# Patient Record
Sex: FEMALE | Race: WHITE | NOT HISPANIC OR LATINO | Employment: OTHER | URBAN - METROPOLITAN AREA
[De-identification: names, ages, dates, MRNs, and addresses within clinical notes are randomized per-mention and may not be internally consistent; named-entity substitution may affect disease eponyms.]

---

## 2017-01-26 ENCOUNTER — HOSPITAL ENCOUNTER (OUTPATIENT)
Dept: INFUSION THERAPY | Facility: HOSPITAL | Age: 72
Discharge: HOME | End: 2017-01-26
Admitting: INTERNAL MEDICINE

## 2017-01-26 ENCOUNTER — DOCUMENTATION (OUTPATIENT)
Dept: GASTROENTEROLOGY | Facility: CLINIC | Age: 72
End: 2017-01-26

## 2017-01-26 VITALS
BODY MASS INDEX: 23.76 KG/M2 | TEMPERATURE: 96 F | RESPIRATION RATE: 20 BRPM | OXYGEN SATURATION: 98 % | WEIGHT: 145 LBS | SYSTOLIC BLOOD PRESSURE: 96 MMHG | DIASTOLIC BLOOD PRESSURE: 93 MMHG | HEART RATE: 74 BPM

## 2017-01-26 DIAGNOSIS — D83.9 COMMON VARIABLE IMMUNODEFICIENCY (HCC): ICD-10-CM

## 2017-01-26 DIAGNOSIS — D80.2 IGA DEFICIENCY (HCC): ICD-10-CM

## 2017-01-26 LAB — IGG1 SER-MCNC: 809 MG/DL (ref 700–1600)

## 2017-01-26 PROCEDURE — 36415 COLL VENOUS BLD VENIPUNCTURE: CPT

## 2017-01-26 PROCEDURE — 96366 THER/PROPH/DIAG IV INF ADDON: CPT

## 2017-01-26 PROCEDURE — 25010000002 IMMUNE GLOBULIN (HUMAN) PER 500 MG: Performed by: ALLERGY & IMMUNOLOGY

## 2017-01-26 PROCEDURE — 96365 THER/PROPH/DIAG IV INF INIT: CPT

## 2017-01-26 RX ORDER — DIPHENHYDRAMINE HYDROCHLORIDE 50 MG/ML
50 INJECTION INTRAMUSCULAR; INTRAVENOUS ONCE AS NEEDED
Status: CANCELLED | OUTPATIENT
Start: 2017-01-26

## 2017-01-26 RX ORDER — ACETAMINOPHEN 500 MG
500 TABLET ORAL ONCE AS NEEDED
Status: DISCONTINUED | OUTPATIENT
Start: 2017-01-26 | End: 2017-01-28 | Stop reason: HOSPADM

## 2017-01-26 RX ORDER — ACETAMINOPHEN 500 MG
500 TABLET ORAL ONCE
Status: CANCELLED
Start: 2017-01-26 | End: 2017-01-26

## 2017-01-26 RX ORDER — EPINEPHRINE 0.3 MG/.3ML
0.3 INJECTION SUBCUTANEOUS ONCE AS NEEDED
Status: DISCONTINUED | OUTPATIENT
Start: 2017-01-26 | End: 2017-01-28 | Stop reason: HOSPADM

## 2017-01-26 RX ORDER — DIPHENHYDRAMINE HCL 25 MG
25 CAPSULE ORAL ONCE
Status: DISCONTINUED | OUTPATIENT
Start: 2017-01-26 | End: 2017-01-28 | Stop reason: HOSPADM

## 2017-01-26 RX ORDER — DIPHENHYDRAMINE HYDROCHLORIDE 50 MG/ML
50 INJECTION INTRAMUSCULAR; INTRAVENOUS ONCE AS NEEDED
Status: DISCONTINUED | OUTPATIENT
Start: 2017-01-26 | End: 2017-01-28 | Stop reason: HOSPADM

## 2017-01-26 RX ORDER — DIPHENHYDRAMINE HCL 25 MG
25 CAPSULE ORAL ONCE
Status: CANCELLED | OUTPATIENT
Start: 2017-01-26

## 2017-01-26 RX ORDER — ACETAMINOPHEN 500 MG
500 TABLET ORAL ONCE AS NEEDED
Status: CANCELLED
Start: 2017-01-26

## 2017-01-26 RX ORDER — ACETAMINOPHEN 500 MG
500 TABLET ORAL ONCE
Status: DISCONTINUED | OUTPATIENT
Start: 2017-01-26 | End: 2017-01-28 | Stop reason: HOSPADM

## 2017-01-26 RX ORDER — EPINEPHRINE 0.3 MG/.3ML
0.3 INJECTION SUBCUTANEOUS ONCE AS NEEDED
Status: CANCELLED
Start: 2017-01-26

## 2017-01-26 RX ADMIN — IMMUNE GLOBULIN INTRAVENOUS (HUMAN) 10 G: KIT at 13:32

## 2017-01-26 RX ADMIN — IMMUNE GLOBULIN INTRAVENOUS (HUMAN) 10 G: KIT at 14:39

## 2017-01-26 NOTE — IP AVS SNAPSHOT
Elaina Tom   1/26/2017 12:00 PM   IVIG    Provider:  ROOM 8 [facility] ACU   Department:  [facility] CARE   Dept Phone:  [phone]                Your Full Care Plan           To Do List     2/23/2017 12:00 PM     Appointment with ROOM 9 [facility] ACU at [facility] CARE ([phone])   Reyna DELACRUZ [facility] [id number]            Your Updated Medication List      ASK your doctor about these medications     ALBUTEROL IN       b complex vitamins tablet       CALCIUM MAGNESIUM PO       CO Q 10 PO       CRANBERRY PO       dicyclomine 10 MG capsule   Commonly known as:  BENTYL   Take 1 capsule by mouth 4 (four) times a day before meals and nightly. TAKE 1 TO 2 CAPSULES EVERY 4 TO 6 HOURS AS NEEDED.       esomeprazole 20 MG capsule   Commonly known as:  NEXIUM   Take 1 capsule by mouth 2 (Two) Times a Day.       ESTRACE VA       FLAX SEEDS PO       fluticasone 50 MCG/BLIST diskus inhaler   Commonly known as:  FLOVENT DISKUS       FOLIC ACID PO       GAMMAGARD IJ       Ginkgo 60 MG tablet       Ginkgo Biloba extract       LECITHIN PO       MUCINEX PO       MULTIVITAMIN ADULT PO       * NON FORMULARY       * NON FORMULARY       * NON FORMULARY       * NON FORMULARY       * NON FORMULARY       OMEGA 3 PO       RESVERATROL PO       sucralfate 1 GM/10ML suspension   Commonly known as:  CARAFATE   Take 10 mL by mouth 4 (four) times a day.       Vinpocetine powder       VITAMIN A PO       Vitamin C 500 MG capsule       VITAMIN D-3 PO       VITAMIN E COMPLETE capsule       WELLNESS ESSENTIALS FOR JOINT PO       ZINC PO       * Notice:  This list has 5 medication(s) that are the same as other medications prescribed for you. Read the directions carefully, and ask your doctor or other care provider to review them with you.            MyChart Signup     [facility] allows you to send messages to your doctor, view your test results, renew your prescriptions,  schedule appointments, and more. To sign up, go to [email or web address] and click on the Sign Up Now link in the New User? box. Enter your [employer] Activation Code exactly as it appears below along with the last four digits of your Social Security Number and your Date of Birth () to complete the sign-up process. If you do not sign up before the expiration date, you must request a new code.    [employer] Activation Code: [email or web address]  Expires: 2017 12:20 PM    If you have questions, you can email [email or web address] or call [phone] to talk to our [employer] staff. Remember, [employer] is NOT to be used for urgent needs. For medical emergencies, dial 911.               Other Info from Your Visit           Allergies     Avelox [Moxifloxacin]     Erythromycin     Levaquin [Levofloxacin]     Other     BLOOD PRODUCTS WITH IGA IN THEM      Reason for Visit     Outpatient Infusion IVIG      Vital Signs     Blood Pressure Pulse Temperature Respirations Weight Oxygen Saturation    134/69 62 96 °F (35.6 °C) (Oral) 20 145 lb (65.8 kg) 98%    Body Mass Index Smoking Status                23.76 kg/m2 Never Smoker          Medications Administered     immune globulin (human) (GAMMAGARD S/D) injection 10 g                  immune globulin (human) (GAMMAGARD S/D) injection 10 g                  immune globulin (human) (GAMMAGARD S/D) injection 10 g                  immune globulin (human) (GAMMAGARD S/D) injection 10 g                  immune globulin (human) (GAMMAGARD S/D) injection 10 g                    Discharge Instructions     None         SYMPTOMS OF A STROKE    Call 911 or have someone take you to the Emergency Department if you have any of the following:    · Sudden numbness or weakness of your face, arm or leg especially on one side of the body  · Sudden confusion, diffiiculty speaking or trouble understanding   · Changes in your vision or loss of sight in one eye  · Sudden severe headache with no  known cause  · sudden dizziness, trouble walking, loss of balance or coordination    It is important to seek emergency care right away if you have further stroke symptoms. If you get emergency help quickly, the powerful clot-dissolving medicines can reduce the disabilities caused by a stroke.     For more information:    American Stroke Association  9-292-6-STROKE  www.strokeassociation.org           IF YOU SMOKE OR USE TOBACCO PLEASE READ THE FOLLOWING:    Why is smoking bad for me?  Smoking increases the risk of heart disease, lung disease, vascular disease, stroke, and cancer.     If you smoke, STOP!    If you would like more information on quitting smoking, please visit the [employer] website: [email or web address]   This link will provide additional resources including the QUIT line and the Beat the Pack support groups.     For more information:    American Cancer Society  [phone]    American Heart Association  1-353.854.2109

## 2017-02-23 ENCOUNTER — HOSPITAL ENCOUNTER (OUTPATIENT)
Dept: INFUSION THERAPY | Facility: HOSPITAL | Age: 72
Discharge: HOME | End: 2017-02-23
Admitting: INTERNAL MEDICINE

## 2017-02-23 VITALS
BODY MASS INDEX: 23.76 KG/M2 | DIASTOLIC BLOOD PRESSURE: 67 MMHG | OXYGEN SATURATION: 95 % | RESPIRATION RATE: 20 BRPM | SYSTOLIC BLOOD PRESSURE: 120 MMHG | HEART RATE: 77 BPM | TEMPERATURE: 98 F | WEIGHT: 145 LBS

## 2017-02-23 DIAGNOSIS — D83.9 COMMON VARIABLE IMMUNODEFICIENCY (HCC): ICD-10-CM

## 2017-02-23 DIAGNOSIS — D80.2 IGA DEFICIENCY (HCC): ICD-10-CM

## 2017-02-23 PROCEDURE — 96366 THER/PROPH/DIAG IV INF ADDON: CPT

## 2017-02-23 PROCEDURE — 25010000002 IMMUNE GLOBULIN (HUMAN) PER 500 MG: Performed by: ALLERGY & IMMUNOLOGY

## 2017-02-23 PROCEDURE — 96365 THER/PROPH/DIAG IV INF INIT: CPT

## 2017-02-23 RX ORDER — ACETAMINOPHEN 500 MG
500 TABLET ORAL ONCE AS NEEDED
Status: DISCONTINUED | OUTPATIENT
Start: 2017-02-23 | End: 2017-02-25 | Stop reason: HOSPADM

## 2017-02-23 RX ORDER — EPINEPHRINE 0.3 MG/.3ML
0.3 INJECTION SUBCUTANEOUS ONCE AS NEEDED
Status: CANCELLED
Start: 2017-02-23

## 2017-02-23 RX ORDER — ACETAMINOPHEN 500 MG
500 TABLET ORAL ONCE
Status: CANCELLED
Start: 2017-02-23 | End: 2017-02-23

## 2017-02-23 RX ORDER — ACETAMINOPHEN 500 MG
500 TABLET ORAL ONCE AS NEEDED
Status: CANCELLED
Start: 2017-02-23

## 2017-02-23 RX ORDER — DIPHENHYDRAMINE HYDROCHLORIDE 50 MG/ML
50 INJECTION INTRAMUSCULAR; INTRAVENOUS ONCE AS NEEDED
Status: CANCELLED | OUTPATIENT
Start: 2017-02-23

## 2017-02-23 RX ORDER — DIPHENHYDRAMINE HCL 25 MG
25 CAPSULE ORAL ONCE
Status: DISCONTINUED | OUTPATIENT
Start: 2017-02-23 | End: 2017-02-25 | Stop reason: HOSPADM

## 2017-02-23 RX ORDER — DIPHENHYDRAMINE HCL 25 MG
25 CAPSULE ORAL ONCE
Status: CANCELLED | OUTPATIENT
Start: 2017-02-23

## 2017-02-23 RX ORDER — DIPHENHYDRAMINE HYDROCHLORIDE 50 MG/ML
50 INJECTION INTRAMUSCULAR; INTRAVENOUS ONCE AS NEEDED
Status: DISCONTINUED | OUTPATIENT
Start: 2017-02-23 | End: 2017-02-25 | Stop reason: HOSPADM

## 2017-02-23 RX ORDER — EPINEPHRINE 0.3 MG/.3ML
0.3 INJECTION SUBCUTANEOUS ONCE AS NEEDED
Status: DISCONTINUED | OUTPATIENT
Start: 2017-02-23 | End: 2017-02-25 | Stop reason: HOSPADM

## 2017-02-23 RX ORDER — ACETAMINOPHEN 500 MG
500 TABLET ORAL ONCE
Status: DISCONTINUED | OUTPATIENT
Start: 2017-02-23 | End: 2017-02-25 | Stop reason: HOSPADM

## 2017-02-23 RX ADMIN — IMMUNE GLOBULIN INTRAVENOUS (HUMAN) 10 G: KIT at 15:02

## 2017-02-23 RX ADMIN — IMMUNE GLOBULIN INTRAVENOUS (HUMAN) 10 G: KIT at 13:55

## 2017-03-21 ENCOUNTER — APPOINTMENT (OUTPATIENT)
Dept: WOMENS IMAGING | Facility: HOSPITAL | Age: 72
End: 2017-03-21

## 2017-03-21 PROCEDURE — G0206 DX MAMMO INCL CAD UNI: HCPCS | Performed by: RADIOLOGY

## 2017-03-21 PROCEDURE — G0279 TOMOSYNTHESIS, MAMMO: HCPCS | Performed by: RADIOLOGY

## 2017-03-21 PROCEDURE — G0202 SCR MAMMO BI INCL CAD: HCPCS | Performed by: RADIOLOGY

## 2017-03-21 PROCEDURE — MDREVIEWSP: Performed by: RADIOLOGY

## 2017-03-21 PROCEDURE — 77063 BREAST TOMOSYNTHESIS BI: CPT | Performed by: RADIOLOGY

## 2017-03-23 ENCOUNTER — HOSPITAL ENCOUNTER (OUTPATIENT)
Dept: INFUSION THERAPY | Facility: HOSPITAL | Age: 72
Discharge: HOME | End: 2017-03-23
Admitting: ALLERGY & IMMUNOLOGY

## 2017-03-23 VITALS
RESPIRATION RATE: 20 BRPM | OXYGEN SATURATION: 97 % | DIASTOLIC BLOOD PRESSURE: 42 MMHG | SYSTOLIC BLOOD PRESSURE: 118 MMHG | BODY MASS INDEX: 23.76 KG/M2 | HEART RATE: 74 BPM | TEMPERATURE: 97.5 F | WEIGHT: 145 LBS

## 2017-03-23 DIAGNOSIS — D80.2 IGA DEFICIENCY (HCC): ICD-10-CM

## 2017-03-23 DIAGNOSIS — D83.9 COMMON VARIABLE IMMUNODEFICIENCY (HCC): ICD-10-CM

## 2017-03-23 PROCEDURE — 96366 THER/PROPH/DIAG IV INF ADDON: CPT

## 2017-03-23 PROCEDURE — 25010000002 IMMUNE GLOBULIN (HUMAN) PER 500 MG: Performed by: ALLERGY & IMMUNOLOGY

## 2017-03-23 PROCEDURE — 96365 THER/PROPH/DIAG IV INF INIT: CPT

## 2017-03-23 RX ORDER — DIPHENHYDRAMINE HYDROCHLORIDE 50 MG/ML
50 INJECTION INTRAMUSCULAR; INTRAVENOUS ONCE AS NEEDED
Status: CANCELLED | OUTPATIENT
Start: 2017-03-23

## 2017-03-23 RX ORDER — ACETAMINOPHEN 500 MG
500 TABLET ORAL ONCE AS NEEDED
Status: CANCELLED
Start: 2017-03-23

## 2017-03-23 RX ORDER — ACETAMINOPHEN 500 MG
500 TABLET ORAL ONCE
Status: CANCELLED
Start: 2017-03-23 | End: 2017-03-23

## 2017-03-23 RX ORDER — DIPHENHYDRAMINE HCL 25 MG
25 CAPSULE ORAL ONCE
Status: CANCELLED | OUTPATIENT
Start: 2017-03-23

## 2017-03-23 RX ORDER — ACETAMINOPHEN 500 MG
500 TABLET ORAL ONCE
Status: DISCONTINUED | OUTPATIENT
Start: 2017-03-23 | End: 2017-03-25 | Stop reason: HOSPADM

## 2017-03-23 RX ORDER — EPINEPHRINE 0.3 MG/.3ML
0.3 INJECTION SUBCUTANEOUS ONCE AS NEEDED
Status: DISCONTINUED | OUTPATIENT
Start: 2017-03-23 | End: 2017-03-25 | Stop reason: HOSPADM

## 2017-03-23 RX ORDER — EPINEPHRINE 0.3 MG/.3ML
0.3 INJECTION SUBCUTANEOUS ONCE AS NEEDED
Status: CANCELLED
Start: 2017-03-23

## 2017-03-23 RX ORDER — DIPHENHYDRAMINE HCL 25 MG
25 CAPSULE ORAL ONCE
Status: DISCONTINUED | OUTPATIENT
Start: 2017-03-23 | End: 2017-03-25 | Stop reason: HOSPADM

## 2017-03-23 RX ORDER — DIPHENHYDRAMINE HYDROCHLORIDE 50 MG/ML
50 INJECTION INTRAMUSCULAR; INTRAVENOUS ONCE AS NEEDED
Status: DISCONTINUED | OUTPATIENT
Start: 2017-03-23 | End: 2017-03-25 | Stop reason: HOSPADM

## 2017-03-23 RX ORDER — ACETAMINOPHEN 500 MG
500 TABLET ORAL ONCE AS NEEDED
Status: DISCONTINUED | OUTPATIENT
Start: 2017-03-23 | End: 2017-03-25 | Stop reason: HOSPADM

## 2017-03-23 RX ADMIN — IMMUNE GLOBULIN INTRAVENOUS (HUMAN) 10 G: KIT at 14:40

## 2017-03-23 RX ADMIN — IMMUNE GLOBULIN INTRAVENOUS (HUMAN) 10 G: KIT at 13:35

## 2017-03-24 ENCOUNTER — APPOINTMENT (OUTPATIENT)
Dept: WOMENS IMAGING | Facility: HOSPITAL | Age: 72
End: 2017-03-24

## 2017-03-24 PROCEDURE — 76641 ULTRASOUND BREAST COMPLETE: CPT | Performed by: RADIOLOGY

## 2017-03-28 ENCOUNTER — APPOINTMENT (OUTPATIENT)
Dept: WOMENS IMAGING | Facility: HOSPITAL | Age: 72
End: 2017-03-28

## 2017-03-28 PROCEDURE — 19083 BX BREAST 1ST LESION US IMAG: CPT | Performed by: RADIOLOGY

## 2017-04-13 ENCOUNTER — OFFICE VISIT (OUTPATIENT)
Dept: SURGERY | Facility: CLINIC | Age: 72
End: 2017-04-13

## 2017-04-13 VITALS — OXYGEN SATURATION: 96 % | HEART RATE: 93 BPM | WEIGHT: 147.2 LBS | HEIGHT: 65 IN | BODY MASS INDEX: 24.53 KG/M2

## 2017-04-13 DIAGNOSIS — C50.912 BREAST CANCER, STAGE 1, LEFT (HCC): Primary | ICD-10-CM

## 2017-04-13 PROCEDURE — 99203 OFFICE O/P NEW LOW 30 MIN: CPT | Performed by: SURGERY

## 2017-04-13 RX ORDER — FLUTICASONE PROPIONATE 50 MCG
2 SPRAY, SUSPENSION (ML) NASAL AS NEEDED
COMMUNITY

## 2017-04-13 RX ORDER — OCTISALATE, AVOBENZONE, HOMOSALATE, AND OCTOCRYLENE 29.4; 29.4; 49; 25.48 MG/ML; MG/ML; MG/ML; MG/ML
1 LOTION TOPICAL DAILY
COMMUNITY
End: 2018-07-11

## 2017-04-13 RX ORDER — ESTRADIOL 10 UG/1
1 INSERT VAGINAL 3 TIMES DAILY
COMMUNITY
End: 2017-05-15 | Stop reason: SDUPTHER

## 2017-04-13 RX ORDER — CEFAZOLIN SODIUM 2 G/100ML
2 INJECTION, SOLUTION INTRAVENOUS ONCE
Status: CANCELLED | OUTPATIENT
Start: 2017-04-13 | End: 2017-04-13

## 2017-04-14 ENCOUNTER — TRANSCRIBE ORDERS (OUTPATIENT)
Dept: ADMINISTRATIVE | Facility: HOSPITAL | Age: 72
End: 2017-04-14

## 2017-04-14 DIAGNOSIS — D05.92 UNSPECIFIED TYPE OF CARCINOMA IN SITU OF LEFT BREAST: Primary | ICD-10-CM

## 2017-04-14 NOTE — PROGRESS NOTES
CC:  Newly diagnosed breast cancer    HPI:  71-year-old lady presents with abnormal mammogram and ultrasound:    Mammogram 3/21/17 demonstrated irregular mass measuring 12 mm with associated architectural distortion in the posterior one third 6:30 o'clock region of the left breast located 6 cm from the nipple.    Ultrasound 3/24/17 solid mass in left breast at 6:00 located 5 cm from nipple is suspicious, measuring 7 mm.    No symptoms, no palpable mass on self-exam    ROS:  No chest pain or shortness of air.  Negative for unexpected weight loss.  All other systems reviewed and negative other than presenting complaints.    PSH:    EGD 2016  Colonoscopy 2014  Left breast biopsy 2007    PMH:    Gastroesophageal reflux disease  Common variable immune deficiency    MEDICATIONS: reviewed, in Epic    ALLERGIES: reviewed, in [facility]    FAMILY HISTORY:    Negative for breast cancer    SOCIAL HISTORY:   Denies tobacco use  Occasional alcohol use    PHYSICAL EXAM:   Constitutional: Well-developed well-nourished, no acute distress  Eyes: Conjunctiva normal, sclera nonicteric  ENMT: Hearing grossly normal, oral mucosa moist  Neck: Supple, no palpable mass, normal thyroid, trachea midline  Respiratory: Clear to auscultation, normal inspiratory effort  Cardiovascular: Regular rate, no murmur, no carotid bruit, no peripheral edema, no jugular venous distention  Gastrointestinal: Soft, nontender  Lymphatics (palpable nodes):  cervical-negative, axillary-negative  Breast:  In the lower mid to outer section of the left breast a proximally 5-6 cm from the nipple is a small perhaps 1 cm firm movable palpable nodule which likely represents a malignancy in question although there is significant ecchymosis from recent biopsy and this could simply be a hematoma as well.  No other palpable or visible abnormality is noted in the left or right breast, no skin retraction, no nipple discharge  Musculoskeletal: Symmetric strength, normal  gait  Psychiatric: Alert and oriented ×3, normal affect     PATHOLOGY:    Invasive ductal carcinoma low-grade, largest focus measuring 7.5 mm    Estrogen and progesterone receptor strongly positive, HER-2 trae negative    CLINICAL SUMMARY (A/P):    71-year-old lady with low-grade invasive duct carcinoma (7.5 mm on biopsy, 7 mm on ultrasound and 12 mm on mammogram), strongly estrogen and progesterone receptor positive, HER-2 negative.  She does have a history of atypical duct hyperplasia in the left breast in the upper central portion diagnosed in 2007.  I discussed the significance of the previous diagnosis of atypical duct hyperplasia with her and her family.  She is a very good candidate for left breast needle localization lumpectomy and sentinel lymph node biopsy and wishes to proceed accordingly.  She understands with atypical hyperplasia she is at increased risk of breast cancer in both breasts in all quadrants and that bilateral mastectomy would also be an option but she is not interested in pursuing this.  She has scheduled for surgery on May 19.    Steve Leon M.D.

## 2017-04-18 DIAGNOSIS — D83.9 COMMON VARIABLE IMMUNODEFICIENCY (HCC): Primary | ICD-10-CM

## 2017-04-20 ENCOUNTER — INFUSION (OUTPATIENT)
Dept: ONCOLOGY | Facility: HOSPITAL | Age: 72
End: 2017-04-20

## 2017-04-20 VITALS
TEMPERATURE: 97.9 F | OXYGEN SATURATION: 99 % | DIASTOLIC BLOOD PRESSURE: 81 MMHG | BODY MASS INDEX: 24.84 KG/M2 | WEIGHT: 147 LBS | HEART RATE: 70 BPM | SYSTOLIC BLOOD PRESSURE: 131 MMHG

## 2017-04-20 DIAGNOSIS — D80.2 IGA DEFICIENCY (HCC): ICD-10-CM

## 2017-04-20 DIAGNOSIS — D83.9 COMMON VARIABLE IMMUNODEFICIENCY (HCC): Primary | ICD-10-CM

## 2017-04-20 LAB
ALBUMIN SERPL-MCNC: 4.4 G/DL (ref 3.5–5.2)
ALBUMIN/GLOB SERPL: 2 G/DL
ALP SERPL-CCNC: 70 U/L (ref 39–117)
ALT SERPL W P-5'-P-CCNC: 18 U/L (ref 1–33)
ANION GAP SERPL CALCULATED.3IONS-SCNC: 10 MMOL/L
AST SERPL-CCNC: 21 U/L (ref 1–32)
BASOPHILS # BLD AUTO: 0.03 10*3/MM3 (ref 0–0.2)
BASOPHILS NFR BLD AUTO: 0.7 % (ref 0–1.5)
BILIRUB SERPL-MCNC: 0.8 MG/DL (ref 0.1–1.2)
BUN BLD-MCNC: 14 MG/DL (ref 8–23)
BUN/CREAT SERPL: 18.7 (ref 7–25)
CALCIUM SPEC-SCNC: 9.3 MG/DL (ref 8.6–10.5)
CHLORIDE SERPL-SCNC: 105 MMOL/L (ref 98–107)
CO2 SERPL-SCNC: 28 MMOL/L (ref 22–29)
CREAT BLD-MCNC: 0.75 MG/DL (ref 0.57–1)
DEPRECATED RDW RBC AUTO: 46.6 FL (ref 37–54)
EOSINOPHIL # BLD AUTO: 0.05 10*3/MM3 (ref 0–0.7)
EOSINOPHIL NFR BLD AUTO: 1.2 % (ref 0.3–6.2)
ERYTHROCYTE [DISTWIDTH] IN BLOOD BY AUTOMATED COUNT: 13.1 % (ref 11.7–13)
GFR SERPL CREATININE-BSD FRML MDRD: 77 ML/MIN/1.73
GLOBULIN UR ELPH-MCNC: 2.2 GM/DL
GLUCOSE BLD-MCNC: 94 MG/DL (ref 65–99)
HCT VFR BLD AUTO: 36.4 % (ref 35.6–45.5)
HGB BLD-MCNC: 12.2 G/DL (ref 11.9–15.5)
IGG1 SER-MCNC: 811 MG/DL (ref 700–1600)
IMM GRANULOCYTES # BLD: 0.01 10*3/MM3 (ref 0–0.03)
IMM GRANULOCYTES NFR BLD: 0.2 % (ref 0–0.5)
LYMPHOCYTES # BLD AUTO: 1.18 10*3/MM3 (ref 0.9–4.8)
LYMPHOCYTES NFR BLD AUTO: 27.7 % (ref 19.6–45.3)
MCH RBC QN AUTO: 32.4 PG (ref 26.9–32)
MCHC RBC AUTO-ENTMCNC: 33.5 G/DL (ref 32.4–36.3)
MCV RBC AUTO: 96.8 FL (ref 80.5–98.2)
MONOCYTES # BLD AUTO: 0.29 10*3/MM3 (ref 0.2–1.2)
MONOCYTES NFR BLD AUTO: 6.8 % (ref 5–12)
NEUTROPHILS # BLD AUTO: 2.7 10*3/MM3 (ref 1.9–8.1)
NEUTROPHILS NFR BLD AUTO: 63.4 % (ref 42.7–76)
NRBC BLD MANUAL-RTO: 0 /100 WBC (ref 0–0)
PLATELET # BLD AUTO: 251 10*3/MM3 (ref 140–500)
PMV BLD AUTO: 10 FL (ref 6–12)
POTASSIUM BLD-SCNC: 4.2 MMOL/L (ref 3.5–5.2)
PROT SERPL-MCNC: 6.6 G/DL (ref 6–8.5)
RBC # BLD AUTO: 3.76 10*6/MM3 (ref 3.9–5.2)
SODIUM BLD-SCNC: 143 MMOL/L (ref 136–145)
WBC NRBC COR # BLD: 4.26 10*3/MM3 (ref 4.5–10.7)

## 2017-04-20 PROCEDURE — 85025 COMPLETE CBC W/AUTO DIFF WBC: CPT | Performed by: NURSE PRACTITIONER

## 2017-04-20 PROCEDURE — 96365 THER/PROPH/DIAG IV INF INIT: CPT | Performed by: NURSE PRACTITIONER

## 2017-04-20 PROCEDURE — 80053 COMPREHEN METABOLIC PANEL: CPT | Performed by: NURSE PRACTITIONER

## 2017-04-20 PROCEDURE — 36415 COLL VENOUS BLD VENIPUNCTURE: CPT | Performed by: NURSE PRACTITIONER

## 2017-04-20 PROCEDURE — 96366 THER/PROPH/DIAG IV INF ADDON: CPT | Performed by: NURSE PRACTITIONER

## 2017-04-20 PROCEDURE — 25010000002 IMMUNE GLOBULIN (HUMAN) PER 500 MG: Performed by: ALLERGY & IMMUNOLOGY

## 2017-04-20 RX ORDER — ACETAMINOPHEN 500 MG
500 TABLET ORAL ONCE
Status: CANCELLED
Start: 2017-04-20 | End: 2017-04-20

## 2017-04-20 RX ORDER — EPINEPHRINE 0.3 MG/.3ML
0.3 INJECTION SUBCUTANEOUS ONCE AS NEEDED
Status: CANCELLED
Start: 2017-04-20

## 2017-04-20 RX ORDER — DIPHENHYDRAMINE HYDROCHLORIDE 50 MG/ML
50 INJECTION INTRAMUSCULAR; INTRAVENOUS ONCE AS NEEDED
Status: CANCELLED | OUTPATIENT
Start: 2017-04-20

## 2017-04-20 RX ORDER — ACETAMINOPHEN 500 MG
500 TABLET ORAL ONCE AS NEEDED
Status: CANCELLED
Start: 2017-04-20

## 2017-04-20 RX ORDER — DIPHENHYDRAMINE HCL 25 MG
25 CAPSULE ORAL ONCE
Status: CANCELLED | OUTPATIENT
Start: 2017-04-20

## 2017-04-20 RX ADMIN — IMMUNE GLOBULIN INTRAVENOUS (HUMAN) 10 G: KIT at 12:35

## 2017-04-20 RX ADMIN — IMMUNE GLOBULIN INTRAVENOUS (HUMAN) 10 G: KIT at 13:53

## 2017-05-15 ENCOUNTER — APPOINTMENT (OUTPATIENT)
Dept: PREADMISSION TESTING | Facility: HOSPITAL | Age: 72
End: 2017-05-15

## 2017-05-15 PROCEDURE — 93005 ELECTROCARDIOGRAM TRACING: CPT

## 2017-05-15 PROCEDURE — 93010 ELECTROCARDIOGRAM REPORT: CPT | Performed by: INTERNAL MEDICINE

## 2017-05-17 RX ORDER — ACETAMINOPHEN 325 MG/1
650 TABLET ORAL ONCE
Status: CANCELLED
Start: 2017-05-17 | End: 2017-05-17

## 2017-05-18 ENCOUNTER — INFUSION (OUTPATIENT)
Dept: ONCOLOGY | Facility: HOSPITAL | Age: 72
End: 2017-05-18

## 2017-05-18 VITALS
BODY MASS INDEX: 24.94 KG/M2 | WEIGHT: 147.6 LBS | HEART RATE: 80 BPM | OXYGEN SATURATION: 97 % | SYSTOLIC BLOOD PRESSURE: 126 MMHG | TEMPERATURE: 97.4 F | DIASTOLIC BLOOD PRESSURE: 77 MMHG

## 2017-05-18 DIAGNOSIS — D80.2 IGA DEFICIENCY (HCC): ICD-10-CM

## 2017-05-18 DIAGNOSIS — D83.9 COMMON VARIABLE IMMUNODEFICIENCY (HCC): Primary | ICD-10-CM

## 2017-05-18 PROCEDURE — 96366 THER/PROPH/DIAG IV INF ADDON: CPT | Performed by: NURSE PRACTITIONER

## 2017-05-18 PROCEDURE — 25010000002 IMMUNE GLOBULIN (HUMAN) PER 500 MG: Performed by: ALLERGY & IMMUNOLOGY

## 2017-05-18 PROCEDURE — 96365 THER/PROPH/DIAG IV INF INIT: CPT | Performed by: NURSE PRACTITIONER

## 2017-05-18 RX ORDER — DIPHENHYDRAMINE HCL 25 MG
25 CAPSULE ORAL ONCE
Status: CANCELLED | OUTPATIENT
Start: 2017-05-18

## 2017-05-18 RX ORDER — DIPHENHYDRAMINE HYDROCHLORIDE 50 MG/ML
50 INJECTION INTRAMUSCULAR; INTRAVENOUS ONCE AS NEEDED
Status: CANCELLED | OUTPATIENT
Start: 2017-05-18

## 2017-05-18 RX ORDER — EPINEPHRINE 0.3 MG/.3ML
0.3 INJECTION SUBCUTANEOUS ONCE AS NEEDED
Status: CANCELLED
Start: 2017-05-18

## 2017-05-18 RX ORDER — ACETAMINOPHEN 500 MG
500 TABLET ORAL ONCE AS NEEDED
Status: CANCELLED
Start: 2017-05-18

## 2017-05-18 RX ORDER — ACETAMINOPHEN 325 MG/1
650 TABLET ORAL ONCE
Status: CANCELLED
Start: 2017-05-18 | End: 2017-05-18

## 2017-05-18 RX ADMIN — IMMUNE GLOBULIN INTRAVENOUS (HUMAN) 10 G: KIT at 14:07

## 2017-05-18 RX ADMIN — IMMUNE GLOBULIN INTRAVENOUS (HUMAN) 10 G: KIT at 12:32

## 2017-05-19 ENCOUNTER — APPOINTMENT (OUTPATIENT)
Dept: GENERAL RADIOLOGY | Facility: HOSPITAL | Age: 72
End: 2017-05-19

## 2017-05-19 ENCOUNTER — HOSPITAL ENCOUNTER (OUTPATIENT)
Dept: MAMMOGRAPHY | Facility: HOSPITAL | Age: 72
Discharge: HOME | End: 2017-05-19
Attending: SURGERY

## 2017-05-19 ENCOUNTER — HOSPITAL ENCOUNTER (OUTPATIENT)
Facility: HOSPITAL | Age: 72
Setting detail: HOSPITAL OUTPATIENT SURGERY
Discharge: HOME | End: 2017-05-19
Attending: SURGERY | Admitting: SURGERY

## 2017-05-19 ENCOUNTER — APPOINTMENT (OUTPATIENT)
Dept: NUCLEAR MEDICINE | Facility: HOSPITAL | Age: 72
End: 2017-05-19

## 2017-05-19 ENCOUNTER — ANESTHESIA (OUTPATIENT)
Dept: PERIOP | Facility: HOSPITAL | Age: 72
End: 2017-05-19

## 2017-05-19 ENCOUNTER — ANESTHESIA EVENT (OUTPATIENT)
Dept: PERIOP | Facility: HOSPITAL | Age: 72
End: 2017-05-19

## 2017-05-19 VITALS
DIASTOLIC BLOOD PRESSURE: 85 MMHG | SYSTOLIC BLOOD PRESSURE: 147 MMHG | RESPIRATION RATE: 16 BRPM | OXYGEN SATURATION: 97 % | HEART RATE: 66 BPM | TEMPERATURE: 97.4 F

## 2017-05-19 DIAGNOSIS — C50.912 BREAST CANCER, STAGE 1, LEFT (HCC): ICD-10-CM

## 2017-05-19 DIAGNOSIS — D05.92 UNSPECIFIED TYPE OF CARCINOMA IN SITU OF LEFT BREAST: ICD-10-CM

## 2017-05-19 PROCEDURE — 38900 IO MAP OF SENT LYMPH NODE: CPT | Performed by: SURGERY

## 2017-05-19 PROCEDURE — 0 TECHNETIUM FILTERED SULFUR COLLOID: Performed by: SURGERY

## 2017-05-19 PROCEDURE — 25010000003 CEFAZOLIN IN DEXTROSE 2-4 GM/100ML-% SOLUTION: Performed by: SURGERY

## 2017-05-19 PROCEDURE — 88307 TISSUE EXAM BY PATHOLOGIST: CPT | Performed by: SURGERY

## 2017-05-19 PROCEDURE — 38525 BIOPSY/REMOVAL LYMPH NODES: CPT | Performed by: SURGERY

## 2017-05-19 PROCEDURE — A9541 TC99M SULFUR COLLOID: HCPCS | Performed by: SURGERY

## 2017-05-19 PROCEDURE — 25010000002 PROPOFOL 10 MG/ML EMULSION: Performed by: NURSE ANESTHETIST, CERTIFIED REGISTERED

## 2017-05-19 PROCEDURE — 19301 PARTIAL MASTECTOMY: CPT | Performed by: SURGERY

## 2017-05-19 PROCEDURE — 25010000002 ONDANSETRON PER 1 MG: Performed by: NURSE ANESTHETIST, CERTIFIED REGISTERED

## 2017-05-19 PROCEDURE — 19301 PARTIAL MASTECTOMY: CPT | Performed by: PHYSICIAN ASSISTANT

## 2017-05-19 PROCEDURE — 25010000002 MIDAZOLAM PER 1 MG: Performed by: ANESTHESIOLOGY

## 2017-05-19 PROCEDURE — 38792 RA TRACER ID OF SENTINL NODE: CPT

## 2017-05-19 PROCEDURE — 25010000002 FENTANYL CITRATE (PF) 100 MCG/2ML SOLUTION: Performed by: NURSE ANESTHETIST, CERTIFIED REGISTERED

## 2017-05-19 PROCEDURE — 25010000002 DEXAMETHASONE PER 1 MG: Performed by: NURSE ANESTHETIST, CERTIFIED REGISTERED

## 2017-05-19 PROCEDURE — 76098 X-RAY EXAM SURGICAL SPECIMEN: CPT

## 2017-05-19 RX ORDER — NALBUPHINE HCL 10 MG/ML
2 AMPUL (ML) INJECTION EVERY 4 HOURS PRN
Status: DISCONTINUED | OUTPATIENT
Start: 2017-05-19 | End: 2017-05-19 | Stop reason: HOSPADM

## 2017-05-19 RX ORDER — HYDROCODONE BITARTRATE AND ACETAMINOPHEN 5; 325 MG/1; MG/1
TABLET ORAL
Qty: 40 TABLET | Refills: 0 | Status: SHIPPED | OUTPATIENT
Start: 2017-05-19 | End: 2017-06-01

## 2017-05-19 RX ORDER — ACETAMINOPHEN 325 MG/1
650 TABLET ORAL ONCE AS NEEDED
Status: DISCONTINUED | OUTPATIENT
Start: 2017-05-19 | End: 2017-05-19 | Stop reason: HOSPADM

## 2017-05-19 RX ORDER — HYDRALAZINE HYDROCHLORIDE 20 MG/ML
5 INJECTION INTRAMUSCULAR; INTRAVENOUS
Status: DISCONTINUED | OUTPATIENT
Start: 2017-05-19 | End: 2017-05-19 | Stop reason: HOSPADM

## 2017-05-19 RX ORDER — NALOXONE HCL 0.4 MG/ML
0.4 VIAL (ML) INJECTION AS NEEDED
Status: DISCONTINUED | OUTPATIENT
Start: 2017-05-19 | End: 2017-05-19 | Stop reason: HOSPADM

## 2017-05-19 RX ORDER — FAMOTIDINE 10 MG/ML
20 INJECTION, SOLUTION INTRAVENOUS ONCE
Status: COMPLETED | OUTPATIENT
Start: 2017-05-19 | End: 2017-05-19

## 2017-05-19 RX ORDER — PROMETHAZINE HYDROCHLORIDE 25 MG/ML
6.25 INJECTION, SOLUTION INTRAMUSCULAR; INTRAVENOUS ONCE AS NEEDED
Status: DISCONTINUED | OUTPATIENT
Start: 2017-05-19 | End: 2017-05-19 | Stop reason: HOSPADM

## 2017-05-19 RX ORDER — DIAZEPAM 5 MG/1
TABLET ORAL
Status: COMPLETED
Start: 2017-05-19 | End: 2017-05-19

## 2017-05-19 RX ORDER — PROPOFOL 10 MG/ML
VIAL (ML) INTRAVENOUS AS NEEDED
Status: DISCONTINUED | OUTPATIENT
Start: 2017-05-19 | End: 2017-05-19 | Stop reason: SURG

## 2017-05-19 RX ORDER — DIAZEPAM 5 MG/ML
5 INJECTION, SOLUTION INTRAMUSCULAR; INTRAVENOUS ONCE
Status: DISCONTINUED | OUTPATIENT
Start: 2017-05-19 | End: 2017-05-19 | Stop reason: HOSPADM

## 2017-05-19 RX ORDER — BUPIVACAINE HYDROCHLORIDE AND EPINEPHRINE 5; 5 MG/ML; UG/ML
INJECTION, SOLUTION PERINEURAL AS NEEDED
Status: DISCONTINUED | OUTPATIENT
Start: 2017-05-19 | End: 2017-05-19 | Stop reason: HOSPADM

## 2017-05-19 RX ORDER — MIDAZOLAM HYDROCHLORIDE 1 MG/ML
1 INJECTION INTRAMUSCULAR; INTRAVENOUS
Status: DISCONTINUED | OUTPATIENT
Start: 2017-05-19 | End: 2017-05-19 | Stop reason: HOSPADM

## 2017-05-19 RX ORDER — PROMETHAZINE HYDROCHLORIDE 25 MG/1
25 TABLET ORAL ONCE AS NEEDED
Status: DISCONTINUED | OUTPATIENT
Start: 2017-05-19 | End: 2017-05-19 | Stop reason: HOSPADM

## 2017-05-19 RX ORDER — LIDOCAINE HYDROCHLORIDE 20 MG/ML
INJECTION, SOLUTION INFILTRATION; PERINEURAL AS NEEDED
Status: DISCONTINUED | OUTPATIENT
Start: 2017-05-19 | End: 2017-05-19 | Stop reason: SURG

## 2017-05-19 RX ORDER — DIAZEPAM 5 MG/1
5 TABLET ORAL ONCE
Status: COMPLETED | OUTPATIENT
Start: 2017-05-19 | End: 2017-05-19

## 2017-05-19 RX ORDER — SCOLOPAMINE TRANSDERMAL SYSTEM 1 MG/1
1 PATCH, EXTENDED RELEASE TRANSDERMAL ONCE
Status: DISCONTINUED | OUTPATIENT
Start: 2017-05-19 | End: 2017-05-19 | Stop reason: HOSPADM

## 2017-05-19 RX ORDER — ACETAMINOPHEN 650 MG/1
650 SUPPOSITORY RECTAL ONCE AS NEEDED
Status: DISCONTINUED | OUTPATIENT
Start: 2017-05-19 | End: 2017-05-19 | Stop reason: HOSPADM

## 2017-05-19 RX ORDER — LIDOCAINE HYDROCHLORIDE 10 MG/ML
3 INJECTION, SOLUTION INFILTRATION; PERINEURAL ONCE
Status: COMPLETED | OUTPATIENT
Start: 2017-05-19 | End: 2017-05-19

## 2017-05-19 RX ORDER — ISOSULFAN BLUE 50 MG/5ML
INJECTION, SOLUTION SUBCUTANEOUS AS NEEDED
Status: DISCONTINUED | OUTPATIENT
Start: 2017-05-19 | End: 2017-05-19 | Stop reason: HOSPADM

## 2017-05-19 RX ORDER — CEFAZOLIN SODIUM 2 G/100ML
2 INJECTION, SOLUTION INTRAVENOUS ONCE
Status: COMPLETED | OUTPATIENT
Start: 2017-05-19 | End: 2017-05-19

## 2017-05-19 RX ORDER — OXYCODONE HYDROCHLORIDE AND ACETAMINOPHEN 5; 325 MG/1; MG/1
1 TABLET ORAL ONCE AS NEEDED
Status: DISCONTINUED | OUTPATIENT
Start: 2017-05-19 | End: 2017-05-19 | Stop reason: HOSPADM

## 2017-05-19 RX ORDER — PROMETHAZINE HYDROCHLORIDE 25 MG/1
25 TABLET ORAL EVERY 6 HOURS PRN
Qty: 10 TABLET | Refills: 0 | Status: SHIPPED | OUTPATIENT
Start: 2017-05-19 | End: 2017-06-01

## 2017-05-19 RX ORDER — FENTANYL CITRATE 50 UG/ML
50 INJECTION, SOLUTION INTRAMUSCULAR; INTRAVENOUS
Status: DISCONTINUED | OUTPATIENT
Start: 2017-05-19 | End: 2017-05-19 | Stop reason: HOSPADM

## 2017-05-19 RX ORDER — DIPHENHYDRAMINE HYDROCHLORIDE 50 MG/ML
12.5 INJECTION INTRAMUSCULAR; INTRAVENOUS
Status: DISCONTINUED | OUTPATIENT
Start: 2017-05-19 | End: 2017-05-19 | Stop reason: HOSPADM

## 2017-05-19 RX ORDER — ONDANSETRON 2 MG/ML
INJECTION INTRAMUSCULAR; INTRAVENOUS AS NEEDED
Status: DISCONTINUED | OUTPATIENT
Start: 2017-05-19 | End: 2017-05-19 | Stop reason: SURG

## 2017-05-19 RX ORDER — HYDROCODONE BITARTRATE AND ACETAMINOPHEN 5; 325 MG/1; MG/1
1 TABLET ORAL EVERY 6 HOURS PRN
Status: DISCONTINUED | OUTPATIENT
Start: 2017-05-19 | End: 2017-05-19 | Stop reason: HOSPADM

## 2017-05-19 RX ORDER — FENTANYL CITRATE 50 UG/ML
INJECTION, SOLUTION INTRAMUSCULAR; INTRAVENOUS AS NEEDED
Status: DISCONTINUED | OUTPATIENT
Start: 2017-05-19 | End: 2017-05-19 | Stop reason: SURG

## 2017-05-19 RX ORDER — HYDROMORPHONE HYDROCHLORIDE 1 MG/ML
0.25 INJECTION, SOLUTION INTRAMUSCULAR; INTRAVENOUS; SUBCUTANEOUS
Status: DISCONTINUED | OUTPATIENT
Start: 2017-05-19 | End: 2017-05-19 | Stop reason: HOSPADM

## 2017-05-19 RX ORDER — LIDOCAINE AND PRILOCAINE 25; 25 MG/G; MG/G
CREAM TOPICAL ONCE
Status: DISCONTINUED | OUTPATIENT
Start: 2017-05-19 | End: 2017-05-19 | Stop reason: HOSPADM

## 2017-05-19 RX ORDER — DEXAMETHASONE SODIUM PHOSPHATE 10 MG/ML
INJECTION INTRAMUSCULAR; INTRAVENOUS AS NEEDED
Status: DISCONTINUED | OUTPATIENT
Start: 2017-05-19 | End: 2017-05-19 | Stop reason: SURG

## 2017-05-19 RX ORDER — ACETAMINOPHEN 325 MG/1
650 TABLET ORAL ONCE
Status: COMPLETED | OUTPATIENT
Start: 2017-05-19 | End: 2017-05-19

## 2017-05-19 RX ORDER — MIDAZOLAM HYDROCHLORIDE 1 MG/ML
2 INJECTION INTRAMUSCULAR; INTRAVENOUS
Status: DISCONTINUED | OUTPATIENT
Start: 2017-05-19 | End: 2017-05-19 | Stop reason: HOSPADM

## 2017-05-19 RX ORDER — SODIUM CHLORIDE, SODIUM LACTATE, POTASSIUM CHLORIDE, CALCIUM CHLORIDE 600; 310; 30; 20 MG/100ML; MG/100ML; MG/100ML; MG/100ML
9 INJECTION, SOLUTION INTRAVENOUS CONTINUOUS
Status: DISCONTINUED | OUTPATIENT
Start: 2017-05-19 | End: 2017-05-19 | Stop reason: HOSPADM

## 2017-05-19 RX ORDER — MAGNESIUM HYDROXIDE 1200 MG/15ML
LIQUID ORAL AS NEEDED
Status: DISCONTINUED | OUTPATIENT
Start: 2017-05-19 | End: 2017-05-19 | Stop reason: HOSPADM

## 2017-05-19 RX ORDER — NALBUPHINE HCL 10 MG/ML
10 AMPUL (ML) INJECTION EVERY 4 HOURS PRN
Status: DISCONTINUED | OUTPATIENT
Start: 2017-05-19 | End: 2017-05-19 | Stop reason: HOSPADM

## 2017-05-19 RX ORDER — PROMETHAZINE HYDROCHLORIDE 25 MG/1
25 SUPPOSITORY RECTAL ONCE AS NEEDED
Status: DISCONTINUED | OUTPATIENT
Start: 2017-05-19 | End: 2017-05-19 | Stop reason: HOSPADM

## 2017-05-19 RX ORDER — SODIUM CHLORIDE 0.9 % (FLUSH) 0.9 %
1-10 SYRINGE (ML) INJECTION AS NEEDED
Status: DISCONTINUED | OUTPATIENT
Start: 2017-05-19 | End: 2017-05-19 | Stop reason: HOSPADM

## 2017-05-19 RX ADMIN — PROPOFOL 150 MG: 10 INJECTION, EMULSION INTRAVENOUS at 09:23

## 2017-05-19 RX ADMIN — SODIUM CHLORIDE, POTASSIUM CHLORIDE, SODIUM LACTATE AND CALCIUM CHLORIDE 9 ML/HR: 600; 310; 30; 20 INJECTION, SOLUTION INTRAVENOUS at 08:54

## 2017-05-19 RX ADMIN — LIDOCAINE HYDROCHLORIDE 3 ML: 10 INJECTION, SOLUTION INFILTRATION; PERINEURAL at 07:50

## 2017-05-19 RX ADMIN — CEFAZOLIN SODIUM 2 G: 2 INJECTION, SOLUTION INTRAVENOUS at 09:26

## 2017-05-19 RX ADMIN — Medication 1 DOSE: at 08:35

## 2017-05-19 RX ADMIN — DEXAMETHASONE SODIUM PHOSPHATE 8 MG: 10 INJECTION INTRAMUSCULAR; INTRAVENOUS at 09:38

## 2017-05-19 RX ADMIN — DIAZEPAM 5 MG: 5 TABLET ORAL at 07:02

## 2017-05-19 RX ADMIN — FENTANYL CITRATE 50 MCG: 50 INJECTION INTRAMUSCULAR; INTRAVENOUS at 09:23

## 2017-05-19 RX ADMIN — HYDROCODONE BITARTRATE AND ACETAMINOPHEN 1 TABLET: 5; 325 TABLET ORAL at 11:04

## 2017-05-19 RX ADMIN — FENTANYL CITRATE 50 MCG: 50 INJECTION INTRAMUSCULAR; INTRAVENOUS at 09:39

## 2017-05-19 RX ADMIN — FAMOTIDINE 20 MG: 10 INJECTION, SOLUTION INTRAVENOUS at 08:54

## 2017-05-19 RX ADMIN — LIDOCAINE HYDROCHLORIDE 60 MG: 20 INJECTION, SOLUTION INFILTRATION; PERINEURAL at 09:23

## 2017-05-19 RX ADMIN — SCOPOLAMINE 1 PATCH: 1 PATCH, EXTENDED RELEASE TRANSDERMAL at 08:54

## 2017-05-19 RX ADMIN — ONDANSETRON 4 MG: 2 INJECTION INTRAMUSCULAR; INTRAVENOUS at 09:41

## 2017-05-19 RX ADMIN — EPHEDRINE SULFATE 10 MG: 50 INJECTION INTRAMUSCULAR; INTRAVENOUS; SUBCUTANEOUS at 09:52

## 2017-05-19 RX ADMIN — ACETAMINOPHEN 650 MG: 325 TABLET ORAL at 08:53

## 2017-05-19 RX ADMIN — MIDAZOLAM 1 MG: 1 INJECTION INTRAMUSCULAR; INTRAVENOUS at 08:54

## 2017-05-22 LAB
CYTO UR: NORMAL
LAB AP CASE REPORT: NORMAL
Lab: NORMAL
PATH REPORT.FINAL DX SPEC: NORMAL
PATH REPORT.GROSS SPEC: NORMAL

## 2017-05-24 ENCOUNTER — TELEPHONE (OUTPATIENT)
Dept: SURGERY | Facility: CLINIC | Age: 72
End: 2017-05-24

## 2017-06-01 ENCOUNTER — OFFICE VISIT (OUTPATIENT)
Dept: SURGERY | Facility: CLINIC | Age: 72
End: 2017-06-01

## 2017-06-01 DIAGNOSIS — C50.912 MALIGNANT NEOPLASM OF LEFT FEMALE BREAST, UNSPECIFIED SITE OF BREAST: ICD-10-CM

## 2017-06-01 DIAGNOSIS — Z09 FOLLOW UP: Primary | ICD-10-CM

## 2017-06-01 PROCEDURE — 99024 POSTOP FOLLOW-UP VISIT: CPT | Performed by: SURGERY

## 2017-06-08 DIAGNOSIS — R89.9 ABNORMAL LABORATORY TEST RESULT: Primary | ICD-10-CM

## 2017-06-09 ENCOUNTER — LAB (OUTPATIENT)
Dept: OTHER | Facility: HOSPITAL | Age: 72
End: 2017-06-09

## 2017-06-09 ENCOUNTER — CONSULT (OUTPATIENT)
Dept: ONCOLOGY | Facility: CLINIC | Age: 72
End: 2017-06-09

## 2017-06-09 VITALS
DIASTOLIC BLOOD PRESSURE: 79 MMHG | BODY MASS INDEX: 24.24 KG/M2 | HEIGHT: 65 IN | RESPIRATION RATE: 16 BRPM | HEART RATE: 85 BPM | OXYGEN SATURATION: 97 % | TEMPERATURE: 98.3 F | WEIGHT: 145.5 LBS | SYSTOLIC BLOOD PRESSURE: 157 MMHG

## 2017-06-09 DIAGNOSIS — D83.9 COMMON VARIABLE IMMUNODEFICIENCY (HCC): ICD-10-CM

## 2017-06-09 DIAGNOSIS — M81.0 OSTEOPOROSIS: ICD-10-CM

## 2017-06-09 DIAGNOSIS — C50.512 MALIGNANT NEOPLASM OF LOWER-OUTER QUADRANT OF LEFT FEMALE BREAST (HCC): Primary | ICD-10-CM

## 2017-06-09 DIAGNOSIS — R89.9 ABNORMAL LABORATORY TEST RESULT: ICD-10-CM

## 2017-06-09 DIAGNOSIS — D80.2 IGA DEFICIENCY (HCC): Primary | ICD-10-CM

## 2017-06-09 LAB
BASOPHILS # BLD AUTO: 0.1 10*3/MM3 (ref 0–0.2)
BASOPHILS NFR BLD AUTO: 1.8 % (ref 0–1.5)
DEPRECATED RDW RBC AUTO: 44.7 FL (ref 37–54)
EOSINOPHIL # BLD AUTO: 0.35 10*3/MM3 (ref 0–0.7)
EOSINOPHIL NFR BLD AUTO: 6.3 % (ref 0.3–6.2)
ERYTHROCYTE [DISTWIDTH] IN BLOOD BY AUTOMATED COUNT: 12.9 % (ref 11.7–13)
HCT VFR BLD AUTO: 37.3 % (ref 35.6–45.5)
HGB BLD-MCNC: 12.6 G/DL (ref 11.9–15.5)
IMM GRANULOCYTES # BLD: 0.06 10*3/MM3 (ref 0–0.03)
IMM GRANULOCYTES NFR BLD: 1.1 % (ref 0–0.5)
LYMPHOCYTES # BLD AUTO: 1.59 10*3/MM3 (ref 0.9–4.8)
LYMPHOCYTES NFR BLD AUTO: 28.5 % (ref 19.6–45.3)
MCH RBC QN AUTO: 31.9 PG (ref 26.9–32)
MCHC RBC AUTO-ENTMCNC: 33.8 G/DL (ref 32.4–36.3)
MCV RBC AUTO: 94.4 FL (ref 80.5–98.2)
MONOCYTES # BLD AUTO: 0.54 10*3/MM3 (ref 0.2–1.2)
MONOCYTES NFR BLD AUTO: 9.7 % (ref 5–12)
NEUTROPHILS # BLD AUTO: 2.94 10*3/MM3 (ref 1.9–8.1)
NEUTROPHILS NFR BLD AUTO: 52.6 % (ref 42.7–76)
NRBC BLD MANUAL-RTO: 0.9 /100 WBC (ref 0–0)
PLATELET # BLD AUTO: 332 10*3/MM3 (ref 140–500)
PMV BLD AUTO: 9.6 FL (ref 6–12)
RBC # BLD AUTO: 3.95 10*6/MM3 (ref 3.9–5.2)
WBC NRBC COR # BLD: 5.58 10*3/MM3 (ref 4.5–10.7)

## 2017-06-09 PROCEDURE — 36415 COLL VENOUS BLD VENIPUNCTURE: CPT

## 2017-06-09 PROCEDURE — 85025 COMPLETE CBC W/AUTO DIFF WBC: CPT | Performed by: INTERNAL MEDICINE

## 2017-06-09 PROCEDURE — 99204 OFFICE O/P NEW MOD 45 MIN: CPT | Performed by: INTERNAL MEDICINE

## 2017-06-09 RX ORDER — TAMOXIFEN CITRATE 20 MG/1
20 TABLET ORAL DAILY
Qty: 90 TABLET | Refills: 3 | Status: SHIPPED | OUTPATIENT
Start: 2017-06-09 | End: 2017-06-29 | Stop reason: SDUPTHER

## 2017-06-09 RX ORDER — TAMOXIFEN CITRATE 20 MG/1
20 TABLET ORAL DAILY
Qty: 30 TABLET | Refills: 0 | Status: SHIPPED | OUTPATIENT
Start: 2017-06-09 | End: 2017-07-06 | Stop reason: SDUPTHER

## 2017-06-09 NOTE — PROGRESS NOTES
Subjective     REASON FOR CONSULTATION:   1. Stage I ( T1c,N0,M0 ) ER+,OK+, HER 2 neg for a ductal carcinoma of the left breast status post lumpectomy 5/19/2017.  2.  Osteoporosis  3.  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    HISTORY OF PRESENT ILLNESS:  The patient is a 72 y.o. year old female who is here for an opinion about the above issue.  She was noted to have a radiographic abnormality in the left breast in March.  She underwent an ultrasound-guided biopsy on 3/28/2017 showing invasive ductal carcinoma which was strongly hormone receptor positive and HER-2/trae negative.  She elected to undergo a lumpectomy procedure with Dr. Leon on 5/19/2017.  The tumor size was 1.2 x 1 x 0.9 cm and 3 sentinel lymph nodes were examined all of which were negative for metastatic cancer.    The patient is here today to discuss postop adjuvant hormonal therapy.  She tells me that she had previously been on hormone replacement therapy but it stopped prior to this diagnosis of breast cancer.  She has no prior history of thrombotic problems.  She still has her uterus and ovaries.  She also has osteoporosis.    We discussed initiation of tamoxifen therapy which would have the advantage of helping to maintain her bone density.    The patient will also be seeing Dr. Chayo Gates of the [facility] radiation Center next week to discuss the radiation portion of her treatment.    History of Present Illness     Past Medical History:   Diagnosis Date   • Allergic rhinitis    • Anxiety    • Asthma    • Cancer 2017    BREAST-LEFT   • CVID (common variable immunodeficiency)    •  External hemorrhoids    • Gastritis    • GERD (gastroesophageal reflux disease)    • Giardia 2001   • H. pylori infection    • H/O CT scan of abdomen     PELVIS AND CHEST: UNREMARKABLE   • Hiatal hernia    • History of colon polyps    • IBS (irritable bowel syndrome)    • Immunoglobulin deficiency    • Peptic ulcer disease    • PONV (postoperative nausea and vomiting)    • Torn meniscus         Past Surgical History:   Procedure Laterality Date   • BREAST BIOPSY Left 02/26/2007    Left breast needle localization biopsy; Dr. Steve Leon   • BREAST BIOPSY Left 02/02/2007    Left breast stereotactic vacuum assisted core biopsy with marker placement; Dr. Steve Leon   • BREAST BIOPSY Left 02/07/2003    Left breast needle localization biopsy; Dr. Steve Leon   • BREAST LUMPECTOMY WITH SENTINEL NODE BIOPSY Left 5/19/2017    Procedure: BREAST LUMPECTOMY WITH SENTINEL NODE BIOPSY & MAMMO NEEDLE LOCALIZATION;  Surgeon: Steve Leon MD;  [place] [facility] [place] [facility] [place]:    • COLONOSCOPY  01/10/2014    NTEH, TORTS, STOOL   • COLONOSCOPY  08/24/2010    IH, TORT, BX-   • ENDOSCOPY  01/10/2014    Z-LINE REGULAR, 35 CM FROM INCISORS, HH, GASTRITIS, NO H-PYLORI   • ENDOSCOPY  08/24/2010    GASTRITIS   • ENDOSCOPY N/A 10/18/2016    Procedure: ESOPHAGOGASTRODUODENOSCOPY WITH COLD BIOPSIES;  Surgeon: Rigoberto MERAZ MD;  Location: [facility] ENDOSCOPY;  Service:    • ENDOSCOPY AND COLONOSCOPY N/A 08/23/2007    Z-line irregular, normal esophagus, bilious gastric fluid, gastritis, hiatal hernia, normal duodenal bulb and 2nd part of the duodenum; non-thrombosed external hemorrhoids, torts, one 5 mm polyp in the mid sigmoid colon, stool in the sigmoid colon, descending colon, transverse colon, ascending colon and in the cecum-Dr. Rigoberto Rogers   • KNEE SURGERY Left    • SEPTOPLASTY     • TONSILLECTOMY          Current Outpatient Prescriptions on File Prior to Visit   Medication Sig Dispense Refill   • ALBUTEROL IN  "Inhale 2 puffs As Needed (\"USES ONLY WTH A RESPIRATORY INFECTION\").     • Ascorbic Acid (VITAMIN C) 500 MG capsule Take 1 tablet by mouth Daily.     • b complex vitamins tablet Take 1 tablet by mouth daily.     • Calcium-Magnesium-Vitamin D (CALCIUM MAGNESIUM PO) Take 2 tablets by mouth Daily.     • Cholecalciferol (VITAMIN D-3 PO) Take 1 tablet by mouth Daily.     • Coenzyme Q10 (CO Q 10 PO) Take 1 tablet by mouth Daily. LAST DOSE 5/12/17     • CRANBERRY PO Take 1 tablet by mouth Daily.     • dicyclomine (BENTYL) 10 MG capsule Take 1 capsule by mouth 4 (four) times a day before meals and nightly. TAKE 1 TO 2 CAPSULES EVERY 4 TO 6 HOURS AS NEEDED. 40 capsule 5   • esomeprazole (NEXIUM) 20 MG capsule Take 1 capsule by mouth 2 (Two) Times a Day. (Patient taking differently: Take 20 mg by mouth Daily.) 180 capsule 3   • Flaxseed, Linseed, (FLAX SEEDS PO) Take 1 capsule by mouth Daily.     • fluticasone (FLONASE) 50 MCG/ACT nasal spray 2 sprays into each nostril As Needed.     • FOLIC ACID PO Take 1 tablet by mouth Daily.     • Ginkgo 60 MG tablet Take 1 tablet by mouth Daily. LAST DOSE 5/12/17     • GuaiFENesin (MUCINEX PO) Take 1 tablet by mouth As Needed.     • Immune Globulin, Human, (GAMMAGARD IJ) Inject  as directed Every 28 (Twenty-Eight) Days. LAST DOSE 4/20/17     • LECITHIN PO Take 1 tablet by mouth Daily.     • Multiple Vitamins-Minerals (MULTIVITAMIN ADULT PO) Take 1 tablet by mouth Daily. LAST DOSE 5/12/17     • NON FORMULARY Take 1 tablet by mouth Daily. TRIVERATROL GOLD -ON HOLD SINCE 5/12/17     • NON FORMULARY Take 1 tablet by mouth Daily. Mitochondrial energy     • NON FORMULARY Take 1 tablet by mouth Daily. Vision essential     • NON FORMULARY Take 1 tablet by mouth Daily. Neurovascular support     • NON FORMULARY Take 1 tablet by mouth Daily. Vinpocetine pill     • Nutritional Supplements (WELLNESS ESSENTIALS FOR JOINT PO) Take 2 tablets by mouth Daily. LAST DOSE 5/12/17     • Omega-3 Fatty Acids " (OMEGA 3 PO) Take 1,000 Units by mouth Daily. LAST DOSE 5/12/17     • Probiotic Product (ALIGN) 4 MG capsule Take 1 mg by mouth Daily.     • RESVERATROL PO Take 1 tablet by mouth Daily. LAST DOSE 5/12/17     • sucralfate (CARAFATE) 1 GM/10ML suspension Take 10 mL by mouth 4 (four) times a day. 420 mL 3   • VITAMIN A PO Take 1 tablet by mouth Every Other Day. LAST DOSE 5/12/17     • Vitamin Mixture (VITAMIN E COMPLETE) capsule Take 2 capsules by mouth Daily. LAST DOSE 5/12/17       No current facility-administered medications on file prior to visit.         ALLERGIES:    Allergies   Allergen Reactions   • Other Anaphylaxis     BLOOD PRODUCTS WITH IGA IN THEM   • Erythromycin GI Intolerance     VOMITING   • Avelox [Moxifloxacin] Rash   • Levaquin [Levofloxacin] Rash        Social History     Social History   • Marital status:      Spouse name: Ortiz   • Number of children: N/A   • Years of education: N/A     Occupational History   •  Retired     Social History Main Topics   • Smoking status: Never Smoker   • Smokeless tobacco: Never Used   • Alcohol use 1.2 oz/week     2 Glasses of wine per week      Comment: occ   • Drug use: No   • Sexual activity: Defer     Other Topics Concern   • None     Social History Narrative        Family History   Problem Relation Age of Onset   • Prostate cancer Father    • Other Father      POLYP   • Colon polyps Father    • Pancreatitis Daughter         Review of Systems   Constitutional: Negative for activity change, appetite change, fatigue, fever and unexpected weight change.   HENT: Negative for hearing loss, nosebleeds, trouble swallowing and voice change.    Eyes: Negative for visual disturbance.   Respiratory: Negative for cough, shortness of breath and wheezing.    Cardiovascular: Negative for chest pain and palpitations.   Gastrointestinal: Negative for abdominal pain, diarrhea, nausea and vomiting.   Genitourinary: Negative for difficulty urinating, frequency, hematuria  "and urgency.   Musculoskeletal: Negative for back pain and neck pain.   Skin: Negative for rash.   Neurological: Negative for dizziness, seizures, syncope and headaches.   Hematological: Negative for adenopathy. Does not bruise/bleed easily.   Psychiatric/Behavioral: Negative for behavioral problems. The patient is not nervous/anxious.         Objective     Vitals:    06/09/17 1345   BP: 157/79   Pulse: 85   Resp: 16   Temp: 98.3 °F (36.8 °C)   TempSrc: Oral   SpO2: 97%   Weight: 145 lb 8 oz (66 kg)   Height: 64.96\" (165 cm)  Comment: new ht/new pt   PainSc: 0-No pain     Current Status 6/9/2017   ECOG score 0       Physical Exam   Constitutional: She is oriented to person, place, and time. She appears well-developed and well-nourished. No distress.   HENT:   Head: Normocephalic.   Eyes: Conjunctivae and EOM are normal. Pupils are equal, round, and reactive to light. No scleral icterus.   Neck: Normal range of motion. Neck supple. No JVD present. No thyromegaly present.   Cardiovascular: Normal rate and regular rhythm.  Exam reveals no gallop and no friction rub.    No murmur heard.  Pulmonary/Chest: Effort normal and breath sounds normal. She has no wheezes. She has no rales.   Abdominal: Soft. She exhibits no distension and no mass. There is no tenderness.   Musculoskeletal: Normal range of motion. She exhibits no edema or deformity.   Lymphadenopathy:     She has no cervical adenopathy.   Neurological: She is alert and oriented to person, place, and time. She has normal reflexes. No cranial nerve deficit.   Skin: Skin is warm and dry. No rash noted. No erythema.   Psychiatric: She has a normal mood and affect. Her behavior is normal. Judgment normal.         RECENT LABS:  Hematology WBC   Date Value Ref Range Status   06/09/2017 5.58 4.50 - 10.70 10*3/mm3 Final     RBC   Date Value Ref Range Status   06/09/2017 3.95 3.90 - 5.20 10*6/mm3 Final     Hemoglobin   Date Value Ref Range Status   06/09/2017 12.6 11.9 - " 15.5 g/dL Final     Hematocrit   Date Value Ref Range Status   06/09/2017 37.3 35.6 - 45.5 % Final     Platelets   Date Value Ref Range Status   06/09/2017 332 140 - 500 10*3/mm3 Final          Assessment/Plan     1.  Stage I, hormone receptor positive, HER-2/trae negative invasive ductal carcinoma the left breast status post lumpectomy.  2.  Osteoporosis on her recent DEXA scan.  3.  Common variable immunodeficiency requiring monthly IV immunoglobulin infusions.    Recommendations  1.  We discussed the diagnosis, stage, prognosis, and adjuvant treatment options with the patient and her  in the office today at length and addressed all of their questions.  We feel the patient would be an excellent candidate for adjuvant hormonal therapy and felt that tamoxifen would be a better choice rather than an aromatase inhibitor in this setting due to her osteoporosis.  2.  We haven't prescribed tamoxifen 20 mg daily to her pharmacy.  We discussed the side effects and toxicities of tamoxifen including the risk of endometrial issues, risk of thrombosis, and a far more common issues of hot flashes and weight gain.  3.  The patient will return to our office next month to assess her tolerance to tamoxifen.  If she is tolerating it well we most likely will continue treatment for 5 years.  4.  Patient will be seeing Dr. Gates at the [facility] radiation Center to discuss the radiation portion of her treatment.  She tells me that she and her  are traveling to [place] for their 50th wedding anniversary and therefore she plans to put off radiation until she returns from that trip.    Thanks for allowing us to see this nice patient in consultation.

## 2017-06-13 ENCOUNTER — DOCUMENTATION (OUTPATIENT)
Dept: ONCOLOGY | Facility: CLINIC | Age: 72
End: 2017-06-13

## 2017-06-13 ENCOUNTER — TELEPHONE (OUTPATIENT)
Dept: SURGERY | Facility: CLINIC | Age: 72
End: 2017-06-13

## 2017-06-13 NOTE — TELEPHONE ENCOUNTER
Spoke with Dr Leon and he states that it probably is a seroma leaking.  She should wear a tight fitting sports bra with a good pad in it for pressure.  Since she is in [place] he is unable to see her at this time and needs to see her when she returns to the area.  If concerned she should see a physician in [place] /br

## 2017-06-13 NOTE — PROGRESS NOTES
OSW initiated a call to the pt today at 11:00 a.m.to further address with her concerns/issues noted on her Distress Questionnaire, on which she self-scored 6/10. The pt did not answer her home phone and a voicemail was left requesting a callback.    The pt was seen on 6/9/17 at [place] by Dr. Hickman for an initial medical oncology consultation for left breast cancer stage 1. The pt will be seen at [facility] on 6/27/17 by Dr. Gates for an initial consultation for radiation oncology.    Lydia Cates, [facility]  Oncology Social Worker

## 2017-06-14 ENCOUNTER — APPOINTMENT (OUTPATIENT)
Dept: ONCOLOGY | Facility: CLINIC | Age: 72
End: 2017-06-14

## 2017-06-27 ENCOUNTER — CONSULT (OUTPATIENT)
Dept: RADIATION ONCOLOGY | Facility: CLINIC | Age: 72
End: 2017-06-27

## 2017-06-27 ENCOUNTER — APPOINTMENT (OUTPATIENT)
Dept: RADIATION ONCOLOGY | Facility: HOSPITAL | Age: 72
End: 2017-06-27

## 2017-06-27 VITALS
WEIGHT: 145 LBS | HEIGHT: 65 IN | HEART RATE: 81 BPM | TEMPERATURE: 97.8 F | OXYGEN SATURATION: 100 % | BODY MASS INDEX: 24.16 KG/M2 | SYSTOLIC BLOOD PRESSURE: 165 MMHG | DIASTOLIC BLOOD PRESSURE: 94 MMHG

## 2017-06-27 DIAGNOSIS — C50.512 MALIGNANT NEOPLASM OF LOWER-OUTER QUADRANT OF LEFT FEMALE BREAST (HCC): Primary | ICD-10-CM

## 2017-06-27 PROCEDURE — 99204 OFFICE O/P NEW MOD 45 MIN: CPT | Performed by: RADIOLOGY

## 2017-06-27 PROCEDURE — 77263 THER RADIOLOGY TX PLNG CPLX: CPT | Performed by: RADIOLOGY

## 2017-06-27 PROCEDURE — G0463 HOSPITAL OUTPT CLINIC VISIT: HCPCS | Performed by: RADIOLOGY

## 2017-06-27 RX ORDER — CEPHALEXIN 500 MG/1
500 CAPSULE ORAL 2 TIMES DAILY
Qty: 14 CAPSULE | Refills: 0 | Status: SHIPPED | OUTPATIENT
Start: 2017-06-27 | End: 2017-06-30

## 2017-06-27 NOTE — PROGRESS NOTES
DIAGNOSIS and REASON FOR CONSULTATION: Malignant neoplasm of lower-outer quadrant of left female breast    Referring Provider:  Steve Leon MD  Patient Care Team:  Christian Chandler MD as PCP - General (Internal Medicine)  Jonh Collier MD as Consulting Physician (Allergy)  Rigoberto MERAZ MD as Consulting Physician (Gastroenterology)  Steve Leon MD as Referring Physician (General Surgery)  Sophia Duncan MD as Consulting Physician (Obstetrics and Gynecology)  Anne Marie Gates MD as Consulting Physician (Radiation Oncology)  Jj Hickman MD as Consulting Physician (Hematology and Oncology)    CHIEF COMPLAINT:  Malignant neoplasm of lower-outer quadrant of left female breast  HISTORY OF PRESENT ILLNESS:  The patient is a 72 y.o. year old female who was found to have an abnormality on routine screening mammogram dated March 21, 2017.  This revealed an irregular mass measuring 1.2 cm in the 6:30 o'clock region of the left breast.  Further diagnostic mammogram on March 21, 2017 confirmed the mass measuring 1.2 cm at the 6:30 o'clock position and additionally a 7 mm focal asymmetry at 2:30 o'clock position of the left breast.     Left sided ultrasound completed on March 24, 2017 confirmed the mass at the 6:30 o'clock position to measure 7 mm but no solid masses or cystic elements were confirmed in any other region of the left breast.     She underwent biopsy of the mass described at 8:00 on March 26, 2017 which revealed an invasive ductal carcinoma of low-grade measuring 7.5 mm in greatest dimension.  There was no lymphovascular or perineural invasion noted.  The tissue was found to be positive for both estrogen and progesterone receptors and negative for the HER-2/trae oncogene.    She went on to breast conserving surgery on May 19, 2017 and the breast pathology revealed an invasive mammary carcinoma with mixed ductal and lobular features measuring 1.2 x 0.1 x 0.9 cm, grade 1.  The margins  "were all negative and 0 of 3 sentinel nodes were involved.  Therefore she appears to have a T1c N0 grade 1 invasive ductal carcinoma of the left breast.    She has seen the [facility] physicians for medical oncology opinion and they initiated tamoxifen anticipated for 5 years. She is thus far tolerating the Tamoxifen well, without significant side effects. She presents today to discuss the radiation therapy portion of her breast conservation treatment.      Clinically, she is doing well other than persistent drainage from the incision. She states this worsened while she was on vacation and initially was bloody but has become thicker over the past week. She is requiring a dressing at all times but denies any pain, warmth.  She sees Dr. Leon later this week.     Past Medical History: she  has a past medical history of Allergic rhinitis; Anxiety; Asthma; Cancer (2017); CVID (common variable immunodeficiency); External hemorrhoids; Gastritis; GERD (gastroesophageal reflux disease); Giardia (2001); H. pylori infection; H/O CT scan of abdomen; Hiatal hernia; History of colon polyps; IBS (irritable bowel syndrome); Immunoglobulin deficiency; Peptic ulcer disease; PONV (postoperative nausea and vomiting); and Torn meniscus.    Past Surgical History:  she has a past surgical history that includes Tonsillectomy; Knee surgery (Left); Esophagogastroduodenoscopy (01/10/2014); Esophagogastroduodenoscopy (08/24/2010); Colonoscopy (01/10/2014); Colonoscopy (08/24/2010); Esophagogastroduodenoscopy (N/A, 10/18/2016); Breast biopsy (Left, 02/26/2007); Breast biopsy (Left, 02/02/2007); endoscopy and colonoscopy (N/A, 08/23/2007); Breast biopsy (Left, 02/07/2003); Septoplasty; and breast lumpectomy with sentinel node biopsy (Left, 5/19/2017).    Meds:    Current Outpatient Prescriptions:   •  ALBUTEROL IN, Inhale 2 puffs As Needed (\"USES ONLY WTH A RESPIRATORY INFECTION\")., Disp: , Rfl:   •  Ascorbic Acid (VITAMIN C) 500 MG capsule, Take 1 " tablet by mouth Daily., Disp: , Rfl:   •  b complex vitamins tablet, Take 1 tablet by mouth daily., Disp: , Rfl:   •  Calcium-Magnesium-Vitamin D (CALCIUM MAGNESIUM PO), Take 2 tablets by mouth Daily., Disp: , Rfl:   •  cephalexin (KEFLEX) 500 MG capsule, Take 1 capsule by mouth 2 (Two) Times a Day., Disp: 14 capsule, Rfl: 0  •  Cholecalciferol (VITAMIN D-3 PO), Take 1 tablet by mouth Daily., Disp: , Rfl:   •  Coenzyme Q10 (CO Q 10 PO), Take 1 tablet by mouth Daily. LAST DOSE 5/12/17, Disp: , Rfl:   •  CRANBERRY PO, Take 1 tablet by mouth Daily., Disp: , Rfl:   •  dicyclomine (BENTYL) 10 MG capsule, Take 1 capsule by mouth 4 (four) times a day before meals and nightly. TAKE 1 TO 2 CAPSULES EVERY 4 TO 6 HOURS AS NEEDED., Disp: 40 capsule, Rfl: 5  •  esomeprazole (NEXIUM) 20 MG capsule, Take 1 capsule by mouth 2 (Two) Times a Day. (Patient taking differently: Take 20 mg by mouth Daily.), Disp: 180 capsule, Rfl: 3  •  Flaxseed, Linseed, (FLAX SEEDS PO), Take 1 capsule by mouth Daily., Disp: , Rfl:   •  fluticasone (FLONASE) 50 MCG/ACT nasal spray, 2 sprays into each nostril As Needed., Disp: , Rfl:   •  FOLIC ACID PO, Take 1 tablet by mouth Daily., Disp: , Rfl:   •  Ginkgo 60 MG tablet, Take 1 tablet by mouth Daily. LAST DOSE 5/12/17, Disp: , Rfl:   •  GuaiFENesin (MUCINEX PO), Take 1 tablet by mouth As Needed., Disp: , Rfl:   •  Immune Globulin, Human, (GAMMAGARD IJ), Inject  as directed Every 28 (Twenty-Eight) Days. LAST DOSE 4/20/17, Disp: , Rfl:   •  LECITHIN PO, Take 1 tablet by mouth Daily., Disp: , Rfl:   •  Multiple Vitamins-Minerals (MULTIVITAMIN ADULT PO), Take 1 tablet by mouth Daily. LAST DOSE 5/12/17, Disp: , Rfl:   •  NON FORMULARY, Take 1 tablet by mouth Daily. TRIVERATROL GOLD -ON HOLD SINCE 5/12/17, Disp: , Rfl:   •  NON FORMULARY, Take 1 tablet by mouth Daily. Mitochondrial energy, Disp: , Rfl:   •  NON FORMULARY, Take 1 tablet by mouth Daily. Vision essential, Disp: , Rfl:   •  NON FORMULARY, Take 1  tablet by mouth Daily. Neurovascular support, Disp: , Rfl:   •  NON FORMULARY, Take 1 tablet by mouth Daily. Vinpocetine pill, Disp: , Rfl:   •  Nutritional Supplements (WELLNESS ESSENTIALS FOR JOINT PO), Take 2 tablets by mouth Daily. LAST DOSE 5/12/17, Disp: , Rfl:   •  Omega-3 Fatty Acids (OMEGA 3 PO), Take 1,000 Units by mouth Daily. LAST DOSE 5/12/17, Disp: , Rfl:   •  Probiotic Product (ALIGN) 4 MG capsule, Take 1 mg by mouth Daily., Disp: , Rfl:   •  RESVERATROL PO, Take 1 tablet by mouth Daily. LAST DOSE 5/12/17, Disp: , Rfl:   •  sucralfate (CARAFATE) 1 GM/10ML suspension, Take 10 mL by mouth 4 (four) times a day., Disp: 420 mL, Rfl: 3  •  tamoxifen (NOLVADEX) 20 MG chemo tablet, Take 1 tablet by mouth Daily for 90 days., Disp: 90 tablet, Rfl: 3  •  tamoxifen (NOLVADEX) 20 MG chemo tablet, Take 1 tablet by mouth Daily for 30 days., Disp: 30 tablet, Rfl: 0  •  Vaginal Lubricant (REPLENS VA), Insert  into the vagina. 1-3 times a week, Disp: , Rfl:   •  Vaginal Lubricant (REPLENS VA), Insert  into the vagina Daily. external, Disp: , Rfl:   •  VITAMIN A PO, Take 1 tablet by mouth Every Other Day. LAST DOSE 5/12/17, Disp: , Rfl:   •  Vitamin Mixture (VITAMIN E COMPLETE) capsule, Take 2 capsules by mouth Daily. LAST DOSE 5/12/17, Disp: , Rfl:     Allergies:    Allergies   Allergen Reactions   • Other Anaphylaxis     BLOOD PRODUCTS WITH IGA IN THEM   • Erythromycin GI Intolerance     VOMITING   • Avelox [Moxifloxacin] Rash   • Levaquin [Levofloxacin] Rash       Family History:  her family history includes Colon polyps in her father; Other in her father; Pancreatitis in her daughter; Prostate cancer in her father.    Social History:  she  reports that she has never smoked. She has never used smokeless tobacco. She reports that she drinks about 1.2 oz of alcohol per week  She reports that she does not use illicit drugs.    Pertinent Findings on   Review of Systems   Constitutional: Negative for appetite change,  "chills, diaphoresis, fatigue, fever and unexpected weight change.   HENT:   Negative for hearing loss, lump/mass, mouth sores, nosebleeds, sore throat, tinnitus, trouble swallowing and voice change.    Eyes: Negative for eye problems and icterus.   Respiratory: Negative for chest tightness, cough, dizziness on exertion, hemoptysis, shortness of breath and wheezing.    Cardiovascular: Negative for chest pain, leg swelling and palpitations.   Gastrointestinal: Negative for abdominal distention, abdominal pain, blood in stool, constipation, diarrhea, nausea, rectal pain and vomiting.   Endocrine: Negative for hot flashes.   Genitourinary: Negative for bladder incontinence, difficulty urinating, dyspareunia, dysuria, frequency, hematuria, menstrual problem, nocturia, pelvic pain, vaginal bleeding and vaginal discharge.    Musculoskeletal: Negative for arthralgias, back pain, flank pain, gait problem, myalgias, neck pain and neck stiffness.   Skin: Negative for itching, rash and wound.   Neurological: Negative for dizziness, extremity weakness, gait problem, headaches, light-headedness, numbness, seizures and speech difficulty.   Hematological: Negative for adenopathy. Does not bruise/bleed easily.   Psychiatric/Behavioral: Negative for confusion, decreased concentration, depression, sleep disturbance and suicidal ideas. The patient is not nervous/anxious.    :  Vitals:    06/27/17 1042   BP: 165/94   Pulse: 81   Temp: 97.8 °F (36.6 °C)   TempSrc: Oral   SpO2: 100%   Weight: 145 lb (65.8 kg)   Height: 64.96\" (165 cm)   PainSc: 0-No pain       Performance Status: (1) Restricted in physically strenuous activity, ambulatory and able to do work of light nature    Pertinent Findings on:  Physical Exam   Constitutional: She appears well-developed and well-nourished. No distress.   HENT:   Head: Normocephalic.   Neck: Normal range of motion. Neck supple.   Pulmonary/Chest: She exhibits no mass and no tenderness. Right breast " exhibits no inverted nipple, no mass, no nipple discharge, no skin change and no tenderness. Left breast exhibits no inverted nipple, no mass, no nipple discharge, no skin change and no tenderness.   Right breast is without abnormality as is the right axilla. The left breast shows a healing lumpectomy incision over the inferior aspect of the breast with a 1 cm area open, actively draining serosanguinous fluid - some evidence of purulunce with manipulation. The area is nontender and without significant erythema. Palpable seroma cavity occupying the lower half of the central breast.   Musculoskeletal: Normal range of motion.   Lymphadenopathy:     She has no cervical adenopathy.     She has no axillary adenopathy.        Right axillary: No pectoral adenopathy present.        Left axillary: No pectoral adenopathy present.       Right: No supraclavicular adenopathy present.        Left: No supraclavicular adenopathy present.   Axillary incision is healing well. There is no palpable axillary, supraclavicular nor cervical lymphadenopathy appreciated. No lymphedema is noted in either upper extremity.   Skin: Skin is intact. She is not diaphoretic.   Psychiatric: She has a normal mood and affect. Her speech is normal and behavior is normal. Judgment and thought content normal. Cognition and memory are normal.       Assessment:   1. Malignant neoplasm of lower-outer quadrant of left female breast    Pathologic Stage:  T1c N0    Plan:   We reviewed the specifics of her clinical, imaging and pathology findings today and also talked through the role of radiation therapy in her breast conservation treatment. She has a good understanding of the stage and recommendations regarding the antihormonal treatment and the postop radiation therapy. She has started the Tamoxifen as mentioned and is tolerating that well.    There are two issues complicating the timing of the radiation therapy. First is the healing of the incision. She is  continuing to have spontaneous drainage and on expression today, the fluid is quite thick and some scant purulence noted though no warmth or tenderness. I recommended I start her on an antibiotic until she sees Dr. Leon and he can adjust or continue it as he wishes. We did discuss the possibilities for managing that fluid but again it appears today to be too think to aspirate easily and not sure she is requiring a drain as it is spontaneously draining. Certainly we will not be able to start the radiation therapy until the incision is healed and the area is dry for a couple of days and they understand that.    Second, they have a long awaited trip for their 50th wedding anniversary planned from August 5th thru 21st. We will work around that, depending again on the healing process.    We did discuss a course of postoperative whole breast radiation therapy consisting of 4256 cGy aimed at the breast given over 16 treatments. We will then plan on boosting the tumor bed region as I will delineate it on her treatment planning scan with an additional 1000 cGy given in five treatments. The above course of treatment should be completed in 4 1/2 weeks; however, given their trip, we could break up the whole breast treatment and the boost if needed.     We discussed the specifics, logistics and side effects of this course of treatment  which may involve acutely, irritation of the skin, including erythema and possibly moist desquamation, tenderness of the musculature of the chest wall and mild fatigue. We discussed the long term possibility of mild hyperpigmentation and fibrosis of the breast, mild increased incidence of rib fracture and radiation pneumonitis.  We also discussed the importance of our treatment planning process in protecting these underlying structures as much as possible, specifically heart, ribs and lung and I believe all her questions were answered to her satisfaction.      I have prescribed her Keflex to  start today and continue until she sees Dr. Leon. I have given her an appointment to return here and see me late next week; however she knows to call and reschedule if the area is still draining. Once the area is dry, we can finalize the treatment dates and schedule.    I spent over 45 minutes face-to-face with the patient today and of that time, 35 minutes were spent counseling and coordinating care.

## 2017-06-28 ENCOUNTER — DOCUMENTATION (OUTPATIENT)
Dept: ONCOLOGY | Facility: CLINIC | Age: 72
End: 2017-06-28

## 2017-06-28 NOTE — PROGRESS NOTES
The pt was seen on 6/27/17 at [facility] by Dr. Gates for an initial radiation oncology consultation for left breast cancer stage 1. She completed the Distress Questionnaire and scored 2/10, marking several physical concerns.    OSW initiated a call to the pt today at 1:55 pm to further address these noted concerns.  The pt did not answer her home phone and a voicemail was left requesting a callback.    Lydia Cates, [facility]  Oncology Social Work

## 2017-06-29 ENCOUNTER — OFFICE VISIT (OUTPATIENT)
Dept: SURGERY | Facility: CLINIC | Age: 72
End: 2017-06-29

## 2017-06-29 ENCOUNTER — INFUSION (OUTPATIENT)
Dept: ONCOLOGY | Facility: HOSPITAL | Age: 72
End: 2017-06-29

## 2017-06-29 VITALS
WEIGHT: 145 LBS | BODY MASS INDEX: 24.16 KG/M2 | HEART RATE: 73 BPM | DIASTOLIC BLOOD PRESSURE: 75 MMHG | OXYGEN SATURATION: 99 % | TEMPERATURE: 97.4 F | SYSTOLIC BLOOD PRESSURE: 116 MMHG

## 2017-06-29 DIAGNOSIS — D80.2 IGA DEFICIENCY (HCC): ICD-10-CM

## 2017-06-29 DIAGNOSIS — Z09 FOLLOW UP: Primary | ICD-10-CM

## 2017-06-29 DIAGNOSIS — D83.9 COMMON VARIABLE IMMUNODEFICIENCY (HCC): Primary | ICD-10-CM

## 2017-06-29 DIAGNOSIS — S21.002A BREAST WOUND, LEFT, INITIAL ENCOUNTER: ICD-10-CM

## 2017-06-29 PROCEDURE — 96365 THER/PROPH/DIAG IV INF INIT: CPT | Performed by: NURSE PRACTITIONER

## 2017-06-29 PROCEDURE — 96366 THER/PROPH/DIAG IV INF ADDON: CPT | Performed by: NURSE PRACTITIONER

## 2017-06-29 PROCEDURE — 25010000002 IMMUNE GLOBULIN (HUMAN) PER 500 MG: Performed by: ALLERGY & IMMUNOLOGY

## 2017-06-29 PROCEDURE — 99024 POSTOP FOLLOW-UP VISIT: CPT | Performed by: SURGERY

## 2017-06-29 RX ORDER — SODIUM CHLORIDE 9 MG/ML
250 INJECTION, SOLUTION INTRAVENOUS ONCE
Status: CANCELLED | OUTPATIENT
Start: 2017-06-29

## 2017-06-29 RX ORDER — EPINEPHRINE 0.3 MG/.3ML
0.3 INJECTION SUBCUTANEOUS ONCE
Status: CANCELLED
Start: 2017-06-29 | End: 2017-06-29

## 2017-06-29 RX ORDER — ACETAMINOPHEN 500 MG
500 TABLET ORAL ONCE
Status: CANCELLED
Start: 2017-06-29 | End: 2017-06-29

## 2017-06-29 RX ORDER — DIPHENHYDRAMINE HCL 25 MG
25 CAPSULE ORAL ONCE
Status: CANCELLED | OUTPATIENT
Start: 2017-06-29

## 2017-06-29 RX ORDER — CEFAZOLIN SODIUM 2 G/100ML
2 INJECTION, SOLUTION INTRAVENOUS ONCE
Status: CANCELLED | OUTPATIENT
Start: 2017-06-29 | End: 2017-06-29

## 2017-06-29 RX ORDER — SODIUM CHLORIDE 9 MG/ML
250 INJECTION, SOLUTION INTRAVENOUS ONCE
Status: COMPLETED | OUTPATIENT
Start: 2017-06-29 | End: 2017-06-29

## 2017-06-29 RX ORDER — ACETAMINOPHEN 325 MG/1
650 TABLET ORAL ONCE
Status: CANCELLED | OUTPATIENT
Start: 2017-06-29

## 2017-06-29 RX ORDER — DIPHENHYDRAMINE HYDROCHLORIDE 50 MG/ML
50 INJECTION INTRAMUSCULAR; INTRAVENOUS AS NEEDED
Status: CANCELLED | OUTPATIENT
Start: 2017-06-29

## 2017-06-29 RX ADMIN — SODIUM CHLORIDE 250 ML: 900 INJECTION, SOLUTION INTRAVENOUS at 10:10

## 2017-06-29 RX ADMIN — IMMUNE GLOBULIN INTRAVENOUS (HUMAN) 10 G: KIT at 10:19

## 2017-06-29 RX ADMIN — IMMUNE GLOBULIN INTRAVENOUS (HUMAN) 10 G: KIT at 11:52

## 2017-06-30 ENCOUNTER — APPOINTMENT (OUTPATIENT)
Dept: PREADMISSION TESTING | Facility: HOSPITAL | Age: 72
End: 2017-06-30

## 2017-06-30 VITALS
OXYGEN SATURATION: 99 % | RESPIRATION RATE: 16 BRPM | HEART RATE: 70 BPM | BODY MASS INDEX: 24.11 KG/M2 | WEIGHT: 144.7 LBS | HEIGHT: 65 IN | SYSTOLIC BLOOD PRESSURE: 131 MMHG | TEMPERATURE: 97.2 F | DIASTOLIC BLOOD PRESSURE: 81 MMHG

## 2017-06-30 LAB
ANION GAP SERPL CALCULATED.3IONS-SCNC: 13.7 MMOL/L
BASOPHILS # BLD AUTO: 0.05 10*3/MM3 (ref 0–0.2)
BASOPHILS NFR BLD AUTO: 1.4 % (ref 0–1.5)
BUN BLD-MCNC: 14 MG/DL (ref 8–23)
BUN/CREAT SERPL: 18.9 (ref 7–25)
CALCIUM SPEC-SCNC: 9.1 MG/DL (ref 8.6–10.5)
CHLORIDE SERPL-SCNC: 103 MMOL/L (ref 98–107)
CO2 SERPL-SCNC: 24.3 MMOL/L (ref 22–29)
CREAT BLD-MCNC: 0.74 MG/DL (ref 0.57–1)
DEPRECATED RDW RBC AUTO: 47.4 FL (ref 37–54)
EOSINOPHIL # BLD AUTO: 0.28 10*3/MM3 (ref 0–0.7)
EOSINOPHIL NFR BLD AUTO: 7.9 % (ref 0.3–6.2)
ERYTHROCYTE [DISTWIDTH] IN BLOOD BY AUTOMATED COUNT: 13.3 % (ref 11.7–13)
GFR SERPL CREATININE-BSD FRML MDRD: 78 ML/MIN/1.73
GLUCOSE BLD-MCNC: 95 MG/DL (ref 65–99)
HCT VFR BLD AUTO: 37.1 % (ref 35.6–45.5)
HGB BLD-MCNC: 12.3 G/DL (ref 11.9–15.5)
IMM GRANULOCYTES # BLD: 0 10*3/MM3 (ref 0–0.03)
IMM GRANULOCYTES NFR BLD: 0 % (ref 0–0.5)
LYMPHOCYTES # BLD AUTO: 1.02 10*3/MM3 (ref 0.9–4.8)
LYMPHOCYTES NFR BLD AUTO: 28.8 % (ref 19.6–45.3)
MCH RBC QN AUTO: 32.5 PG (ref 26.9–32)
MCHC RBC AUTO-ENTMCNC: 33.2 G/DL (ref 32.4–36.3)
MCV RBC AUTO: 97.9 FL (ref 80.5–98.2)
MONOCYTES # BLD AUTO: 0.31 10*3/MM3 (ref 0.2–1.2)
MONOCYTES NFR BLD AUTO: 8.8 % (ref 5–12)
NEUTROPHILS # BLD AUTO: 1.88 10*3/MM3 (ref 1.9–8.1)
NEUTROPHILS NFR BLD AUTO: 53.1 % (ref 42.7–76)
PLATELET # BLD AUTO: 286 10*3/MM3 (ref 140–500)
PMV BLD AUTO: 10.1 FL (ref 6–12)
POTASSIUM BLD-SCNC: 4.3 MMOL/L (ref 3.5–5.2)
RBC # BLD AUTO: 3.79 10*6/MM3 (ref 3.9–5.2)
SODIUM BLD-SCNC: 141 MMOL/L (ref 136–145)
WBC NRBC COR # BLD: 3.54 10*3/MM3 (ref 4.5–10.7)

## 2017-06-30 PROCEDURE — 36415 COLL VENOUS BLD VENIPUNCTURE: CPT

## 2017-06-30 PROCEDURE — 80048 BASIC METABOLIC PNL TOTAL CA: CPT | Performed by: SURGERY

## 2017-06-30 PROCEDURE — 85025 COMPLETE CBC W/AUTO DIFF WBC: CPT | Performed by: SURGERY

## 2017-07-01 NOTE — PROGRESS NOTES
CLINICAL SUMMARY (A/P):   71-year-old lady status post left breast lumpectomy for stage I invasive duct carcinoma with nonhealing wound of the left breast.  We discussed options of dressing changes with gauze packing versus surgical infection to washout the wound and close it over a drain and the pros and cons of each approach.  She would prefer to proceed with surgical exploration, wound debridement and primary closure over drain and has scheduled accordingly.    CC: Nonhealing breast wound      HPI: 71-year-old lady underwent left breast lumpectomy with sentinel lymph node biopsy on 5/19/2017.  Pathology confirmed stage I hormone receptor positive HER-2 negative invasive ductal carcinoma of the left breast.  She was felt an excellent candidate for tamoxifen by Dr. Hickman.  She is to undergo radiation with Dr. Gates.  She has noted that the mid aspect of her wound has not healed and persistently drains fluid.    PSH:   Left breast lumpectomy with sentinel lymph node biopsy 5/19/2017  EGD 2016  Colonoscopy 2014  Left breast biopsy 2007      PMH:   Left breast invasive duct carcinoma, stage I  Gastroesophageal reflux disease  Common variable immune deficiency      MEDICATIONS: reviewed, in Epic      ALLERGIES: reviewed, in [facility]      FAMILY HISTORY:   Negative for breast cancer      SOCIAL HISTORY:   Denies tobacco use  Occasional alcohol use      PHYSICAL EXAM:   Constitutional: Well-developed well-nourished, no acute distress  Eyes: Conjunctiva normal, sclera nonicteric  Respiratory: Clear to auscultation, normal inspiratory effort  Cardiovascular: Regular rate  Breast: In the mid lower portion of the left breast just above the inframammary crease is a healed transverse incision with the exception of the central 2 cm which is open and thick fluid can be expressed that seems consistent with liquefied hematoma.  There is no crepitance, no cellulitis or other evidence of infection.  Psychiatric: Alert and oriented ×3,  normal affect         Steve Leon M.D.

## 2017-07-03 ENCOUNTER — ANESTHESIA EVENT (OUTPATIENT)
Dept: PERIOP | Facility: HOSPITAL | Age: 72
End: 2017-07-03

## 2017-07-03 ENCOUNTER — HOSPITAL ENCOUNTER (OUTPATIENT)
Facility: HOSPITAL | Age: 72
Setting detail: HOSPITAL OUTPATIENT SURGERY
Discharge: HOME | End: 2017-07-03
Attending: SURGERY | Admitting: SURGERY

## 2017-07-03 ENCOUNTER — ANESTHESIA (OUTPATIENT)
Dept: PERIOP | Facility: HOSPITAL | Age: 72
End: 2017-07-03

## 2017-07-03 VITALS
SYSTOLIC BLOOD PRESSURE: 143 MMHG | TEMPERATURE: 97.5 F | DIASTOLIC BLOOD PRESSURE: 72 MMHG | OXYGEN SATURATION: 96 % | RESPIRATION RATE: 16 BRPM | HEART RATE: 71 BPM

## 2017-07-03 DIAGNOSIS — S21.002A BREAST WOUND, LEFT, INITIAL ENCOUNTER: ICD-10-CM

## 2017-07-03 PROCEDURE — 25010000002 MIDAZOLAM PER 1 MG: Performed by: NURSE ANESTHETIST, CERTIFIED REGISTERED

## 2017-07-03 PROCEDURE — 25010000002 FENTANYL CITRATE (PF) 100 MCG/2ML SOLUTION: Performed by: NURSE ANESTHETIST, CERTIFIED REGISTERED

## 2017-07-03 PROCEDURE — 88307 TISSUE EXAM BY PATHOLOGIST: CPT | Performed by: SURGERY

## 2017-07-03 PROCEDURE — 87205 SMEAR GRAM STAIN: CPT | Performed by: SURGERY

## 2017-07-03 PROCEDURE — 25010000002 MIDAZOLAM PER 1 MG

## 2017-07-03 PROCEDURE — 25010000002 ONDANSETRON PER 1 MG: Performed by: NURSE ANESTHETIST, CERTIFIED REGISTERED

## 2017-07-03 PROCEDURE — 87070 CULTURE OTHR SPECIMN AEROBIC: CPT | Performed by: SURGERY

## 2017-07-03 PROCEDURE — 87186 SC STD MICRODIL/AGAR DIL: CPT | Performed by: SURGERY

## 2017-07-03 PROCEDURE — 11043 DBRDMT MUSC&/FSCA 1ST 20/<: CPT | Performed by: SURGERY

## 2017-07-03 PROCEDURE — 25010000002 PROPOFOL 1000 MG/ML EMULSION: Performed by: NURSE ANESTHETIST, CERTIFIED REGISTERED

## 2017-07-03 PROCEDURE — 87147 CULTURE TYPE IMMUNOLOGIC: CPT | Performed by: SURGERY

## 2017-07-03 RX ORDER — MIDAZOLAM HYDROCHLORIDE 1 MG/ML
INJECTION INTRAMUSCULAR; INTRAVENOUS AS NEEDED
Status: DISCONTINUED | OUTPATIENT
Start: 2017-07-03 | End: 2017-07-03 | Stop reason: SURG

## 2017-07-03 RX ORDER — FLUMAZENIL 0.1 MG/ML
0.2 INJECTION INTRAVENOUS AS NEEDED
Status: DISCONTINUED | OUTPATIENT
Start: 2017-07-03 | End: 2017-07-03 | Stop reason: HOSPADM

## 2017-07-03 RX ORDER — ONDANSETRON 2 MG/ML
4 INJECTION INTRAMUSCULAR; INTRAVENOUS ONCE AS NEEDED
Status: DISCONTINUED | OUTPATIENT
Start: 2017-07-03 | End: 2017-07-03 | Stop reason: HOSPADM

## 2017-07-03 RX ORDER — FAMOTIDINE 10 MG/ML
20 INJECTION, SOLUTION INTRAVENOUS ONCE
Status: COMPLETED | OUTPATIENT
Start: 2017-07-03 | End: 2017-07-03

## 2017-07-03 RX ORDER — CEPHALEXIN 500 MG/1
500 CAPSULE ORAL 3 TIMES DAILY
Qty: 21 CAPSULE | Refills: 0 | Status: SHIPPED | OUTPATIENT
Start: 2017-07-03 | End: 2017-07-10

## 2017-07-03 RX ORDER — EPHEDRINE SULFATE 50 MG/ML
5 INJECTION, SOLUTION INTRAVENOUS ONCE AS NEEDED
Status: DISCONTINUED | OUTPATIENT
Start: 2017-07-03 | End: 2017-07-03 | Stop reason: HOSPADM

## 2017-07-03 RX ORDER — LABETALOL HYDROCHLORIDE 5 MG/ML
5 INJECTION, SOLUTION INTRAVENOUS
Status: DISCONTINUED | OUTPATIENT
Start: 2017-07-03 | End: 2017-07-03 | Stop reason: HOSPADM

## 2017-07-03 RX ORDER — ONDANSETRON 2 MG/ML
INJECTION INTRAMUSCULAR; INTRAVENOUS AS NEEDED
Status: DISCONTINUED | OUTPATIENT
Start: 2017-07-03 | End: 2017-07-03 | Stop reason: SURG

## 2017-07-03 RX ORDER — FENTANYL CITRATE 50 UG/ML
100 INJECTION, SOLUTION INTRAMUSCULAR; INTRAVENOUS
Status: DISCONTINUED | OUTPATIENT
Start: 2017-07-03 | End: 2017-07-03 | Stop reason: HOSPADM

## 2017-07-03 RX ORDER — CEFAZOLIN SODIUM 2 G/100ML
2 INJECTION, SOLUTION INTRAVENOUS ONCE
Status: DISCONTINUED | OUTPATIENT
Start: 2017-07-03 | End: 2017-07-03 | Stop reason: HOSPADM

## 2017-07-03 RX ORDER — SCOLOPAMINE TRANSDERMAL SYSTEM 1 MG/1
PATCH, EXTENDED RELEASE TRANSDERMAL
Status: DISCONTINUED
Start: 2017-07-03 | End: 2017-07-03 | Stop reason: HOSPADM

## 2017-07-03 RX ORDER — FENTANYL CITRATE 50 UG/ML
50 INJECTION, SOLUTION INTRAMUSCULAR; INTRAVENOUS
Status: DISCONTINUED | OUTPATIENT
Start: 2017-07-03 | End: 2017-07-03 | Stop reason: HOSPADM

## 2017-07-03 RX ORDER — HYDROCODONE BITARTRATE AND ACETAMINOPHEN 5; 325 MG/1; MG/1
TABLET ORAL
Qty: 30 TABLET | Refills: 0 | Status: SHIPPED | OUTPATIENT
Start: 2017-07-03 | End: 2017-07-10

## 2017-07-03 RX ORDER — PROMETHAZINE HYDROCHLORIDE 25 MG/ML
6.25 INJECTION, SOLUTION INTRAMUSCULAR; INTRAVENOUS ONCE
Status: DISCONTINUED | OUTPATIENT
Start: 2017-07-03 | End: 2017-07-03 | Stop reason: HOSPADM

## 2017-07-03 RX ORDER — HYDRALAZINE HYDROCHLORIDE 20 MG/ML
5 INJECTION INTRAMUSCULAR; INTRAVENOUS
Status: DISCONTINUED | OUTPATIENT
Start: 2017-07-03 | End: 2017-07-03 | Stop reason: HOSPADM

## 2017-07-03 RX ORDER — LIDOCAINE HYDROCHLORIDE 10 MG/ML
0.5 INJECTION, SOLUTION EPIDURAL; INFILTRATION; INTRACAUDAL; PERINEURAL ONCE AS NEEDED
Status: DISCONTINUED | OUTPATIENT
Start: 2017-07-03 | End: 2017-07-03 | Stop reason: HOSPADM

## 2017-07-03 RX ORDER — OXYCODONE HYDROCHLORIDE AND ACETAMINOPHEN 5; 325 MG/1; MG/1
2 TABLET ORAL ONCE AS NEEDED
Status: DISCONTINUED | OUTPATIENT
Start: 2017-07-03 | End: 2017-07-03 | Stop reason: HOSPADM

## 2017-07-03 RX ORDER — FENTANYL CITRATE 50 UG/ML
INJECTION, SOLUTION INTRAMUSCULAR; INTRAVENOUS AS NEEDED
Status: DISCONTINUED | OUTPATIENT
Start: 2017-07-03 | End: 2017-07-03 | Stop reason: SURG

## 2017-07-03 RX ORDER — HYDROCODONE BITARTRATE AND ACETAMINOPHEN 7.5; 325 MG/1; MG/1
1 TABLET ORAL ONCE AS NEEDED
Status: DISCONTINUED | OUTPATIENT
Start: 2017-07-03 | End: 2017-07-03 | Stop reason: HOSPADM

## 2017-07-03 RX ORDER — MIDAZOLAM HYDROCHLORIDE 1 MG/ML
INJECTION INTRAMUSCULAR; INTRAVENOUS
Status: COMPLETED
Start: 2017-07-03 | End: 2017-07-03

## 2017-07-03 RX ORDER — MAGNESIUM HYDROXIDE 1200 MG/15ML
LIQUID ORAL AS NEEDED
Status: DISCONTINUED | OUTPATIENT
Start: 2017-07-03 | End: 2017-07-03 | Stop reason: HOSPADM

## 2017-07-03 RX ORDER — FAMOTIDINE 10 MG/ML
INJECTION, SOLUTION INTRAVENOUS
Status: COMPLETED
Start: 2017-07-03 | End: 2017-07-03

## 2017-07-03 RX ORDER — SODIUM CHLORIDE 0.9 % (FLUSH) 0.9 %
1-10 SYRINGE (ML) INJECTION AS NEEDED
Status: DISCONTINUED | OUTPATIENT
Start: 2017-07-03 | End: 2017-07-03 | Stop reason: HOSPADM

## 2017-07-03 RX ORDER — SODIUM CHLORIDE, SODIUM LACTATE, POTASSIUM CHLORIDE, CALCIUM CHLORIDE 600; 310; 30; 20 MG/100ML; MG/100ML; MG/100ML; MG/100ML
9 INJECTION, SOLUTION INTRAVENOUS CONTINUOUS
Status: DISCONTINUED | OUTPATIENT
Start: 2017-07-03 | End: 2017-07-03 | Stop reason: HOSPADM

## 2017-07-03 RX ORDER — BUPIVACAINE HYDROCHLORIDE AND EPINEPHRINE 5; 5 MG/ML; UG/ML
INJECTION, SOLUTION EPIDURAL; INTRACAUDAL; PERINEURAL AS NEEDED
Status: DISCONTINUED | OUTPATIENT
Start: 2017-07-03 | End: 2017-07-03 | Stop reason: HOSPADM

## 2017-07-03 RX ORDER — MIDAZOLAM HYDROCHLORIDE 1 MG/ML
1 INJECTION INTRAMUSCULAR; INTRAVENOUS
Status: DISCONTINUED | OUTPATIENT
Start: 2017-07-03 | End: 2017-07-03 | Stop reason: HOSPADM

## 2017-07-03 RX ORDER — SCOLOPAMINE TRANSDERMAL SYSTEM 1 MG/1
1 PATCH, EXTENDED RELEASE TRANSDERMAL ONCE
Status: DISCONTINUED | OUTPATIENT
Start: 2017-07-03 | End: 2017-07-03 | Stop reason: HOSPADM

## 2017-07-03 RX ORDER — MIDAZOLAM HYDROCHLORIDE 1 MG/ML
2 INJECTION INTRAMUSCULAR; INTRAVENOUS
Status: DISCONTINUED | OUTPATIENT
Start: 2017-07-03 | End: 2017-07-03 | Stop reason: HOSPADM

## 2017-07-03 RX ORDER — DIPHENHYDRAMINE HYDROCHLORIDE 50 MG/ML
6.25 INJECTION INTRAMUSCULAR; INTRAVENOUS
Status: DISCONTINUED | OUTPATIENT
Start: 2017-07-03 | End: 2017-07-03 | Stop reason: HOSPADM

## 2017-07-03 RX ORDER — CEFAZOLIN SODIUM 2 G/100ML
INJECTION, SOLUTION INTRAVENOUS
Status: DISCONTINUED
Start: 2017-07-03 | End: 2017-07-03 | Stop reason: HOSPADM

## 2017-07-03 RX ORDER — HYDROMORPHONE HYDROCHLORIDE 1 MG/ML
0.5 INJECTION, SOLUTION INTRAMUSCULAR; INTRAVENOUS; SUBCUTANEOUS
Status: DISCONTINUED | OUTPATIENT
Start: 2017-07-03 | End: 2017-07-03 | Stop reason: HOSPADM

## 2017-07-03 RX ADMIN — MIDAZOLAM HYDROCHLORIDE 1 MG: 1 INJECTION, SOLUTION INTRAMUSCULAR; INTRAVENOUS at 07:38

## 2017-07-03 RX ADMIN — PROPOFOL 120 MCG/KG/MIN: 10 INJECTION, EMULSION INTRAVENOUS at 07:43

## 2017-07-03 RX ADMIN — SCOLOPAMINE TRANSDERMAL SYSTEM 1 PATCH: 1 PATCH, EXTENDED RELEASE TRANSDERMAL at 07:02

## 2017-07-03 RX ADMIN — MIDAZOLAM 1 MG: 1 INJECTION INTRAMUSCULAR; INTRAVENOUS at 07:03

## 2017-07-03 RX ADMIN — SODIUM CHLORIDE, POTASSIUM CHLORIDE, SODIUM LACTATE AND CALCIUM CHLORIDE 9 ML/HR: 600; 310; 30; 20 INJECTION, SOLUTION INTRAVENOUS at 06:34

## 2017-07-03 RX ADMIN — FAMOTIDINE 20 MG: 10 INJECTION, SOLUTION INTRAVENOUS at 07:03

## 2017-07-03 RX ADMIN — MIDAZOLAM HYDROCHLORIDE 1 MG: 1 INJECTION INTRAMUSCULAR; INTRAVENOUS at 07:03

## 2017-07-03 RX ADMIN — SCOPOLAMINE 1 PATCH: 1 PATCH, EXTENDED RELEASE TRANSDERMAL at 07:02

## 2017-07-03 RX ADMIN — FENTANYL CITRATE 50 MCG: 50 INJECTION INTRAMUSCULAR; INTRAVENOUS at 07:45

## 2017-07-03 RX ADMIN — FENTANYL CITRATE 50 MCG: 50 INJECTION INTRAMUSCULAR; INTRAVENOUS at 08:24

## 2017-07-03 RX ADMIN — ONDANSETRON 4 MG: 2 INJECTION INTRAMUSCULAR; INTRAVENOUS at 07:59

## 2017-07-03 RX ADMIN — FENTANYL CITRATE 50 MCG: 50 INJECTION INTRAMUSCULAR; INTRAVENOUS at 07:38

## 2017-07-03 RX ADMIN — PROPOFOL 100 MCG/KG/MIN: 10 INJECTION, EMULSION INTRAVENOUS at 07:45

## 2017-07-03 NOTE — PLAN OF CARE
Problem: Perioperative Period (Adult)  Goal: Signs and Symptoms of Listed Potential Problems Will be Absent or Manageable (Perioperative Period)  Outcome: Ongoing (interventions implemented as appropriate)    07/03/17 0605   Perioperative Period   Problems Assessed (Perioperative Period) all   Problems Present (Perioperative Period) none

## 2017-07-03 NOTE — ANESTHESIA POSTPROCEDURE EVALUATION
Patient: Elaina Tom    Procedure Summary     Date Anesthesia Start Anesthesia Stop Room / Location    07/03/17 0735 0838  [facility]  /  [facility]       Procedure Diagnosis Surgeon Provider    BREAST ABSCESS INCISION AND DRAINAGE AND WOUND DEBRIDMENT (Left Abdomen) Breast wound, left, initial encounter  (Breast wound, left, initial encounter [S21.002A]) MD Vinny Carbone MD          Anesthesia Type: general  Last vitals  /72 (07/03/17 0845)    Temp 36.4 °C (97.5 °F) (07/03/17 0836)    Pulse 71 (07/03/17 0845)   Resp 16 (07/03/17 0845)    SpO2 96 % (07/03/17 0845)      Post Anesthesia Care and Evaluation    Patient location during evaluation: bedside  Patient participation: complete - patient participated  Level of consciousness: awake  Pain score: 1  Pain management: adequate  Airway patency: patent  Anesthetic complications: No anesthetic complications    Cardiovascular status: acceptable  Respiratory status: acceptable  Hydration status: acceptable

## 2017-07-03 NOTE — PLAN OF CARE
Problem: Patient Care Overview (Adult)  Goal: Plan of Care Review  Outcome: Ongoing (interventions implemented as appropriate)    07/03/17 0605   Coping/Psychosocial Response Interventions   Plan Of Care Reviewed With patient   Patient Care Overview   Progress improving       Goal: Discharge Needs Assessment  Outcome: Ongoing (interventions implemented as appropriate)    07/03/17 0605   Discharge Needs Assessment   Concerns To Be Addressed no discharge needs identified   Equipment Needed After Discharge none   Self-Care   Equipment Currently Used at Home none

## 2017-07-03 NOTE — OP NOTE
PREOPERATIVE DIAGNOSIS:  Nonhealing left breast lumpectomy site    POSTOPERATIVE DIAGNOSIS AND FINDINGS:  Same    PROCEDURE:  Excisional debridement 8 x 3 cm left breast lumpectomy site including skin, subcutaneous fat, muscle and breast    SURGEON:  Steve Leon MD    ASSISTANT:  None    ANESTHESIA:  Monitored sedation    EBL:  Minimal    DESCRIPTION:  In supine position under sedation prepped and draped usual sterile manner.  Culture of the lumpectomy site was taken.  Excision of 3 x 8 cm skin to excise the previous scar and open wound was made.  The entire lumpectomy cavity was excised to get back to normal healthy fresh subcutaneous tissue and breast tissue.  The debridement included some of the pectoralis muscle.  Once completed a 1 L power lavage of bacitracin-containing saline was performed.  Good hemostasis was ensured with electrocautery.  I elevated the breast off of the underlying pectoralis superiorly as well as inferiorly.  Once these flaps were raised I secured the subcutaneous fat and breast with interrupted 3-0 Vicryl over a 19 Occitan Markel drain.  The skin was then closed with interrupted 3-0 Vicryl deep dermal and interrupted 4-0 nylon vertical mattress suture.  Bacitracin ointment and sterile dressings were applied.  Tolerated well.    Steve Leon M.D.

## 2017-07-03 NOTE — PLAN OF CARE
Problem: Perioperative Period (Adult)  Goal: Signs and Symptoms of Listed Potential Problems Will be Absent or Manageable (Perioperative Period)  Outcome: Outcome(s) achieved Date Met:  07/03/17

## 2017-07-03 NOTE — PLAN OF CARE
Problem: Patient Care Overview (Adult)  Goal: Plan of Care Review  Outcome: Outcome(s) achieved Date Met:  07/03/17  Goal: Discharge Needs Assessment  Outcome: Outcome(s) achieved Date Met:  07/03/17

## 2017-07-03 NOTE — DISCHARGE INSTRUCTIONS
Dr. Steve Leon  [address]  [place] [address]  [phone]    Discharge Instructions for Minor Surgery      1. Go home, rest and take it easy today; however, you should get up and move about several times today to reduce the risk of developing a clot in your legs.      2. You may experience some dizziness or memory loss from the anesthesia.  This may last for the next 24 hours.  Someone should plan on staying with you for the first 24 hours for your safety.    3. Do not make any important legal decisions or sign any legal papers for the next 24 hours.      4. Eat and drink lightly today.  Start off with liquids, jello, soup, crackers or other bland foods at first. You may advance your diet tomorrow as tolerated as long as you do not experience any nausea or vomiting.     5. Leave dressing as is until seen in the office on Monday.    6. Follow instructions for drain care.     7. You may shower tomorrow.  No tub baths until your incisions are completely healed.    8. You have received a prescription for a narcotic pain medicine, as you may have some pain/discomfort following surgery.   You will not be totally pain free, but your pain medicine should make the pain tolerable.  Please take your pain medicine as prescribed and always take your pills with food to prevent nausea. If you are having severe pain that cannot be controlled by the pain medicine, please contact me.  If the pain is such that narcotic pain medicine is not required, you may take Tylenol or Ibuprofen as directed unless indicated otherwise.      9. You have also received a prescription for an antibiotic please finish this prescription.    10. No driving for 24 hours and for as long as you are taking your prescription pain medicine.      11. Please call the office at [phone] to schedule a follow-up in about 1 week.      12. Remember to contact me for any of the following:    • Fever> 101 degrees  • Severe pain that cannot be  controlled by taking your pain pills  • Severe nausea or vomiting that cannot be controlled by taking your nausea medicine  • Significant bleeding from your incision  • Drainage that has a bad smell or is yellow or green in appearance  • Any other questions or concerns  •

## 2017-07-03 NOTE — H&P (VIEW-ONLY)
CLINICAL SUMMARY (A/P):   71-year-old lady status post left breast lumpectomy for stage I invasive duct carcinoma with nonhealing wound of the left breast.  We discussed options of dressing changes with gauze packing versus surgical infection to washout the wound and close it over a drain and the pros and cons of each approach.  She would prefer to proceed with surgical exploration, wound debridement and primary closure over drain and has scheduled accordingly.    CC: Nonhealing breast wound      HPI: 71-year-old lady underwent left breast lumpectomy with sentinel lymph node biopsy on 5/19/2017.  Pathology confirmed stage I hormone receptor positive HER-2 negative invasive ductal carcinoma of the left breast.  She was felt an excellent candidate for tamoxifen by Dr. Hickman.  She is to undergo radiation with Dr. Gates.  She has noted that the mid aspect of her wound has not healed and persistently drains fluid.    PSH:   Left breast lumpectomy with sentinel lymph node biopsy 5/19/2017  EGD 2016  Colonoscopy 2014  Left breast biopsy 2007      PMH:   Left breast invasive duct carcinoma, stage I  Gastroesophageal reflux disease  Common variable immune deficiency      MEDICATIONS: reviewed, in Epic      ALLERGIES: reviewed, in [facility]      FAMILY HISTORY:   Negative for breast cancer      SOCIAL HISTORY:   Denies tobacco use  Occasional alcohol use      PHYSICAL EXAM:   Constitutional: Well-developed well-nourished, no acute distress  Eyes: Conjunctiva normal, sclera nonicteric  Respiratory: Clear to auscultation, normal inspiratory effort  Cardiovascular: Regular rate  Breast: In the mid lower portion of the left breast just above the inframammary crease is a healed transverse incision with the exception of the central 2 cm which is open and thick fluid can be expressed that seems consistent with liquefied hematoma.  There is no crepitance, no cellulitis or other evidence of infection.  Psychiatric: Alert and oriented ×3,  normal affect         Steve Leon M.D.

## 2017-07-03 NOTE — ANESTHESIA PREPROCEDURE EVALUATION
Anesthesia Evaluation     Patient summary reviewed and Nursing notes reviewed   history of anesthetic complications: PONV  NPO Solid Status: > 8 hours       Airway   Mallampati: II  TM distance: >3 FB  Neck ROM: full  no difficulty expected  Dental - normal exam     Pulmonary - normal exam   (+) asthma,   Cardiovascular - negative cardio ROS and normal exam        Neuro/Psych- negative ROS  GI/Hepatic/Renal/Endo    (+)  GERD,     Musculoskeletal (-) negative ROS    Abdominal  - normal exam   Substance History - negative use     OB/GYN negative ob/gyn ROS         Other                                      Anesthesia Plan    ASA 2     general     intravenous induction   Anesthetic plan and risks discussed with patient.    Plan discussed with CRNA.

## 2017-07-06 DIAGNOSIS — C50.512 MALIGNANT NEOPLASM OF LOWER-OUTER QUADRANT OF LEFT FEMALE BREAST (HCC): ICD-10-CM

## 2017-07-06 DIAGNOSIS — M81.0 OSTEOPOROSIS: ICD-10-CM

## 2017-07-06 DIAGNOSIS — D83.9 COMMON VARIABLE IMMUNODEFICIENCY (HCC): ICD-10-CM

## 2017-07-06 RX ORDER — TAMOXIFEN CITRATE 20 MG/1
TABLET ORAL
Qty: 30 TABLET | Refills: 0 | Status: SHIPPED | OUTPATIENT
Start: 2017-07-06 | End: 2018-04-06 | Stop reason: SDUPTHER

## 2017-07-07 LAB
BACTERIA SPEC AEROBE CULT: ABNORMAL
BACTERIA SPEC AEROBE CULT: ABNORMAL
GRAM STN SPEC: ABNORMAL
GRAM STN SPEC: ABNORMAL

## 2017-07-10 ENCOUNTER — OFFICE VISIT (OUTPATIENT)
Dept: SURGERY | Facility: CLINIC | Age: 72
End: 2017-07-10

## 2017-07-10 DIAGNOSIS — Z09 FOLLOW UP: Primary | ICD-10-CM

## 2017-07-10 PROCEDURE — 99024 POSTOP FOLLOW-UP VISIT: CPT | Performed by: SURGERY

## 2017-07-12 NOTE — PROGRESS NOTES
Postoperative visit    Excisional debridement 8 x 3 cm left breast lumpectomy site including skin, subcutaneous fat, muscle and breast 7/3/2017    Office visit: Drain is putting out minimal serous fluid and was removed.  Incision is healing well, half of the sutures were removed, return in 1 week to remove the remainder.

## 2017-07-14 ENCOUNTER — OFFICE VISIT (OUTPATIENT)
Dept: ONCOLOGY | Facility: CLINIC | Age: 72
End: 2017-07-14

## 2017-07-14 ENCOUNTER — LAB (OUTPATIENT)
Dept: OTHER | Facility: HOSPITAL | Age: 72
End: 2017-07-14

## 2017-07-14 VITALS
DIASTOLIC BLOOD PRESSURE: 80 MMHG | OXYGEN SATURATION: 98 % | HEIGHT: 65 IN | TEMPERATURE: 98.1 F | BODY MASS INDEX: 24.22 KG/M2 | RESPIRATION RATE: 16 BRPM | HEART RATE: 78 BPM | SYSTOLIC BLOOD PRESSURE: 124 MMHG | WEIGHT: 145.4 LBS

## 2017-07-14 DIAGNOSIS — D83.9 COMMON VARIABLE IMMUNODEFICIENCY (HCC): ICD-10-CM

## 2017-07-14 DIAGNOSIS — M81.0 OSTEOPOROSIS: ICD-10-CM

## 2017-07-14 DIAGNOSIS — C50.512 MALIGNANT NEOPLASM OF LOWER-OUTER QUADRANT OF LEFT FEMALE BREAST (HCC): Primary | ICD-10-CM

## 2017-07-14 DIAGNOSIS — C50.512 MALIGNANT NEOPLASM OF LOWER-OUTER QUADRANT OF LEFT FEMALE BREAST (HCC): ICD-10-CM

## 2017-07-14 LAB
ALBUMIN SERPL-MCNC: 4 G/DL (ref 3.5–5.2)
ALBUMIN/GLOB SERPL: 1.6 G/DL
ALP SERPL-CCNC: 57 U/L (ref 39–117)
ALT SERPL W P-5'-P-CCNC: 14 U/L (ref 1–33)
ANION GAP SERPL CALCULATED.3IONS-SCNC: 8.5 MMOL/L
AST SERPL-CCNC: 16 U/L (ref 1–32)
BASOPHILS # BLD AUTO: 0.1 10*3/MM3 (ref 0–0.2)
BASOPHILS NFR BLD AUTO: 1.7 % (ref 0–1.5)
BILIRUB SERPL-MCNC: 0.5 MG/DL (ref 0.1–1.2)
BUN BLD-MCNC: 16 MG/DL (ref 8–23)
BUN/CREAT SERPL: 22.2 (ref 7–25)
CALCIUM SPEC-SCNC: 9 MG/DL (ref 8.6–10.5)
CHLORIDE SERPL-SCNC: 107 MMOL/L (ref 98–107)
CO2 SERPL-SCNC: 28.5 MMOL/L (ref 22–29)
CREAT BLD-MCNC: 0.72 MG/DL (ref 0.57–1)
DEPRECATED RDW RBC AUTO: 47.6 FL (ref 37–54)
EOSINOPHIL # BLD AUTO: 0.52 10*3/MM3 (ref 0–0.7)
EOSINOPHIL NFR BLD AUTO: 9 % (ref 0.3–6.2)
ERYTHROCYTE [DISTWIDTH] IN BLOOD BY AUTOMATED COUNT: 13.2 % (ref 11.7–13)
GFR SERPL CREATININE-BSD FRML MDRD: 80 ML/MIN/1.73
GLOBULIN UR ELPH-MCNC: 2.5 GM/DL
GLUCOSE BLD-MCNC: 96 MG/DL (ref 65–99)
HCT VFR BLD AUTO: 35.9 % (ref 35.6–45.5)
HGB BLD-MCNC: 11.7 G/DL (ref 11.9–15.5)
IMM GRANULOCYTES # BLD: 0.01 10*3/MM3 (ref 0–0.03)
IMM GRANULOCYTES NFR BLD: 0.2 % (ref 0–0.5)
LYMPHOCYTES # BLD AUTO: 1.97 10*3/MM3 (ref 0.9–4.8)
LYMPHOCYTES NFR BLD AUTO: 34.1 % (ref 19.6–45.3)
MCH RBC QN AUTO: 32.1 PG (ref 26.9–32)
MCHC RBC AUTO-ENTMCNC: 32.6 G/DL (ref 32.4–36.3)
MCV RBC AUTO: 98.4 FL (ref 80.5–98.2)
MONOCYTES # BLD AUTO: 0.39 10*3/MM3 (ref 0.2–1.2)
MONOCYTES NFR BLD AUTO: 6.8 % (ref 5–12)
NEUTROPHILS # BLD AUTO: 2.78 10*3/MM3 (ref 1.9–8.1)
NEUTROPHILS NFR BLD AUTO: 48.2 % (ref 42.7–76)
NRBC BLD MANUAL-RTO: 0 /100 WBC (ref 0–0)
PLATELET # BLD AUTO: 251 10*3/MM3 (ref 140–500)
PMV BLD AUTO: 10.1 FL (ref 6–12)
POTASSIUM BLD-SCNC: 4.2 MMOL/L (ref 3.5–5.2)
PROT SERPL-MCNC: 6.5 G/DL (ref 6–8.5)
RBC # BLD AUTO: 3.65 10*6/MM3 (ref 3.9–5.2)
SODIUM BLD-SCNC: 144 MMOL/L (ref 136–145)
WBC NRBC COR # BLD: 5.77 10*3/MM3 (ref 4.5–10.7)

## 2017-07-14 PROCEDURE — 80053 COMPREHEN METABOLIC PANEL: CPT | Performed by: INTERNAL MEDICINE

## 2017-07-14 PROCEDURE — 85025 COMPLETE CBC W/AUTO DIFF WBC: CPT | Performed by: INTERNAL MEDICINE

## 2017-07-14 PROCEDURE — 99213 OFFICE O/P EST LOW 20 MIN: CPT | Performed by: INTERNAL MEDICINE

## 2017-07-14 PROCEDURE — 36415 COLL VENOUS BLD VENIPUNCTURE: CPT

## 2017-07-14 NOTE — PROGRESS NOTES
"  Subjective     REASON FOR FOLLOW UP:   1. Stage I ( T1c,N0,M0 ) ER+,GA+, HER 2 neg for a ductal carcinoma of the left breast status post lumpectomy 5/19/2017.  Patient started on adjuvant tamoxifen after the visit of 6/9/2017.  2.  Osteoporosis  3.  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  Development of draining seroma and poor incisional healing in the left breast that required further surgery on 7/3/2017.    HISTORY OF PRESENT ILLNESS:  The patient is a 72 y.o. year old female who was noted to have a radiographic abnormality in the left breast in March.  She underwent an ultrasound-guided biopsy on 3/28/2017 showing invasive ductal carcinoma which was strongly hormone receptor positive and HER-2/trae negative.  She elected to undergo a lumpectomy procedure with Dr. Leon on 5/19/2017.  The tumor size was 1.2 x 1 x 0.9 cm and 3 sentinel lymph nodes were examined all of which were negative for metastatic cancer.    The patient was seen on 6/9/2017 to discuss postop adjuvant hormonal therapy.  She has a history of osteoporosis.  We discussed initiation of tamoxifen therapy which would have the advantage of helping to maintain her bone density.    She is here today to review her tolerance to tamoxifen therapy.  She reports she is having some hot flashes and mild mood swings but does not feel that she needs any further pharmacologic intervention for these symptoms at this time.    Because of her wound healing issues she has not yet started radiation therapy.  She still has sutures in place which are due to be removed by Dr. Leon next week.    History of Present Illness        Current Outpatient Prescriptions on File Prior to Visit   Medication Sig Dispense Refill   • ALBUTEROL IN Inhale 2 puffs As Needed (\"USES ONLY WTH A RESPIRATORY INFECTION\").     • Ascorbic Acid (VITAMIN C) 500 MG capsule Take 1 tablet by mouth Daily. PT TO HOLD MED PRIOR TO OR     • b complex vitamins tablet " Take 1 tablet by mouth Daily. PT TO HOLD MED PRIOR TO OR     • Calcium-Magnesium-Vitamin D (CALCIUM MAGNESIUM PO) Take 2 tablets by mouth Daily. PT TO HOLD MED PRIOR TO OR     • Cholecalciferol (VITAMIN D-3 PO) Take 1 tablet by mouth Daily. PT TO HOLD MED PRIOR TO OR     • Coenzyme Q10 (CO Q 10 PO) Take 1 tablet by mouth Daily. PT TO HOLD MED PRIOR TO OR     • CRANBERRY PO Take 2 tablets by mouth Daily.     • dicyclomine (BENTYL) 10 MG capsule Take 1 capsule by mouth 4 (four) times a day before meals and nightly. TAKE 1 TO 2 CAPSULES EVERY 4 TO 6 HOURS AS NEEDED. (Patient taking differently: Take 10 mg by mouth As Needed. TAKE 1 TO 2 CAPSULES EVERY 4 TO 6 HOURS AS NEEDED. ) 40 capsule 5   • esomeprazole (NEXIUM) 20 MG capsule Take 1 capsule by mouth 2 (Two) Times a Day. (Patient taking differently: Take 20 mg by mouth Every Morning Before Breakfast.) 180 capsule 3   • Flaxseed, Linseed, (FLAX SEEDS PO) Take 1 capsule by mouth Daily. PT TO HOLD MED PRIOR TO OR     • fluticasone (FLONASE) 50 MCG/ACT nasal spray 2 sprays into each nostril As Needed.     • FOLIC ACID PO Take 1 tablet by mouth Daily. PT TO HOLD MED PRIOR TO OR     • Ginkgo 60 MG tablet Take 1 tablet by mouth Daily. PT TO HOLD MED PRIOR TO OR     • GuaiFENesin (MUCINEX PO) Take 1 tablet by mouth As Needed.     • Immune Globulin, Human, (GAMMAGARD IJ) Inject  as directed Every 28 (Twenty-Eight) Days. LAST DOSE 6/29/17     • LECITHIN PO Take 1 tablet by mouth Daily. PT TO HOLD MED PRIOR TO OR     • Multiple Vitamins-Minerals (MULTIVITAMIN ADULT PO) Take 1 tablet by mouth Daily. PT TO HOLD MED PRIOR TO OR     • NON FORMULARY Take 1 tablet by mouth Daily. TRIVERATROL GOLD -ON HOLD     • NON FORMULARY Take 1 tablet by mouth Daily. Mitochondrial energy PT TO HOLD MED PRIOR TO OR     • NON FORMULARY Take 1 tablet by mouth Daily. Vision essential PT TO HOLD MED PRIOR TO OR     • NON FORMULARY Take 1 tablet by mouth Daily. Neurovascular support PT TO HOLD MED  PRIOR TO OR     • NON FORMULARY Take 1 tablet by mouth Daily. Vinpocetine pill PT TO HOLD MED PRIOR TO OR     • Nutritional Supplements (WELLNESS ESSENTIALS FOR JOINT PO) Take 2 tablets by mouth Daily. PT TO HOLD MED PRIOR TO OR     • Omega-3 Fatty Acids (OMEGA 3 PO) Take 1,000 mg by mouth Daily. LAST DOSE 5/12/17     • Probiotic Product (ALIGN) 4 MG capsule Take 1 mg by mouth Daily.     • RESVERATROL PO Take 1 tablet by mouth Daily. PT TO HOLD MED PRIOR TO OR     • sucralfate (CARAFATE) 1 GM/10ML suspension Take 10 mL by mouth 4 (four) times a day. (Patient taking differently: Take 1 g by mouth 4 (Four) Times a Day As Needed.) 420 mL 3   • tamoxifen (NOLVADEX) 20 MG chemo tablet TAKE 1 TABLET BY MOUTH DAILY FOR 30 DAYS. 30 tablet 0   • Vaginal Lubricant (REPLENS VA) Insert  into the vagina. 1-3 times a week     • VITAMIN A PO Take 1 tablet by mouth Every Other Day. LAST DOSE 5/12/17     • Vitamin Mixture (VITAMIN E COMPLETE) capsule Take 2 capsules by mouth Daily. PT TO HOLD MED PRIOR TO OR       No current facility-administered medications on file prior to visit.         ALLERGIES:    Allergies   Allergen Reactions   • Other Anaphylaxis     BLOOD PRODUCTS WITH IGA IN THEM   • Erythromycin GI Intolerance     VOMITING   • Avelox [Moxifloxacin] Rash   • Levaquin [Levofloxacin] Rash         Review of Systems   Constitutional: Negative for activity change, appetite change, fatigue, fever and unexpected weight change.   HENT: Negative for hearing loss, nosebleeds, trouble swallowing and voice change.    Eyes: Negative for visual disturbance.   Respiratory: Negative for cough, shortness of breath and wheezing.    Cardiovascular: Negative for chest pain and palpitations.   Gastrointestinal: Negative for abdominal pain, diarrhea, nausea and vomiting.   Genitourinary: Negative for difficulty urinating, frequency, hematuria and urgency.   Musculoskeletal: Negative for back pain and neck pain.   Skin: Negative for rash.  "  Neurological: Negative for dizziness, seizures, syncope and headaches.   Hematological: Negative for adenopathy. Does not bruise/bleed easily.   Psychiatric/Behavioral: Negative for behavioral problems. The patient is not nervous/anxious.         Objective     Vitals:    07/14/17 0833   BP: 124/80   Pulse: 78   Resp: 16   Temp: 98.1 °F (36.7 °C)   TempSrc: Oral   SpO2: 98%   Weight: 145 lb 6.4 oz (66 kg)   Height: 64.93\" (164.9 cm)   PainSc: 0-No pain     Current Status 7/14/2017   ECOG score 0       Physical Exam   Constitutional: She is oriented to person, place, and time. She appears well-developed and well-nourished. No distress.   HENT:   Head: Normocephalic.   Eyes: Conjunctivae and EOM are normal. Pupils are equal, round, and reactive to light. No scleral icterus.   Neck: Normal range of motion. Neck supple. No JVD present. No thyromegaly present.   Cardiovascular: Normal rate and regular rhythm.  Exam reveals no gallop and no friction rub.    No murmur heard.  Pulmonary/Chest: Effort normal and breath sounds normal. She has no wheezes. She has no rales.   Abdominal: Soft. She exhibits no distension and no mass. There is no tenderness.   Musculoskeletal: Normal range of motion. She exhibits no edema or deformity.   Lymphadenopathy:     She has no cervical adenopathy.   Neurological: She is alert and oriented to person, place, and time. She has normal reflexes. No cranial nerve deficit.   Skin: Skin is warm and dry. No rash noted. No erythema.   Psychiatric: She has a normal mood and affect. Her behavior is normal. Judgment normal.         RECENT LABS:  Hematology WBC   Date Value Ref Range Status   07/14/2017 5.77 4.50 - 10.70 10*3/mm3 Final     RBC   Date Value Ref Range Status   07/14/2017 3.65 (L) 3.90 - 5.20 10*6/mm3 Final     Hemoglobin   Date Value Ref Range Status   07/14/2017 11.7 (L) 11.9 - 15.5 g/dL Final     Hematocrit   Date Value Ref Range Status   07/14/2017 35.9 35.6 - 45.5 % Final "     Platelets   Date Value Ref Range Status   07/14/2017 251 140 - 500 10*3/mm3 Final          Assessment/Plan     1.  Stage I, hormone receptor positive, HER-2/trae negative invasive ductal carcinoma the left breast status post lumpectomy. Patient was started on adjuvant hormonal therapy with tamoxifen 20 mg daily in June 2017 and seems to be tolerating it well.  2.  Osteoporosis on her recent DEXA scan.  3.  Common variable immunodeficiency requiring monthly IV immunoglobulin infusions.  4.  Wound healing issues following her initial lumpectomy which required further surgical revision on 7/3/2017.  The area and now seems to be healing well with no drainage or signs of infection.  5.  Plans for radiation therapy with Dr. Gates when she is fully healed.    Plan  1.  Continue tamoxifen 20 mg daily with plans to continue treatment for 5 years through June 2022.  2.  Return for follow-up in 3 months.  3.  Repeat bone density scan will be due in April 2019.  4.  Bilateral screening mammograms be due again in April 2018.  5.  Follow-up with Dr. Leon for further review of her wound healing next week.  6.  Follow-up with Dr. Gates regarding her upcoming left breast irradiation once healing is complete.

## 2017-07-17 ENCOUNTER — OFFICE VISIT (OUTPATIENT)
Dept: SURGERY | Facility: CLINIC | Age: 72
End: 2017-07-17

## 2017-07-17 DIAGNOSIS — Z09 FOLLOW UP: Primary | ICD-10-CM

## 2017-07-17 PROCEDURE — 99024 POSTOP FOLLOW-UP VISIT: CPT | Performed by: SURGERY

## 2017-07-17 NOTE — PROGRESS NOTES
Incision has healed completely and remaining sutures were removed, no recurrent drainage or other problems.  She will follow up long-term with Dr. Markel Hickman for medical oncology and Dr. Chayo Gates for radiation oncology

## 2017-07-25 ENCOUNTER — APPOINTMENT (OUTPATIENT)
Dept: RADIATION ONCOLOGY | Facility: HOSPITAL | Age: 72
End: 2017-07-25

## 2017-07-25 ENCOUNTER — OFFICE VISIT (OUTPATIENT)
Dept: RADIATION ONCOLOGY | Facility: CLINIC | Age: 72
End: 2017-07-25

## 2017-07-25 VITALS — DIASTOLIC BLOOD PRESSURE: 76 MMHG | OXYGEN SATURATION: 99 % | HEART RATE: 75 BPM | SYSTOLIC BLOOD PRESSURE: 137 MMHG

## 2017-07-25 DIAGNOSIS — C50.512 MALIGNANT NEOPLASM OF LOWER-OUTER QUADRANT OF LEFT FEMALE BREAST (HCC): Primary | ICD-10-CM

## 2017-07-25 PROCEDURE — 77334 RADIATION TREATMENT AID(S): CPT | Performed by: RADIOLOGY

## 2017-07-25 PROCEDURE — 77290 THER RAD SIMULAJ FIELD CPLX: CPT | Performed by: RADIOLOGY

## 2017-07-25 PROCEDURE — 77332 RADIATION TREATMENT AID(S): CPT | Performed by: RADIOLOGY

## 2017-07-25 PROCEDURE — 99214 OFFICE O/P EST MOD 30 MIN: CPT | Performed by: RADIOLOGY

## 2017-07-25 NOTE — PROGRESS NOTES
DIAGNOSIS and REASON FOR VISIT: Malignant neoplasm of lower-outer quadrant of left female breast - post excisional debridement of lumpectomy site and ready for radiation therapy    Referring Provider:  No ref. provider found  Patient Care Team:  Christian Chandler MD as PCP - General (Internal Medicine)  Jonh Collier MD as Consulting Physician (Allergy)  Rigoberto MERAZ MD as Consulting Physician (Gastroenterology)  Steve Leon MD as Referring Physician (General Surgery)  Sophia Duncan MD as Consulting Physician (Obstetrics and Gynecology)  Anne Marie Gates MD as Consulting Physician (Radiation Oncology)  Jj Hickman MD as Consulting Physician (Hematology and Oncology)    CHIEF COMPLAINT:  Malignant neoplasm of lower-outer quadrant of left female breast  HISTORY OF PRESENT ILLNESS:  She returns today 3 weeks post excisional debridement of the left breast lumpectomy site to discuss again the postoperative radiation therapy portion of her care.    In short, she is a 72 y.o. year old female who was found to have an abnormality on routine screening mammogram dated March 21, 2017.  This revealed an irregular mass measuring 1.2 cm in the 6:30 o'clock region of the left breast.  Further diagnostic mammogram on March 21, 2017 confirmed the mass measuring 1.2 cm at the 6:30 o'clock position and additionally a 7 mm focal asymmetry at 2:30 o'clock position of the left breast.     Left sided ultrasound completed on March 24, 2017 confirmed the mass at the 6:30 o'clock position to measure 7 mm but no solid masses or cystic elements were confirmed in any other region of the left breast.     She underwent biopsy of the mass described at 8:00 on March 26, 2017 which revealed an invasive ductal carcinoma of low-grade measuring 7.5 mm in greatest dimension.  There was no lymphovascular or perineural invasion noted.  The tissue was found to be positive for both estrogen and progesterone receptors and  negative for the HER-2/trae oncogene.    She went on to breast conserving surgery on May 19, 2017 and the breast pathology revealed an invasive mammary carcinoma with mixed ductal and lobular features measuring 1.2 x 0.1 x 0.9 cm, grade 1.  The margins were all negative and 0 of 3 sentinel nodes were involved.  Therefore she appears to have a T1c N0 grade 1 invasive ductal carcinoma of the left breast.    She initiated tamoxifen anticipated for 5 years in June, 2017. She continued to have persistent drainage from the incision and underwent excisional debridement of the left lumpectomy cavity on July 3, 2017 which included resection of the adjacent skin, subcutaneous fat, muscle and breast tissues. The pathology was benign.    Clinically, she is doing well after that procedure and is having no drainage or issues with the incision. She has recently seen the [facility] physicians back and continues on her Tamoxifen. She will return to see them in 3 months and presents today to start the treatment planning process for the radiation.    Past Medical History: she  has a past medical history of Allergic rhinitis; Anxiety; Arthritis; Asthma; Cancer (2017); Cataract; CVID (common variable immunodeficiency); External hemorrhoids; Gastritis; GERD (gastroesophageal reflux disease); H/O CT scan of abdomen; Hiatal hernia; History of colon polyps; History of giardia infection; History of Helicobacter pylori infection; IBS (irritable bowel syndrome); IgA deficiency; Immunoglobulin deficiency; Peptic ulcer disease; PONV (postoperative nausea and vomiting); Torn meniscus; and Wound discharge.    Past Surgical History:  she has a past surgical history that includes Tonsillectomy; Knee surgery (Left, 01/2015); Esophagogastroduodenoscopy (01/10/2014); Esophagogastroduodenoscopy (08/24/2010); Colonoscopy (01/10/2014); Colonoscopy (08/24/2010); Esophagogastroduodenoscopy (N/A, 10/18/2016); Breast biopsy (Left, 02/26/2007); Breast biopsy (Left,  "02/02/2007); endoscopy and colonoscopy (N/A, 08/23/2007); Breast biopsy (Left, 02/07/2003); Septoplasty; breast lumpectomy with sentinel node biopsy (Left, 5/19/2017); and Abdominal Wall Abscess Incision and Drainage (Left, 7/3/2017).    Meds:    Current Outpatient Prescriptions:   •  ALBUTEROL IN, Inhale 2 puffs As Needed (\"USES ONLY WTH A RESPIRATORY INFECTION\")., Disp: , Rfl:   •  Ascorbic Acid (VITAMIN C) 500 MG capsule, Take 1 tablet by mouth Daily. PT TO HOLD MED PRIOR TO OR, Disp: , Rfl:   •  b complex vitamins tablet, Take 1 tablet by mouth Daily. PT TO HOLD MED PRIOR TO OR, Disp: , Rfl:   •  Calcium-Magnesium-Vitamin D (CALCIUM MAGNESIUM PO), Take 2 tablets by mouth Daily. PT TO HOLD MED PRIOR TO OR, Disp: , Rfl:   •  Cholecalciferol (VITAMIN D-3 PO), Take 1 tablet by mouth Daily. PT TO HOLD MED PRIOR TO OR, Disp: , Rfl:   •  Coenzyme Q10 (CO Q 10 PO), Take 1 tablet by mouth Daily. PT TO HOLD MED PRIOR TO OR, Disp: , Rfl:   •  CRANBERRY PO, Take 2 tablets by mouth Daily., Disp: , Rfl:   •  dicyclomine (BENTYL) 10 MG capsule, Take 1 capsule by mouth 4 (four) times a day before meals and nightly. TAKE 1 TO 2 CAPSULES EVERY 4 TO 6 HOURS AS NEEDED. (Patient taking differently: Take 10 mg by mouth As Needed. TAKE 1 TO 2 CAPSULES EVERY 4 TO 6 HOURS AS NEEDED. ), Disp: 40 capsule, Rfl: 5  •  esomeprazole (NEXIUM) 20 MG capsule, Take 1 capsule by mouth 2 (Two) Times a Day. (Patient taking differently: Take 20 mg by mouth Every Morning Before Breakfast.), Disp: 180 capsule, Rfl: 3  •  Flaxseed, Linseed, (FLAX SEEDS PO), Take 1 capsule by mouth Daily. PT TO HOLD MED PRIOR TO OR, Disp: , Rfl:   •  fluticasone (FLONASE) 50 MCG/ACT nasal spray, 2 sprays into each nostril As Needed., Disp: , Rfl:   •  FOLIC ACID PO, Take 1 tablet by mouth Daily. PT TO HOLD MED PRIOR TO OR, Disp: , Rfl:   •  Ginkgo 60 MG tablet, Take 1 tablet by mouth Daily. PT TO HOLD MED PRIOR TO OR, Disp: , Rfl:   •  GuaiFENesin (MUCINEX PO), Take 1 " tablet by mouth As Needed., Disp: , Rfl:   •  Immune Globulin, Human, (GAMMAGARD IJ), Inject  as directed Every 28 (Twenty-Eight) Days. LAST DOSE 6/29/17, Disp: , Rfl:   •  LECITHIN PO, Take 1 tablet by mouth Daily. PT TO HOLD MED PRIOR TO OR, Disp: , Rfl:   •  Multiple Vitamins-Minerals (MULTIVITAMIN ADULT PO), Take 1 tablet by mouth Daily. PT TO HOLD MED PRIOR TO OR, Disp: , Rfl:   •  NON FORMULARY, Take 1 tablet by mouth Daily. TRIVERATROL GOLD -ON HOLD, Disp: , Rfl:   •  NON FORMULARY, Take 1 tablet by mouth Daily. Mitochondrial energy PT TO HOLD MED PRIOR TO OR, Disp: , Rfl:   •  NON FORMULARY, Take 1 tablet by mouth Daily. Vision essential PT TO HOLD MED PRIOR TO OR, Disp: , Rfl:   •  NON FORMULARY, Take 1 tablet by mouth Daily. Neurovascular support PT TO HOLD MED PRIOR TO OR, Disp: , Rfl:   •  NON FORMULARY, Take 1 tablet by mouth Daily. Vinpocetine pill PT TO HOLD MED PRIOR TO OR, Disp: , Rfl:   •  Nutritional Supplements (WELLNESS ESSENTIALS FOR JOINT PO), Take 2 tablets by mouth Daily. PT TO HOLD MED PRIOR TO OR, Disp: , Rfl:   •  Omega-3 Fatty Acids (OMEGA 3 PO), Take 1,000 mg by mouth Daily. LAST DOSE 5/12/17, Disp: , Rfl:   •  Probiotic Product (ALIGN) 4 MG capsule, Take 1 mg by mouth Daily., Disp: , Rfl:   •  RESVERATROL PO, Take 1 tablet by mouth Daily. PT TO HOLD MED PRIOR TO OR, Disp: , Rfl:   •  sucralfate (CARAFATE) 1 GM/10ML suspension, Take 10 mL by mouth 4 (four) times a day. (Patient taking differently: Take 1 g by mouth 4 (Four) Times a Day As Needed.), Disp: 420 mL, Rfl: 3  •  tamoxifen (NOLVADEX) 20 MG chemo tablet, TAKE 1 TABLET BY MOUTH DAILY FOR 30 DAYS., Disp: 30 tablet, Rfl: 0  •  Vaginal Lubricant (REPLENS VA), Insert  into the vagina. 1-3 times a week, Disp: , Rfl:   •  VITAMIN A PO, Take 1 tablet by mouth Every Other Day. LAST DOSE 5/12/17, Disp: , Rfl:   •  Vitamin Mixture (VITAMIN E COMPLETE) capsule, Take 2 capsules by mouth Daily. PT TO HOLD MED PRIOR TO OR, Disp: , Rfl:      Allergies:    Allergies   Allergen Reactions   • Other Anaphylaxis     BLOOD PRODUCTS WITH IGA IN THEM   • Erythromycin GI Intolerance     VOMITING   • Avelox [Moxifloxacin] Rash   • Levaquin [Levofloxacin] Rash       Family History:  her family history includes Colon polyps in her father; Other in her father; Pancreatitis in her daughter; Prostate cancer (age of onset: 68) in her father. There is no history of Malig Hyperthermia.    Social History:  she  reports that she has never smoked. She has never used smokeless tobacco. She reports that she drinks about 0.6 oz of alcohol per week  She reports that she does not use illicit drugs.    Pertinent Findings on   Review of Systems   Constitutional: Positive for fatigue. Negative for appetite change, chills, diaphoresis, fever and unexpected weight change.   HENT:   Negative for hearing loss, lump/mass, mouth sores, nosebleeds, sore throat, tinnitus, trouble swallowing and voice change.    Eyes: Negative for eye problems and icterus.   Respiratory: Negative for chest tightness, cough, dizziness on exertion, hemoptysis, shortness of breath and wheezing.    Cardiovascular: Negative for chest pain, leg swelling and palpitations.   Gastrointestinal: Negative for abdominal distention, abdominal pain, blood in stool, constipation, diarrhea, nausea, rectal pain and vomiting.   Endocrine: Negative for hot flashes.   Genitourinary: Negative for bladder incontinence, difficulty urinating, dyspareunia, dysuria, frequency, hematuria, menstrual problem, nocturia, pelvic pain, vaginal bleeding and vaginal discharge.    Musculoskeletal: Negative for arthralgias, back pain, flank pain, gait problem, myalgias, neck pain and neck stiffness.   Skin: Negative for itching, rash and wound.   Neurological: Negative for dizziness, extremity weakness, gait problem, headaches, light-headedness, numbness, seizures and speech difficulty.   Hematological: Negative for adenopathy. Does not  bruise/bleed easily.   Psychiatric/Behavioral: Negative for confusion, decreased concentration, depression, sleep disturbance and suicidal ideas. The patient is not nervous/anxious.    :  There were no vitals filed for this visit.    Performance Status: (1) Restricted in physically strenuous activity, ambulatory and able to do work of light nature    Pertinent Findings on:  Physical Exam   Constitutional: She appears well-developed and well-nourished. No distress.   HENT:   Head: Normocephalic.   Neck: Normal range of motion. Neck supple.   Pulmonary/Chest: She exhibits no mass and no tenderness. Right breast exhibits no inverted nipple, no mass, no nipple discharge, no skin change and no tenderness. Left breast exhibits no inverted nipple, no mass, no nipple discharge, no skin change and no tenderness.   Right breast is without abnormality as is the right axilla. The left breast shows a healing lumpectomy incision over the inferior aspect of the breast. It is now healing well, no drainage, no fluid collection or seroma noted. No erythema, looks great.    Musculoskeletal: Normal range of motion.   Lymphadenopathy:     She has no cervical adenopathy.     She has no axillary adenopathy.        Right axillary: No pectoral adenopathy present.        Left axillary: No pectoral adenopathy present.       Right: No supraclavicular adenopathy present.        Left: No supraclavicular adenopathy present.   Axillary incision is healing well. There is no palpable axillary, supraclavicular nor cervical lymphadenopathy appreciated. No lymphedema is noted in either upper extremity.   Skin: Skin is intact. She is not diaphoretic.   Psychiatric: She has a normal mood and affect. Her speech is normal and behavior is normal. Judgment and thought content normal. Cognition and memory are normal.       Assessment:   1. Malignant neoplasm of lower-outer quadrant of left female breast    Pathologic Stage:  T1c N0    Plan:   The breast is  now certainly healing well and ready to begin treatment. She does have her long-awaited trim from August 5 thru August 21 so we will be starting her treatments immediately upon her return.    We reviewed the role of radiation therapy in her breast conservation treatment.  We reviewed the course of postoperative whole breast radiation therapy consisting of 4256 cGy aimed at the breast given over 16 treatments. We will then plan on boosting the tumor bed region as I will delineate it on her treatment planning scan with an additional 1000 cGy given in five treatments. The above course of treatment should be completed in 4 1/2 weeks.     We reviewed the specifics, logistics and side effects of this course of treatment  which may involve acutely, irritation of the skin, including erythema and possibly moist desquamation, tenderness of the musculature of the chest wall and mild fatigue. We discussed the long term possibility of mild hyperpigmentation and fibrosis of the breast, mild increased incidence of rib fracture and radiation pneumonitis.  We also discussed the importance of our treatment planning process in protecting these underlying structures as much as possible, specifically heart, ribs and lung and I believe all her questions were answered to her satisfaction.      I spent over 30 minutes face-to-face with the patient today and of that time, 20 minutes were spent counseling and coordinating care.

## 2017-07-27 ENCOUNTER — INFUSION (OUTPATIENT)
Dept: ONCOLOGY | Facility: HOSPITAL | Age: 72
End: 2017-07-27

## 2017-07-27 VITALS
HEART RATE: 76 BPM | DIASTOLIC BLOOD PRESSURE: 72 MMHG | BODY MASS INDEX: 23.85 KG/M2 | SYSTOLIC BLOOD PRESSURE: 109 MMHG | WEIGHT: 143 LBS | OXYGEN SATURATION: 100 % | TEMPERATURE: 97.4 F

## 2017-07-27 DIAGNOSIS — D83.9 COMMON VARIABLE IMMUNODEFICIENCY (HCC): Primary | ICD-10-CM

## 2017-07-27 DIAGNOSIS — D80.2 IGA DEFICIENCY (HCC): ICD-10-CM

## 2017-07-27 PROCEDURE — 77280 THER RAD SIMULAJ FIELD SMPL: CPT | Performed by: RADIOLOGY

## 2017-07-27 PROCEDURE — 77417 THER RADIOLOGY PORT IMAGE(S): CPT | Performed by: RADIOLOGY

## 2017-07-27 PROCEDURE — 25010000002 IMMUNE GLOBULIN (HUMAN) PER 500 MG: Performed by: ALLERGY & IMMUNOLOGY

## 2017-07-27 PROCEDURE — 96366 THER/PROPH/DIAG IV INF ADDON: CPT | Performed by: NURSE PRACTITIONER

## 2017-07-27 PROCEDURE — 96365 THER/PROPH/DIAG IV INF INIT: CPT | Performed by: NURSE PRACTITIONER

## 2017-07-27 RX ORDER — EPINEPHRINE 0.3 MG/.3ML
0.3 INJECTION SUBCUTANEOUS ONCE
Status: CANCELLED
Start: 2017-07-27 | End: 2017-07-27

## 2017-07-27 RX ORDER — DIPHENHYDRAMINE HYDROCHLORIDE 50 MG/ML
50 INJECTION INTRAMUSCULAR; INTRAVENOUS AS NEEDED
Status: CANCELLED | OUTPATIENT
Start: 2017-07-27

## 2017-07-27 RX ORDER — ACETAMINOPHEN 500 MG
500 TABLET ORAL ONCE
Status: CANCELLED
Start: 2017-07-27 | End: 2017-07-27

## 2017-07-27 RX ORDER — SODIUM CHLORIDE 9 MG/ML
250 INJECTION, SOLUTION INTRAVENOUS ONCE
Status: CANCELLED | OUTPATIENT
Start: 2017-07-27

## 2017-07-27 RX ORDER — ACETAMINOPHEN 325 MG/1
650 TABLET ORAL ONCE
Status: CANCELLED | OUTPATIENT
Start: 2017-07-27

## 2017-07-27 RX ORDER — DIPHENHYDRAMINE HCL 25 MG
25 CAPSULE ORAL ONCE
Status: DISCONTINUED | OUTPATIENT
Start: 2017-07-27 | End: 2017-07-27 | Stop reason: HOSPADM

## 2017-07-27 RX ORDER — ACETAMINOPHEN 325 MG/1
650 TABLET ORAL ONCE
Status: DISCONTINUED | OUTPATIENT
Start: 2017-07-27 | End: 2017-07-27 | Stop reason: HOSPADM

## 2017-07-27 RX ORDER — SODIUM CHLORIDE 9 MG/ML
250 INJECTION, SOLUTION INTRAVENOUS ONCE
Status: COMPLETED | OUTPATIENT
Start: 2017-07-27 | End: 2017-07-27

## 2017-07-27 RX ORDER — DIPHENHYDRAMINE HCL 25 MG
25 CAPSULE ORAL ONCE
Status: CANCELLED | OUTPATIENT
Start: 2017-07-27

## 2017-07-27 RX ADMIN — Medication 10 G: at 13:43

## 2017-07-27 RX ADMIN — SODIUM CHLORIDE 250 ML: 900 INJECTION, SOLUTION INTRAVENOUS at 12:26

## 2017-07-27 RX ADMIN — IMMUNE GLOBULIN INTRAVENOUS (HUMAN) 10 G: KIT at 12:26

## 2017-08-21 PROCEDURE — 77295 3-D RADIOTHERAPY PLAN: CPT | Performed by: RADIOLOGY

## 2017-08-21 PROCEDURE — 77300 RADIATION THERAPY DOSE PLAN: CPT | Performed by: RADIOLOGY

## 2017-08-22 ENCOUNTER — APPOINTMENT (OUTPATIENT)
Dept: RADIATION ONCOLOGY | Facility: HOSPITAL | Age: 72
End: 2017-08-22

## 2017-08-22 PROCEDURE — 77412 RADIATION TX DELIVERY LVL 3: CPT | Performed by: RADIOLOGY

## 2017-08-22 PROCEDURE — 77334 RADIATION TREATMENT AID(S): CPT | Performed by: RADIOLOGY

## 2017-08-22 PROCEDURE — 77417 THER RADIOLOGY PORT IMAGE(S): CPT | Performed by: RADIOLOGY

## 2017-08-22 PROCEDURE — 77427 RADIATION TX MANAGEMENT X5: CPT | Performed by: RADIOLOGY

## 2017-08-23 ENCOUNTER — DOCUMENTATION (OUTPATIENT)
Dept: ONCOLOGY | Facility: CLINIC | Age: 72
End: 2017-08-23

## 2017-08-23 DIAGNOSIS — D83.8 OTHER COMMON VARIABLE IMMUNODEFICIENCIES (HCC): Primary | ICD-10-CM

## 2017-08-23 PROCEDURE — 77412 RADIATION TX DELIVERY LVL 3: CPT | Performed by: RADIOLOGY

## 2017-08-24 ENCOUNTER — INFUSION (OUTPATIENT)
Dept: ONCOLOGY | Facility: HOSPITAL | Age: 72
End: 2017-08-24

## 2017-08-24 VITALS
WEIGHT: 145.4 LBS | TEMPERATURE: 97.5 F | HEART RATE: 63 BPM | OXYGEN SATURATION: 97 % | SYSTOLIC BLOOD PRESSURE: 119 MMHG | DIASTOLIC BLOOD PRESSURE: 74 MMHG | BODY MASS INDEX: 24.25 KG/M2

## 2017-08-24 DIAGNOSIS — D83.9 COMMON VARIABLE IMMUNODEFICIENCY (HCC): Primary | ICD-10-CM

## 2017-08-24 DIAGNOSIS — D80.2 IGA DEFICIENCY (HCC): ICD-10-CM

## 2017-08-24 DIAGNOSIS — D83.8 OTHER COMMON VARIABLE IMMUNODEFICIENCIES (HCC): ICD-10-CM

## 2017-08-24 LAB — IGG1 SER-MCNC: 788 MG/DL (ref 700–1600)

## 2017-08-24 PROCEDURE — 96365 THER/PROPH/DIAG IV INF INIT: CPT | Performed by: NURSE PRACTITIONER

## 2017-08-24 PROCEDURE — 96366 THER/PROPH/DIAG IV INF ADDON: CPT | Performed by: NURSE PRACTITIONER

## 2017-08-24 PROCEDURE — 25010000002 IMMUNE GLOBULIN (HUMAN) PER 500 MG: Performed by: ALLERGY & IMMUNOLOGY

## 2017-08-24 PROCEDURE — 77412 RADIATION TX DELIVERY LVL 3: CPT | Performed by: RADIOLOGY

## 2017-08-24 PROCEDURE — 77336 RADIATION PHYSICS CONSULT: CPT | Performed by: RADIOLOGY

## 2017-08-24 RX ORDER — ACETAMINOPHEN 325 MG/1
650 TABLET ORAL ONCE
Status: CANCELLED | OUTPATIENT
Start: 2017-08-24

## 2017-08-24 RX ORDER — SODIUM CHLORIDE 9 MG/ML
250 INJECTION, SOLUTION INTRAVENOUS ONCE
Status: CANCELLED | OUTPATIENT
Start: 2017-08-24

## 2017-08-24 RX ORDER — EPINEPHRINE 0.3 MG/.3ML
0.3 INJECTION SUBCUTANEOUS ONCE
Status: CANCELLED
Start: 2017-08-24 | End: 2017-08-24

## 2017-08-24 RX ORDER — DIPHENHYDRAMINE HYDROCHLORIDE 50 MG/ML
50 INJECTION INTRAMUSCULAR; INTRAVENOUS AS NEEDED
Status: CANCELLED | OUTPATIENT
Start: 2017-08-24

## 2017-08-24 RX ORDER — ACETAMINOPHEN 500 MG
500 TABLET ORAL ONCE
Status: CANCELLED
Start: 2017-08-24 | End: 2017-08-24

## 2017-08-24 RX ORDER — SODIUM CHLORIDE 9 MG/ML
250 INJECTION, SOLUTION INTRAVENOUS ONCE
Status: COMPLETED | OUTPATIENT
Start: 2017-08-24 | End: 2017-08-24

## 2017-08-24 RX ORDER — DIPHENHYDRAMINE HCL 25 MG
25 CAPSULE ORAL ONCE
Status: CANCELLED | OUTPATIENT
Start: 2017-08-24

## 2017-08-24 RX ADMIN — IMMUNE GLOBULIN INTRAVENOUS (HUMAN) 10 G: KIT at 13:55

## 2017-08-24 RX ADMIN — SODIUM CHLORIDE 250 ML: 900 INJECTION, SOLUTION INTRAVENOUS at 12:00

## 2017-08-24 RX ADMIN — Medication 10 G: at 12:14

## 2017-08-25 PROCEDURE — 77412 RADIATION TX DELIVERY LVL 3: CPT | Performed by: RADIOLOGY

## 2017-08-28 PROCEDURE — 77412 RADIATION TX DELIVERY LVL 3: CPT | Performed by: RADIOLOGY

## 2017-08-29 ENCOUNTER — RADIATION ONCOLOGY WEEKLY ASSESSMENT (OUTPATIENT)
Dept: RADIATION ONCOLOGY | Facility: CLINIC | Age: 72
End: 2017-08-29

## 2017-08-29 VITALS
HEIGHT: 65 IN | HEART RATE: 77 BPM | BODY MASS INDEX: 23.99 KG/M2 | DIASTOLIC BLOOD PRESSURE: 85 MMHG | WEIGHT: 144 LBS | SYSTOLIC BLOOD PRESSURE: 143 MMHG | OXYGEN SATURATION: 99 % | TEMPERATURE: 97.9 F

## 2017-08-29 DIAGNOSIS — C50.512 MALIGNANT NEOPLASM OF LOWER-OUTER QUADRANT OF LEFT FEMALE BREAST (HCC): Primary | ICD-10-CM

## 2017-08-29 PROCEDURE — 77412 RADIATION TX DELIVERY LVL 3: CPT | Performed by: RADIOLOGY

## 2017-08-29 PROCEDURE — 77427 RADIATION TX MANAGEMENT X5: CPT | Performed by: RADIOLOGY

## 2017-08-29 PROCEDURE — 77417 THER RADIOLOGY PORT IMAGE(S): CPT | Performed by: RADIOLOGY

## 2017-08-29 NOTE — PROGRESS NOTES
"Physician Weekly Management Note    Diagnosis:   No matching staging information was found for the patient.    Reason for Visit: Radiation (6/21)    Concurrent Chemo:   No    Notes on Treatment course, Films, Medical progress and Plan:  Doing well. Enjoyed her trip but glad to be home. No problems or questions, cont on.    ROS - Other than as listed above, as follow:  Constitutional - Normal - Denies lack of appetite, fatigue, fever, night sweats and change in weight.  Neck - Normal - Denies neck masses, muscle weakness, neck pain, decreased range of motion and swelling of the neck.  Breasts - Normal - Denies breast masses, nipple discharge, nipple inversion and pain.  Respiratory - Normal - Denies cough, dyspnea, hemoptysis, hiccoughs, pleuritic chest pain and wheezing.  Gastrointestinal - Normal - Denies abdominal pain, constipation, diarrhea, heartburn / dyspepsia, hematemesis, hemorrhoids, melena / GI bleeding, nausea, pain / cramping, satiety and vomiting.  Musculoskeletal - Normal - Denies arthritis, bone pain, joint pain, muscle weakness and decreased range of motion.   Hematologic/Lymphatic - Normal - Denies easy bruising and tender or enlarged lymph nodes.    PYHSICAL EXAM - Other than listed above, as follows:  Vitals:    08/29/17 1102   BP: 143/85   Pulse: 77   Temp: 97.9 °F (36.6 °C)   TempSrc: Oral   SpO2: 99%   Weight: 144 lb (65.3 kg)   Height: 64.93\" (164.9 cm)   PainSc: 0-No pain       Constitutional - Normal - No evidence of impaired alertness, inadequate appearance, premature or advanced chronologic age, uncooperativeness, altered mood and affect and disorientation.  Neck - Normal - No evidence of tender or enlarged lymph nodes, neck abnormalities, restricted range of motion and enlarged thyroid gland.  Breasts - Normal - No change in nipple or incision site.  Chest - Normal - No evidence of chest abnormalities and tender or enlarged lymph nodes.  Hematologic/Lymphatic -  Normal - No evidence of " "tender or enlarged axillae lymph nodes and tender or enlarged neck lymph nodes.    Performance Status: (0) Fully active, able to carry on all predisease performance without restriction  Problem added:  No problems updated.  Medications added: No orders of the defined types were placed in this encounter.    Ancillary referrals made: No orders of the defined types were placed in this encounter.      Technical aspects reviewed:  Weekly OBI approved if applicable? Yes  Weekly port films approved?   Yes  Change requests noted if applicable? Yes  Patient setup and plan reviewed?  Yes    Chart Reviewed:  Continue current treatment plan?   Yes  Treatment plan change requested?  No    I have reviewed and marked as \"reviewed\" the current medications, allergies and problem list in the patients EMR.    I have reviewed the patient's medical, surgical  history in detail, reviewed any pertinent lab work  and updated the computerized patient record if needed.    Patient's Care Team:  Patient Care Team:  Christian Chandler MD as PCP - General (Internal Medicine)  Jonh Collier MD as Consulting Physician (Allergy)  Rigoberto MERAZ MD as Consulting Physician (Gastroenterology)  Steve Leon MD as Referring Physician (General Surgery)  Sophia Duncan MD as Consulting Physician (Obstetrics and Gynecology)  Anne Marie Gates MD as Consulting Physician (Radiation Oncology)  Jj Hickman MD as Consulting Physician (Hematology and Oncology)    Seen and approved by:  Anne Marie Gates MD, 08/29/2017  "

## 2017-08-30 PROCEDURE — 77412 RADIATION TX DELIVERY LVL 3: CPT | Performed by: RADIOLOGY

## 2017-08-30 PROCEDURE — 77417 THER RADIOLOGY PORT IMAGE(S): CPT | Performed by: RADIOLOGY

## 2017-08-31 PROCEDURE — 77321 SPECIAL TELETX PORT PLAN: CPT | Performed by: RADIOLOGY

## 2017-08-31 PROCEDURE — 77412 RADIATION TX DELIVERY LVL 3: CPT | Performed by: RADIOLOGY

## 2017-08-31 PROCEDURE — 77336 RADIATION PHYSICS CONSULT: CPT | Performed by: RADIOLOGY

## 2017-09-01 ENCOUNTER — APPOINTMENT (OUTPATIENT)
Dept: RADIATION ONCOLOGY | Facility: HOSPITAL | Age: 72
End: 2017-09-01

## 2017-09-01 PROCEDURE — 77331 SPECIAL RADIATION DOSIMETRY: CPT | Performed by: RADIOLOGY

## 2017-09-01 PROCEDURE — 77412 RADIATION TX DELIVERY LVL 3: CPT | Performed by: RADIOLOGY

## 2017-09-05 PROCEDURE — 77412 RADIATION TX DELIVERY LVL 3: CPT | Performed by: RADIOLOGY

## 2017-09-06 ENCOUNTER — RADIATION ONCOLOGY WEEKLY ASSESSMENT (OUTPATIENT)
Dept: RADIATION ONCOLOGY | Facility: CLINIC | Age: 72
End: 2017-09-06

## 2017-09-06 VITALS
HEIGHT: 65 IN | OXYGEN SATURATION: 98 % | BODY MASS INDEX: 24.66 KG/M2 | TEMPERATURE: 97 F | HEART RATE: 71 BPM | SYSTOLIC BLOOD PRESSURE: 142 MMHG | WEIGHT: 148 LBS | DIASTOLIC BLOOD PRESSURE: 82 MMHG

## 2017-09-06 DIAGNOSIS — C50.512 MALIGNANT NEOPLASM OF LOWER-OUTER QUADRANT OF LEFT FEMALE BREAST (HCC): Primary | ICD-10-CM

## 2017-09-06 PROCEDURE — 77427 RADIATION TX MANAGEMENT X5: CPT | Performed by: RADIOLOGY

## 2017-09-06 PROCEDURE — 77300 RADIATION THERAPY DOSE PLAN: CPT | Performed by: RADIOLOGY

## 2017-09-06 PROCEDURE — 77417 THER RADIOLOGY PORT IMAGE(S): CPT | Performed by: RADIOLOGY

## 2017-09-06 PROCEDURE — 77412 RADIATION TX DELIVERY LVL 3: CPT | Performed by: RADIOLOGY

## 2017-09-06 NOTE — PROGRESS NOTES
Physician Weekly Management Note    Diagnosis:  Left breast ca  Reason for Visit: Tx:  11/21  Concurrent Chemo:   No    Notes on Treatment course, Films, Medical progress and Plan:  Doing well. Some slight fatigue and had to put cat down this week so appropriately sad about that. No problems with us, no questions. Cont on.    ROS - Other than as listed above, as follow:  Constitutional - Normal - Denies lack of appetite, fatigue, fever, night sweats and change in weight.  Neck - Normal - Denies neck masses, muscle weakness, neck pain, decreased range of motion and swelling of the neck.  Breasts - Normal - Denies breast masses, nipple discharge, nipple inversion and pain.  Respiratory - Normal - Denies cough, dyspnea, hemoptysis, hiccoughs, pleuritic chest pain and wheezing.  Gastrointestinal - Normal - Denies abdominal pain, constipation, diarrhea, heartburn / dyspepsia, hematemesis, hemorrhoids, melena / GI bleeding, nausea, pain / cramping, satiety and vomiting.  Musculoskeletal - Normal - Denies arthritis, bone pain, joint pain, muscle weakness and decreased range of motion.   Hematologic/Lymphatic - Normal - Denies easy bruising and tender or enlarged lymph nodes.    PYHSICAL EXAM - Other than listed above, as follows:  There were no vitals filed for this visit.    Constitutional - Normal - No evidence of impaired alertness, inadequate appearance, premature or advanced chronologic age, uncooperativeness, altered mood and affect and disorientation.  Neck - Normal - No evidence of tender or enlarged lymph nodes, neck abnormalities, restricted range of motion and enlarged thyroid gland.  Breasts - Normal - No change in nipple or incision site.  Chest - Normal - No evidence of chest abnormalities and tender or enlarged lymph nodes.  Hematologic/Lymphatic -  Normal - No evidence of tender or enlarged axillae lymph nodes and tender or enlarged neck lymph nodes.    Performance Status: (0) Fully active, able to carry on  "all predisease performance without restriction  Problem added:  No problems updated.  Medications added: No orders of the defined types were placed in this encounter.    Ancillary referrals made: No orders of the defined types were placed in this encounter.      Technical aspects reviewed:  Weekly OBI approved if applicable? Yes  Weekly port films approved?   Yes  Change requests noted if applicable? Yes  Patient setup and plan reviewed?  Yes    Chart Reviewed:  Continue current treatment plan?   Yes  Treatment plan change requested?  No    I have reviewed and marked as \"reviewed\" the current medications, allergies and problem list in the patients EMR.    I have reviewed the patient's medical, surgical  history in detail, reviewed any pertinent lab work  and updated the computerized patient record if needed.    Patient's Care Team:  Patient Care Team:  Christian Chandler MD as PCP - General (Internal Medicine)  Jonh Collier MD as Consulting Physician (Allergy)  Rigoberto MERAZ MD as Consulting Physician (Gastroenterology)  Steve Leon MD as Referring Physician (General Surgery)  Sophia Duncan MD as Consulting Physician (Obstetrics and Gynecology)  Anne Marie Gates MD as Consulting Physician (Radiation Oncology)  Jj Hickman MD as Consulting Physician (Hematology and Oncology)    Seen and approved by:  Anne Marie Gates MD, 08/29/2017  "

## 2017-09-07 PROCEDURE — 77417 THER RADIOLOGY PORT IMAGE(S): CPT | Performed by: RADIOLOGY

## 2017-09-07 PROCEDURE — 77412 RADIATION TX DELIVERY LVL 3: CPT | Performed by: RADIOLOGY

## 2017-09-07 PROCEDURE — 77336 RADIATION PHYSICS CONSULT: CPT | Performed by: RADIOLOGY

## 2017-09-08 PROCEDURE — 77412 RADIATION TX DELIVERY LVL 3: CPT | Performed by: RADIOLOGY

## 2017-09-11 PROCEDURE — 77412 RADIATION TX DELIVERY LVL 3: CPT | Performed by: RADIOLOGY

## 2017-09-12 ENCOUNTER — RADIATION ONCOLOGY WEEKLY ASSESSMENT (OUTPATIENT)
Dept: RADIATION ONCOLOGY | Facility: CLINIC | Age: 72
End: 2017-09-12

## 2017-09-12 VITALS
SYSTOLIC BLOOD PRESSURE: 144 MMHG | HEART RATE: 74 BPM | OXYGEN SATURATION: 99 % | BODY MASS INDEX: 24.32 KG/M2 | HEIGHT: 65 IN | DIASTOLIC BLOOD PRESSURE: 76 MMHG | TEMPERATURE: 97.4 F | WEIGHT: 146 LBS

## 2017-09-12 DIAGNOSIS — C50.512 MALIGNANT NEOPLASM OF LOWER-OUTER QUADRANT OF LEFT FEMALE BREAST (HCC): Primary | ICD-10-CM

## 2017-09-12 PROCEDURE — 77412 RADIATION TX DELIVERY LVL 3: CPT | Performed by: RADIOLOGY

## 2017-09-12 PROCEDURE — 77280 THER RAD SIMULAJ FIELD SMPL: CPT | Performed by: RADIOLOGY

## 2017-09-12 PROCEDURE — 77334 RADIATION TREATMENT AID(S): CPT | Performed by: RADIOLOGY

## 2017-09-12 NOTE — PROGRESS NOTES
Physician Weekly Management Note    Diagnosis:  Left breast ca  Reason for Visit: Tx:  15/21  Concurrent Chemo:   No    Notes on Treatment course, Films, Medical progress and Plan:  Doing well. Scant erythematous papular rash over medial breast, keep moist. No problems with us, no questions. Cont on.    ROS - Other than as listed above, as follow:  Constitutional - Normal - Denies lack of appetite, fatigue, fever, night sweats and change in weight.  Neck - Normal - Denies neck masses, muscle weakness, neck pain, decreased range of motion and swelling of the neck.  Breasts - Normal - Denies breast masses, nipple discharge, nipple inversion and pain.  Respiratory - Normal - Denies cough, dyspnea, hemoptysis, hiccoughs, pleuritic chest pain and wheezing.  Gastrointestinal - Normal - Denies abdominal pain, constipation, diarrhea, heartburn / dyspepsia, hematemesis, hemorrhoids, melena / GI bleeding, nausea, pain / cramping, satiety and vomiting.  Musculoskeletal - Normal - Denies arthritis, bone pain, joint pain, muscle weakness and decreased range of motion.   Hematologic/Lymphatic - Normal - Denies easy bruising and tender or enlarged lymph nodes.    PYHSICAL EXAM - Other than listed above, as follows:  There were no vitals filed for this visit.    Constitutional - Normal - No evidence of impaired alertness, inadequate appearance, premature or advanced chronologic age, uncooperativeness, altered mood and affect and disorientation.  Neck - Normal - No evidence of tender or enlarged lymph nodes, neck abnormalities, restricted range of motion and enlarged thyroid gland.  Breasts - Normal - No change in nipple or incision site.  Chest - Normal - No evidence of chest abnormalities and tender or enlarged lymph nodes.  Hematologic/Lymphatic -  Normal - No evidence of tender or enlarged axillae lymph nodes and tender or enlarged neck lymph nodes.    Performance Status: (0) Fully active, able to carry on all predisease  "performance without restriction  Problem added:  No problems updated.  Medications added: No orders of the defined types were placed in this encounter.    Ancillary referrals made: No orders of the defined types were placed in this encounter.      Technical aspects reviewed:  Weekly OBI approved if applicable? Yes  Weekly port films approved?   Yes  Change requests noted if applicable? Yes  Patient setup and plan reviewed?  Yes    Chart Reviewed:  Continue current treatment plan?   Yes  Treatment plan change requested?  No    I have reviewed and marked as \"reviewed\" the current medications, allergies and problem list in the patients EMR.    I have reviewed the patient's medical, surgical  history in detail, reviewed any pertinent lab work  and updated the computerized patient record if needed.    Patient's Care Team:  Patient Care Team:  Christian Chandler MD as PCP - General (Internal Medicine)  Jonh Collier MD as Consulting Physician (Allergy)  Rigoberto MERAZ MD as Consulting Physician (Gastroenterology)  Steve Leon MD as Referring Physician (General Surgery)  Sophia Duncan MD as Consulting Physician (Obstetrics and Gynecology)  Anne Marie Gates MD as Consulting Physician (Radiation Oncology)  Jj Hickman MD as Consulting Physician (Hematology and Oncology)    Seen and approved by:  Anne Marie Gates MD, 08/29/2017  "

## 2017-09-13 PROCEDURE — 77417 THER RADIOLOGY PORT IMAGE(S): CPT | Performed by: RADIOLOGY

## 2017-09-13 PROCEDURE — 77412 RADIATION TX DELIVERY LVL 3: CPT | Performed by: RADIOLOGY

## 2017-09-13 PROCEDURE — 77427 RADIATION TX MANAGEMENT X5: CPT | Performed by: RADIOLOGY

## 2017-09-14 PROCEDURE — 77336 RADIATION PHYSICS CONSULT: CPT | Performed by: RADIOLOGY

## 2017-09-14 PROCEDURE — 77412 RADIATION TX DELIVERY LVL 3: CPT | Performed by: RADIOLOGY

## 2017-09-15 PROCEDURE — 77412 RADIATION TX DELIVERY LVL 3: CPT | Performed by: RADIOLOGY

## 2017-09-18 PROCEDURE — 77412 RADIATION TX DELIVERY LVL 3: CPT | Performed by: RADIOLOGY

## 2017-09-19 PROCEDURE — 77412 RADIATION TX DELIVERY LVL 3: CPT | Performed by: RADIOLOGY

## 2017-09-20 ENCOUNTER — RADIATION ONCOLOGY WEEKLY ASSESSMENT (OUTPATIENT)
Dept: RADIATION ONCOLOGY | Facility: CLINIC | Age: 72
End: 2017-09-20

## 2017-09-20 VITALS
HEART RATE: 77 BPM | WEIGHT: 144 LBS | SYSTOLIC BLOOD PRESSURE: 143 MMHG | DIASTOLIC BLOOD PRESSURE: 83 MMHG | HEIGHT: 65 IN | BODY MASS INDEX: 23.99 KG/M2 | TEMPERATURE: 97.6 F | OXYGEN SATURATION: 99 %

## 2017-09-20 DIAGNOSIS — C50.512 MALIGNANT NEOPLASM OF LOWER-OUTER QUADRANT OF LEFT FEMALE BREAST (HCC): Primary | ICD-10-CM

## 2017-09-20 PROCEDURE — 77412 RADIATION TX DELIVERY LVL 3: CPT | Performed by: RADIOLOGY

## 2017-09-20 NOTE — PROGRESS NOTES
"Physician Weekly Management Note    Diagnosis:   Left breast ca    Reason for Visit: Radiation (21/21)    Concurrent Chemo:   No    Notes on Treatment course, Films, Medical progress and Plan:  Doing well. Papular erythematous reaction at sun exposed corner as expected. Skin care discussed. F/U in 4 weeks. No problems or questions.    ROS - Other than as listed above, as follow:  Constitutional - Normal - Denies lack of appetite, fatigue, fever, night sweats and change in weight.  Neck - Normal - Denies neck masses, muscle weakness, neck pain, decreased range of motion and swelling of the neck.  Breasts - Normal - Denies breast masses, nipple discharge, nipple inversion and pain.  Respiratory - Normal - Denies cough, dyspnea, hemoptysis, hiccoughs, pleuritic chest pain and wheezing.  Gastrointestinal - Normal - Denies abdominal pain, constipation, diarrhea, heartburn / dyspepsia, hematemesis, hemorrhoids, melena / GI bleeding, nausea, pain / cramping, satiety and vomiting.  Musculoskeletal - Normal - Denies arthritis, bone pain, joint pain, muscle weakness and decreased range of motion.   Hematologic/Lymphatic - Normal - Denies easy bruising and tender or enlarged lymph nodes.    PYHSICAL EXAM - Other than listed above, as follows:  Vitals:    09/20/17 1046   BP: 143/83   Pulse: 77   Temp: 97.6 °F (36.4 °C)   TempSrc: Oral   SpO2: 99%   Weight: 144 lb (65.3 kg)   Height: 64.93\" (164.9 cm)   PainSc: 0-No pain       Constitutional - Normal - No evidence of impaired alertness, inadequate appearance, premature or advanced chronologic age, uncooperativeness, altered mood and affect and disorientation.  Neck - Normal - No evidence of tender or enlarged lymph nodes, neck abnormalities, restricted range of motion and enlarged thyroid gland.  Breasts - Normal - No change in nipple or incision site.  Chest - Normal - No evidence of chest abnormalities and tender or enlarged lymph nodes.  Hematologic/Lymphatic -  Normal - No " "evidence of tender or enlarged axillae lymph nodes and tender or enlarged neck lymph nodes.    Performance Status: (1) Restricted in physically strenuous activity, ambulatory and able to do work of light nature  Problem added:  No problems updated.  Medications added: No orders of the defined types were placed in this encounter.    Ancillary referrals made: No orders of the defined types were placed in this encounter.      Technical aspects reviewed:  Weekly OBI approved if applicable? Yes  Weekly port films approved?   Yes  Change requests noted if applicable? Yes  Patient setup and plan reviewed?  Yes    Chart Reviewed:  Continue current treatment plan?   Yes  Treatment plan change requested?  No    I have reviewed and marked as \"reviewed\" the current medications, allergies and problem list in the patients EMR.    I have reviewed the patient's medical, surgical  history in detail, reviewed any pertinent lab work  and updated the computerized patient record if needed.    Patient's Care Team:  Patient Care Team:  Christian Chandler MD as PCP - General (Internal Medicine)  Jonh Collier MD as Consulting Physician (Allergy)  Rigoberto MERAZ MD as Consulting Physician (Gastroenterology)  Steve Leon MD as Referring Physician (General Surgery)  Sophia Duncan MD as Consulting Physician (Obstetrics and Gynecology)  Anne Marie Gates MD as Consulting Physician (Radiation Oncology)  Jj Hickman MD as Consulting Physician (Hematology and Oncology)    Seen and approved by:  Anne Marie Gates MD, 09/20/2017  "

## 2017-09-28 ENCOUNTER — INFUSION (OUTPATIENT)
Dept: ONCOLOGY | Facility: HOSPITAL | Age: 72
End: 2017-09-28

## 2017-09-28 VITALS
SYSTOLIC BLOOD PRESSURE: 123 MMHG | WEIGHT: 145.4 LBS | OXYGEN SATURATION: 97 % | HEART RATE: 65 BPM | BODY MASS INDEX: 24.25 KG/M2 | TEMPERATURE: 97.5 F | DIASTOLIC BLOOD PRESSURE: 75 MMHG

## 2017-09-28 DIAGNOSIS — D83.9 COMMON VARIABLE IMMUNODEFICIENCY (HCC): Primary | ICD-10-CM

## 2017-09-28 DIAGNOSIS — D80.2 IGA DEFICIENCY (HCC): ICD-10-CM

## 2017-09-28 PROCEDURE — 96365 THER/PROPH/DIAG IV INF INIT: CPT | Performed by: NURSE PRACTITIONER

## 2017-09-28 PROCEDURE — 96366 THER/PROPH/DIAG IV INF ADDON: CPT | Performed by: NURSE PRACTITIONER

## 2017-09-28 PROCEDURE — 25010000002 IMMUNE GLOBULIN (HUMAN) PER 500 MG: Performed by: ALLERGY & IMMUNOLOGY

## 2017-09-28 RX ORDER — ACETAMINOPHEN 325 MG/1
650 TABLET ORAL ONCE
Status: CANCELLED | OUTPATIENT
Start: 2017-09-28

## 2017-09-28 RX ORDER — SODIUM CHLORIDE 9 MG/ML
250 INJECTION, SOLUTION INTRAVENOUS ONCE
Status: CANCELLED | OUTPATIENT
Start: 2017-09-28

## 2017-09-28 RX ORDER — DIPHENHYDRAMINE HCL 25 MG
25 CAPSULE ORAL ONCE
Status: CANCELLED | OUTPATIENT
Start: 2017-09-28

## 2017-09-28 RX ORDER — EPINEPHRINE 0.3 MG/.3ML
0.3 INJECTION SUBCUTANEOUS ONCE
Status: CANCELLED
Start: 2017-09-28 | End: 2017-09-28

## 2017-09-28 RX ORDER — SODIUM CHLORIDE 9 MG/ML
250 INJECTION, SOLUTION INTRAVENOUS ONCE
Status: COMPLETED | OUTPATIENT
Start: 2017-09-28 | End: 2017-09-28

## 2017-09-28 RX ORDER — DIPHENHYDRAMINE HYDROCHLORIDE 50 MG/ML
50 INJECTION INTRAMUSCULAR; INTRAVENOUS AS NEEDED
Status: CANCELLED | OUTPATIENT
Start: 2017-09-28

## 2017-09-28 RX ORDER — ACETAMINOPHEN 500 MG
500 TABLET ORAL ONCE
Status: CANCELLED
Start: 2017-09-28 | End: 2017-09-28

## 2017-09-28 RX ADMIN — Medication 10 G: at 13:23

## 2017-09-28 RX ADMIN — SODIUM CHLORIDE 250 ML: 900 INJECTION, SOLUTION INTRAVENOUS at 11:50

## 2017-09-28 RX ADMIN — Medication 10 G: at 11:50

## 2017-10-05 ENCOUNTER — DOCUMENTATION (OUTPATIENT)
Dept: RADIATION ONCOLOGY | Facility: HOSPITAL | Age: 72
End: 2017-10-05

## 2017-10-05 NOTE — PROGRESS NOTES
RADIATION THERAPY TREATMENT SUMMARY    DIAGNOSIS: Left breast ca    Patient Care Team:  Christian Chandler MD as PCP - General (Internal Medicine)  Jonh Collier MD as Consulting Physician (Allergy)  Rigoberto MERAZ MD as Consulting Physician (Gastroenterology)  Steve Leon MD as Referring Physician (General Surgery)  Sophia Duncan MD as Consulting Physician (Obstetrics and Gynecology)  Anne Marie Gates MD as Consulting Physician (Radiation Oncology)  Jj Hickman MD as Consulting Physician (Hematology and Oncology)    The patient is a 72 y.o. year old female who has recently completed radiation therapy treatment for the above mentioned diagnosis.     Please see the specifics of the course of treatment below.    Dates of treatment:  8/22/2017 - 9/20/2017      Details of radiation therapy:    Treatment Site - Left Breast  Treatment Intent - Curative, Postoperative  Total Dose in cGy - 4256  Number of Treatments - 16  Dose per fraction - 266 cGy per fraction  Fractions per day - 1 fx/day  Fractions per week - 5 fx/week  Treatment Type - Tangents, Electronic Compensation (EC), 3D  Energy - 6 MVP  Normalization - Calc point, Prescribed at 100%  Imaging/Field Verification - Simulation before first treatment to verify field, blocking, placement and positioning, Simulation for all ports of change, Weekly port films of all ports    Treatment Site - Left Tumor Bed Boost  Treatment Intent - Curative, Postoperative  Total Dose in cGy - 1000  Number of Treatments - 5  Dose per fraction - 200 cGy per fraction  Fractions per day - 1 fx/day  Fractions per week - 5 fx/week  Treatment Type - Electrons, 3D  Energy - 16 MeV  Normalization - Calc point, Prescribed at 95%  Imaging/Field Verification - Simulation before first treatment to verify field, blocking, placement and positioning, Simulation for all ports of change    Tolerance and Toxicities:  she tolerated the treatments well, with only the mild skin  reaction and slight fatigue; she required no treatment breaks and the above course of treatments was completed in 29 elapsed days    F/U plans:  I have asked her to return to see me in 4  weeks

## 2017-10-20 ENCOUNTER — OFFICE VISIT (OUTPATIENT)
Dept: ONCOLOGY | Facility: CLINIC | Age: 72
End: 2017-10-20

## 2017-10-20 ENCOUNTER — OFFICE VISIT (OUTPATIENT)
Dept: RADIATION ONCOLOGY | Facility: CLINIC | Age: 72
End: 2017-10-20

## 2017-10-20 ENCOUNTER — APPOINTMENT (OUTPATIENT)
Dept: OTHER | Facility: HOSPITAL | Age: 72
End: 2017-10-20

## 2017-10-20 VITALS
HEIGHT: 65 IN | DIASTOLIC BLOOD PRESSURE: 90 MMHG | SYSTOLIC BLOOD PRESSURE: 148 MMHG | RESPIRATION RATE: 16 BRPM | HEART RATE: 76 BPM | WEIGHT: 144.3 LBS | BODY MASS INDEX: 24.04 KG/M2 | OXYGEN SATURATION: 98 % | TEMPERATURE: 97.4 F

## 2017-10-20 VITALS
SYSTOLIC BLOOD PRESSURE: 144 MMHG | OXYGEN SATURATION: 99 % | BODY MASS INDEX: 24.01 KG/M2 | DIASTOLIC BLOOD PRESSURE: 77 MMHG | TEMPERATURE: 97.4 F | WEIGHT: 144 LBS | HEART RATE: 66 BPM

## 2017-10-20 DIAGNOSIS — Z17.0 MALIGNANT NEOPLASM OF LOWER-OUTER QUADRANT OF LEFT BREAST OF FEMALE, ESTROGEN RECEPTOR POSITIVE (HCC): Primary | ICD-10-CM

## 2017-10-20 DIAGNOSIS — C50.512 MALIGNANT NEOPLASM OF LOWER-OUTER QUADRANT OF LEFT BREAST OF FEMALE, ESTROGEN RECEPTOR POSITIVE (HCC): Primary | ICD-10-CM

## 2017-10-20 DIAGNOSIS — D83.9 COMMON VARIABLE IMMUNODEFICIENCY (HCC): ICD-10-CM

## 2017-10-20 DIAGNOSIS — M81.0 OSTEOPOROSIS, UNSPECIFIED OSTEOPOROSIS TYPE, UNSPECIFIED PATHOLOGICAL FRACTURE PRESENCE: ICD-10-CM

## 2017-10-20 LAB
BASOPHILS # BLD AUTO: 0.04 10*3/MM3 (ref 0–0.2)
BASOPHILS NFR BLD AUTO: 1.1 % (ref 0–1.5)
DEPRECATED RDW RBC AUTO: 46.3 FL (ref 37–54)
EOSINOPHIL # BLD AUTO: 0.11 10*3/MM3 (ref 0–0.7)
EOSINOPHIL NFR BLD AUTO: 3.1 % (ref 0.3–6.2)
ERYTHROCYTE [DISTWIDTH] IN BLOOD BY AUTOMATED COUNT: 13.2 % (ref 11.7–13)
HCT VFR BLD AUTO: 36.9 % (ref 35.6–45.5)
HGB BLD-MCNC: 12.8 G/DL (ref 11.9–15.5)
IMM GRANULOCYTES # BLD: 0.04 10*3/MM3 (ref 0–0.03)
IMM GRANULOCYTES NFR BLD: 1.1 % (ref 0–0.5)
LYMPHOCYTES # BLD AUTO: 0.96 10*3/MM3 (ref 0.9–4.8)
LYMPHOCYTES NFR BLD AUTO: 27.1 % (ref 19.6–45.3)
MCH RBC QN AUTO: 33.2 PG (ref 26.9–32)
MCHC RBC AUTO-ENTMCNC: 34.7 G/DL (ref 32.4–36.3)
MCV RBC AUTO: 95.8 FL (ref 80.5–98.2)
MONOCYTES # BLD AUTO: 0.38 10*3/MM3 (ref 0.2–1.2)
MONOCYTES NFR BLD AUTO: 10.7 % (ref 5–12)
NEUTROPHILS # BLD AUTO: 2.01 10*3/MM3 (ref 1.9–8.1)
NEUTROPHILS NFR BLD AUTO: 56.9 % (ref 42.7–76)
NRBC BLD MANUAL-RTO: 0 /100 WBC (ref 0–0)
PLATELET # BLD AUTO: 202 10*3/MM3 (ref 140–500)
PMV BLD AUTO: 9.8 FL (ref 6–12)
RBC # BLD AUTO: 3.85 10*6/MM3 (ref 3.9–5.2)
WBC NRBC COR # BLD: 3.54 10*3/MM3 (ref 4.5–10.7)

## 2017-10-20 PROCEDURE — 85025 COMPLETE CBC W/AUTO DIFF WBC: CPT | Performed by: INTERNAL MEDICINE

## 2017-10-20 PROCEDURE — 99213 OFFICE O/P EST LOW 20 MIN: CPT | Performed by: INTERNAL MEDICINE

## 2017-10-20 PROCEDURE — 36415 COLL VENOUS BLD VENIPUNCTURE: CPT

## 2017-10-20 PROCEDURE — 99024 POSTOP FOLLOW-UP VISIT: CPT | Performed by: RADIOLOGY

## 2017-10-20 NOTE — PROGRESS NOTES
DIAGNOSIS and REASON FOR FOLLOW UP: Left breast ca    Patient Care Team:  Christian Chandler MD as PCP - General (Internal Medicine)  Jonh Collier MD as Consulting Physician (Allergy)  Rigoberto MERAZ MD as Consulting Physician (Gastroenterology)  Steve Leon MD as Referring Physician (General Surgery)  Sophia Duncan MD as Consulting Physician (Obstetrics and Gynecology)  Anne Marie Gates MD as Consulting Physician (Radiation Oncology)  Jj Hickman MD as Consulting Physician (Hematology and Oncology)    CHIEF COMPLAINT:  4 week follow up  I had the pleasure of seeing Elaina Tom  back in the department today, now approximately 4 weeks out from the radiation therapy portion of her treatment for the above mentioned diagnosis. Clinically she is doing wonderfully well.  Her energy has improved and she states her performance status is nearly back to baseline.  She has no new complaints regarding the breast.      She saw Dr. Hickman this morning and is having no problems with the Tamoxifen thus far.    Past Medical History: she  has a past medical history of Allergic rhinitis; Anxiety; Arthritis; Asthma; Cancer (2017); Cataract; CVID (common variable immunodeficiency); External hemorrhoids; Gastritis; GERD (gastroesophageal reflux disease); H/O CT scan of abdomen; Hiatal hernia; History of colon polyps; History of giardia infection; History of Helicobacter pylori infection; IBS (irritable bowel syndrome); IgA deficiency; Immunoglobulin deficiency; Peptic ulcer disease; PONV (postoperative nausea and vomiting); Torn meniscus; and Wound discharge.    Past Surgical History:  she has a past surgical history that includes Tonsillectomy; Knee surgery (Left, 01/2015); Esophagogastroduodenoscopy (01/10/2014); Esophagogastroduodenoscopy (08/24/2010); Colonoscopy (01/10/2014); Colonoscopy (08/24/2010); Esophagogastroduodenoscopy (N/A, 10/18/2016); Breast biopsy (Left, 02/26/2007); Breast biopsy (Left,  "02/02/2007); endoscopy and colonoscopy (N/A, 08/23/2007); Breast biopsy (Left, 02/07/2003); Septoplasty; breast lumpectomy with sentinel node biopsy (Left, 5/19/2017); and Abdominal Wall Abscess Incision and Drainage (Left, 7/3/2017).    Meds:    Current Outpatient Prescriptions:   •  ALBUTEROL IN, Inhale 2 puffs As Needed (\"USES ONLY WTH A RESPIRATORY INFECTION\")., Disp: , Rfl:   •  Ascorbic Acid (VITAMIN C) 500 MG capsule, Take 1 tablet by mouth Daily. PT TO HOLD MED PRIOR TO OR, Disp: , Rfl:   •  b complex vitamins tablet, Take 1 tablet by mouth Daily. PT TO HOLD MED PRIOR TO OR, Disp: , Rfl:   •  Calcium-Magnesium-Vitamin D (CALCIUM MAGNESIUM PO), Take 2 tablets by mouth Daily. PT TO HOLD MED PRIOR TO OR, Disp: , Rfl:   •  Cholecalciferol (VITAMIN D-3 PO), Take 1 tablet by mouth Daily. PT TO HOLD MED PRIOR TO OR, Disp: , Rfl:   •  Coenzyme Q10 (CO Q 10 PO), Take 1 tablet by mouth Daily. PT TO HOLD MED PRIOR TO OR, Disp: , Rfl:   •  CRANBERRY PO, Take 2 tablets by mouth Daily., Disp: , Rfl:   •  dicyclomine (BENTYL) 10 MG capsule, Take 1 capsule by mouth 4 (four) times a day before meals and nightly. TAKE 1 TO 2 CAPSULES EVERY 4 TO 6 HOURS AS NEEDED. (Patient taking differently: Take 10 mg by mouth As Needed. TAKE 1 TO 2 CAPSULES EVERY 4 TO 6 HOURS AS NEEDED. ), Disp: 40 capsule, Rfl: 5  •  esomeprazole (NEXIUM) 20 MG capsule, Take 1 capsule by mouth 2 (Two) Times a Day. (Patient taking differently: Take 20 mg by mouth Every Morning Before Breakfast.), Disp: 180 capsule, Rfl: 3  •  Flaxseed, Linseed, (FLAX SEEDS PO), Take 1 capsule by mouth Daily. PT TO HOLD MED PRIOR TO OR, Disp: , Rfl:   •  fluticasone (FLONASE) 50 MCG/ACT nasal spray, 2 sprays into each nostril As Needed., Disp: , Rfl:   •  FOLIC ACID PO, Take 1 tablet by mouth Daily. PT TO HOLD MED PRIOR TO OR, Disp: , Rfl:   •  Ginkgo 60 MG tablet, Take 1 tablet by mouth Daily. PT TO HOLD MED PRIOR TO OR, Disp: , Rfl:   •  GuaiFENesin (MUCINEX PO), Take 1 " tablet by mouth As Needed., Disp: , Rfl:   •  Immune Globulin, Human, (GAMMAGARD IJ), Inject  as directed Every 28 (Twenty-Eight) Days. LAST DOSE 6/29/17, Disp: , Rfl:   •  LECITHIN PO, Take 1 tablet by mouth Daily. PT TO HOLD MED PRIOR TO OR, Disp: , Rfl:   •  Multiple Vitamins-Minerals (MULTIVITAMIN ADULT PO), Take 1 tablet by mouth Daily. PT TO HOLD MED PRIOR TO OR, Disp: , Rfl:   •  NON FORMULARY, Take 1 tablet by mouth Daily. TRIVERATROL GOLD -ON HOLD, Disp: , Rfl:   •  NON FORMULARY, Take 1 tablet by mouth Daily. Mitochondrial energy PT TO HOLD MED PRIOR TO OR, Disp: , Rfl:   •  NON FORMULARY, Take 1 tablet by mouth Daily. Vision essential PT TO HOLD MED PRIOR TO OR, Disp: , Rfl:   •  NON FORMULARY, Take 1 tablet by mouth Daily. Neurovascular support PT TO HOLD MED PRIOR TO OR, Disp: , Rfl:   •  NON FORMULARY, Take 1 tablet by mouth Daily. Vinpocetine pill PT TO HOLD MED PRIOR TO OR, Disp: , Rfl:   •  Nutritional Supplements (WELLNESS ESSENTIALS FOR JOINT PO), Take 2 tablets by mouth Daily. PT TO HOLD MED PRIOR TO OR, Disp: , Rfl:   •  Omega-3 Fatty Acids (OMEGA 3 PO), Take 1,000 mg by mouth Daily. LAST DOSE 5/12/17, Disp: , Rfl:   •  Probiotic Product (ALIGN) 4 MG capsule, Take 1 mg by mouth Daily., Disp: , Rfl:   •  RESVERATROL PO, Take 1 tablet by mouth Daily. PT TO HOLD MED PRIOR TO OR, Disp: , Rfl:   •  sucralfate (CARAFATE) 1 GM/10ML suspension, Take 10 mL by mouth 4 (four) times a day. (Patient taking differently: Take 1 g by mouth 4 (Four) Times a Day As Needed.), Disp: 420 mL, Rfl: 3  •  tamoxifen (NOLVADEX) 20 MG chemo tablet, TAKE 1 TABLET BY MOUTH DAILY FOR 30 DAYS., Disp: 30 tablet, Rfl: 0  •  Vaginal Lubricant (REPLENS VA), Insert  into the vagina. 1-3 times a week, Disp: , Rfl:   •  VITAMIN A PO, Take 1 tablet by mouth Every Other Day. LAST DOSE 5/12/17, Disp: , Rfl:   •  Vitamin Mixture (VITAMIN E COMPLETE) capsule, Take 2 capsules by mouth Daily. PT TO HOLD MED PRIOR TO OR, Disp: , Rfl:      Allergies:    Allergies   Allergen Reactions   • Other Anaphylaxis     BLOOD PRODUCTS WITH IGA IN THEM   • Erythromycin GI Intolerance     VOMITING   • Avelox [Moxifloxacin] Rash   • Levaquin [Levofloxacin] Rash       Family History:  her family history includes Colon polyps in her father; Other in her father; Pancreatitis in her daughter; Prostate cancer (age of onset: 68) in her father. There is no history of Malig Hyperthermia.    Social History:  she  reports that she has never smoked. She has never used smokeless tobacco. She reports that she drinks about 0.6 oz of alcohol per week  She reports that she does not use illicit drugs.    Pertinent Findings on Review of Systems:  Fourteen systems were reviewed and are negative other than as mentioned above.    Pertinent Findings on Physical Exam:  Vitals:    10/20/17 0953   BP: 144/77   Pulse: 66   Temp: 97.4 °F (36.3 °C)   TempSrc: Oral   SpO2: 99%   Weight: 144 lb (65.3 kg)   PainSc: 0-No pain       Performance Status:  (1) Restricted in physically strenuous activity, ambulatory and able to do work of light nature    Physical examination of the right breast reveals no abnormality and the right axilla is benign, without lymphadenopathy.     The left breast shows the well-healed lumpectomy incision and only slight and mild hyperpigmentation from the radiation therapy. The breast is quite soft and easy to examine. There are no worrisome nodules appreciated. The nipple is without change. There is a significant size difference between the left and right.    The axilla is benign on the left and there is no cervical or supraclavicular lymphadenopathy. There is no lymphedema noted in either upper extremity.    Assessment:  Left breast ca  Doing wonderfully well, 4 weeks out from radiation therapy    Plan:   We reviewed today the current NCCN guidelines for her surveillance and follow up, specifically the need for physician visit every 4 to 6 months for 5 years, annual  mammogram, annual gynecologic assessment, continued monitoring of bone health and achieving and maintaining an ideal BMI.  We also discussed the possible cosmetic solutions for the difference in size - from a padded bra to a prosthesis in the left cup to consultation with a plastic surgeon. She wished to continue to think about her options and if we can help further with that, I encouraged her to let me know.    She does continue on the Tamoxifen without difficulty.  She returns to see the [facility] physicians in 4-6 months and understands she will continue follow-up through that office while on the medication. Regarding her mammographic follow-up, she knows to continue on with her annual bilateral mammogram in March, 2018.                   Given the above, I have not given her an appointment to return here, but encouraged her to call if I can be of any further help in her care and thank you again for allowing us to participate in her care.

## 2017-10-20 NOTE — PROGRESS NOTES
Subjective     REASON FOR FOLLOW UP:   1. Stage I ( T1c,N0,M0 ) ER+,TX+, HER 2 neg for a ductal carcinoma of the left breast status post lumpectomy 5/19/2017.  Patient started on adjuvant tamoxifen after the visit of 6/9/2017.  2.  Osteoporosis  3.  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  Development of draining seroma and poor incisional healing in the left breast that required further surgery on 7/3/2017.    HISTORY OF PRESENT ILLNESS:  The patient is a 72 y.o. year old female who was noted to have a radiographic abnormality in the left breast in March.  She underwent an ultrasound-guided biopsy on 3/28/2017 showing invasive ductal carcinoma which was strongly hormone receptor positive and HER-2/trae negative.  She elected to undergo a lumpectomy procedure with Dr. Leon on 5/19/2017.  The tumor size was 1.2 x 1 x 0.9 cm and 3 sentinel lymph nodes were examined all of which were negative for metastatic cancer.    The patient was seen on 6/9/2017 to discuss postop adjuvant hormonal therapy.  She has a history of osteoporosis.  We discussed initiation of tamoxifen therapy which would have the advantage of helping to maintain her bone density.    She is here today for routine follow-up and seems to be getting along well on her tamoxifen.  She does report some hot flashes and mood swings but feels that she can tolerate this without a need for any other medications to address these symptoms.  She completed her radiation therapy last month and seems to recovered nicely.    History of Present Illness        ALLERGIES:    Allergies   Allergen Reactions   • Other Anaphylaxis     BLOOD PRODUCTS WITH IGA IN THEM   • Erythromycin GI Intolerance     VOMITING   • Avelox [Moxifloxacin] Rash   • Levaquin [Levofloxacin] Rash         Review of Systems   Constitutional: Negative for activity change, appetite change, fatigue, fever and unexpected weight change.   HENT: Negative for hearing loss,  "nosebleeds, trouble swallowing and voice change.    Eyes: Negative for visual disturbance.   Respiratory: Negative for cough, shortness of breath and wheezing.    Cardiovascular: Negative for chest pain and palpitations.   Gastrointestinal: Negative for abdominal pain, diarrhea, nausea and vomiting.   Genitourinary: Negative for difficulty urinating, frequency, hematuria and urgency.   Musculoskeletal: Negative for back pain and neck pain.   Skin: Negative for rash.   Neurological: Negative for dizziness, seizures, syncope and headaches.   Hematological: Negative for adenopathy. Does not bruise/bleed easily.   Psychiatric/Behavioral: Negative for behavioral problems. The patient is not nervous/anxious.         Objective     Vitals:    10/20/17 0859   BP: 148/90   Pulse: 76   Resp: 16   Temp: 97.4 °F (36.3 °C)   TempSrc: Oral   SpO2: 98%   Weight: 144 lb 4.8 oz (65.5 kg)   Height: 64.93\" (164.9 cm)   PainSc: 0-No pain     Current Status 10/20/2017   ECOG score 0       Physical Exam   Constitutional: She is oriented to person, place, and time. She appears well-developed and well-nourished. No distress.   HENT:   Head: Normocephalic.   Eyes: Conjunctivae and EOM are normal. Pupils are equal, round, and reactive to light. No scleral icterus.   Neck: Normal range of motion. Neck supple. No JVD present. No thyromegaly present.   Cardiovascular: Normal rate and regular rhythm.  Exam reveals no gallop and no friction rub.    No murmur heard.  Pulmonary/Chest: Effort normal and breath sounds normal. She has no wheezes. She has no rales. Right breast exhibits no mass, no nipple discharge and no skin change. Left breast exhibits skin change. Left breast exhibits no mass and no nipple discharge.   Left breast shows healed lumpectomy scar and radiation tattoos.   Abdominal: Soft. Bowel sounds are normal. She exhibits no distension and no mass. There is no tenderness.   Musculoskeletal: Normal range of motion. She exhibits no " edema or deformity.   Lymphadenopathy:     She has no cervical adenopathy.   Neurological: She is alert and oriented to person, place, and time. She has normal reflexes. No cranial nerve deficit.   Skin: Skin is warm and dry. No rash noted. No erythema.   Psychiatric: She has a normal mood and affect. Her behavior is normal. Judgment normal.         RECENT LABS:  Hematology WBC   Date Value Ref Range Status   10/20/2017 3.54 (L) 4.50 - 10.70 10*3/mm3 Final     RBC   Date Value Ref Range Status   10/20/2017 3.85 (L) 3.90 - 5.20 10*6/mm3 Final     Hemoglobin   Date Value Ref Range Status   10/20/2017 12.8 11.9 - 15.5 g/dL Final     Hematocrit   Date Value Ref Range Status   10/20/2017 36.9 35.6 - 45.5 % Final     Platelets   Date Value Ref Range Status   10/20/2017 202 140 - 500 10*3/mm3 Final          Assessment/Plan     1.  Stage I, hormone receptor positive, HER-2/trae negative invasive ductal carcinoma the left breast status post lumpectomy. Patient was started on adjuvant hormonal therapy with tamoxifen 20 mg daily in June 2017 and seems to be tolerating it well.  2.  Osteoporosis on her recent DEXA scan.  3.  Common variable immunodeficiency requiring monthly IV immunoglobulin infusions.    Plan  1.  Continue tamoxifen 20 mg daily with plans to continue treatment for 5 years through June 2022.  2.  Return for follow-up in 6 months.  3.  Repeat bone density scan will be due in April 2019.  4.  Bilateral screening mammograms be due again in April 2018.

## 2017-10-26 ENCOUNTER — INFUSION (OUTPATIENT)
Dept: ONCOLOGY | Facility: HOSPITAL | Age: 72
End: 2017-10-26

## 2017-10-26 VITALS
DIASTOLIC BLOOD PRESSURE: 78 MMHG | SYSTOLIC BLOOD PRESSURE: 129 MMHG | TEMPERATURE: 97.5 F | WEIGHT: 145.4 LBS | HEART RATE: 64 BPM | BODY MASS INDEX: 24.25 KG/M2

## 2017-10-26 DIAGNOSIS — D80.2 IGA DEFICIENCY (HCC): ICD-10-CM

## 2017-10-26 DIAGNOSIS — D83.9 COMMON VARIABLE IMMUNODEFICIENCY (HCC): Primary | ICD-10-CM

## 2017-10-26 LAB
ALBUMIN SERPL-MCNC: 4.2 G/DL (ref 3.5–5.2)
ALBUMIN/GLOB SERPL: 1.9 G/DL
ALP SERPL-CCNC: 53 U/L (ref 39–117)
ALT SERPL W P-5'-P-CCNC: 16 U/L (ref 1–33)
ANION GAP SERPL CALCULATED.3IONS-SCNC: 12.3 MMOL/L
AST SERPL-CCNC: 20 U/L (ref 1–32)
BASOPHILS # BLD AUTO: 0.02 10*3/MM3 (ref 0–0.2)
BASOPHILS NFR BLD AUTO: 0.5 % (ref 0–1.5)
BILIRUB SERPL-MCNC: 0.6 MG/DL (ref 0.1–1.2)
BUN BLD-MCNC: 16 MG/DL (ref 8–23)
BUN/CREAT SERPL: 25.8 (ref 7–25)
CALCIUM SPEC-SCNC: 9.3 MG/DL (ref 8.6–10.5)
CHLORIDE SERPL-SCNC: 107 MMOL/L (ref 98–107)
CO2 SERPL-SCNC: 25.7 MMOL/L (ref 22–29)
CREAT BLD-MCNC: 0.62 MG/DL (ref 0.57–1)
DEPRECATED RDW RBC AUTO: 47.8 FL (ref 37–54)
EOSINOPHIL # BLD AUTO: 0.07 10*3/MM3 (ref 0–0.7)
EOSINOPHIL NFR BLD AUTO: 1.8 % (ref 0.3–6.2)
ERYTHROCYTE [DISTWIDTH] IN BLOOD BY AUTOMATED COUNT: 13.2 % (ref 11.7–13)
GFR SERPL CREATININE-BSD FRML MDRD: 95 ML/MIN/1.73
GLOBULIN UR ELPH-MCNC: 2.2 GM/DL
GLUCOSE BLD-MCNC: 93 MG/DL (ref 65–99)
HCT VFR BLD AUTO: 36.4 % (ref 35.6–45.5)
HGB BLD-MCNC: 12.2 G/DL (ref 11.9–15.5)
IGG1 SER-MCNC: 816 MG/DL (ref 700–1600)
IMM GRANULOCYTES # BLD: 0.01 10*3/MM3 (ref 0–0.03)
IMM GRANULOCYTES NFR BLD: 0.3 % (ref 0–0.5)
LYMPHOCYTES # BLD AUTO: 0.94 10*3/MM3 (ref 0.9–4.8)
LYMPHOCYTES NFR BLD AUTO: 24.1 % (ref 19.6–45.3)
MCH RBC QN AUTO: 32.6 PG (ref 26.9–32)
MCHC RBC AUTO-ENTMCNC: 33.5 G/DL (ref 32.4–36.3)
MCV RBC AUTO: 97.3 FL (ref 80.5–98.2)
MONOCYTES # BLD AUTO: 0.31 10*3/MM3 (ref 0.2–1.2)
MONOCYTES NFR BLD AUTO: 7.9 % (ref 5–12)
NEUTROPHILS # BLD AUTO: 2.55 10*3/MM3 (ref 1.9–8.1)
NEUTROPHILS NFR BLD AUTO: 65.4 % (ref 42.7–76)
NRBC BLD MANUAL-RTO: 0 /100 WBC (ref 0–0)
PLATELET # BLD AUTO: 213 10*3/MM3 (ref 140–500)
PMV BLD AUTO: 9.6 FL (ref 6–12)
POTASSIUM BLD-SCNC: 4.2 MMOL/L (ref 3.5–5.2)
PROT SERPL-MCNC: 6.4 G/DL (ref 6–8.5)
RBC # BLD AUTO: 3.74 10*6/MM3 (ref 3.9–5.2)
SODIUM BLD-SCNC: 145 MMOL/L (ref 136–145)
WBC NRBC COR # BLD: 3.9 10*3/MM3 (ref 4.5–10.7)

## 2017-10-26 PROCEDURE — 85025 COMPLETE CBC W/AUTO DIFF WBC: CPT | Performed by: ALLERGY & IMMUNOLOGY

## 2017-10-26 PROCEDURE — 80053 COMPREHEN METABOLIC PANEL: CPT | Performed by: ALLERGY & IMMUNOLOGY

## 2017-10-26 PROCEDURE — 36415 COLL VENOUS BLD VENIPUNCTURE: CPT | Performed by: INTERNAL MEDICINE

## 2017-10-26 PROCEDURE — 25010000002 IMMUNE GLOBULIN (HUMAN) PER 500 MG: Performed by: ALLERGY & IMMUNOLOGY

## 2017-10-26 PROCEDURE — 96365 THER/PROPH/DIAG IV INF INIT: CPT | Performed by: NURSE PRACTITIONER

## 2017-10-26 PROCEDURE — 96366 THER/PROPH/DIAG IV INF ADDON: CPT | Performed by: NURSE PRACTITIONER

## 2017-10-26 RX ORDER — ACETAMINOPHEN 500 MG
500 TABLET ORAL ONCE
Status: CANCELLED
Start: 2017-10-26 | End: 2017-10-26

## 2017-10-26 RX ORDER — SODIUM CHLORIDE 9 MG/ML
250 INJECTION, SOLUTION INTRAVENOUS ONCE
Status: COMPLETED | OUTPATIENT
Start: 2017-10-26 | End: 2017-10-26

## 2017-10-26 RX ORDER — DIPHENHYDRAMINE HYDROCHLORIDE 50 MG/ML
50 INJECTION INTRAMUSCULAR; INTRAVENOUS AS NEEDED
Status: CANCELLED | OUTPATIENT
Start: 2017-10-26

## 2017-10-26 RX ORDER — EPINEPHRINE 0.3 MG/.3ML
0.3 INJECTION SUBCUTANEOUS ONCE
Status: CANCELLED
Start: 2017-10-26 | End: 2017-10-26

## 2017-10-26 RX ORDER — SODIUM CHLORIDE 9 MG/ML
250 INJECTION, SOLUTION INTRAVENOUS ONCE
Status: CANCELLED | OUTPATIENT
Start: 2017-10-26

## 2017-10-26 RX ORDER — ACETAMINOPHEN 325 MG/1
650 TABLET ORAL ONCE
Status: CANCELLED | OUTPATIENT
Start: 2017-10-26

## 2017-10-26 RX ORDER — DIPHENHYDRAMINE HCL 25 MG
25 CAPSULE ORAL ONCE
Status: CANCELLED | OUTPATIENT
Start: 2017-10-26

## 2017-10-26 RX ADMIN — Medication 10 G: at 12:29

## 2017-10-26 RX ADMIN — SODIUM CHLORIDE 250 ML: 900 INJECTION, SOLUTION INTRAVENOUS at 12:11

## 2017-10-26 RX ADMIN — Medication 10 G: at 14:08

## 2017-11-30 ENCOUNTER — INFUSION (OUTPATIENT)
Dept: ONCOLOGY | Facility: HOSPITAL | Age: 72
End: 2017-11-30

## 2017-11-30 VITALS
HEART RATE: 73 BPM | DIASTOLIC BLOOD PRESSURE: 69 MMHG | BODY MASS INDEX: 24.21 KG/M2 | SYSTOLIC BLOOD PRESSURE: 103 MMHG | TEMPERATURE: 97.9 F | WEIGHT: 145.2 LBS

## 2017-11-30 DIAGNOSIS — D83.9 COMMON VARIABLE IMMUNODEFICIENCY (HCC): Primary | ICD-10-CM

## 2017-11-30 DIAGNOSIS — D80.2 IGA DEFICIENCY (HCC): ICD-10-CM

## 2017-11-30 PROCEDURE — 25010000002 IMMUNE GLOBULIN (HUMAN) PER 500 MG: Performed by: ALLERGY & IMMUNOLOGY

## 2017-11-30 PROCEDURE — 96365 THER/PROPH/DIAG IV INF INIT: CPT | Performed by: NURSE PRACTITIONER

## 2017-11-30 PROCEDURE — 96366 THER/PROPH/DIAG IV INF ADDON: CPT | Performed by: NURSE PRACTITIONER

## 2017-11-30 RX ORDER — EPINEPHRINE 0.3 MG/.3ML
0.3 INJECTION SUBCUTANEOUS ONCE
Status: CANCELLED
Start: 2017-11-30 | End: 2017-11-30

## 2017-11-30 RX ORDER — SODIUM CHLORIDE 9 MG/ML
250 INJECTION, SOLUTION INTRAVENOUS ONCE
Status: COMPLETED | OUTPATIENT
Start: 2017-11-30 | End: 2017-11-30

## 2017-11-30 RX ORDER — ACETAMINOPHEN 325 MG/1
650 TABLET ORAL ONCE
Status: CANCELLED | OUTPATIENT
Start: 2017-11-30

## 2017-11-30 RX ORDER — DIPHENHYDRAMINE HCL 25 MG
25 CAPSULE ORAL ONCE
Status: CANCELLED | OUTPATIENT
Start: 2017-11-30

## 2017-11-30 RX ORDER — DIPHENHYDRAMINE HYDROCHLORIDE 50 MG/ML
50 INJECTION INTRAMUSCULAR; INTRAVENOUS AS NEEDED
Status: CANCELLED | OUTPATIENT
Start: 2017-11-30

## 2017-11-30 RX ORDER — ACETAMINOPHEN 500 MG
500 TABLET ORAL ONCE
Status: CANCELLED
Start: 2017-11-30 | End: 2017-11-30

## 2017-11-30 RX ORDER — SODIUM CHLORIDE 9 MG/ML
250 INJECTION, SOLUTION INTRAVENOUS ONCE
Status: CANCELLED | OUTPATIENT
Start: 2017-11-30

## 2017-11-30 RX ADMIN — IMMUNE GLOBULIN INTRAVENOUS (HUMAN) 10 G: KIT at 12:10

## 2017-11-30 RX ADMIN — SODIUM CHLORIDE 250 ML: 900 INJECTION, SOLUTION INTRAVENOUS at 11:50

## 2017-11-30 RX ADMIN — Medication 10 G: at 13:45

## 2017-12-28 ENCOUNTER — INFUSION (OUTPATIENT)
Dept: ONCOLOGY | Facility: HOSPITAL | Age: 72
End: 2017-12-28

## 2017-12-28 VITALS
TEMPERATURE: 97.5 F | HEART RATE: 67 BPM | WEIGHT: 148.4 LBS | BODY MASS INDEX: 24.75 KG/M2 | SYSTOLIC BLOOD PRESSURE: 125 MMHG | OXYGEN SATURATION: 97 % | DIASTOLIC BLOOD PRESSURE: 79 MMHG

## 2017-12-28 DIAGNOSIS — D80.2 IGA DEFICIENCY (HCC): ICD-10-CM

## 2017-12-28 DIAGNOSIS — D83.9 COMMON VARIABLE IMMUNODEFICIENCY (HCC): Primary | ICD-10-CM

## 2017-12-28 PROCEDURE — 25010000002 IMMUNE GLOBULIN (HUMAN) PER 500 MG: Performed by: ALLERGY & IMMUNOLOGY

## 2017-12-28 PROCEDURE — 96365 THER/PROPH/DIAG IV INF INIT: CPT | Performed by: NURSE PRACTITIONER

## 2017-12-28 PROCEDURE — 96366 THER/PROPH/DIAG IV INF ADDON: CPT | Performed by: NURSE PRACTITIONER

## 2017-12-28 RX ORDER — SODIUM CHLORIDE 9 MG/ML
250 INJECTION, SOLUTION INTRAVENOUS ONCE
Status: CANCELLED | OUTPATIENT
Start: 2017-12-28

## 2017-12-28 RX ORDER — DIPHENHYDRAMINE HCL 25 MG
25 CAPSULE ORAL ONCE
Status: CANCELLED | OUTPATIENT
Start: 2017-12-28

## 2017-12-28 RX ORDER — DIPHENHYDRAMINE HYDROCHLORIDE 50 MG/ML
50 INJECTION INTRAMUSCULAR; INTRAVENOUS AS NEEDED
Status: CANCELLED | OUTPATIENT
Start: 2017-12-28

## 2017-12-28 RX ORDER — SODIUM CHLORIDE 9 MG/ML
250 INJECTION, SOLUTION INTRAVENOUS ONCE
Status: COMPLETED | OUTPATIENT
Start: 2017-12-28 | End: 2017-12-28

## 2017-12-28 RX ORDER — ACETAMINOPHEN 500 MG
500 TABLET ORAL ONCE
Status: CANCELLED
Start: 2017-12-28 | End: 2017-12-28

## 2017-12-28 RX ORDER — ACETAMINOPHEN 325 MG/1
650 TABLET ORAL ONCE
Status: CANCELLED | OUTPATIENT
Start: 2017-12-28

## 2017-12-28 RX ORDER — EPINEPHRINE 0.3 MG/.3ML
0.3 INJECTION SUBCUTANEOUS ONCE
Status: CANCELLED
Start: 2017-12-28 | End: 2017-12-28

## 2017-12-28 RX ADMIN — SODIUM CHLORIDE 250 ML: 900 INJECTION, SOLUTION INTRAVENOUS at 11:40

## 2017-12-28 RX ADMIN — Medication 10 G: at 13:20

## 2017-12-28 RX ADMIN — Medication 10 G: at 11:55

## 2018-01-24 DIAGNOSIS — D83.9 COMMON VARIABLE IMMUNODEFICIENCY (HCC): Primary | ICD-10-CM

## 2018-01-25 ENCOUNTER — INFUSION (OUTPATIENT)
Dept: ONCOLOGY | Facility: HOSPITAL | Age: 73
End: 2018-01-25

## 2018-01-25 VITALS
SYSTOLIC BLOOD PRESSURE: 108 MMHG | OXYGEN SATURATION: 97 % | WEIGHT: 145.6 LBS | HEART RATE: 74 BPM | DIASTOLIC BLOOD PRESSURE: 72 MMHG | TEMPERATURE: 98 F | BODY MASS INDEX: 24.28 KG/M2

## 2018-01-25 DIAGNOSIS — D83.9 COMMON VARIABLE IMMUNODEFICIENCY (HCC): Primary | ICD-10-CM

## 2018-01-25 DIAGNOSIS — D80.2 IGA DEFICIENCY (HCC): ICD-10-CM

## 2018-01-25 PROCEDURE — 25010000002 IMMUNE GLOBULIN (HUMAN) PER 500 MG: Performed by: ALLERGY & IMMUNOLOGY

## 2018-01-25 PROCEDURE — 96365 THER/PROPH/DIAG IV INF INIT: CPT | Performed by: NURSE PRACTITIONER

## 2018-01-25 PROCEDURE — 96366 THER/PROPH/DIAG IV INF ADDON: CPT | Performed by: NURSE PRACTITIONER

## 2018-01-25 RX ORDER — SODIUM CHLORIDE 9 MG/ML
250 INJECTION, SOLUTION INTRAVENOUS ONCE
Status: CANCELLED | OUTPATIENT
Start: 2018-01-25

## 2018-01-25 RX ORDER — DIPHENHYDRAMINE HCL 25 MG
25 CAPSULE ORAL ONCE
Status: CANCELLED | OUTPATIENT
Start: 2018-01-25

## 2018-01-25 RX ORDER — ACETAMINOPHEN 325 MG/1
650 TABLET ORAL ONCE
Status: CANCELLED | OUTPATIENT
Start: 2018-01-25

## 2018-01-25 RX ORDER — SODIUM CHLORIDE 9 MG/ML
250 INJECTION, SOLUTION INTRAVENOUS ONCE
Status: COMPLETED | OUTPATIENT
Start: 2018-01-25 | End: 2018-01-25

## 2018-01-25 RX ORDER — EPINEPHRINE 0.3 MG/.3ML
0.3 INJECTION SUBCUTANEOUS ONCE
Status: CANCELLED
Start: 2018-01-25 | End: 2018-01-25

## 2018-01-25 RX ORDER — ACETAMINOPHEN 500 MG
500 TABLET ORAL ONCE
Status: CANCELLED
Start: 2018-01-25 | End: 2018-01-25

## 2018-01-25 RX ORDER — DIPHENHYDRAMINE HYDROCHLORIDE 50 MG/ML
50 INJECTION INTRAMUSCULAR; INTRAVENOUS AS NEEDED
Status: CANCELLED | OUTPATIENT
Start: 2018-01-25

## 2018-01-25 RX ADMIN — SODIUM CHLORIDE 250 ML: 900 INJECTION, SOLUTION INTRAVENOUS at 12:30

## 2018-01-25 RX ADMIN — Medication 10 G: at 14:18

## 2018-01-25 RX ADMIN — Medication 10 G: at 12:43

## 2018-02-28 DIAGNOSIS — D83.9 COMMON VARIABLE IMMUNODEFICIENCY (HCC): Primary | ICD-10-CM

## 2018-03-01 ENCOUNTER — INFUSION (OUTPATIENT)
Dept: ONCOLOGY | Facility: HOSPITAL | Age: 73
End: 2018-03-01

## 2018-03-01 VITALS
BODY MASS INDEX: 24.25 KG/M2 | HEART RATE: 73 BPM | WEIGHT: 145.4 LBS | SYSTOLIC BLOOD PRESSURE: 112 MMHG | OXYGEN SATURATION: 97 % | DIASTOLIC BLOOD PRESSURE: 79 MMHG | TEMPERATURE: 97.5 F

## 2018-03-01 DIAGNOSIS — D83.9 COMMON VARIABLE IMMUNODEFICIENCY (HCC): Primary | ICD-10-CM

## 2018-03-01 DIAGNOSIS — D80.2 IGA DEFICIENCY (HCC): ICD-10-CM

## 2018-03-01 PROCEDURE — 96365 THER/PROPH/DIAG IV INF INIT: CPT | Performed by: NURSE PRACTITIONER

## 2018-03-01 PROCEDURE — 25010000002 IMMUNE GLOBULIN (HUMAN) PER 500 MG: Performed by: ALLERGY & IMMUNOLOGY

## 2018-03-01 PROCEDURE — 96366 THER/PROPH/DIAG IV INF ADDON: CPT | Performed by: NURSE PRACTITIONER

## 2018-03-01 RX ORDER — SODIUM CHLORIDE 9 MG/ML
250 INJECTION, SOLUTION INTRAVENOUS ONCE
Status: COMPLETED | OUTPATIENT
Start: 2018-03-01 | End: 2018-03-01

## 2018-03-01 RX ORDER — EPINEPHRINE 0.3 MG/.3ML
0.3 INJECTION SUBCUTANEOUS ONCE
Status: CANCELLED
Start: 2018-03-01 | End: 2018-03-01

## 2018-03-01 RX ORDER — ACETAMINOPHEN 500 MG
500 TABLET ORAL ONCE
Status: CANCELLED
Start: 2018-03-01 | End: 2018-03-01

## 2018-03-01 RX ORDER — DIPHENHYDRAMINE HCL 25 MG
25 CAPSULE ORAL ONCE
Status: CANCELLED | OUTPATIENT
Start: 2018-03-01

## 2018-03-01 RX ORDER — ACETAMINOPHEN 325 MG/1
650 TABLET ORAL ONCE
Status: CANCELLED | OUTPATIENT
Start: 2018-03-01

## 2018-03-01 RX ORDER — SODIUM CHLORIDE 9 MG/ML
250 INJECTION, SOLUTION INTRAVENOUS ONCE
Status: CANCELLED | OUTPATIENT
Start: 2018-03-01

## 2018-03-01 RX ORDER — DIPHENHYDRAMINE HYDROCHLORIDE 50 MG/ML
50 INJECTION INTRAMUSCULAR; INTRAVENOUS AS NEEDED
Status: CANCELLED | OUTPATIENT
Start: 2018-03-01

## 2018-03-01 RX ADMIN — IMMUNE GLOBULIN INTRAVENOUS (HUMAN) 10 G: KIT at 13:25

## 2018-03-01 RX ADMIN — SODIUM CHLORIDE 250 ML: 900 INJECTION, SOLUTION INTRAVENOUS at 12:00

## 2018-03-01 RX ADMIN — IMMUNE GLOBULIN INTRAVENOUS (HUMAN) 10 G: KIT at 12:01

## 2018-03-27 ENCOUNTER — APPOINTMENT (OUTPATIENT)
Dept: WOMENS IMAGING | Facility: HOSPITAL | Age: 73
End: 2018-03-27

## 2018-03-27 PROCEDURE — 77067 SCR MAMMO BI INCL CAD: CPT | Performed by: RADIOLOGY

## 2018-03-27 PROCEDURE — MDREVIEWSP: Performed by: RADIOLOGY

## 2018-03-27 PROCEDURE — 77063 BREAST TOMOSYNTHESIS BI: CPT | Performed by: RADIOLOGY

## 2018-03-29 ENCOUNTER — INFUSION (OUTPATIENT)
Dept: ONCOLOGY | Facility: HOSPITAL | Age: 73
End: 2018-03-29

## 2018-03-29 VITALS
TEMPERATURE: 97.9 F | OXYGEN SATURATION: 96 % | SYSTOLIC BLOOD PRESSURE: 125 MMHG | DIASTOLIC BLOOD PRESSURE: 80 MMHG | BODY MASS INDEX: 24.35 KG/M2 | HEART RATE: 68 BPM | WEIGHT: 146 LBS

## 2018-03-29 DIAGNOSIS — D83.9 COMMON VARIABLE IMMUNODEFICIENCY (HCC): Primary | ICD-10-CM

## 2018-03-29 DIAGNOSIS — D80.2 IGA DEFICIENCY (HCC): ICD-10-CM

## 2018-03-29 LAB
ALBUMIN SERPL-MCNC: 4 G/DL (ref 3.5–5.2)
ALBUMIN/GLOB SERPL: 1.6 G/DL
ALP SERPL-CCNC: 47 U/L (ref 39–117)
ALT SERPL W P-5'-P-CCNC: 15 U/L (ref 1–33)
ANION GAP SERPL CALCULATED.3IONS-SCNC: 10.3 MMOL/L
AST SERPL-CCNC: 21 U/L (ref 1–32)
BASOPHILS # BLD AUTO: 0.04 10*3/MM3 (ref 0–0.2)
BASOPHILS NFR BLD AUTO: 1 % (ref 0–1.5)
BILIRUB SERPL-MCNC: 0.5 MG/DL (ref 0.1–1.2)
BUN BLD-MCNC: 20 MG/DL (ref 8–23)
BUN/CREAT SERPL: 25.3 (ref 7–25)
CALCIUM SPEC-SCNC: 9.1 MG/DL (ref 8.6–10.5)
CHLORIDE SERPL-SCNC: 107 MMOL/L (ref 98–107)
CO2 SERPL-SCNC: 25.7 MMOL/L (ref 22–29)
CREAT BLD-MCNC: 0.79 MG/DL (ref 0.57–1)
DEPRECATED RDW RBC AUTO: 47.1 FL (ref 37–54)
EOSINOPHIL # BLD AUTO: 0.06 10*3/MM3 (ref 0–0.7)
EOSINOPHIL NFR BLD AUTO: 1.5 % (ref 0.3–6.2)
ERYTHROCYTE [DISTWIDTH] IN BLOOD BY AUTOMATED COUNT: 13.1 % (ref 11.7–13)
GFR SERPL CREATININE-BSD FRML MDRD: 72 ML/MIN/1.73
GLOBULIN UR ELPH-MCNC: 2.5 GM/DL
GLUCOSE BLD-MCNC: 89 MG/DL (ref 65–99)
HCT VFR BLD AUTO: 35.6 % (ref 35.6–45.5)
HGB BLD-MCNC: 12.1 G/DL (ref 11.9–15.5)
IGG1 SER-MCNC: 766 MG/DL (ref 700–1600)
IMM GRANULOCYTES # BLD: 0 10*3/MM3 (ref 0–0.03)
IMM GRANULOCYTES NFR BLD: 0 % (ref 0–0.5)
LYMPHOCYTES # BLD AUTO: 1.36 10*3/MM3 (ref 0.9–4.8)
LYMPHOCYTES NFR BLD AUTO: 33.7 % (ref 19.6–45.3)
MCH RBC QN AUTO: 33.3 PG (ref 26.9–32)
MCHC RBC AUTO-ENTMCNC: 34 G/DL (ref 32.4–36.3)
MCV RBC AUTO: 98.1 FL (ref 80.5–98.2)
MONOCYTES # BLD AUTO: 0.35 10*3/MM3 (ref 0.2–1.2)
MONOCYTES NFR BLD AUTO: 8.7 % (ref 5–12)
NEUTROPHILS # BLD AUTO: 2.22 10*3/MM3 (ref 1.9–8.1)
NEUTROPHILS NFR BLD AUTO: 55.1 % (ref 42.7–76)
NRBC BLD MANUAL-RTO: 0 /100 WBC (ref 0–0)
PLATELET # BLD AUTO: 246 10*3/MM3 (ref 140–500)
PMV BLD AUTO: 9.7 FL (ref 6–12)
POTASSIUM BLD-SCNC: 4.1 MMOL/L (ref 3.5–5.2)
PROT SERPL-MCNC: 6.5 G/DL (ref 6–8.5)
RBC # BLD AUTO: 3.63 10*6/MM3 (ref 3.9–5.2)
SODIUM BLD-SCNC: 143 MMOL/L (ref 136–145)
WBC NRBC COR # BLD: 4.03 10*3/MM3 (ref 4.5–10.7)

## 2018-03-29 PROCEDURE — 36415 COLL VENOUS BLD VENIPUNCTURE: CPT | Performed by: NURSE PRACTITIONER

## 2018-03-29 PROCEDURE — 85025 COMPLETE CBC W/AUTO DIFF WBC: CPT | Performed by: ALLERGY & IMMUNOLOGY

## 2018-03-29 PROCEDURE — 82784 ASSAY IGA/IGD/IGG/IGM EACH: CPT | Performed by: ALLERGY & IMMUNOLOGY

## 2018-03-29 PROCEDURE — 25010000002 IMMUNE GLOBULIN (HUMAN) PER 500 MG: Performed by: ALLERGY & IMMUNOLOGY

## 2018-03-29 PROCEDURE — 96366 THER/PROPH/DIAG IV INF ADDON: CPT | Performed by: NURSE PRACTITIONER

## 2018-03-29 PROCEDURE — 96365 THER/PROPH/DIAG IV INF INIT: CPT | Performed by: NURSE PRACTITIONER

## 2018-03-29 PROCEDURE — 80053 COMPREHEN METABOLIC PANEL: CPT | Performed by: ALLERGY & IMMUNOLOGY

## 2018-03-29 RX ORDER — DIPHENHYDRAMINE HYDROCHLORIDE 50 MG/ML
50 INJECTION INTRAMUSCULAR; INTRAVENOUS AS NEEDED
Status: CANCELLED | OUTPATIENT
Start: 2018-03-29

## 2018-03-29 RX ORDER — ACETAMINOPHEN 500 MG
500 TABLET ORAL ONCE
Status: CANCELLED
Start: 2018-03-29 | End: 2018-03-29

## 2018-03-29 RX ORDER — SODIUM CHLORIDE 9 MG/ML
250 INJECTION, SOLUTION INTRAVENOUS ONCE
Status: COMPLETED | OUTPATIENT
Start: 2018-03-29 | End: 2018-03-29

## 2018-03-29 RX ORDER — ACETAMINOPHEN 325 MG/1
650 TABLET ORAL ONCE
Status: CANCELLED | OUTPATIENT
Start: 2018-03-29

## 2018-03-29 RX ORDER — DIPHENHYDRAMINE HCL 25 MG
25 CAPSULE ORAL ONCE
Status: CANCELLED | OUTPATIENT
Start: 2018-03-29

## 2018-03-29 RX ORDER — EPINEPHRINE 0.3 MG/.3ML
0.3 INJECTION SUBCUTANEOUS ONCE
Status: CANCELLED
Start: 2018-03-29 | End: 2018-03-29

## 2018-03-29 RX ORDER — SODIUM CHLORIDE 9 MG/ML
250 INJECTION, SOLUTION INTRAVENOUS ONCE
Status: CANCELLED | OUTPATIENT
Start: 2018-03-29

## 2018-03-29 RX ADMIN — IMMUNE GLOBULIN INTRAVENOUS (HUMAN) 10 G: KIT at 13:23

## 2018-03-29 RX ADMIN — IMMUNE GLOBULIN INTRAVENOUS (HUMAN) 10 G: KIT at 11:56

## 2018-03-29 RX ADMIN — SODIUM CHLORIDE 250 ML: 900 INJECTION, SOLUTION INTRAVENOUS at 11:56

## 2018-04-06 ENCOUNTER — LAB (OUTPATIENT)
Dept: OTHER | Facility: HOSPITAL | Age: 73
End: 2018-04-06

## 2018-04-06 ENCOUNTER — OFFICE VISIT (OUTPATIENT)
Dept: ONCOLOGY | Facility: CLINIC | Age: 73
End: 2018-04-06

## 2018-04-06 VITALS
HEIGHT: 65 IN | TEMPERATURE: 97.6 F | SYSTOLIC BLOOD PRESSURE: 140 MMHG | RESPIRATION RATE: 14 BRPM | OXYGEN SATURATION: 97 % | BODY MASS INDEX: 24.49 KG/M2 | WEIGHT: 147 LBS | HEART RATE: 71 BPM | DIASTOLIC BLOOD PRESSURE: 85 MMHG

## 2018-04-06 DIAGNOSIS — C50.512 MALIGNANT NEOPLASM OF LOWER-OUTER QUADRANT OF LEFT BREAST OF FEMALE, ESTROGEN RECEPTOR POSITIVE (HCC): Primary | ICD-10-CM

## 2018-04-06 DIAGNOSIS — D83.9 COMMON VARIABLE IMMUNODEFICIENCY (HCC): ICD-10-CM

## 2018-04-06 DIAGNOSIS — M81.0 OSTEOPOROSIS: ICD-10-CM

## 2018-04-06 DIAGNOSIS — M81.0 OSTEOPOROSIS, UNSPECIFIED OSTEOPOROSIS TYPE, UNSPECIFIED PATHOLOGICAL FRACTURE PRESENCE: ICD-10-CM

## 2018-04-06 DIAGNOSIS — Z17.0 MALIGNANT NEOPLASM OF LOWER-OUTER QUADRANT OF LEFT BREAST OF FEMALE, ESTROGEN RECEPTOR POSITIVE (HCC): Primary | ICD-10-CM

## 2018-04-06 DIAGNOSIS — C50.512 MALIGNANT NEOPLASM OF LOWER-OUTER QUADRANT OF LEFT FEMALE BREAST (HCC): ICD-10-CM

## 2018-04-06 LAB
ALBUMIN SERPL-MCNC: 4.2 G/DL (ref 3.5–5.2)
ALBUMIN/GLOB SERPL: 1.5 G/DL
ALP SERPL-CCNC: 50 U/L (ref 39–117)
ALT SERPL W P-5'-P-CCNC: 20 U/L (ref 1–33)
ANION GAP SERPL CALCULATED.3IONS-SCNC: 10.4 MMOL/L
AST SERPL-CCNC: 25 U/L (ref 1–32)
BASOPHILS # BLD AUTO: 0.04 10*3/MM3 (ref 0–0.2)
BASOPHILS NFR BLD AUTO: 1 % (ref 0–1.5)
BILIRUB SERPL-MCNC: 0.5 MG/DL (ref 0.1–1.2)
BUN BLD-MCNC: 19 MG/DL (ref 8–23)
BUN/CREAT SERPL: 23.8 (ref 7–25)
CALCIUM SPEC-SCNC: 9.3 MG/DL (ref 8.6–10.5)
CHLORIDE SERPL-SCNC: 105 MMOL/L (ref 98–107)
CO2 SERPL-SCNC: 28.6 MMOL/L (ref 22–29)
CREAT BLD-MCNC: 0.8 MG/DL (ref 0.57–1)
DEPRECATED RDW RBC AUTO: 49 FL (ref 37–54)
EOSINOPHIL # BLD AUTO: 0.08 10*3/MM3 (ref 0–0.7)
EOSINOPHIL NFR BLD AUTO: 2.1 % (ref 0.3–6.2)
ERYTHROCYTE [DISTWIDTH] IN BLOOD BY AUTOMATED COUNT: 13.4 % (ref 11.7–13)
GFR SERPL CREATININE-BSD FRML MDRD: 71 ML/MIN/1.73
GLOBULIN UR ELPH-MCNC: 2.8 GM/DL
GLUCOSE BLD-MCNC: 84 MG/DL (ref 65–99)
HCT VFR BLD AUTO: 36.3 % (ref 35.6–45.5)
HGB BLD-MCNC: 11.9 G/DL (ref 11.9–15.5)
IMM GRANULOCYTES # BLD: 0 10*3/MM3 (ref 0–0.03)
IMM GRANULOCYTES NFR BLD: 0 % (ref 0–0.5)
LYMPHOCYTES # BLD AUTO: 1.2 10*3/MM3 (ref 0.9–4.8)
LYMPHOCYTES NFR BLD AUTO: 31.4 % (ref 19.6–45.3)
MCH RBC QN AUTO: 32.7 PG (ref 26.9–32)
MCHC RBC AUTO-ENTMCNC: 32.8 G/DL (ref 32.4–36.3)
MCV RBC AUTO: 99.7 FL (ref 80.5–98.2)
MONOCYTES # BLD AUTO: 0.36 10*3/MM3 (ref 0.2–1.2)
MONOCYTES NFR BLD AUTO: 9.4 % (ref 5–12)
NEUTROPHILS # BLD AUTO: 2.14 10*3/MM3 (ref 1.9–8.1)
NEUTROPHILS NFR BLD AUTO: 56.1 % (ref 42.7–76)
NRBC BLD MANUAL-RTO: 0 /100 WBC (ref 0–0)
PLATELET # BLD AUTO: 216 10*3/MM3 (ref 140–500)
PMV BLD AUTO: 9.4 FL (ref 6–12)
POTASSIUM BLD-SCNC: 4.3 MMOL/L (ref 3.5–5.2)
PROT SERPL-MCNC: 7 G/DL (ref 6–8.5)
RBC # BLD AUTO: 3.64 10*6/MM3 (ref 3.9–5.2)
SODIUM BLD-SCNC: 144 MMOL/L (ref 136–145)
WBC NRBC COR # BLD: 3.82 10*3/MM3 (ref 4.5–10.7)

## 2018-04-06 PROCEDURE — 80053 COMPREHEN METABOLIC PANEL: CPT | Performed by: INTERNAL MEDICINE

## 2018-04-06 PROCEDURE — 99213 OFFICE O/P EST LOW 20 MIN: CPT | Performed by: INTERNAL MEDICINE

## 2018-04-06 PROCEDURE — 85025 COMPLETE CBC W/AUTO DIFF WBC: CPT | Performed by: INTERNAL MEDICINE

## 2018-04-06 PROCEDURE — 36415 COLL VENOUS BLD VENIPUNCTURE: CPT

## 2018-04-06 RX ORDER — TAMOXIFEN CITRATE 20 MG/1
20 TABLET ORAL DAILY
Qty: 90 TABLET | Refills: 3 | Status: SHIPPED | OUTPATIENT
Start: 2018-04-06 | End: 2018-05-29 | Stop reason: SDUPTHER

## 2018-04-06 NOTE — PROGRESS NOTES
Subjective     REASON FOR FOLLOW UP:   1. Stage I ( T1c,N0,M0 ) ER+,MT+, HER 2 neg for a ductal carcinoma of the left breast status post lumpectomy 5/19/2017.  Patient started on adjuvant tamoxifen after the visit of 6/9/2017.  2.  Osteoporosis  3.  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  Development of draining seroma and poor incisional healing in the left breast that required further surgery on 7/3/2017.    HISTORY OF PRESENT ILLNESS:  The patient is a 72 y.o. year old female who was noted to have a radiographic abnormality in the left breast in March.  She underwent an ultrasound-guided biopsy on 3/28/2017 showing invasive ductal carcinoma which was strongly hormone receptor positive and HER-2/trae negative.  She elected to undergo a lumpectomy procedure with Dr. Leon on 5/19/2017.  The tumor size was 1.2 x 1 x 0.9 cm and 3 sentinel lymph nodes were examined all of which were negative for metastatic cancer.    The patient was seen on 6/9/2017 to discuss postop adjuvant hormonal therapy.  She has a history of osteoporosis.  We discussed initiation of tamoxifen therapy which would have the advantage of helping to maintain her bone density.    She is here today for routine 6 month follow-up and seems to be getting along well on her tamoxifen.  She does report some hot flashes and mood swings but feels that she can tolerate this without a need for any other medications to address these symptoms.      [facility] with no worrisome findings.  History of Present Illness        ALLERGIES:    Allergies   Allergen Reactions   • Other Anaphylaxis     BLOOD PRODUCTS WITH IGA IN THEM   • Erythromycin GI Intolerance     VOMITING   • Avelox [Moxifloxacin] Rash   • Levaquin [Levofloxacin] Rash         Review of Systems   Constitutional: Negative for activity change, appetite change, fatigue, fever and unexpected weight change.   HENT:  "Negative for hearing loss, nosebleeds, trouble swallowing and voice change.    Eyes: Negative for visual disturbance.   Respiratory: Negative for cough, shortness of breath and wheezing.    Cardiovascular: Negative for chest pain and palpitations.   Gastrointestinal: Negative for abdominal pain, diarrhea, nausea and vomiting.   Genitourinary: Negative for difficulty urinating, frequency, hematuria and urgency.   Musculoskeletal: Negative for back pain and neck pain.   Skin: Negative for rash.   Neurological: Negative for dizziness, seizures, syncope and headaches.   Hematological: Negative for adenopathy. Does not bruise/bleed easily.   Psychiatric/Behavioral: Negative for behavioral problems. The patient is not nervous/anxious.         Objective     Vitals:    04/06/18 1103   BP: 140/85   Pulse: 71   Resp: 14   Temp: 97.6 °F (36.4 °C)   TempSrc: Oral   SpO2: 97%   Weight: 66.7 kg (147 lb)   Height: 164.5 cm (64.76\")  Comment: new ht without shoes   PainSc: 0-No pain     Current Status 4/6/2018   ECOG score 0       Physical Exam   Constitutional: She is oriented to person, place, and time. She appears well-developed and well-nourished. No distress.   HENT:   Head: Normocephalic.   Eyes: Conjunctivae and EOM are normal. Pupils are equal, round, and reactive to light. No scleral icterus.   Neck: Normal range of motion. Neck supple. No JVD present. No thyromegaly present.   Cardiovascular: Normal rate and regular rhythm.  Exam reveals no gallop and no friction rub.    No murmur heard.  Pulmonary/Chest: Effort normal and breath sounds normal. She has no wheezes. She has no rales. Right breast exhibits no mass, no nipple discharge and no skin change. Left breast exhibits skin change. Left breast exhibits no mass and no nipple discharge.   Left breast shows healed lumpectomy scar and radiation tattoos.   Abdominal: Soft. Bowel sounds are normal. She exhibits no distension and no mass. There is no tenderness. "   Musculoskeletal: Normal range of motion. She exhibits no edema or deformity.   Lymphadenopathy:     She has no cervical adenopathy.   Neurological: She is alert and oriented to person, place, and time. She has normal reflexes. No cranial nerve deficit.   Skin: Skin is warm and dry. No rash noted. No erythema.   Psychiatric: She has a normal mood and affect. Her behavior is normal. Judgment normal.         RECENT LABS:  Hematology WBC   Date Value Ref Range Status   04/06/2018 3.82 (L) 4.50 - 10.70 10*3/mm3 Final     RBC   Date Value Ref Range Status   04/06/2018 3.64 (L) 3.90 - 5.20 10*6/mm3 Final     Hemoglobin   Date Value Ref Range Status   04/06/2018 11.9 11.9 - 15.5 g/dL Final     Hematocrit   Date Value Ref Range Status   04/06/2018 36.3 35.6 - 45.5 % Final     Platelets   Date Value Ref Range Status   04/06/2018 216 140 - 500 10*3/mm3 Final          Assessment/Plan     1.  Stage I, hormone receptor positive, HER-2/trae negative invasive ductal carcinoma the left breast status post lumpectomy. Patient was started on adjuvant hormonal therapy with tamoxifen 20 mg daily in June 2017 and seems to be tolerating it well.  2.  Osteoporosis on her recent DEXA scan.  3.  Common variable immunodeficiency requiring monthly IV immunoglobulin infusions.    Plan  1.  Continue tamoxifen 20 mg daily with plans to continue treatment for 5 years through June 2022. We reordered her tamoxifen for 90 day supply with 3 refills.  2.  Return for follow-up in 6 months.  3.  Repeat bone density scan will be due in April 2019.  4.  Bilateral screening mammograms be due again in April 2019.

## 2018-04-26 ENCOUNTER — INFUSION (OUTPATIENT)
Dept: ONCOLOGY | Facility: HOSPITAL | Age: 73
End: 2018-04-26

## 2018-04-26 VITALS
HEART RATE: 58 BPM | TEMPERATURE: 97.5 F | SYSTOLIC BLOOD PRESSURE: 111 MMHG | DIASTOLIC BLOOD PRESSURE: 72 MMHG | OXYGEN SATURATION: 96 % | BODY MASS INDEX: 24.47 KG/M2 | WEIGHT: 146 LBS

## 2018-04-26 DIAGNOSIS — D83.9 COMMON VARIABLE IMMUNODEFICIENCY (HCC): Primary | ICD-10-CM

## 2018-04-26 DIAGNOSIS — D80.2 IGA DEFICIENCY (HCC): ICD-10-CM

## 2018-04-26 LAB
ALBUMIN SERPL-MCNC: 4 G/DL (ref 3.5–5.2)
ALBUMIN/GLOB SERPL: 1.7 G/DL
ALP SERPL-CCNC: 45 U/L (ref 39–117)
ALT SERPL W P-5'-P-CCNC: 14 U/L (ref 1–33)
ANION GAP SERPL CALCULATED.3IONS-SCNC: 12.6 MMOL/L
AST SERPL-CCNC: 23 U/L (ref 1–32)
BASOPHILS # BLD AUTO: 0.03 10*3/MM3 (ref 0–0.2)
BASOPHILS NFR BLD AUTO: 0.9 % (ref 0–1.5)
BILIRUB SERPL-MCNC: 0.6 MG/DL (ref 0.1–1.2)
BUN BLD-MCNC: 14 MG/DL (ref 8–23)
BUN/CREAT SERPL: 19.2 (ref 7–25)
CALCIUM SPEC-SCNC: 8.9 MG/DL (ref 8.6–10.5)
CHLORIDE SERPL-SCNC: 105 MMOL/L (ref 98–107)
CO2 SERPL-SCNC: 24.4 MMOL/L (ref 22–29)
CREAT BLD-MCNC: 0.73 MG/DL (ref 0.57–1)
DEPRECATED RDW RBC AUTO: 47.8 FL (ref 37–54)
EOSINOPHIL # BLD AUTO: 0.05 10*3/MM3 (ref 0–0.7)
EOSINOPHIL NFR BLD AUTO: 1.4 % (ref 0.3–6.2)
ERYTHROCYTE [DISTWIDTH] IN BLOOD BY AUTOMATED COUNT: 13.2 % (ref 11.7–13)
GFR SERPL CREATININE-BSD FRML MDRD: 79 ML/MIN/1.73
GLOBULIN UR ELPH-MCNC: 2.3 GM/DL
GLUCOSE BLD-MCNC: 93 MG/DL (ref 65–99)
HCT VFR BLD AUTO: 34.9 % (ref 35.6–45.5)
HGB BLD-MCNC: 11.6 G/DL (ref 11.9–15.5)
IGG1 SER-MCNC: 817 MG/DL (ref 700–1600)
IMM GRANULOCYTES # BLD: 0 10*3/MM3 (ref 0–0.03)
IMM GRANULOCYTES NFR BLD: 0 % (ref 0–0.5)
LYMPHOCYTES # BLD AUTO: 1.14 10*3/MM3 (ref 0.9–4.8)
LYMPHOCYTES NFR BLD AUTO: 32.8 % (ref 19.6–45.3)
MCH RBC QN AUTO: 33 PG (ref 26.9–32)
MCHC RBC AUTO-ENTMCNC: 33.2 G/DL (ref 32.4–36.3)
MCV RBC AUTO: 99.1 FL (ref 80.5–98.2)
MONOCYTES # BLD AUTO: 0.3 10*3/MM3 (ref 0.2–1.2)
MONOCYTES NFR BLD AUTO: 8.6 % (ref 5–12)
NEUTROPHILS # BLD AUTO: 1.96 10*3/MM3 (ref 1.9–8.1)
NEUTROPHILS NFR BLD AUTO: 56.3 % (ref 42.7–76)
NRBC BLD MANUAL-RTO: 0 /100 WBC (ref 0–0)
PLATELET # BLD AUTO: 221 10*3/MM3 (ref 140–500)
PMV BLD AUTO: 10.3 FL (ref 6–12)
POTASSIUM BLD-SCNC: 4.1 MMOL/L (ref 3.5–5.2)
PROT SERPL-MCNC: 6.3 G/DL (ref 6–8.5)
RBC # BLD AUTO: 3.52 10*6/MM3 (ref 3.9–5.2)
SODIUM BLD-SCNC: 142 MMOL/L (ref 136–145)
WBC NRBC COR # BLD: 3.48 10*3/MM3 (ref 4.5–10.7)

## 2018-04-26 PROCEDURE — 82784 ASSAY IGA/IGD/IGG/IGM EACH: CPT | Performed by: ALLERGY & IMMUNOLOGY

## 2018-04-26 PROCEDURE — 96365 THER/PROPH/DIAG IV INF INIT: CPT | Performed by: NURSE PRACTITIONER

## 2018-04-26 PROCEDURE — 80053 COMPREHEN METABOLIC PANEL: CPT | Performed by: ALLERGY & IMMUNOLOGY

## 2018-04-26 PROCEDURE — 85025 COMPLETE CBC W/AUTO DIFF WBC: CPT | Performed by: ALLERGY & IMMUNOLOGY

## 2018-04-26 PROCEDURE — 25010000002 IMMUNE GLOBULIN (HUMAN) PER 500 MG: Performed by: ALLERGY & IMMUNOLOGY

## 2018-04-26 PROCEDURE — 96366 THER/PROPH/DIAG IV INF ADDON: CPT | Performed by: NURSE PRACTITIONER

## 2018-04-26 RX ORDER — DIPHENHYDRAMINE HCL 25 MG
25 CAPSULE ORAL ONCE
Status: CANCELLED | OUTPATIENT
Start: 2018-04-26

## 2018-04-26 RX ORDER — DIPHENHYDRAMINE HYDROCHLORIDE 50 MG/ML
50 INJECTION INTRAMUSCULAR; INTRAVENOUS AS NEEDED
Status: CANCELLED | OUTPATIENT
Start: 2018-04-26

## 2018-04-26 RX ORDER — SODIUM CHLORIDE 9 MG/ML
250 INJECTION, SOLUTION INTRAVENOUS ONCE
Status: CANCELLED | OUTPATIENT
Start: 2018-04-26

## 2018-04-26 RX ORDER — EPINEPHRINE 0.3 MG/.3ML
0.3 INJECTION SUBCUTANEOUS ONCE
Status: CANCELLED
Start: 2018-04-26 | End: 2018-04-26

## 2018-04-26 RX ORDER — ACETAMINOPHEN 500 MG
500 TABLET ORAL ONCE
Status: CANCELLED
Start: 2018-04-26 | End: 2018-04-26

## 2018-04-26 RX ORDER — ACETAMINOPHEN 325 MG/1
650 TABLET ORAL ONCE
Status: CANCELLED | OUTPATIENT
Start: 2018-04-26

## 2018-04-26 RX ORDER — SODIUM CHLORIDE 9 MG/ML
250 INJECTION, SOLUTION INTRAVENOUS ONCE
Status: COMPLETED | OUTPATIENT
Start: 2018-04-26 | End: 2018-04-26

## 2018-04-26 RX ADMIN — IMMUNE GLOBULIN INTRAVENOUS (HUMAN) 10 G: KIT at 13:26

## 2018-04-26 RX ADMIN — IMMUNE GLOBULIN INTRAVENOUS (HUMAN) 10 G: KIT at 12:01

## 2018-04-26 RX ADMIN — SODIUM CHLORIDE 250 ML: 9 INJECTION, SOLUTION INTRAVENOUS at 12:01

## 2018-05-24 ENCOUNTER — INFUSION (OUTPATIENT)
Dept: ONCOLOGY | Facility: HOSPITAL | Age: 73
End: 2018-05-24

## 2018-05-24 VITALS
SYSTOLIC BLOOD PRESSURE: 114 MMHG | HEART RATE: 60 BPM | TEMPERATURE: 98 F | WEIGHT: 145 LBS | DIASTOLIC BLOOD PRESSURE: 73 MMHG | BODY MASS INDEX: 24.31 KG/M2 | OXYGEN SATURATION: 98 %

## 2018-05-24 DIAGNOSIS — D80.2 IGA DEFICIENCY (HCC): ICD-10-CM

## 2018-05-24 DIAGNOSIS — D83.9 COMMON VARIABLE IMMUNODEFICIENCY (HCC): Primary | ICD-10-CM

## 2018-05-24 PROCEDURE — 25010000002 IMMUNE GLOBULIN (HUMAN) PER 500 MG: Performed by: ALLERGY & IMMUNOLOGY

## 2018-05-24 PROCEDURE — 96366 THER/PROPH/DIAG IV INF ADDON: CPT | Performed by: NURSE PRACTITIONER

## 2018-05-24 PROCEDURE — 96365 THER/PROPH/DIAG IV INF INIT: CPT | Performed by: NURSE PRACTITIONER

## 2018-05-24 RX ORDER — EPINEPHRINE 0.3 MG/.3ML
0.3 INJECTION SUBCUTANEOUS ONCE
Status: CANCELLED
Start: 2018-05-24 | End: 2018-05-24

## 2018-05-24 RX ORDER — SODIUM CHLORIDE 9 MG/ML
250 INJECTION, SOLUTION INTRAVENOUS ONCE
Status: CANCELLED | OUTPATIENT
Start: 2018-05-24

## 2018-05-24 RX ORDER — SODIUM CHLORIDE 9 MG/ML
250 INJECTION, SOLUTION INTRAVENOUS ONCE
Status: DISCONTINUED | OUTPATIENT
Start: 2018-05-24 | End: 2018-05-24 | Stop reason: HOSPADM

## 2018-05-24 RX ORDER — DIPHENHYDRAMINE HYDROCHLORIDE 50 MG/ML
50 INJECTION INTRAMUSCULAR; INTRAVENOUS AS NEEDED
Status: CANCELLED | OUTPATIENT
Start: 2018-05-24

## 2018-05-24 RX ORDER — ACETAMINOPHEN 325 MG/1
650 TABLET ORAL ONCE
Status: CANCELLED | OUTPATIENT
Start: 2018-05-24

## 2018-05-24 RX ORDER — ACETAMINOPHEN 500 MG
500 TABLET ORAL ONCE
Status: CANCELLED
Start: 2018-05-24 | End: 2018-05-24

## 2018-05-24 RX ORDER — DIPHENHYDRAMINE HCL 25 MG
25 CAPSULE ORAL ONCE
Status: CANCELLED | OUTPATIENT
Start: 2018-05-24

## 2018-05-24 RX ADMIN — IMMUNE GLOBULIN INTRAVENOUS (HUMAN) 10 G: KIT at 11:54

## 2018-05-24 RX ADMIN — IMMUNE GLOBULIN INTRAVENOUS (HUMAN) 10 G: KIT at 13:19

## 2018-05-29 DIAGNOSIS — C50.512 MALIGNANT NEOPLASM OF LOWER-OUTER QUADRANT OF LEFT BREAST OF FEMALE, ESTROGEN RECEPTOR POSITIVE (HCC): ICD-10-CM

## 2018-05-29 DIAGNOSIS — Z17.0 MALIGNANT NEOPLASM OF LOWER-OUTER QUADRANT OF LEFT BREAST OF FEMALE, ESTROGEN RECEPTOR POSITIVE (HCC): ICD-10-CM

## 2018-05-29 DIAGNOSIS — M81.0 OSTEOPOROSIS, UNSPECIFIED OSTEOPOROSIS TYPE, UNSPECIFIED PATHOLOGICAL FRACTURE PRESENCE: ICD-10-CM

## 2018-05-29 DIAGNOSIS — D83.9 COMMON VARIABLE IMMUNODEFICIENCY (HCC): ICD-10-CM

## 2018-05-29 RX ORDER — TAMOXIFEN CITRATE 20 MG/1
20 TABLET ORAL DAILY
Qty: 90 TABLET | Refills: 3 | Status: SHIPPED | OUTPATIENT
Start: 2018-05-29 | End: 2018-09-21 | Stop reason: SDUPTHER

## 2018-07-05 ENCOUNTER — APPOINTMENT (OUTPATIENT)
Dept: ONCOLOGY | Facility: HOSPITAL | Age: 73
End: 2018-07-05

## 2018-07-11 ENCOUNTER — OFFICE VISIT (OUTPATIENT)
Dept: GASTROENTEROLOGY | Facility: CLINIC | Age: 73
End: 2018-07-11

## 2018-07-11 VITALS
SYSTOLIC BLOOD PRESSURE: 138 MMHG | TEMPERATURE: 97.7 F | WEIGHT: 143.6 LBS | HEIGHT: 65 IN | DIASTOLIC BLOOD PRESSURE: 86 MMHG | BODY MASS INDEX: 23.93 KG/M2

## 2018-07-11 DIAGNOSIS — K27.9 PUD (PEPTIC ULCER DISEASE): ICD-10-CM

## 2018-07-11 DIAGNOSIS — K21.9 GASTROESOPHAGEAL REFLUX DISEASE, ESOPHAGITIS PRESENCE NOT SPECIFIED: ICD-10-CM

## 2018-07-11 DIAGNOSIS — K58.0 IRRITABLE BOWEL SYNDROME WITH DIARRHEA: Primary | ICD-10-CM

## 2018-07-11 DIAGNOSIS — K58.9 IRRITABLE BOWEL SYNDROME WITHOUT DIARRHEA: ICD-10-CM

## 2018-07-11 DIAGNOSIS — R10.32 LEFT LOWER QUADRANT PAIN: ICD-10-CM

## 2018-07-11 PROCEDURE — 99214 OFFICE O/P EST MOD 30 MIN: CPT | Performed by: NURSE PRACTITIONER

## 2018-07-11 RX ORDER — PANTOPRAZOLE SODIUM 40 MG/1
40 TABLET, DELAYED RELEASE ORAL 2 TIMES DAILY
Qty: 180 TABLET | Refills: 3 | Status: SHIPPED | OUTPATIENT
Start: 2018-07-11 | End: 2019-10-21 | Stop reason: SDUPTHER

## 2018-07-11 RX ORDER — DICYCLOMINE HYDROCHLORIDE 10 MG/1
10 CAPSULE ORAL
Qty: 120 CAPSULE | Refills: 5 | Status: SHIPPED | OUTPATIENT
Start: 2018-07-11 | End: 2020-07-07 | Stop reason: SDUPTHER

## 2018-07-11 RX ORDER — SUCRALFATE ORAL 1 G/10ML
1 SUSPENSION ORAL 4 TIMES DAILY
Qty: 420 ML | Refills: 3 | Status: SHIPPED | OUTPATIENT
Start: 2018-07-11 | End: 2020-06-05

## 2018-07-11 NOTE — PROGRESS NOTES
Chief Complaint   Patient presents with   • Irritable Bowel Syndrome     change in stools        Elaina Tom is a  73 y.o. female here for a follow up visit for IBS and GERD.    HPI  74 yo f presents today for follow up visit for IBS and GERD. She is a patient of Dr. Rogers. She was last seen in the office on 9/2016. She has hx GERD/PUD and done well on protonix 409 mg BID. But her PCP took her off of it because her risk of osteoporosis and placed her on nexium instead. She admits the nexium didn't work as well as the protonix and she would like to change back. She has hx IBS-D and reports for the past several weeks she has had a change in bowel pattern. She will normally have 1-3 firm but soft stools every morning. But lately she has been having stools that start out solid, but then there will be stool pieces then it will turn loose then finally watery. She denies any abd pain or rectal bleeding or melena. She denies any recent ABX or change in diet. She has been started back on tamoxifen and admits her hormones have been all over the place. She is having hot flashes and more anxiety. She tells me her PCP wanted to start her on some effexor but she refused. She is thinking now all this might be related and maybe she should consider the medication. She does feel that she tends to obsess about things more lately. She denies any dysphagia, reflux, abd pain, N&V, constipation, rectal bleeding or melena. She admits her appetite is good and her weight is stable. She admits bentyl has helped the abd cramping and the loose stools for the past 3 days.     Past Medical History:   Diagnosis Date   • Allergic rhinitis    • Anxiety    • Arthritis    • Asthma     ONLY HAS ISSUES WHEN HAS RESPIRATORY INFECTION   • Cancer (CMS/HCC) 2017    BREAST-LEFT   • Cataract    • CVID (common variable immunodeficiency) (CMS/HCC)    • External hemorrhoids    • Gastritis    • GERD (gastroesophageal reflux disease)    • H/O CT scan of  abdomen     PELVIS AND CHEST: UNREMARKABLE   • Hiatal hernia    • History of colon polyps    • History of giardia infection    • History of Helicobacter pylori infection    • IBS (irritable bowel syndrome)    • IgA deficiency (CMS/HCC)     CANNOT HAVE ANYTHING WITH IGA IN IT WILL CAUSE ANAPHYLAXIS   • Immunoglobulin deficiency (CMS/HCC)    • Peptic ulcer disease    • PONV (postoperative nausea and vomiting)    • Torn meniscus    • Wound discharge     LT BREAST       Past Surgical History:   Procedure Laterality Date   • ABDOMINAL WALL ABSCESS INCISION AND DRAINAGE Left 7/3/2017    Procedure: BREAST ABSCESS INCISION AND DRAINAGE AND WOUND DEBRIDMENT;  Surgeon: Steve Leon MD;  [place] [facility] [place] [facility] [place];  Service:    • BREAST BIOPSY Left 02/26/2007    Left breast needle localization biopsy; Dr. Steve Leon   • BREAST BIOPSY Left 02/02/2007    Left breast stereotactic vacuum assisted core biopsy with marker placement; Dr. Steve Leon   • BREAST BIOPSY Left 02/07/2003    Left breast needle localization biopsy; Dr. Steve Leon   • BREAST LUMPECTOMY WITH SENTINEL NODE BIOPSY Left 5/19/2017    Procedure: BREAST LUMPECTOMY WITH SENTINEL NODE BIOPSY & MAMMO NEEDLE LOCALIZATION;  Surgeon: Steve Leon MD;  [place] [facility] [facility] [place];  Service:    • COLONOSCOPY  01/10/2014    NTEH, TORTS, STOOL   • COLONOSCOPY  08/24/2010    IH, TORT, BX-   • ENDOSCOPY  01/10/2014    Z-LINE REGULAR, 35 CM FROM INCISORS, HH, GASTRITIS, NO H-PYLORI   • ENDOSCOPY  08/24/2010    GASTRITIS   • ENDOSCOPY N/A 10/18/2016    Gastritis, duodenitis, HH    • ENDOSCOPY AND COLONOSCOPY N/A 08/23/2007    Z-line irregular, normal esophagus, bilious gastric fluid, gastritis, hiatal hernia, normal duodenal bulb and 2nd part of the duodenum; non-thrombosed external hemorrhoids, torts, one 5 mm polyp in the mid sigmoid colon, stool in the sigmoid colon, descending colon, transverse colon, ascending colon and in the cecum-Dr. Rigoberto Rogers   •  KNEE SURGERY Left 01/2015   • SEPTOPLASTY     • TONSILLECTOMY         Scheduled Meds:    Continuous Infusions:  No current facility-administered medications for this visit.     PRN Meds:.    Allergies   Allergen Reactions   • Other Anaphylaxis     BLOOD PRODUCTS WITH IGA IN THEM   • Erythromycin GI Intolerance     VOMITING   • Avelox [Moxifloxacin] Rash   • Levaquin [Levofloxacin] Rash       Social History     Social History   • Marital status:      Spouse name: Ortiz   • Number of children: 3   • Years of education: College     Occupational History   •  Retired     Social History Main Topics   • Smoking status: Never Smoker   • Smokeless tobacco: Never Used   • Alcohol use 0.6 oz/week     1 Glasses of wine per week      Comment: occasional   • Drug use: No   • Sexual activity: Defer     Other Topics Concern   • Not on file     Social History Narrative   • No narrative on file       Family History   Problem Relation Age of Onset   • Other Father         POLYP   • Colon polyps Father    • Prostate cancer Father 68   • Pancreatitis Daughter    • Malig Hyperthermia Neg Hx        Review of Systems   Constitutional: Negative for appetite change, chills, diaphoresis, fatigue, fever and unexpected weight change.   HENT: Negative for nosebleeds, postnasal drip, sore throat, trouble swallowing and voice change.    Respiratory: Negative for cough, choking, chest tightness, shortness of breath and wheezing.    Cardiovascular: Negative for chest pain.   Gastrointestinal: Positive for abdominal distention and diarrhea. Negative for abdominal pain, anal bleeding, blood in stool, constipation, nausea, rectal pain and vomiting.   Endocrine: Negative for polydipsia, polyphagia and polyuria.   Musculoskeletal: Negative for gait problem.   Skin: Negative for rash and wound.   Allergic/Immunologic: Negative for food allergies.   Neurological: Negative for dizziness, speech difficulty and light-headedness.    Psychiatric/Behavioral: Negative for confusion, self-injury, sleep disturbance and suicidal ideas.       Vitals:    07/11/18 0929   BP: 138/86   Temp: 97.7 °F (36.5 °C)       Physical Exam   Constitutional: She is oriented to person, place, and time. She appears well-developed and well-nourished. She does not appear ill. No distress.   HENT:   Head: Normocephalic.   Eyes: Pupils are equal, round, and reactive to light.   Cardiovascular: Normal rate, regular rhythm and normal heart sounds.    Pulmonary/Chest: Effort normal and breath sounds normal.   Abdominal: Soft. Bowel sounds are normal. She exhibits no distension and no mass. There is no hepatosplenomegaly. There is no tenderness. There is no rebound and no guarding. No hernia.   Musculoskeletal: Normal range of motion.   Neurological: She is alert and oriented to person, place, and time.   Skin: Skin is warm and dry.   Psychiatric: She has a normal mood and affect. Her speech is normal and behavior is normal. Judgment normal.       No images are attached to the encounter.    Assessment & Plan     1. Irritable bowel syndrome with diarrhea  - dicyclomine (BENTYL) 10 MG capsule; Take 1 capsule by mouth 4 (Four) Times a Day Before Meals & at Bedtime.  Dispense: 120 capsule; Refill: 5    2. Left lower quadrant pain    3. Gastroesophageal reflux disease, esophagitis presence not specified  - sucralfate (CARAFATE) 1 GM/10ML suspension; Take 10 mL by mouth 4 (Four) Times a Day.  Dispense: 420 mL; Refill: 3  - pantoprazole (PROTONIX) 40 MG EC tablet; Take 1 tablet by mouth 2 (Two) Times a Day.  Dispense: 180 tablet; Refill: 3    4. PUD (peptic ulcer disease)    For her IBS Given hx and symptoms will have her change the probiotic, add some fiber gummies and try some IBGARD. Continue bentyl as needed. GERD is not well controlled on nexium BID. Will change her back to Protonix 40 mg BID and carafate PRN. Follow up with Dr. Rogers in 3 months. Call office with update  in 2 weeks.

## 2018-07-12 ENCOUNTER — INFUSION (OUTPATIENT)
Dept: ONCOLOGY | Facility: HOSPITAL | Age: 73
End: 2018-07-12

## 2018-07-12 VITALS
HEART RATE: 67 BPM | DIASTOLIC BLOOD PRESSURE: 72 MMHG | SYSTOLIC BLOOD PRESSURE: 117 MMHG | OXYGEN SATURATION: 99 % | WEIGHT: 144.8 LBS | TEMPERATURE: 97.4 F | BODY MASS INDEX: 24.1 KG/M2

## 2018-07-12 DIAGNOSIS — D83.9 COMMON VARIABLE IMMUNODEFICIENCY (HCC): Primary | ICD-10-CM

## 2018-07-12 DIAGNOSIS — D80.2 IGA DEFICIENCY (HCC): ICD-10-CM

## 2018-07-12 PROCEDURE — A9270 NON-COVERED ITEM OR SERVICE: HCPCS | Performed by: ALLERGY & IMMUNOLOGY

## 2018-07-12 PROCEDURE — 96365 THER/PROPH/DIAG IV INF INIT: CPT | Performed by: NURSE PRACTITIONER

## 2018-07-12 PROCEDURE — 96366 THER/PROPH/DIAG IV INF ADDON: CPT | Performed by: NURSE PRACTITIONER

## 2018-07-12 PROCEDURE — 25010000002 IMMUNE GLOBULIN (HUMAN) PER 500 MG: Performed by: ALLERGY & IMMUNOLOGY

## 2018-07-12 PROCEDURE — 63710000001 ACETAMINOPHEN 325 MG TABLET: Performed by: ALLERGY & IMMUNOLOGY

## 2018-07-12 RX ORDER — ACETAMINOPHEN 500 MG
500 TABLET ORAL ONCE
Status: CANCELLED
Start: 2018-07-12 | End: 2018-07-12

## 2018-07-12 RX ORDER — DIPHENHYDRAMINE HYDROCHLORIDE 50 MG/ML
50 INJECTION INTRAMUSCULAR; INTRAVENOUS AS NEEDED
Status: CANCELLED | OUTPATIENT
Start: 2018-07-12

## 2018-07-12 RX ORDER — DIPHENHYDRAMINE HCL 25 MG
25 CAPSULE ORAL ONCE
Status: CANCELLED | OUTPATIENT
Start: 2018-07-12

## 2018-07-12 RX ORDER — SODIUM CHLORIDE 9 MG/ML
250 INJECTION, SOLUTION INTRAVENOUS ONCE
Status: CANCELLED | OUTPATIENT
Start: 2018-07-12

## 2018-07-12 RX ORDER — ACETAMINOPHEN 325 MG/1
650 TABLET ORAL ONCE
Status: COMPLETED | OUTPATIENT
Start: 2018-07-12 | End: 2018-07-12

## 2018-07-12 RX ORDER — SODIUM CHLORIDE 9 MG/ML
250 INJECTION, SOLUTION INTRAVENOUS ONCE
Status: COMPLETED | OUTPATIENT
Start: 2018-07-12 | End: 2018-07-12

## 2018-07-12 RX ORDER — ACETAMINOPHEN 325 MG/1
650 TABLET ORAL ONCE
Status: CANCELLED | OUTPATIENT
Start: 2018-07-12

## 2018-07-12 RX ORDER — EPINEPHRINE 0.3 MG/.3ML
0.3 INJECTION SUBCUTANEOUS ONCE
Status: CANCELLED
Start: 2018-07-12 | End: 2018-07-12

## 2018-07-12 RX ADMIN — IMMUNE GLOBULIN INTRAVENOUS (HUMAN) 10 G: KIT at 09:53

## 2018-07-12 RX ADMIN — IMMUNE GLOBULIN INTRAVENOUS (HUMAN) 10 G: KIT at 11:42

## 2018-07-12 RX ADMIN — SODIUM CHLORIDE 250 ML: 900 INJECTION, SOLUTION INTRAVENOUS at 09:27

## 2018-07-12 RX ADMIN — ACETAMINOPHEN 650 MG: 325 TABLET, FILM COATED ORAL at 09:27

## 2018-07-12 NOTE — PROGRESS NOTES
Patient states she had IGG lab on May 24. Contacting office to fax over. Per Sophia (Pharmacist) okay to give today.

## 2018-07-25 ENCOUNTER — TELEPHONE (OUTPATIENT)
Dept: GASTROENTEROLOGY | Facility: CLINIC | Age: 73
End: 2018-07-25

## 2018-07-25 NOTE — TELEPHONE ENCOUNTER
----- Message from Nyasia Estrada sent at 7/25/2018  9:15 AM EDT -----  Regarding: RITU   Contact: [phone]  PT CALLED STATED EVERYTHING IS BETTER NOW

## 2018-08-08 DIAGNOSIS — D83.9 COMMON VARIABLE IMMUNODEFICIENCY (HCC): Primary | ICD-10-CM

## 2018-08-09 ENCOUNTER — INFUSION (OUTPATIENT)
Dept: ONCOLOGY | Facility: HOSPITAL | Age: 73
End: 2018-08-09

## 2018-08-09 VITALS
TEMPERATURE: 97.9 F | DIASTOLIC BLOOD PRESSURE: 80 MMHG | WEIGHT: 145.2 LBS | BODY MASS INDEX: 24.16 KG/M2 | SYSTOLIC BLOOD PRESSURE: 121 MMHG | OXYGEN SATURATION: 100 % | HEART RATE: 71 BPM

## 2018-08-09 DIAGNOSIS — D80.2 IGA DEFICIENCY (HCC): ICD-10-CM

## 2018-08-09 DIAGNOSIS — D83.9 COMMON VARIABLE IMMUNODEFICIENCY (HCC): Primary | ICD-10-CM

## 2018-08-09 LAB — IGG1 SER-MCNC: 881 MG/DL (ref 700–1600)

## 2018-08-09 PROCEDURE — A9270 NON-COVERED ITEM OR SERVICE: HCPCS | Performed by: ALLERGY & IMMUNOLOGY

## 2018-08-09 PROCEDURE — 63710000001 ACETAMINOPHEN 500 MG TABLET: Performed by: ALLERGY & IMMUNOLOGY

## 2018-08-09 PROCEDURE — 82784 ASSAY IGA/IGD/IGG/IGM EACH: CPT | Performed by: ALLERGY & IMMUNOLOGY

## 2018-08-09 PROCEDURE — 25010000002 IMMUNE GLOBULIN (HUMAN) PER 500 MG: Performed by: ALLERGY & IMMUNOLOGY

## 2018-08-09 PROCEDURE — 96365 THER/PROPH/DIAG IV INF INIT: CPT | Performed by: NURSE PRACTITIONER

## 2018-08-09 PROCEDURE — 96366 THER/PROPH/DIAG IV INF ADDON: CPT | Performed by: NURSE PRACTITIONER

## 2018-08-09 RX ORDER — DIPHENHYDRAMINE HYDROCHLORIDE 50 MG/ML
50 INJECTION INTRAMUSCULAR; INTRAVENOUS AS NEEDED
Status: CANCELLED | OUTPATIENT
Start: 2018-08-09

## 2018-08-09 RX ORDER — DIPHENHYDRAMINE HCL 25 MG
25 CAPSULE ORAL ONCE
Status: CANCELLED | OUTPATIENT
Start: 2018-08-09

## 2018-08-09 RX ORDER — EPINEPHRINE 0.3 MG/.3ML
0.3 INJECTION SUBCUTANEOUS ONCE
Status: CANCELLED
Start: 2018-08-09 | End: 2018-08-09

## 2018-08-09 RX ORDER — SODIUM CHLORIDE 9 MG/ML
250 INJECTION, SOLUTION INTRAVENOUS ONCE
Status: CANCELLED | OUTPATIENT
Start: 2018-08-09

## 2018-08-09 RX ORDER — ACETAMINOPHEN 500 MG
500 TABLET ORAL ONCE
Status: CANCELLED
Start: 2018-08-09 | End: 2018-08-09

## 2018-08-09 RX ORDER — ACETAMINOPHEN 500 MG
500 TABLET ORAL ONCE
Status: COMPLETED | OUTPATIENT
Start: 2018-08-09 | End: 2018-08-09

## 2018-08-09 RX ORDER — ACETAMINOPHEN 325 MG/1
650 TABLET ORAL ONCE
Status: CANCELLED | OUTPATIENT
Start: 2018-08-09

## 2018-08-09 RX ORDER — SODIUM CHLORIDE 9 MG/ML
250 INJECTION, SOLUTION INTRAVENOUS ONCE
Status: COMPLETED | OUTPATIENT
Start: 2018-08-09 | End: 2018-08-09

## 2018-08-09 RX ORDER — ACETAMINOPHEN 500 MG
500 TABLET ORAL ONCE
Status: CANCELLED | OUTPATIENT
Start: 2018-08-09

## 2018-08-09 RX ADMIN — IMMUNE GLOBULIN INTRAVENOUS (HUMAN) 10 G: KIT at 10:53

## 2018-08-09 RX ADMIN — IMMUNE GLOBULIN INTRAVENOUS (HUMAN) 10 G: KIT at 09:27

## 2018-08-09 RX ADMIN — SODIUM CHLORIDE 250 ML: 900 INJECTION, SOLUTION INTRAVENOUS at 09:10

## 2018-08-09 RX ADMIN — ACETAMINOPHEN 500 MG: 500 TABLET, FILM COATED ORAL at 09:09

## 2018-09-06 ENCOUNTER — INFUSION (OUTPATIENT)
Dept: ONCOLOGY | Facility: HOSPITAL | Age: 73
End: 2018-09-06

## 2018-09-06 VITALS
SYSTOLIC BLOOD PRESSURE: 134 MMHG | OXYGEN SATURATION: 100 % | TEMPERATURE: 97.7 F | DIASTOLIC BLOOD PRESSURE: 77 MMHG | BODY MASS INDEX: 23.96 KG/M2 | HEART RATE: 60 BPM | WEIGHT: 144 LBS

## 2018-09-06 DIAGNOSIS — D80.2 IGA DEFICIENCY (HCC): ICD-10-CM

## 2018-09-06 DIAGNOSIS — D83.9 COMMON VARIABLE IMMUNODEFICIENCY (HCC): Primary | ICD-10-CM

## 2018-09-06 PROCEDURE — 96366 THER/PROPH/DIAG IV INF ADDON: CPT

## 2018-09-06 PROCEDURE — 25010000002 IMMUNE GLOBULIN (HUMAN) PER 500 MG: Performed by: ALLERGY & IMMUNOLOGY

## 2018-09-06 PROCEDURE — 96365 THER/PROPH/DIAG IV INF INIT: CPT

## 2018-09-06 RX ORDER — ACETAMINOPHEN 500 MG
500 TABLET ORAL ONCE
Status: CANCELLED | OUTPATIENT
Start: 2018-09-06

## 2018-09-06 RX ORDER — SODIUM CHLORIDE 9 MG/ML
250 INJECTION, SOLUTION INTRAVENOUS ONCE
Status: CANCELLED | OUTPATIENT
Start: 2018-09-06

## 2018-09-06 RX ORDER — SODIUM CHLORIDE 9 MG/ML
250 INJECTION, SOLUTION INTRAVENOUS ONCE
Status: COMPLETED | OUTPATIENT
Start: 2018-09-06 | End: 2018-09-06

## 2018-09-06 RX ORDER — DIPHENHYDRAMINE HYDROCHLORIDE 50 MG/ML
50 INJECTION INTRAMUSCULAR; INTRAVENOUS AS NEEDED
Status: CANCELLED | OUTPATIENT
Start: 2018-09-06

## 2018-09-06 RX ORDER — DIPHENHYDRAMINE HCL 25 MG
25 CAPSULE ORAL ONCE
Status: CANCELLED | OUTPATIENT
Start: 2018-09-06

## 2018-09-06 RX ADMIN — IMMUNE GLOBULIN INTRAVENOUS (HUMAN) 10 G: KIT at 13:40

## 2018-09-06 RX ADMIN — Medication 10 G: at 12:12

## 2018-09-06 RX ADMIN — SODIUM CHLORIDE 250 ML: 900 INJECTION, SOLUTION INTRAVENOUS at 11:31

## 2018-09-21 ENCOUNTER — LAB (OUTPATIENT)
Dept: OTHER | Facility: HOSPITAL | Age: 73
End: 2018-09-21

## 2018-09-21 ENCOUNTER — OFFICE VISIT (OUTPATIENT)
Dept: ONCOLOGY | Facility: CLINIC | Age: 73
End: 2018-09-21

## 2018-09-21 VITALS
BODY MASS INDEX: 24.13 KG/M2 | TEMPERATURE: 97.9 F | OXYGEN SATURATION: 98 % | SYSTOLIC BLOOD PRESSURE: 132 MMHG | DIASTOLIC BLOOD PRESSURE: 80 MMHG | RESPIRATION RATE: 12 BRPM | WEIGHT: 145 LBS | HEART RATE: 76 BPM

## 2018-09-21 DIAGNOSIS — Z17.0 MALIGNANT NEOPLASM OF LOWER-OUTER QUADRANT OF LEFT BREAST OF FEMALE, ESTROGEN RECEPTOR POSITIVE (HCC): Primary | ICD-10-CM

## 2018-09-21 DIAGNOSIS — Z17.0 MALIGNANT NEOPLASM OF LOWER-OUTER QUADRANT OF LEFT BREAST OF FEMALE, ESTROGEN RECEPTOR POSITIVE (HCC): ICD-10-CM

## 2018-09-21 DIAGNOSIS — C50.512 MALIGNANT NEOPLASM OF LOWER-OUTER QUADRANT OF LEFT BREAST OF FEMALE, ESTROGEN RECEPTOR POSITIVE (HCC): Primary | ICD-10-CM

## 2018-09-21 DIAGNOSIS — D83.9 COMMON VARIABLE IMMUNODEFICIENCY (HCC): ICD-10-CM

## 2018-09-21 DIAGNOSIS — M81.0 OSTEOPOROSIS, UNSPECIFIED OSTEOPOROSIS TYPE, UNSPECIFIED PATHOLOGICAL FRACTURE PRESENCE: ICD-10-CM

## 2018-09-21 DIAGNOSIS — C50.512 MALIGNANT NEOPLASM OF LOWER-OUTER QUADRANT OF LEFT BREAST OF FEMALE, ESTROGEN RECEPTOR POSITIVE (HCC): ICD-10-CM

## 2018-09-21 LAB
ALBUMIN SERPL-MCNC: 4.3 G/DL (ref 3.5–5.2)
ALBUMIN/GLOB SERPL: 1.5 G/DL
ALP SERPL-CCNC: 51 U/L (ref 39–117)
ALT SERPL W P-5'-P-CCNC: 16 U/L (ref 1–33)
ANION GAP SERPL CALCULATED.3IONS-SCNC: 10.6 MMOL/L
AST SERPL-CCNC: 20 U/L (ref 1–32)
BASOPHILS # BLD AUTO: 0.04 10*3/MM3 (ref 0–0.2)
BASOPHILS NFR BLD AUTO: 0.9 % (ref 0–1.5)
BILIRUB SERPL-MCNC: 0.6 MG/DL (ref 0.1–1.2)
BUN BLD-MCNC: 19 MG/DL (ref 8–23)
BUN/CREAT SERPL: 23.8 (ref 7–25)
CALCIUM SPEC-SCNC: 9.7 MG/DL (ref 8.6–10.5)
CHLORIDE SERPL-SCNC: 106 MMOL/L (ref 98–107)
CO2 SERPL-SCNC: 27.4 MMOL/L (ref 22–29)
CREAT BLD-MCNC: 0.8 MG/DL (ref 0.57–1)
DEPRECATED RDW RBC AUTO: 49.1 FL (ref 37–54)
EOSINOPHIL # BLD AUTO: 0.05 10*3/MM3 (ref 0–0.7)
EOSINOPHIL NFR BLD AUTO: 1.2 % (ref 0.3–6.2)
ERYTHROCYTE [DISTWIDTH] IN BLOOD BY AUTOMATED COUNT: 13.2 % (ref 11.7–13)
GFR SERPL CREATININE-BSD FRML MDRD: 71 ML/MIN/1.73
GLOBULIN UR ELPH-MCNC: 2.8 GM/DL
GLUCOSE BLD-MCNC: 76 MG/DL (ref 65–99)
HCT VFR BLD AUTO: 37.1 % (ref 35.6–45.5)
HGB BLD-MCNC: 12.2 G/DL (ref 11.9–15.5)
IMM GRANULOCYTES # BLD: 0.01 10*3/MM3 (ref 0–0.03)
IMM GRANULOCYTES NFR BLD: 0.2 % (ref 0–0.5)
LYMPHOCYTES # BLD AUTO: 1.23 10*3/MM3 (ref 0.9–4.8)
LYMPHOCYTES NFR BLD AUTO: 28.4 % (ref 19.6–45.3)
MCH RBC QN AUTO: 33.2 PG (ref 26.9–32)
MCHC RBC AUTO-ENTMCNC: 32.9 G/DL (ref 32.4–36.3)
MCV RBC AUTO: 100.8 FL (ref 80.5–98.2)
MONOCYTES # BLD AUTO: 0.4 10*3/MM3 (ref 0.2–1.2)
MONOCYTES NFR BLD AUTO: 9.2 % (ref 5–12)
NEUTROPHILS # BLD AUTO: 2.6 10*3/MM3 (ref 1.9–8.1)
NEUTROPHILS NFR BLD AUTO: 60.1 % (ref 42.7–76)
NRBC BLD MANUAL-RTO: 0 /100 WBC (ref 0–0)
PLATELET # BLD AUTO: 233 10*3/MM3 (ref 140–500)
PMV BLD AUTO: 9.8 FL (ref 6–12)
POTASSIUM BLD-SCNC: 4.7 MMOL/L (ref 3.5–5.2)
PROT SERPL-MCNC: 7.1 G/DL (ref 6–8.5)
RBC # BLD AUTO: 3.68 10*6/MM3 (ref 3.9–5.2)
SODIUM BLD-SCNC: 144 MMOL/L (ref 136–145)
WBC NRBC COR # BLD: 4.33 10*3/MM3 (ref 4.5–10.7)

## 2018-09-21 PROCEDURE — 80053 COMPREHEN METABOLIC PANEL: CPT | Performed by: INTERNAL MEDICINE

## 2018-09-21 PROCEDURE — 36415 COLL VENOUS BLD VENIPUNCTURE: CPT

## 2018-09-21 PROCEDURE — 85025 COMPLETE CBC W/AUTO DIFF WBC: CPT | Performed by: INTERNAL MEDICINE

## 2018-09-21 PROCEDURE — 99213 OFFICE O/P EST LOW 20 MIN: CPT | Performed by: INTERNAL MEDICINE

## 2018-09-21 RX ORDER — TAMOXIFEN CITRATE 20 MG/1
20 TABLET ORAL DAILY
Qty: 90 TABLET | Refills: 3 | Status: SHIPPED | OUTPATIENT
Start: 2018-09-21 | End: 2019-12-03 | Stop reason: SDUPTHER

## 2018-09-21 NOTE — PROGRESS NOTES
Subjective     REASON FOR FOLLOW UP:   1. Stage I ( T1c,N0,M0 ) ER+,MO+, HER 2 neg for a ductal carcinoma of the left breast status post lumpectomy 5/19/2017.  Patient started on adjuvant tamoxifen after the visit of 6/9/2017.  2.  Osteoporosis  3.  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  Development of draining seroma and poor incisional healing in the left breast that required further surgery on 7/3/2017.    HISTORY OF PRESENT ILLNESS:  The patient is a 73 y.o. year old female who was noted to have a radiographic abnormality in the left breast in March.  She underwent an ultrasound-guided biopsy on 3/28/2017 showing invasive ductal carcinoma which was strongly hormone receptor positive and HER-2/trae negative.  She elected to undergo a lumpectomy procedure with Dr. Leon on 5/19/2017.  The tumor size was 1.2 x 1 x 0.9 cm and 3 sentinel lymph nodes were examined all of which were negative for metastatic cancer.    The patient was seen on 6/9/2017 to discuss postop adjuvant hormonal therapy.  She has a history of osteoporosis.  We discussed initiation of tamoxifen therapy which would have the advantage of helping to maintain her bone density.    She is here today for routine 6 month follow-up and seems to be getting along well on her tamoxifen.  She does report some hot flashes and mood swings but feels that she can tolerate this without a need for any other medications to address these symptoms.      [facility] with no worrisome findings.  History of Present Illness        ALLERGIES:    Allergies   Allergen Reactions   • Other Anaphylaxis     BLOOD PRODUCTS WITH IGA IN THEM   • Erythromycin GI Intolerance     VOMITING   • Avelox [Moxifloxacin] Rash   • Levaquin [Levofloxacin] Rash         Review of Systems   Constitutional: Negative for activity change, appetite change, fatigue, fever and unexpected weight change.   HENT:  Negative for hearing loss, nosebleeds, trouble swallowing and voice change.    Eyes: Negative for visual disturbance.   Respiratory: Negative for cough, shortness of breath and wheezing.    Cardiovascular: Negative for chest pain and palpitations.   Gastrointestinal: Negative for abdominal pain, diarrhea, nausea and vomiting.   Genitourinary: Negative for difficulty urinating, frequency, hematuria and urgency.   Musculoskeletal: Negative for back pain and neck pain.   Skin: Negative for rash.   Neurological: Negative for dizziness, seizures, syncope and headaches.   Hematological: Negative for adenopathy. Does not bruise/bleed easily.   Psychiatric/Behavioral: Negative for behavioral problems. The patient is not nervous/anxious.         Objective     Vitals:    09/21/18 1049   BP: 132/80   Pulse: 76   Resp: 12   Temp: 97.9 °F (36.6 °C)   TempSrc: Oral   SpO2: 98%   Weight: 65.8 kg (145 lb)   PainSc: 0-No pain     Current Status 9/21/2018   ECOG score 0       Physical Exam   Constitutional: She is oriented to person, place, and time. She appears well-developed and well-nourished. No distress.   HENT:   Head: Normocephalic.   Eyes: Pupils are equal, round, and reactive to light. Conjunctivae and EOM are normal. No scleral icterus.   Neck: Normal range of motion. Neck supple. No JVD present. No thyromegaly present.   Cardiovascular: Normal rate and regular rhythm.  Exam reveals no gallop and no friction rub.    No murmur heard.  Pulmonary/Chest: Effort normal and breath sounds normal. She has no wheezes. She has no rales. Right breast exhibits no mass, no nipple discharge and no skin change. Left breast exhibits skin change. Left breast exhibits no mass and no nipple discharge.   Left breast shows healed lumpectomy scar and radiation tattoos.   Abdominal: Soft. Bowel sounds are normal. She exhibits no distension and no mass. There is no tenderness.   Musculoskeletal: Normal range of motion. She exhibits no edema or  deformity.   Lymphadenopathy:     She has no cervical adenopathy.   Neurological: She is alert and oriented to person, place, and time. She has normal reflexes. No cranial nerve deficit.   Skin: Skin is warm and dry. No rash noted. No erythema.   Psychiatric: She has a normal mood and affect. Her behavior is normal. Judgment normal.         RECENT LABS:  Hematology WBC   Date Value Ref Range Status   09/21/2018 4.33 (L) 4.50 - 10.70 10*3/mm3 Final     RBC   Date Value Ref Range Status   09/21/2018 3.68 (L) 3.90 - 5.20 10*6/mm3 Final     Hemoglobin   Date Value Ref Range Status   09/21/2018 12.2 11.9 - 15.5 g/dL Final     Hematocrit   Date Value Ref Range Status   09/21/2018 37.1 35.6 - 45.5 % Final     Platelets   Date Value Ref Range Status   09/21/2018 233 140 - 500 10*3/mm3 Final          Assessment/Plan     1.  Stage I, hormone receptor positive, HER-2/trae negative invasive ductal carcinoma the left breast status post lumpectomy. Patient was started on adjuvant hormonal therapy with tamoxifen 20 mg daily in June 2017 and seems to be tolerating it well.  2.  Osteoporosis on her last DEXA scan.  3.  Common variable immunodeficiency requiring monthly IV immunoglobulin infusions.    Plan  1.  Continue tamoxifen 20 mg daily with plans to continue treatment for 5 years through June 2022.  2.  Return for follow-up in 6 months.  3.  Repeat bone density scan will be due in April 2019.  4.  Bilateral screening mammograms be due again in April 2019.

## 2018-10-03 DIAGNOSIS — D83.9 COMMON VARIABLE IMMUNODEFICIENCY (HCC): Primary | ICD-10-CM

## 2018-10-04 ENCOUNTER — INFUSION (OUTPATIENT)
Dept: ONCOLOGY | Facility: HOSPITAL | Age: 73
End: 2018-10-04

## 2018-10-04 VITALS
BODY MASS INDEX: 24.12 KG/M2 | HEART RATE: 72 BPM | TEMPERATURE: 97.5 F | WEIGHT: 143 LBS | SYSTOLIC BLOOD PRESSURE: 129 MMHG | DIASTOLIC BLOOD PRESSURE: 81 MMHG

## 2018-10-04 DIAGNOSIS — D80.2 IGA DEFICIENCY (HCC): ICD-10-CM

## 2018-10-04 DIAGNOSIS — D83.9 COMMON VARIABLE IMMUNODEFICIENCY (HCC): Primary | ICD-10-CM

## 2018-10-04 LAB
ALBUMIN SERPL-MCNC: 4.3 G/DL (ref 3.5–5.2)
ALBUMIN/GLOB SERPL: 1.7 G/DL
ALP SERPL-CCNC: 48 U/L (ref 39–117)
ALT SERPL W P-5'-P-CCNC: 16 U/L (ref 1–33)
ANION GAP SERPL CALCULATED.3IONS-SCNC: 10.8 MMOL/L
AST SERPL-CCNC: 20 U/L (ref 1–32)
BASOPHILS # BLD AUTO: 0.04 10*3/MM3 (ref 0–0.2)
BASOPHILS NFR BLD AUTO: 1 % (ref 0–1.5)
BILIRUB SERPL-MCNC: 0.6 MG/DL (ref 0.1–1.2)
BUN BLD-MCNC: 14 MG/DL (ref 8–23)
BUN/CREAT SERPL: 19.2 (ref 7–25)
CALCIUM SPEC-SCNC: 9.7 MG/DL (ref 8.6–10.5)
CHLORIDE SERPL-SCNC: 106 MMOL/L (ref 98–107)
CO2 SERPL-SCNC: 27.2 MMOL/L (ref 22–29)
CREAT BLD-MCNC: 0.73 MG/DL (ref 0.57–1)
DEPRECATED RDW RBC AUTO: 47.2 FL (ref 37–54)
EOSINOPHIL # BLD AUTO: 0.05 10*3/MM3 (ref 0–0.7)
EOSINOPHIL NFR BLD AUTO: 1.2 % (ref 0.3–6.2)
ERYTHROCYTE [DISTWIDTH] IN BLOOD BY AUTOMATED COUNT: 12.9 % (ref 11.7–13)
GFR SERPL CREATININE-BSD FRML MDRD: 79 ML/MIN/1.73
GLOBULIN UR ELPH-MCNC: 2.6 GM/DL
GLUCOSE BLD-MCNC: 93 MG/DL (ref 65–99)
HCT VFR BLD AUTO: 37.1 % (ref 35.6–45.5)
HGB BLD-MCNC: 12.4 G/DL (ref 11.9–15.5)
IGG1 SER-MCNC: 943 MG/DL (ref 700–1600)
IMM GRANULOCYTES # BLD: 0.01 10*3/MM3 (ref 0–0.03)
IMM GRANULOCYTES NFR BLD: 0.2 % (ref 0–0.5)
LYMPHOCYTES # BLD AUTO: 1.34 10*3/MM3 (ref 0.9–4.8)
LYMPHOCYTES NFR BLD AUTO: 33.3 % (ref 19.6–45.3)
MCH RBC QN AUTO: 33.2 PG (ref 26.9–32)
MCHC RBC AUTO-ENTMCNC: 33.4 G/DL (ref 32.4–36.3)
MCV RBC AUTO: 99.5 FL (ref 80.5–98.2)
MONOCYTES # BLD AUTO: 0.4 10*3/MM3 (ref 0.2–1.2)
MONOCYTES NFR BLD AUTO: 10 % (ref 5–12)
NEUTROPHILS # BLD AUTO: 2.18 10*3/MM3 (ref 1.9–8.1)
NEUTROPHILS NFR BLD AUTO: 54.3 % (ref 42.7–76)
NRBC BLD MANUAL-RTO: 0 /100 WBC (ref 0–0)
PLATELET # BLD AUTO: 244 10*3/MM3 (ref 140–500)
PMV BLD AUTO: 9.9 FL (ref 6–12)
POTASSIUM BLD-SCNC: 4.3 MMOL/L (ref 3.5–5.2)
PROT SERPL-MCNC: 6.9 G/DL (ref 6–8.5)
RBC # BLD AUTO: 3.73 10*6/MM3 (ref 3.9–5.2)
SODIUM BLD-SCNC: 144 MMOL/L (ref 136–145)
WBC NRBC COR # BLD: 4.02 10*3/MM3 (ref 4.5–10.7)

## 2018-10-04 PROCEDURE — 85025 COMPLETE CBC W/AUTO DIFF WBC: CPT | Performed by: ALLERGY & IMMUNOLOGY

## 2018-10-04 PROCEDURE — 96366 THER/PROPH/DIAG IV INF ADDON: CPT | Performed by: NURSE PRACTITIONER

## 2018-10-04 PROCEDURE — 96365 THER/PROPH/DIAG IV INF INIT: CPT | Performed by: NURSE PRACTITIONER

## 2018-10-04 PROCEDURE — 80053 COMPREHEN METABOLIC PANEL: CPT | Performed by: ALLERGY & IMMUNOLOGY

## 2018-10-04 PROCEDURE — 25010000002 IMMUNE GLOBULIN (HUMAN) PER 500 MG: Performed by: ALLERGY & IMMUNOLOGY

## 2018-10-04 PROCEDURE — 82784 ASSAY IGA/IGD/IGG/IGM EACH: CPT | Performed by: ALLERGY & IMMUNOLOGY

## 2018-10-04 RX ORDER — SODIUM CHLORIDE 9 MG/ML
250 INJECTION, SOLUTION INTRAVENOUS ONCE
Status: CANCELLED | OUTPATIENT
Start: 2018-10-04

## 2018-10-04 RX ORDER — DIPHENHYDRAMINE HYDROCHLORIDE 50 MG/ML
50 INJECTION INTRAMUSCULAR; INTRAVENOUS AS NEEDED
Status: CANCELLED | OUTPATIENT
Start: 2018-10-04

## 2018-10-04 RX ORDER — DIPHENHYDRAMINE HCL 25 MG
25 CAPSULE ORAL ONCE
Status: CANCELLED | OUTPATIENT
Start: 2018-10-04

## 2018-10-04 RX ORDER — ACETAMINOPHEN 500 MG
500 TABLET ORAL ONCE
Status: CANCELLED | OUTPATIENT
Start: 2018-10-04

## 2018-10-04 RX ORDER — SODIUM CHLORIDE 9 MG/ML
250 INJECTION, SOLUTION INTRAVENOUS ONCE
Status: COMPLETED | OUTPATIENT
Start: 2018-10-04 | End: 2018-10-04

## 2018-10-04 RX ADMIN — SODIUM CHLORIDE 250 ML: 900 INJECTION, SOLUTION INTRAVENOUS at 11:25

## 2018-10-04 RX ADMIN — IMMUNE GLOBULIN INTRAVENOUS (HUMAN) 10 G: KIT at 13:17

## 2018-10-04 RX ADMIN — IMMUNE GLOBULIN INTRAVENOUS (HUMAN) 10 G: KIT at 11:37

## 2018-10-18 ENCOUNTER — OFFICE VISIT (OUTPATIENT)
Dept: GASTROENTEROLOGY | Facility: CLINIC | Age: 73
End: 2018-10-18

## 2018-10-18 VITALS
DIASTOLIC BLOOD PRESSURE: 86 MMHG | HEIGHT: 65 IN | TEMPERATURE: 97.7 F | SYSTOLIC BLOOD PRESSURE: 140 MMHG | BODY MASS INDEX: 23.79 KG/M2 | WEIGHT: 142.8 LBS

## 2018-10-18 DIAGNOSIS — K58.0 IRRITABLE BOWEL SYNDROME WITH DIARRHEA: ICD-10-CM

## 2018-10-18 DIAGNOSIS — Z83.71 FAMILY HISTORY OF POLYPS IN THE COLON: ICD-10-CM

## 2018-10-18 DIAGNOSIS — K21.9 GASTROESOPHAGEAL REFLUX DISEASE, ESOPHAGITIS PRESENCE NOT SPECIFIED: Primary | ICD-10-CM

## 2018-10-18 PROCEDURE — 99214 OFFICE O/P EST MOD 30 MIN: CPT | Performed by: INTERNAL MEDICINE

## 2018-10-18 NOTE — PROGRESS NOTES
Chief Complaint   Patient presents with   • Follow-up   • Diarrhea   • Heartburn        Elaina Tom is a  73 y.o. female here for a follow up visit for GERD, IBS-D, family history    HPI this 73-year-old white female patient of Dr. Christian Chandler is in follow-up since last seen in July of this year.  She had complained of reflux problems despite the use of Nexium and switched back to Protonix which affords more symptomatic relief.  Her IBS D seems to be relatively well-controlled with no further diarrhea, she does have occasional segmented stools and I encouraged her to use a fiber supplement.  She would be due for follow-up endoscopy given her family history of polyps in early 2019.  She will schedule both upper and lower endoscopy accordingly.  She admits to some recent stressors in her life including her brother-in-law's demise and daughter being diagnosed with breast cancer.  She will continue her current medical regimen and schedule her endoscopies as outlined above.    Past Medical History:   Diagnosis Date   • Allergic rhinitis    • Anxiety    • Arthritis    • Asthma     ONLY HAS ISSUES WHEN HAS RESPIRATORY INFECTION   • Cancer (CMS/HCC) 2017    BREAST-LEFT   • Cataract    • CVID (common variable immunodeficiency) (CMS/HCC)    • External hemorrhoids    • Gastritis    • GERD (gastroesophageal reflux disease)    • H/O CT scan of abdomen     PELVIS AND CHEST: UNREMARKABLE   • Hiatal hernia    • History of colon polyps    • History of giardia infection    • History of Helicobacter pylori infection    • IBS (irritable bowel syndrome)    • IgA deficiency (CMS/HCC)     CANNOT HAVE ANYTHING WITH IGA IN IT WILL CAUSE ANAPHYLAXIS   • Immunoglobulin deficiency (CMS/HCC)    • Peptic ulcer disease    • PONV (postoperative nausea and vomiting)    • Torn meniscus    • Wound discharge     LT BREAST       Current Outpatient Prescriptions   Medication Sig Dispense Refill   • Ascorbic Acid (VITAMIN C) 500 MG capsule Take 1  tablet by mouth Daily. PT TO HOLD MED PRIOR TO OR     • b complex vitamins tablet Take 1 tablet by mouth Daily. PT TO HOLD MED PRIOR TO OR     • Calcium-Magnesium-Vitamin D (CALCIUM MAGNESIUM PO) Take 1 tablet by mouth Daily. PT TO HOLD MED PRIOR TO OR     • Cholecalciferol (VITAMIN D-3 PO) Take 1 tablet by mouth Daily. PT TO HOLD MED PRIOR TO OR     • Coenzyme Q10 (CO Q 10 PO) Take 1 tablet by mouth Daily. PT TO HOLD MED PRIOR TO OR     • CRANBERRY PO Take 2 tablets by mouth Daily.     • dicyclomine (BENTYL) 10 MG capsule Take 1 capsule by mouth 4 (Four) Times a Day Before Meals & at Bedtime. 120 capsule 5   • Flaxseed, Linseed, (FLAX SEEDS PO) Take 1 capsule by mouth Daily. PT TO HOLD MED PRIOR TO OR     • fluticasone (FLONASE) 50 MCG/ACT nasal spray 2 sprays into each nostril As Needed.     • FOLIC ACID PO Take 1 tablet by mouth Daily. PT TO HOLD MED PRIOR TO OR     • Ginkgo 60 MG tablet Take 1 tablet by mouth Daily. PT TO HOLD MED PRIOR TO OR     • GuaiFENesin (MUCINEX PO) Take 1 tablet by mouth As Needed.     • Immune Globulin, Human, (GAMMAGARD IJ) Inject  as directed Every 28 (Twenty-Eight) Days. LAST DOSE 6/29/17     • LECITHIN PO Take 1 tablet by mouth Daily. PT TO HOLD MED PRIOR TO OR     • Multiple Vitamins-Minerals (MULTIVITAMIN ADULT PO) Take 1 tablet by mouth Daily. PT TO HOLD MED PRIOR TO OR     • NON FORMULARY Take 1 tablet by mouth Daily. TRIVERATROL GOLD -ON HOLD     • NON FORMULARY Take 1 tablet by mouth Daily. Mitochondrial energy PT TO HOLD MED PRIOR TO OR     • NON FORMULARY Take 1 tablet by mouth Daily. Vision essential PT TO HOLD MED PRIOR TO OR     • NON FORMULARY Take 1 tablet by mouth Daily. Neurovascular support PT TO HOLD MED PRIOR TO OR     • NON FORMULARY Take 1 tablet by mouth Daily. Vinpocetine pill PT TO HOLD MED PRIOR TO OR     • Nutritional Supplements (WELLNESS ESSENTIALS FOR JOINT PO) Take 2 tablets by mouth Daily. PT TO HOLD MED PRIOR TO OR     • Omega-3 Fatty Acids (OMEGA 3  "PO) Take 1,000 mg by mouth Daily. LAST DOSE 5/12/17     • pantoprazole (PROTONIX) 40 MG EC tablet Take 1 tablet by mouth 2 (Two) Times a Day. (Patient taking differently: Take 40 mg by mouth Daily.) 180 tablet 3   • RESVERATROL PO Take 1 tablet by mouth Daily. PT TO HOLD MED PRIOR TO OR     • sucralfate (CARAFATE) 1 GM/10ML suspension Take 10 mL by mouth 4 (Four) Times a Day. 420 mL 3   • tamoxifen (NOLVADEX) 20 MG chemo tablet Take 1 tablet by mouth Daily. 90 tablet 3   • VITAMIN A PO Take 1 tablet by mouth Every Other Day. LAST DOSE 5/12/17     • Vitamin Mixture (VITAMIN E COMPLETE) capsule Take 2 capsules by mouth Daily. PT TO HOLD MED PRIOR TO OR     • ALBUTEROL IN Inhale 2 puffs As Needed (\"USES ONLY WTH A RESPIRATORY INFECTION\").     • Vaginal Lubricant (REPLENS VA) Insert  into the vagina. 1-3 times a week       No current facility-administered medications for this visit.        PRN Meds:.    Allergies   Allergen Reactions   • Other Anaphylaxis     BLOOD PRODUCTS WITH IGA IN THEM   • Erythromycin GI Intolerance     VOMITING   • Avelox [Moxifloxacin] Rash   • Levaquin [Levofloxacin] Rash       Social History     Social History   • Marital status:      Spouse name: Ortiz   • Number of children: 3   • Years of education: College     Occupational History   •  Retired     Social History Main Topics   • Smoking status: Never Smoker   • Smokeless tobacco: Never Used   • Alcohol use 0.6 oz/week     1 Glasses of wine per week      Comment: occasional   • Drug use: No   • Sexual activity: Defer     Other Topics Concern   • Not on file     Social History Narrative   • No narrative on file       Family History   Problem Relation Age of Onset   • Other Father         POLYP   • Colon polyps Father    • Prostate cancer Father 68   • Pancreatitis Daughter    • Malig Hyperthermia Neg Hx        Review of Systems   Gastrointestinal:        GERD  IBS D       Vitals:    10/18/18 1437   BP: 140/86   Temp: 97.7 °F (36.5 °C) "       Physical Exam   Constitutional: She is oriented to person, place, and time. She appears well-developed and well-nourished.   HENT:   Head: Normocephalic.   Mouth/Throat: Oropharynx is clear and moist.   Eyes: Conjunctivae and EOM are normal.   Neck: Normal range of motion.   Cardiovascular: Normal rate and regular rhythm.    Pulmonary/Chest: Breath sounds normal.   Abdominal: Soft. Bowel sounds are normal.   Musculoskeletal: Normal range of motion.   Neurological: She is alert and oriented to person, place, and time.   Skin: Skin is warm and dry.   Psychiatric: She has a normal mood and affect. Her behavior is normal.       ASSESSMENT   #1 GERD: Stable on PPI  #2 IBS D  #3 family history of colon polyps      PLAN  Anticipate EGD and colonoscopy early next spring  Continue current medical regimen      ICD-10-CM ICD-9-CM   1. Gastroesophageal reflux disease, esophagitis presence not specified K21.9 530.81   2. Irritable bowel syndrome with diarrhea K58.0 564.1   3. Family history of polyps in the colon Z83.71 V18.51

## 2018-11-01 ENCOUNTER — INFUSION (OUTPATIENT)
Dept: ONCOLOGY | Facility: HOSPITAL | Age: 73
End: 2018-11-01

## 2018-11-01 VITALS
WEIGHT: 144.6 LBS | SYSTOLIC BLOOD PRESSURE: 130 MMHG | DIASTOLIC BLOOD PRESSURE: 96 MMHG | HEART RATE: 61 BPM | BODY MASS INDEX: 24.38 KG/M2 | OXYGEN SATURATION: 94 % | TEMPERATURE: 97.4 F

## 2018-11-01 DIAGNOSIS — D83.9 COMMON VARIABLE IMMUNODEFICIENCY (HCC): Primary | ICD-10-CM

## 2018-11-01 DIAGNOSIS — D80.2 IGA DEFICIENCY (HCC): ICD-10-CM

## 2018-11-01 PROCEDURE — 25010000002 IMMUNE GLOBULIN (HUMAN) PER 500 MG: Performed by: ALLERGY & IMMUNOLOGY

## 2018-11-01 PROCEDURE — 96365 THER/PROPH/DIAG IV INF INIT: CPT | Performed by: NURSE PRACTITIONER

## 2018-11-01 PROCEDURE — 96366 THER/PROPH/DIAG IV INF ADDON: CPT | Performed by: NURSE PRACTITIONER

## 2018-11-01 RX ORDER — DIPHENHYDRAMINE HYDROCHLORIDE 50 MG/ML
50 INJECTION INTRAMUSCULAR; INTRAVENOUS AS NEEDED
Status: CANCELLED | OUTPATIENT
Start: 2018-11-01

## 2018-11-01 RX ORDER — ACETAMINOPHEN 500 MG
500 TABLET ORAL ONCE
Status: CANCELLED | OUTPATIENT
Start: 2018-11-01

## 2018-11-01 RX ORDER — SODIUM CHLORIDE 9 MG/ML
250 INJECTION, SOLUTION INTRAVENOUS ONCE
Status: COMPLETED | OUTPATIENT
Start: 2018-11-01 | End: 2018-11-01

## 2018-11-01 RX ORDER — DIPHENHYDRAMINE HCL 25 MG
25 CAPSULE ORAL ONCE
Status: CANCELLED | OUTPATIENT
Start: 2018-11-01

## 2018-11-01 RX ORDER — SODIUM CHLORIDE 9 MG/ML
250 INJECTION, SOLUTION INTRAVENOUS ONCE
Status: CANCELLED | OUTPATIENT
Start: 2018-11-01

## 2018-11-01 RX ADMIN — SODIUM CHLORIDE 250 ML: 900 INJECTION, SOLUTION INTRAVENOUS at 11:40

## 2018-11-01 RX ADMIN — IMMUNE GLOBULIN INTRAVENOUS (HUMAN) 10 G: KIT at 13:30

## 2018-11-01 RX ADMIN — IMMUNE GLOBULIN INTRAVENOUS (HUMAN) 10 G: KIT at 11:56

## 2018-11-20 ENCOUNTER — TRANSCRIBE ORDERS (OUTPATIENT)
Dept: ADMINISTRATIVE | Facility: HOSPITAL | Age: 73
End: 2018-11-20

## 2018-11-20 ENCOUNTER — LAB (OUTPATIENT)
Dept: LAB | Facility: HOSPITAL | Age: 73
End: 2018-11-20

## 2018-11-20 DIAGNOSIS — Z11.59 SCREENING EXAMINATION FOR POLIOMYELITIS: ICD-10-CM

## 2018-11-20 DIAGNOSIS — Z00.00 ROUTINE GENERAL MEDICAL EXAMINATION AT A HEALTH CARE FACILITY: ICD-10-CM

## 2018-11-20 DIAGNOSIS — Z00.00 ROUTINE GENERAL MEDICAL EXAMINATION AT A HEALTH CARE FACILITY: Primary | ICD-10-CM

## 2018-11-20 LAB
ALBUMIN SERPL-MCNC: 3.8 G/DL (ref 3.5–5.2)
ALBUMIN/GLOB SERPL: 1.4 G/DL
ALP SERPL-CCNC: 42 U/L (ref 39–117)
ALT SERPL W P-5'-P-CCNC: 17 U/L (ref 1–33)
ANION GAP SERPL CALCULATED.3IONS-SCNC: 10.4 MMOL/L
AST SERPL-CCNC: 17 U/L (ref 1–32)
BACTERIA UR QL AUTO: ABNORMAL /HPF
BASOPHILS # BLD AUTO: 0.04 10*3/MM3 (ref 0–0.2)
BASOPHILS NFR BLD AUTO: 1 % (ref 0–1.5)
BILIRUB SERPL-MCNC: 0.5 MG/DL (ref 0.1–1.2)
BILIRUB UR QL STRIP: NEGATIVE
BUN BLD-MCNC: 14 MG/DL (ref 8–23)
BUN/CREAT SERPL: 18.2 (ref 7–25)
CALCIUM SPEC-SCNC: 9.3 MG/DL (ref 8.6–10.5)
CHLORIDE SERPL-SCNC: 108 MMOL/L (ref 98–107)
CHOLEST SERPL-MCNC: 164 MG/DL (ref 0–200)
CLARITY UR: CLEAR
CO2 SERPL-SCNC: 25.6 MMOL/L (ref 22–29)
COLOR UR: YELLOW
CREAT BLD-MCNC: 0.77 MG/DL (ref 0.57–1)
DEPRECATED RDW RBC AUTO: 51.3 FL (ref 37–54)
EOSINOPHIL # BLD AUTO: 0.07 10*3/MM3 (ref 0–0.7)
EOSINOPHIL NFR BLD AUTO: 1.8 % (ref 0.3–6.2)
ERYTHROCYTE [DISTWIDTH] IN BLOOD BY AUTOMATED COUNT: 13.5 % (ref 11.7–13)
GFR SERPL CREATININE-BSD FRML MDRD: 74 ML/MIN/1.73
GLOBULIN UR ELPH-MCNC: 2.7 GM/DL
GLUCOSE BLD-MCNC: 98 MG/DL (ref 65–99)
GLUCOSE UR STRIP-MCNC: NEGATIVE MG/DL
HAV IGM SERPL QL IA: NORMAL
HBV CORE IGM SERPL QL IA: NORMAL
HBV SURFACE AG SERPL QL IA: NORMAL
HCT VFR BLD AUTO: 37.7 % (ref 35.6–45.5)
HCV AB SER DONR QL: NORMAL
HDLC SERPL-MCNC: 65 MG/DL (ref 40–60)
HGB BLD-MCNC: 11.9 G/DL (ref 11.9–15.5)
HGB UR QL STRIP.AUTO: NEGATIVE
HYALINE CASTS UR QL AUTO: ABNORMAL /LPF
IMM GRANULOCYTES # BLD: 0 10*3/MM3 (ref 0–0.03)
IMM GRANULOCYTES NFR BLD: 0 % (ref 0–0.5)
KETONES UR QL STRIP: NEGATIVE
LDLC SERPL CALC-MCNC: 84 MG/DL (ref 0–100)
LDLC/HDLC SERPL: 1.3 {RATIO}
LEUKOCYTE ESTERASE UR QL STRIP.AUTO: ABNORMAL
LYMPHOCYTES # BLD AUTO: 1.47 10*3/MM3 (ref 0.9–4.8)
LYMPHOCYTES NFR BLD AUTO: 37.1 % (ref 19.6–45.3)
MCH RBC QN AUTO: 33.1 PG (ref 26.9–32)
MCHC RBC AUTO-ENTMCNC: 31.6 G/DL (ref 32.4–36.3)
MCV RBC AUTO: 104.7 FL (ref 80.5–98.2)
MONOCYTES # BLD AUTO: 0.36 10*3/MM3 (ref 0.2–1.2)
MONOCYTES NFR BLD AUTO: 9.1 % (ref 5–12)
NEUTROPHILS # BLD AUTO: 2.02 10*3/MM3 (ref 1.9–8.1)
NEUTROPHILS NFR BLD AUTO: 51 % (ref 42.7–76)
NITRITE UR QL STRIP: NEGATIVE
PH UR STRIP.AUTO: <=5 [PH] (ref 5–8)
PLATELET # BLD AUTO: 250 10*3/MM3 (ref 140–500)
PMV BLD AUTO: 10.3 FL (ref 6–12)
POTASSIUM BLD-SCNC: 4.2 MMOL/L (ref 3.5–5.2)
PROT SERPL-MCNC: 6.5 G/DL (ref 6–8.5)
PROT UR QL STRIP: NEGATIVE
RBC # BLD AUTO: 3.6 10*6/MM3 (ref 3.9–5.2)
RBC # UR: ABNORMAL /HPF
REF LAB TEST METHOD: ABNORMAL
SODIUM BLD-SCNC: 144 MMOL/L (ref 136–145)
SP GR UR STRIP: 1.02 (ref 1–1.03)
SQUAMOUS #/AREA URNS HPF: ABNORMAL /HPF
TRIGL SERPL-MCNC: 73 MG/DL (ref 0–150)
UROBILINOGEN UR QL STRIP: ABNORMAL
VLDLC SERPL-MCNC: 14.6 MG/DL (ref 5–40)
WBC NRBC COR # BLD: 3.96 10*3/MM3 (ref 4.5–10.7)
WBC UR QL AUTO: ABNORMAL /HPF

## 2018-11-20 PROCEDURE — 81001 URINALYSIS AUTO W/SCOPE: CPT | Performed by: INTERNAL MEDICINE

## 2018-11-20 PROCEDURE — 80074 ACUTE HEPATITIS PANEL: CPT | Performed by: INTERNAL MEDICINE

## 2018-11-20 PROCEDURE — 80053 COMPREHEN METABOLIC PANEL: CPT | Performed by: INTERNAL MEDICINE

## 2018-11-20 PROCEDURE — 36415 COLL VENOUS BLD VENIPUNCTURE: CPT

## 2018-11-20 PROCEDURE — 80061 LIPID PANEL: CPT | Performed by: INTERNAL MEDICINE

## 2018-11-20 PROCEDURE — 85025 COMPLETE CBC W/AUTO DIFF WBC: CPT | Performed by: INTERNAL MEDICINE

## 2018-11-29 ENCOUNTER — INFUSION (OUTPATIENT)
Dept: ONCOLOGY | Facility: HOSPITAL | Age: 73
End: 2018-11-29

## 2018-11-29 VITALS
WEIGHT: 145.2 LBS | RESPIRATION RATE: 14 BRPM | OXYGEN SATURATION: 97 % | TEMPERATURE: 97.5 F | DIASTOLIC BLOOD PRESSURE: 73 MMHG | SYSTOLIC BLOOD PRESSURE: 112 MMHG | HEART RATE: 71 BPM | BODY MASS INDEX: 24.49 KG/M2

## 2018-11-29 DIAGNOSIS — D80.2 IGA DEFICIENCY (HCC): ICD-10-CM

## 2018-11-29 DIAGNOSIS — D83.9 COMMON VARIABLE IMMUNODEFICIENCY (HCC): Primary | ICD-10-CM

## 2018-11-29 PROCEDURE — 96365 THER/PROPH/DIAG IV INF INIT: CPT | Performed by: NURSE PRACTITIONER

## 2018-11-29 PROCEDURE — 96366 THER/PROPH/DIAG IV INF ADDON: CPT | Performed by: NURSE PRACTITIONER

## 2018-11-29 PROCEDURE — 25010000002 IMMUNE GLOBULIN (HUMAN) PER 500 MG: Performed by: NURSE PRACTITIONER

## 2018-11-29 PROCEDURE — 25010000002 IMMUNE GLOBULIN (HUMAN) PER 500 MG: Performed by: ALLERGY & IMMUNOLOGY

## 2018-11-29 RX ORDER — DIPHENHYDRAMINE HYDROCHLORIDE 50 MG/ML
50 INJECTION INTRAMUSCULAR; INTRAVENOUS AS NEEDED
Status: CANCELLED | OUTPATIENT
Start: 2018-11-29

## 2018-11-29 RX ORDER — SODIUM CHLORIDE 9 MG/ML
250 INJECTION, SOLUTION INTRAVENOUS ONCE
Status: CANCELLED | OUTPATIENT
Start: 2018-11-29

## 2018-11-29 RX ORDER — SODIUM CHLORIDE 9 MG/ML
250 INJECTION, SOLUTION INTRAVENOUS ONCE
Status: COMPLETED | OUTPATIENT
Start: 2018-11-29 | End: 2018-11-29

## 2018-11-29 RX ORDER — ACETAMINOPHEN 500 MG
500 TABLET ORAL ONCE
Status: CANCELLED | OUTPATIENT
Start: 2018-11-29

## 2018-11-29 RX ORDER — DIPHENHYDRAMINE HCL 25 MG
25 CAPSULE ORAL ONCE
Status: CANCELLED | OUTPATIENT
Start: 2018-11-29

## 2018-11-29 RX ADMIN — IMMUNE GLOBULIN INTRAVENOUS (HUMAN) 10 G: KIT at 13:22

## 2018-11-29 RX ADMIN — IMMUNE GLOBULIN INTRAVENOUS (HUMAN) 10 G: KIT at 11:59

## 2018-11-29 RX ADMIN — SODIUM CHLORIDE 250 ML: 900 INJECTION, SOLUTION INTRAVENOUS at 11:59

## 2018-12-27 ENCOUNTER — INFUSION (OUTPATIENT)
Dept: ONCOLOGY | Facility: HOSPITAL | Age: 73
End: 2018-12-27

## 2018-12-27 VITALS
HEART RATE: 68 BPM | TEMPERATURE: 97.4 F | WEIGHT: 143.8 LBS | BODY MASS INDEX: 24.25 KG/M2 | DIASTOLIC BLOOD PRESSURE: 78 MMHG | RESPIRATION RATE: 18 BRPM | SYSTOLIC BLOOD PRESSURE: 126 MMHG | OXYGEN SATURATION: 95 %

## 2018-12-27 DIAGNOSIS — D83.9 COMMON VARIABLE IMMUNODEFICIENCY (HCC): Primary | ICD-10-CM

## 2018-12-27 DIAGNOSIS — D80.2 IGA DEFICIENCY (HCC): ICD-10-CM

## 2018-12-27 PROCEDURE — 96365 THER/PROPH/DIAG IV INF INIT: CPT | Performed by: NURSE PRACTITIONER

## 2018-12-27 PROCEDURE — 96366 THER/PROPH/DIAG IV INF ADDON: CPT | Performed by: NURSE PRACTITIONER

## 2018-12-27 PROCEDURE — 25010000002 IMMUNE GLOBULIN (HUMAN) PER 500 MG: Performed by: ALLERGY & IMMUNOLOGY

## 2018-12-27 RX ORDER — SODIUM CHLORIDE 9 MG/ML
250 INJECTION, SOLUTION INTRAVENOUS ONCE
Status: COMPLETED | OUTPATIENT
Start: 2018-12-27 | End: 2018-12-27

## 2018-12-27 RX ORDER — SODIUM CHLORIDE 9 MG/ML
250 INJECTION, SOLUTION INTRAVENOUS ONCE
Status: CANCELLED | OUTPATIENT
Start: 2018-12-27

## 2018-12-27 RX ORDER — DIPHENHYDRAMINE HCL 25 MG
25 CAPSULE ORAL ONCE
Status: CANCELLED | OUTPATIENT
Start: 2018-12-27

## 2018-12-27 RX ORDER — ACETAMINOPHEN 500 MG
500 TABLET ORAL ONCE
Status: CANCELLED | OUTPATIENT
Start: 2018-12-27

## 2018-12-27 RX ORDER — DIPHENHYDRAMINE HYDROCHLORIDE 50 MG/ML
50 INJECTION INTRAMUSCULAR; INTRAVENOUS AS NEEDED
Status: CANCELLED | OUTPATIENT
Start: 2018-12-27

## 2018-12-27 RX ADMIN — IMMUNE GLOBULIN INTRAVENOUS (HUMAN) 10 G: KIT at 11:58

## 2018-12-27 RX ADMIN — SODIUM CHLORIDE 250 ML: 9 INJECTION, SOLUTION INTRAVENOUS at 11:58

## 2018-12-27 RX ADMIN — IMMUNE GLOBULIN INTRAVENOUS (HUMAN) 10 G: KIT at 13:33

## 2019-01-02 PROBLEM — Z83.71 FAMILY HISTORY OF POLYPS IN THE COLON: Status: ACTIVE | Noted: 2019-01-02

## 2019-01-02 PROBLEM — K21.9 GASTROESOPHAGEAL REFLUX DISEASE: Status: ACTIVE | Noted: 2019-01-02

## 2019-01-02 PROBLEM — Z83.719 FAMILY HISTORY OF POLYPS IN THE COLON: Status: ACTIVE | Noted: 2019-01-02

## 2019-01-02 PROBLEM — K58.0 IRRITABLE BOWEL SYNDROME WITH DIARRHEA: Status: ACTIVE | Noted: 2019-01-02

## 2019-01-23 DIAGNOSIS — D83.9 COMMON VARIABLE IMMUNODEFICIENCY (HCC): Primary | ICD-10-CM

## 2019-01-24 ENCOUNTER — INFUSION (OUTPATIENT)
Dept: ONCOLOGY | Facility: HOSPITAL | Age: 74
End: 2019-01-24

## 2019-01-24 VITALS
TEMPERATURE: 97.5 F | OXYGEN SATURATION: 92 % | WEIGHT: 143 LBS | BODY MASS INDEX: 24.11 KG/M2 | HEART RATE: 62 BPM | SYSTOLIC BLOOD PRESSURE: 100 MMHG | DIASTOLIC BLOOD PRESSURE: 65 MMHG

## 2019-01-24 DIAGNOSIS — D80.2 IGA DEFICIENCY (HCC): ICD-10-CM

## 2019-01-24 DIAGNOSIS — D83.9 COMMON VARIABLE IMMUNODEFICIENCY (HCC): Primary | ICD-10-CM

## 2019-01-24 LAB — IGG1 SER-MCNC: 851 MG/DL (ref 700–1600)

## 2019-01-24 PROCEDURE — 82784 ASSAY IGA/IGD/IGG/IGM EACH: CPT | Performed by: ALLERGY & IMMUNOLOGY

## 2019-01-24 PROCEDURE — 96366 THER/PROPH/DIAG IV INF ADDON: CPT | Performed by: NURSE PRACTITIONER

## 2019-01-24 PROCEDURE — 25010000002 IMMUNE GLOBULIN (HUMAN) PER 500 MG: Performed by: ALLERGY & IMMUNOLOGY

## 2019-01-24 PROCEDURE — 96365 THER/PROPH/DIAG IV INF INIT: CPT | Performed by: NURSE PRACTITIONER

## 2019-01-24 RX ORDER — ACETAMINOPHEN 500 MG
500 TABLET ORAL ONCE
Status: CANCELLED | OUTPATIENT
Start: 2019-01-24

## 2019-01-24 RX ORDER — SODIUM CHLORIDE 9 MG/ML
250 INJECTION, SOLUTION INTRAVENOUS ONCE
Status: COMPLETED | OUTPATIENT
Start: 2019-01-24 | End: 2019-01-24

## 2019-01-24 RX ORDER — DIPHENHYDRAMINE HCL 25 MG
25 CAPSULE ORAL ONCE
Status: CANCELLED | OUTPATIENT
Start: 2019-01-24

## 2019-01-24 RX ORDER — DIPHENHYDRAMINE HYDROCHLORIDE 50 MG/ML
50 INJECTION INTRAMUSCULAR; INTRAVENOUS AS NEEDED
Status: CANCELLED | OUTPATIENT
Start: 2019-01-24

## 2019-01-24 RX ORDER — SODIUM CHLORIDE 9 MG/ML
250 INJECTION, SOLUTION INTRAVENOUS ONCE
Status: CANCELLED | OUTPATIENT
Start: 2019-01-24

## 2019-01-24 RX ADMIN — SODIUM CHLORIDE 250 ML: 9 INJECTION, SOLUTION INTRAVENOUS at 12:00

## 2019-01-24 RX ADMIN — IMMUNE GLOBULIN INTRAVENOUS (HUMAN) 10 G: KIT at 13:35

## 2019-01-24 RX ADMIN — IMMUNE GLOBULIN INTRAVENOUS (HUMAN) 10 G: KIT at 12:00

## 2019-02-21 ENCOUNTER — INFUSION (OUTPATIENT)
Dept: ONCOLOGY | Facility: HOSPITAL | Age: 74
End: 2019-02-21

## 2019-02-21 VITALS
RESPIRATION RATE: 16 BRPM | HEART RATE: 73 BPM | SYSTOLIC BLOOD PRESSURE: 132 MMHG | HEIGHT: 65 IN | TEMPERATURE: 97.3 F | DIASTOLIC BLOOD PRESSURE: 83 MMHG | BODY MASS INDEX: 23.36 KG/M2 | OXYGEN SATURATION: 95 % | WEIGHT: 140.2 LBS

## 2019-02-21 DIAGNOSIS — D83.9 COMMON VARIABLE IMMUNODEFICIENCY (HCC): Primary | ICD-10-CM

## 2019-02-21 DIAGNOSIS — D80.2 IGA DEFICIENCY (HCC): ICD-10-CM

## 2019-02-21 PROCEDURE — 25010000002 IMMUNE GLOBULIN (HUMAN) PER 500 MG: Performed by: ALLERGY & IMMUNOLOGY

## 2019-02-21 PROCEDURE — 96365 THER/PROPH/DIAG IV INF INIT: CPT | Performed by: NURSE PRACTITIONER

## 2019-02-21 PROCEDURE — 96366 THER/PROPH/DIAG IV INF ADDON: CPT | Performed by: NURSE PRACTITIONER

## 2019-02-21 RX ORDER — ACETAMINOPHEN 500 MG
500 TABLET ORAL ONCE
Status: CANCELLED | OUTPATIENT
Start: 2019-02-21

## 2019-02-21 RX ORDER — DIPHENHYDRAMINE HCL 25 MG
25 CAPSULE ORAL ONCE
Status: CANCELLED | OUTPATIENT
Start: 2019-02-21

## 2019-02-21 RX ORDER — SODIUM CHLORIDE 9 MG/ML
250 INJECTION, SOLUTION INTRAVENOUS ONCE
Status: COMPLETED | OUTPATIENT
Start: 2019-02-21 | End: 2019-02-21

## 2019-02-21 RX ORDER — DIPHENHYDRAMINE HYDROCHLORIDE 50 MG/ML
50 INJECTION INTRAMUSCULAR; INTRAVENOUS AS NEEDED
Status: CANCELLED | OUTPATIENT
Start: 2019-02-21

## 2019-02-21 RX ORDER — SODIUM CHLORIDE 9 MG/ML
250 INJECTION, SOLUTION INTRAVENOUS ONCE
Status: CANCELLED | OUTPATIENT
Start: 2019-02-21

## 2019-02-21 RX ADMIN — SODIUM CHLORIDE 250 ML: 900 INJECTION, SOLUTION INTRAVENOUS at 12:10

## 2019-02-21 RX ADMIN — IMMUNE GLOBULIN INTRAVENOUS (HUMAN) 10 G: KIT at 12:11

## 2019-02-21 RX ADMIN — IMMUNE GLOBULIN INTRAVENOUS (HUMAN) 10 G: KIT at 13:39

## 2019-03-08 ENCOUNTER — APPOINTMENT (OUTPATIENT)
Dept: ONCOLOGY | Facility: CLINIC | Age: 74
End: 2019-03-08

## 2019-03-08 ENCOUNTER — APPOINTMENT (OUTPATIENT)
Dept: OTHER | Facility: HOSPITAL | Age: 74
End: 2019-03-08

## 2019-03-21 ENCOUNTER — INFUSION (OUTPATIENT)
Dept: ONCOLOGY | Facility: HOSPITAL | Age: 74
End: 2019-03-21

## 2019-03-21 VITALS
WEIGHT: 144.4 LBS | OXYGEN SATURATION: 97 % | HEIGHT: 65 IN | SYSTOLIC BLOOD PRESSURE: 159 MMHG | BODY MASS INDEX: 24.06 KG/M2 | RESPIRATION RATE: 16 BRPM | DIASTOLIC BLOOD PRESSURE: 92 MMHG | HEART RATE: 93 BPM | TEMPERATURE: 97.6 F

## 2019-03-21 DIAGNOSIS — D83.9 COMMON VARIABLE IMMUNODEFICIENCY (HCC): Primary | ICD-10-CM

## 2019-03-21 DIAGNOSIS — D80.2 IGA DEFICIENCY (HCC): ICD-10-CM

## 2019-03-21 PROCEDURE — 96365 THER/PROPH/DIAG IV INF INIT: CPT

## 2019-03-21 PROCEDURE — 25010000002 IMMUNE GLOBULIN (HUMAN) PER 500 MG: Performed by: ALLERGY & IMMUNOLOGY

## 2019-03-21 RX ORDER — SODIUM CHLORIDE 9 MG/ML
250 INJECTION, SOLUTION INTRAVENOUS ONCE
Status: CANCELLED | OUTPATIENT
Start: 2019-03-21

## 2019-03-21 RX ORDER — SODIUM CHLORIDE 9 MG/ML
250 INJECTION, SOLUTION INTRAVENOUS ONCE
Status: COMPLETED | OUTPATIENT
Start: 2019-03-21 | End: 2019-03-21

## 2019-03-21 RX ORDER — DIPHENHYDRAMINE HYDROCHLORIDE 50 MG/ML
50 INJECTION INTRAMUSCULAR; INTRAVENOUS AS NEEDED
Status: CANCELLED | OUTPATIENT
Start: 2019-03-21

## 2019-03-21 RX ORDER — ACETAMINOPHEN 500 MG
500 TABLET ORAL ONCE
Status: CANCELLED | OUTPATIENT
Start: 2019-03-21

## 2019-03-21 RX ORDER — DIPHENHYDRAMINE HCL 25 MG
25 CAPSULE ORAL ONCE
Status: CANCELLED | OUTPATIENT
Start: 2019-03-21

## 2019-03-21 RX ADMIN — SODIUM CHLORIDE 250 ML: 900 INJECTION, SOLUTION INTRAVENOUS at 11:38

## 2019-03-21 RX ADMIN — IMMUNE GLOBULIN INTRAVENOUS (HUMAN) 10 G: KIT at 12:20

## 2019-03-21 RX ADMIN — IMMUNE GLOBULIN INTRAVENOUS (HUMAN) 10 G: KIT at 12:57

## 2019-03-25 DIAGNOSIS — Z12.31 SCREENING MAMMOGRAM, ENCOUNTER FOR: ICD-10-CM

## 2019-03-25 DIAGNOSIS — Z78.0 POST-MENOPAUSAL: ICD-10-CM

## 2019-03-25 DIAGNOSIS — Z85.3 HISTORY OF BREAST CANCER: ICD-10-CM

## 2019-03-25 DIAGNOSIS — Z79.810 LONG-TERM CURRENT USE OF TAMOXIFEN: Primary | ICD-10-CM

## 2019-03-27 ENCOUNTER — TELEPHONE (OUTPATIENT)
Dept: ONCOLOGY | Facility: CLINIC | Age: 74
End: 2019-03-27

## 2019-03-27 NOTE — TELEPHONE ENCOUNTER
----- Message from Sophia Dillon sent at 3/25/2019  1:32 PM EDT -----  Please see note below per .  ----- Message -----  From: Jj Hickman MD  Sent: 3/25/2019  12:33 PM  To: Sophia Dillon    That would probably be best. If she doesn't want to re-schedule that would be OK too.  ----- Message -----  From: Sophia Dillon  Sent: 3/25/2019  11:03 AM  To: MD Dr. Vick Dominguez,     Pt has appt on 3/29 but her Mammo and Bone density not due till April.Do you want to move appt?

## 2019-03-29 ENCOUNTER — APPOINTMENT (OUTPATIENT)
Dept: OTHER | Facility: HOSPITAL | Age: 74
End: 2019-03-29

## 2019-03-29 ENCOUNTER — APPOINTMENT (OUTPATIENT)
Dept: ONCOLOGY | Facility: CLINIC | Age: 74
End: 2019-03-29

## 2019-04-09 ENCOUNTER — APPOINTMENT (OUTPATIENT)
Dept: WOMENS IMAGING | Facility: HOSPITAL | Age: 74
End: 2019-04-09

## 2019-04-09 PROCEDURE — 77063 BREAST TOMOSYNTHESIS BI: CPT | Performed by: RADIOLOGY

## 2019-04-09 PROCEDURE — MDREVIEWSP: Performed by: RADIOLOGY

## 2019-04-09 PROCEDURE — 77067 SCR MAMMO BI INCL CAD: CPT | Performed by: RADIOLOGY

## 2019-04-18 ENCOUNTER — INFUSION (OUTPATIENT)
Dept: ONCOLOGY | Facility: HOSPITAL | Age: 74
End: 2019-04-18

## 2019-04-18 VITALS
WEIGHT: 144.4 LBS | OXYGEN SATURATION: 93 % | SYSTOLIC BLOOD PRESSURE: 148 MMHG | TEMPERATURE: 97.5 F | DIASTOLIC BLOOD PRESSURE: 88 MMHG | BODY MASS INDEX: 24.35 KG/M2 | HEART RATE: 69 BPM

## 2019-04-18 DIAGNOSIS — D83.9 COMMON VARIABLE IMMUNODEFICIENCY (HCC): Primary | ICD-10-CM

## 2019-04-18 DIAGNOSIS — D80.2 IGA DEFICIENCY (HCC): ICD-10-CM

## 2019-04-18 LAB
ALBUMIN SERPL-MCNC: 4.1 G/DL (ref 3.5–5.2)
ALBUMIN/GLOB SERPL: 2.2 G/DL
ALP SERPL-CCNC: 51 U/L (ref 39–117)
ALT SERPL W P-5'-P-CCNC: 29 U/L (ref 1–33)
ANION GAP SERPL CALCULATED.3IONS-SCNC: 11.2 MMOL/L
AST SERPL-CCNC: 29 U/L (ref 1–32)
BASOPHILS # BLD AUTO: 0.03 10*3/MM3 (ref 0–0.2)
BASOPHILS NFR BLD AUTO: 0.8 % (ref 0–1.5)
BILIRUB SERPL-MCNC: 0.6 MG/DL (ref 0.1–1.2)
BUN BLD-MCNC: 11 MG/DL (ref 8–23)
BUN/CREAT SERPL: 14.9 (ref 7–25)
CALCIUM SPEC-SCNC: 9 MG/DL (ref 8.6–10.5)
CHLORIDE SERPL-SCNC: 108 MMOL/L (ref 98–107)
CO2 SERPL-SCNC: 23.8 MMOL/L (ref 22–29)
CREAT BLD-MCNC: 0.74 MG/DL (ref 0.57–1)
DEPRECATED RDW RBC AUTO: 49 FL (ref 37–54)
EOSINOPHIL # BLD AUTO: 0.03 10*3/MM3 (ref 0–0.4)
EOSINOPHIL NFR BLD AUTO: 0.8 % (ref 0.3–6.2)
ERYTHROCYTE [DISTWIDTH] IN BLOOD BY AUTOMATED COUNT: 13.2 % (ref 12.3–15.4)
GFR SERPL CREATININE-BSD FRML MDRD: 77 ML/MIN/1.73
GLOBULIN UR ELPH-MCNC: 1.9 GM/DL
GLUCOSE BLD-MCNC: 90 MG/DL (ref 65–99)
HCT VFR BLD AUTO: 33.5 % (ref 34–46.6)
HGB BLD-MCNC: 11.1 G/DL (ref 12–15.9)
IGG1 SER-MCNC: 905 MG/DL (ref 700–1600)
IMM GRANULOCYTES # BLD AUTO: 0 10*3/MM3 (ref 0–0.05)
IMM GRANULOCYTES NFR BLD AUTO: 0 % (ref 0–0.5)
LYMPHOCYTES # BLD AUTO: 1.18 10*3/MM3 (ref 0.7–3.1)
LYMPHOCYTES NFR BLD AUTO: 32.4 % (ref 19.6–45.3)
MCH RBC QN AUTO: 33.2 PG (ref 26.6–33)
MCHC RBC AUTO-ENTMCNC: 33.1 G/DL (ref 31.5–35.7)
MCV RBC AUTO: 100.3 FL (ref 79–97)
MONOCYTES # BLD AUTO: 0.3 10*3/MM3 (ref 0.1–0.9)
MONOCYTES NFR BLD AUTO: 8.2 % (ref 5–12)
NEUTROPHILS # BLD AUTO: 2.1 10*3/MM3 (ref 1.4–7)
NEUTROPHILS NFR BLD AUTO: 57.8 % (ref 42.7–76)
NRBC BLD AUTO-RTO: 0 /100 WBC (ref 0–0)
PLATELET # BLD AUTO: 229 10*3/MM3 (ref 140–450)
PMV BLD AUTO: 10.4 FL (ref 6–12)
POTASSIUM BLD-SCNC: 4.1 MMOL/L (ref 3.5–5.2)
PROT SERPL-MCNC: 6 G/DL (ref 6–8.5)
RBC # BLD AUTO: 3.34 10*6/MM3 (ref 3.77–5.28)
SODIUM BLD-SCNC: 143 MMOL/L (ref 136–145)
WBC NRBC COR # BLD: 3.64 10*3/MM3 (ref 3.4–10.8)

## 2019-04-18 PROCEDURE — 80053 COMPREHEN METABOLIC PANEL: CPT | Performed by: INTERNAL MEDICINE

## 2019-04-18 PROCEDURE — 85025 COMPLETE CBC W/AUTO DIFF WBC: CPT | Performed by: INTERNAL MEDICINE

## 2019-04-18 PROCEDURE — 96365 THER/PROPH/DIAG IV INF INIT: CPT | Performed by: NURSE PRACTITIONER

## 2019-04-18 PROCEDURE — 82784 ASSAY IGA/IGD/IGG/IGM EACH: CPT | Performed by: INTERNAL MEDICINE

## 2019-04-18 PROCEDURE — 96366 THER/PROPH/DIAG IV INF ADDON: CPT | Performed by: NURSE PRACTITIONER

## 2019-04-18 PROCEDURE — 25010000002 IMMUNE GLOBULIN (HUMAN) PER 500 MG: Performed by: ALLERGY & IMMUNOLOGY

## 2019-04-18 RX ORDER — SODIUM CHLORIDE 9 MG/ML
250 INJECTION, SOLUTION INTRAVENOUS ONCE
Status: COMPLETED | OUTPATIENT
Start: 2019-04-18 | End: 2019-04-18

## 2019-04-18 RX ORDER — SODIUM CHLORIDE 9 MG/ML
250 INJECTION, SOLUTION INTRAVENOUS ONCE
Status: CANCELLED | OUTPATIENT
Start: 2019-04-18

## 2019-04-18 RX ORDER — DIPHENHYDRAMINE HCL 25 MG
25 CAPSULE ORAL ONCE
Status: CANCELLED | OUTPATIENT
Start: 2019-04-18

## 2019-04-18 RX ORDER — DIPHENHYDRAMINE HYDROCHLORIDE 50 MG/ML
50 INJECTION INTRAMUSCULAR; INTRAVENOUS AS NEEDED
Status: CANCELLED | OUTPATIENT
Start: 2019-04-18

## 2019-04-18 RX ORDER — ACETAMINOPHEN 500 MG
500 TABLET ORAL ONCE
Status: CANCELLED | OUTPATIENT
Start: 2019-04-18

## 2019-04-18 RX ADMIN — SODIUM CHLORIDE 250 ML: 900 INJECTION, SOLUTION INTRAVENOUS at 11:40

## 2019-04-18 RX ADMIN — IMMUNE GLOBULIN INTRAVENOUS (HUMAN) 10 G: KIT at 12:25

## 2019-04-18 RX ADMIN — IMMUNE GLOBULIN INTRAVENOUS (HUMAN) 10 G: KIT at 13:58

## 2019-04-18 NOTE — PROGRESS NOTES
Patient states she took 1000 mg PO Tylenol x 1. Patient refuses Benadryl so she may drive safely home.

## 2019-04-19 ENCOUNTER — OFFICE VISIT (OUTPATIENT)
Dept: ONCOLOGY | Facility: CLINIC | Age: 74
End: 2019-04-19

## 2019-04-19 ENCOUNTER — LAB (OUTPATIENT)
Dept: OTHER | Facility: HOSPITAL | Age: 74
End: 2019-04-19

## 2019-04-19 VITALS
DIASTOLIC BLOOD PRESSURE: 82 MMHG | OXYGEN SATURATION: 97 % | HEIGHT: 65 IN | WEIGHT: 145.6 LBS | HEART RATE: 87 BPM | SYSTOLIC BLOOD PRESSURE: 162 MMHG | RESPIRATION RATE: 18 BRPM | TEMPERATURE: 97.6 F | BODY MASS INDEX: 24.26 KG/M2

## 2019-04-19 DIAGNOSIS — D83.9 COMMON VARIABLE IMMUNODEFICIENCY (HCC): ICD-10-CM

## 2019-04-19 DIAGNOSIS — M81.0 OSTEOPOROSIS, UNSPECIFIED OSTEOPOROSIS TYPE, UNSPECIFIED PATHOLOGICAL FRACTURE PRESENCE: ICD-10-CM

## 2019-04-19 DIAGNOSIS — C50.512 MALIGNANT NEOPLASM OF LOWER-OUTER QUADRANT OF LEFT BREAST OF FEMALE, ESTROGEN RECEPTOR POSITIVE (HCC): Primary | ICD-10-CM

## 2019-04-19 DIAGNOSIS — Z17.0 MALIGNANT NEOPLASM OF LOWER-OUTER QUADRANT OF LEFT BREAST OF FEMALE, ESTROGEN RECEPTOR POSITIVE (HCC): Primary | ICD-10-CM

## 2019-04-19 DIAGNOSIS — Z17.0 MALIGNANT NEOPLASM OF LOWER-OUTER QUADRANT OF LEFT BREAST OF FEMALE, ESTROGEN RECEPTOR POSITIVE (HCC): ICD-10-CM

## 2019-04-19 DIAGNOSIS — C50.512 MALIGNANT NEOPLASM OF LOWER-OUTER QUADRANT OF LEFT BREAST OF FEMALE, ESTROGEN RECEPTOR POSITIVE (HCC): ICD-10-CM

## 2019-04-19 LAB
ALBUMIN SERPL-MCNC: 4.2 G/DL (ref 3.5–5.2)
ALBUMIN/GLOB SERPL: 1.5 G/DL
ALP SERPL-CCNC: 55 U/L (ref 39–117)
ALT SERPL W P-5'-P-CCNC: 37 U/L (ref 1–33)
ANION GAP SERPL CALCULATED.3IONS-SCNC: 7.8 MMOL/L
AST SERPL-CCNC: 39 U/L (ref 1–32)
BASOPHILS # BLD AUTO: 0.03 10*3/MM3 (ref 0–0.2)
BASOPHILS NFR BLD AUTO: 0.7 % (ref 0–1.5)
BILIRUB SERPL-MCNC: 0.4 MG/DL (ref 0.1–1.2)
BUN BLD-MCNC: 12 MG/DL (ref 8–23)
BUN/CREAT SERPL: 9 (ref 7–25)
CALCIUM SPEC-SCNC: 9 MG/DL (ref 8.6–10.5)
CHLORIDE SERPL-SCNC: 108 MMOL/L (ref 98–107)
CO2 SERPL-SCNC: 28.2 MMOL/L (ref 22–29)
CREAT BLD-MCNC: 1.34 MG/DL (ref 0.57–1)
DEPRECATED RDW RBC AUTO: 49.8 FL (ref 37–54)
EOSINOPHIL # BLD AUTO: 0.04 10*3/MM3 (ref 0–0.4)
EOSINOPHIL NFR BLD AUTO: 0.9 % (ref 0.3–6.2)
ERYTHROCYTE [DISTWIDTH] IN BLOOD BY AUTOMATED COUNT: 13.3 % (ref 12.3–15.4)
FERRITIN SERPL-MCNC: 94.7 NG/ML (ref 13–150)
GFR SERPL CREATININE-BSD FRML MDRD: 39 ML/MIN/1.73
GLOBULIN UR ELPH-MCNC: 2.8 GM/DL
GLUCOSE BLD-MCNC: 96 MG/DL (ref 65–99)
HCT VFR BLD AUTO: 35.5 % (ref 34–46.6)
HGB BLD-MCNC: 11.7 G/DL (ref 12–15.9)
IMM GRANULOCYTES # BLD AUTO: 0.05 10*3/MM3 (ref 0–0.05)
IMM GRANULOCYTES NFR BLD AUTO: 1.1 % (ref 0–0.5)
IRON 24H UR-MRATE: 44 MCG/DL (ref 37–145)
IRON SATN MFR SERPL: 12 % (ref 20–50)
LYMPHOCYTES # BLD AUTO: 1.21 10*3/MM3 (ref 0.7–3.1)
LYMPHOCYTES NFR BLD AUTO: 26.9 % (ref 19.6–45.3)
MCH RBC QN AUTO: 33.4 PG (ref 26.6–33)
MCHC RBC AUTO-ENTMCNC: 33 G/DL (ref 31.5–35.7)
MCV RBC AUTO: 101.4 FL (ref 79–97)
MONOCYTES # BLD AUTO: 0.31 10*3/MM3 (ref 0.1–0.9)
MONOCYTES NFR BLD AUTO: 6.9 % (ref 5–12)
NEUTROPHILS # BLD AUTO: 2.85 10*3/MM3 (ref 1.7–7)
NEUTROPHILS NFR BLD AUTO: 63.5 % (ref 42.7–76)
NRBC BLD AUTO-RTO: 0 /100 WBC (ref 0–0.2)
PLATELET # BLD AUTO: 229 10*3/MM3 (ref 140–450)
PMV BLD AUTO: 9.9 FL (ref 6–12)
POTASSIUM BLD-SCNC: 4.2 MMOL/L (ref 3.5–5.2)
PROT SERPL-MCNC: 7 G/DL (ref 6–8.5)
RBC # BLD AUTO: 3.5 10*6/MM3 (ref 3.77–5.28)
SODIUM BLD-SCNC: 144 MMOL/L (ref 136–145)
TIBC SERPL-MCNC: 353 MCG/DL (ref 298–536)
TRANSFERRIN SERPL-MCNC: 237 MG/DL (ref 200–360)
VIT B12 BLD-MCNC: 683 PG/ML (ref 211–946)
WBC NRBC COR # BLD: 4.49 10*3/MM3 (ref 3.4–10.8)

## 2019-04-19 PROCEDURE — 82607 VITAMIN B-12: CPT | Performed by: INTERNAL MEDICINE

## 2019-04-19 PROCEDURE — 85025 COMPLETE CBC W/AUTO DIFF WBC: CPT | Performed by: INTERNAL MEDICINE

## 2019-04-19 PROCEDURE — 99214 OFFICE O/P EST MOD 30 MIN: CPT | Performed by: INTERNAL MEDICINE

## 2019-04-19 PROCEDURE — 83540 ASSAY OF IRON: CPT | Performed by: INTERNAL MEDICINE

## 2019-04-19 PROCEDURE — 84466 ASSAY OF TRANSFERRIN: CPT | Performed by: INTERNAL MEDICINE

## 2019-04-19 PROCEDURE — 80053 COMPREHEN METABOLIC PANEL: CPT | Performed by: INTERNAL MEDICINE

## 2019-04-19 PROCEDURE — 82728 ASSAY OF FERRITIN: CPT | Performed by: INTERNAL MEDICINE

## 2019-04-19 NOTE — PROGRESS NOTES
Subjective     REASON FOR FOLLOW UP:   1. Stage I ( T1c,N0,M0 ) ER+,MT+, HER 2 neg for a ductal carcinoma of the left breast status post lumpectomy 5/19/2017.  Patient started on adjuvant tamoxifen after the visit of 6/9/2017.  2.  Osteoporosis  3.  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  Development of draining seroma and poor incisional healing in the left breast that required further surgery on 7/3/2017.    HISTORY OF PRESENT ILLNESS:  The patient is a 73 y.o. year old female who was noted to have a radiographic abnormality in the left breast in March.  She underwent an ultrasound-guided biopsy on 3/28/2017 showing invasive ductal carcinoma which was strongly hormone receptor positive and HER-2/trae negative.  She elected to undergo a lumpectomy procedure with Dr. Leon on 5/19/2017.  The tumor size was 1.2 x 1 x 0.9 cm and 3 sentinel lymph nodes were examined all of which were negative for metastatic cancer.    The patient was seen on 6/9/2017 to discuss postop adjuvant hormonal therapy.  She has a history of osteoporosis.  We discussed initiation of tamoxifen therapy which would have the advantage of helping to maintain her bone density.    She is here today for routine 6 month follow-up and seems to be getting along well on her tamoxifen.  She has been on the medication for nearly 2 years now.    [facility] with no worrisome findings.  CT scan was also performed at that time and it apparently was stable.    History of Present Illness        ALLERGIES:    Allergies   Allergen Reactions   • Other Anaphylaxis     BLOOD PRODUCTS WITH IGA IN THEM   • Erythromycin GI Intolerance     VOMITING   • Avelox [Moxifloxacin] Rash   • Levaquin [Levofloxacin] Rash         Review of Systems   Constitutional: Negative for activity change, appetite change, fatigue, fever and unexpected weight change.   HENT: Negative for hearing loss,  "nosebleeds, trouble swallowing and voice change.    Eyes: Negative for visual disturbance.   Respiratory: Negative for cough, shortness of breath and wheezing.    Cardiovascular: Negative for chest pain and palpitations.   Gastrointestinal: Negative for abdominal pain, diarrhea, nausea and vomiting.   Genitourinary: Negative for difficulty urinating, frequency, hematuria and urgency.   Musculoskeletal: Negative for back pain and neck pain.   Skin: Negative for rash.   Neurological: Negative for dizziness, seizures, syncope and headaches.   Hematological: Negative for adenopathy. Does not bruise/bleed easily.   Psychiatric/Behavioral: Negative for behavioral problems. The patient is not nervous/anxious.         Objective     Vitals:    04/19/19 1540   BP: 162/82   Pulse: 87   Resp: 18   Temp: 97.6 °F (36.4 °C)   SpO2: 97%   Weight: 66 kg (145 lb 9.6 oz)   Height: 164 cm (64.57\")   PainSc: 0-No pain     Current Status 4/19/2019   ECOG score 0       Physical Exam   Constitutional: She is oriented to person, place, and time. She appears well-developed and well-nourished. No distress.   HENT:   Head: Normocephalic.   Eyes: Conjunctivae and EOM are normal. Pupils are equal, round, and reactive to light. No scleral icterus.   Neck: Normal range of motion. Neck supple. No JVD present. No thyromegaly present.   Cardiovascular: Normal rate and regular rhythm. Exam reveals no gallop and no friction rub.   No murmur heard.  Pulmonary/Chest: Effort normal and breath sounds normal. She has no wheezes. She has no rales. Right breast exhibits no mass, no nipple discharge and no skin change. Left breast exhibits skin change. Left breast exhibits no mass and no nipple discharge.   Left breast shows healed lumpectomy scar and radiation tattoos.   Abdominal: Soft. Bowel sounds are normal. She exhibits no distension and no mass. There is no tenderness.   Musculoskeletal: Normal range of motion. She exhibits no edema or deformity. "   Lymphadenopathy:     She has no cervical adenopathy.   Neurological: She is alert and oriented to person, place, and time. She has normal reflexes. No cranial nerve deficit.   Skin: Skin is warm and dry. No rash noted. No erythema.   Psychiatric: She has a normal mood and affect. Her behavior is normal. Judgment normal.         RECENT LABS:  Hematology WBC   Date Value Ref Range Status   04/18/2019 3.64 3.40 - 10.80 10*3/mm3 Final     RBC   Date Value Ref Range Status   04/18/2019 3.34 (L) 3.77 - 5.28 10*6/mm3 Final     Hemoglobin   Date Value Ref Range Status   04/18/2019 11.1 (L) 12.0 - 15.9 g/dL Final     Hematocrit   Date Value Ref Range Status   04/18/2019 33.5 (L) 34.0 - 46.6 % Final     Platelets   Date Value Ref Range Status   04/18/2019 229 140 - 450 10*3/mm3 Final          Assessment/Plan     1.  Stage I, hormone receptor positive, HER-2/trae negative invasive ductal carcinoma the left breast status post lumpectomy. Patient was started on adjuvant hormonal therapy with tamoxifen 20 mg daily in June 2017 and seems to be tolerating it well.  2.  Osteoporosis on her last DEXA scan.  3.  Common variable immunodeficiency requiring monthly IV immunoglobulin infusions.  4.  Mild anemia    Plan  1.  Continue tamoxifen 20 mg daily with plans to continue treatment for 5 years through June 2022.  2.  Return for follow-up in 6 months.  3.  Repeat bone density scan .  4.  Bilateral screening mammograms be due again in April 2020.  5.  Because of her worsened anemia we ordered additional anemia labs from the office today including an iron panel, ferritin, and B12 level.  6.  We will be referring Elaina to the genetic counselors due to the fact that her daughter also was diagnosed with breast cancer at an early age.  She also tells me that another family member underwent genetic testing and was found to have an abnormality other than BRCA.

## 2019-05-16 ENCOUNTER — INFUSION (OUTPATIENT)
Dept: ONCOLOGY | Facility: HOSPITAL | Age: 74
End: 2019-05-16

## 2019-05-16 VITALS
WEIGHT: 142.4 LBS | BODY MASS INDEX: 24.02 KG/M2 | OXYGEN SATURATION: 97 % | RESPIRATION RATE: 18 BRPM | DIASTOLIC BLOOD PRESSURE: 73 MMHG | TEMPERATURE: 97.6 F | HEART RATE: 69 BPM | SYSTOLIC BLOOD PRESSURE: 116 MMHG

## 2019-05-16 DIAGNOSIS — D80.2 IGA DEFICIENCY (HCC): ICD-10-CM

## 2019-05-16 DIAGNOSIS — D83.9 COMMON VARIABLE IMMUNODEFICIENCY (HCC): Primary | ICD-10-CM

## 2019-05-16 PROCEDURE — 25010000002 IMMUNE GLOBULIN (HUMAN) PER 500 MG: Performed by: ALLERGY & IMMUNOLOGY

## 2019-05-16 PROCEDURE — 96366 THER/PROPH/DIAG IV INF ADDON: CPT | Performed by: NURSE PRACTITIONER

## 2019-05-16 PROCEDURE — 96365 THER/PROPH/DIAG IV INF INIT: CPT | Performed by: NURSE PRACTITIONER

## 2019-05-16 RX ORDER — SODIUM CHLORIDE 9 MG/ML
250 INJECTION, SOLUTION INTRAVENOUS ONCE
Status: CANCELLED | OUTPATIENT
Start: 2019-05-16

## 2019-05-16 RX ORDER — DIPHENHYDRAMINE HYDROCHLORIDE 50 MG/ML
50 INJECTION INTRAMUSCULAR; INTRAVENOUS AS NEEDED
Status: CANCELLED | OUTPATIENT
Start: 2019-05-16

## 2019-05-16 RX ORDER — ACETAMINOPHEN 500 MG
500 TABLET ORAL ONCE
Status: CANCELLED | OUTPATIENT
Start: 2019-05-16

## 2019-05-16 RX ORDER — DIPHENHYDRAMINE HCL 25 MG
25 CAPSULE ORAL ONCE
Status: CANCELLED | OUTPATIENT
Start: 2019-05-16

## 2019-05-16 RX ORDER — SODIUM CHLORIDE 9 MG/ML
250 INJECTION, SOLUTION INTRAVENOUS ONCE
Status: COMPLETED | OUTPATIENT
Start: 2019-05-16 | End: 2019-05-16

## 2019-05-16 RX ADMIN — SODIUM CHLORIDE 250 ML: 900 INJECTION, SOLUTION INTRAVENOUS at 11:40

## 2019-05-16 RX ADMIN — IMMUNE GLOBULIN INTRAVENOUS (HUMAN) 10 G: KIT at 13:11

## 2019-05-16 RX ADMIN — IMMUNE GLOBULIN INTRAVENOUS (HUMAN) 10 G: KIT at 11:53

## 2019-05-23 ENCOUNTER — HOSPITAL ENCOUNTER (OUTPATIENT)
Facility: HOSPITAL | Age: 74
Setting detail: HOSPITAL OUTPATIENT SURGERY
Discharge: HOME | End: 2019-05-23
Attending: INTERNAL MEDICINE | Admitting: INTERNAL MEDICINE

## 2019-05-23 ENCOUNTER — ANESTHESIA (OUTPATIENT)
Dept: GASTROENTEROLOGY | Facility: HOSPITAL | Age: 74
End: 2019-05-23

## 2019-05-23 ENCOUNTER — ANESTHESIA EVENT (OUTPATIENT)
Dept: GASTROENTEROLOGY | Facility: HOSPITAL | Age: 74
End: 2019-05-23

## 2019-05-23 VITALS
HEIGHT: 65 IN | BODY MASS INDEX: 23.89 KG/M2 | SYSTOLIC BLOOD PRESSURE: 124 MMHG | OXYGEN SATURATION: 98 % | HEART RATE: 73 BPM | RESPIRATION RATE: 16 BRPM | DIASTOLIC BLOOD PRESSURE: 75 MMHG | TEMPERATURE: 98 F | WEIGHT: 143.38 LBS

## 2019-05-23 DIAGNOSIS — Z83.71 FAMILY HISTORY OF POLYPS IN THE COLON: ICD-10-CM

## 2019-05-23 DIAGNOSIS — K21.9 GASTROESOPHAGEAL REFLUX DISEASE, ESOPHAGITIS PRESENCE NOT SPECIFIED: ICD-10-CM

## 2019-05-23 DIAGNOSIS — K58.0 IRRITABLE BOWEL SYNDROME WITH DIARRHEA: ICD-10-CM

## 2019-05-23 PROCEDURE — S0260 H&P FOR SURGERY: HCPCS | Performed by: INTERNAL MEDICINE

## 2019-05-23 PROCEDURE — 45378 DIAGNOSTIC COLONOSCOPY: CPT | Performed by: INTERNAL MEDICINE

## 2019-05-23 PROCEDURE — 43239 EGD BIOPSY SINGLE/MULTIPLE: CPT | Performed by: INTERNAL MEDICINE

## 2019-05-23 PROCEDURE — 88305 TISSUE EXAM BY PATHOLOGIST: CPT | Performed by: INTERNAL MEDICINE

## 2019-05-23 PROCEDURE — 87081 CULTURE SCREEN ONLY: CPT | Performed by: INTERNAL MEDICINE

## 2019-05-23 PROCEDURE — 25010000002 PROPOFOL 10 MG/ML EMULSION: Performed by: ANESTHESIOLOGY

## 2019-05-23 RX ORDER — SODIUM CHLORIDE, SODIUM LACTATE, POTASSIUM CHLORIDE, CALCIUM CHLORIDE 600; 310; 30; 20 MG/100ML; MG/100ML; MG/100ML; MG/100ML
1000 INJECTION, SOLUTION INTRAVENOUS CONTINUOUS
Status: DISCONTINUED | OUTPATIENT
Start: 2019-05-23 | End: 2019-05-23 | Stop reason: HOSPADM

## 2019-05-23 RX ORDER — SODIUM CHLORIDE 0.9 % (FLUSH) 0.9 %
3 SYRINGE (ML) INJECTION AS NEEDED
Status: DISCONTINUED | OUTPATIENT
Start: 2019-05-23 | End: 2019-05-23 | Stop reason: HOSPADM

## 2019-05-23 RX ORDER — LIDOCAINE HYDROCHLORIDE 10 MG/ML
0.5 INJECTION, SOLUTION INFILTRATION; PERINEURAL ONCE AS NEEDED
Status: DISCONTINUED | OUTPATIENT
Start: 2019-05-23 | End: 2019-05-23 | Stop reason: HOSPADM

## 2019-05-23 RX ORDER — PROPOFOL 10 MG/ML
VIAL (ML) INTRAVENOUS CONTINUOUS PRN
Status: DISCONTINUED | OUTPATIENT
Start: 2019-05-23 | End: 2019-05-23 | Stop reason: SURG

## 2019-05-23 RX ORDER — LIDOCAINE HYDROCHLORIDE 20 MG/ML
INJECTION, SOLUTION INFILTRATION; PERINEURAL AS NEEDED
Status: DISCONTINUED | OUTPATIENT
Start: 2019-05-23 | End: 2019-05-23 | Stop reason: SURG

## 2019-05-23 RX ORDER — PROPOFOL 10 MG/ML
VIAL (ML) INTRAVENOUS AS NEEDED
Status: DISCONTINUED | OUTPATIENT
Start: 2019-05-23 | End: 2019-05-23 | Stop reason: SURG

## 2019-05-23 RX ADMIN — PROPOFOL 140 MCG/KG/MIN: 10 INJECTION, EMULSION INTRAVENOUS at 08:07

## 2019-05-23 RX ADMIN — SODIUM CHLORIDE, POTASSIUM CHLORIDE, SODIUM LACTATE AND CALCIUM CHLORIDE 1000 ML: 600; 310; 30; 20 INJECTION, SOLUTION INTRAVENOUS at 07:56

## 2019-05-23 RX ADMIN — PROPOFOL 100 MG: 10 INJECTION, EMULSION INTRAVENOUS at 08:15

## 2019-05-23 RX ADMIN — LIDOCAINE HYDROCHLORIDE 50 MG: 20 INJECTION, SOLUTION INFILTRATION; PERINEURAL at 08:07

## 2019-05-23 RX ADMIN — PROPOFOL 125 MG: 10 INJECTION, EMULSION INTRAVENOUS at 08:07

## 2019-05-23 NOTE — ANESTHESIA PREPROCEDURE EVALUATION
Anesthesia Evaluation     Patient summary reviewed                Airway   Mallampati: II  TM distance: >3 FB  Neck ROM: full  No difficulty expected  Dental - normal exam     Pulmonary     breath sounds clear to auscultation  Cardiovascular   Exercise tolerance: good (4-7 METS)    Rhythm: regular  Rate: normal        Neuro/Psych  GI/Hepatic/Renal/Endo      Musculoskeletal     Abdominal    Substance History      OB/GYN          Other                        Anesthesia Plan    ASA 3     MAC     intravenous induction   Anesthetic plan, all risks, benefits, and alternatives have been provided, discussed and informed consent has been obtained with: patient.

## 2019-05-23 NOTE — ANESTHESIA POSTPROCEDURE EVALUATION
"Patient: Elaina Tom    Procedure Summary     Date:  05/23/19 Room / Location:  [facility] 8 /  [facility]    Anesthesia Start:  0803 Anesthesia Stop:  0835    Procedures:       COLONOSCOPY TO CECUM AND TERM. ILEUM WITH BIOPSIES (N/A )      ESOPHAGOGASTRODUODENOSCOPY WITH BIOPSIES AND RAPID UREASE (N/A Esophagus) Diagnosis:       Gastritis      Duodenitis      Hemorrhoids      (Family history of polyps in the colon [Z83.71])      (Irritable bowel syndrome with diarrhea [K58.0])      (Gastroesophageal reflux disease, esophagitis presence not specified [K21.9])    Surgeon:  Rigoberto Rogers MD Provider:  Vinny Garsia MD    Anesthesia Type:  MAC ASA Status:  3          Anesthesia Type: MAC  Last vitals  BP   124/75 (05/23/19 0900)   Temp   36.7 °C (98 °F) (05/23/19 0740)   Pulse   73 (05/23/19 0900)   Resp   16 (05/23/19 0900)     SpO2   98 % (05/23/19 0900)     Post Anesthesia Care and Evaluation    Patient location during evaluation: bedside  Patient participation: complete - patient participated  Level of consciousness: awake and alert  Pain management: adequate  Airway patency: patent  Anesthetic complications: No anesthetic complications    Cardiovascular status: acceptable  Respiratory status: acceptable  Hydration status: acceptable    Comments: /75 (BP Location: Left arm, Patient Position: Lying)   Pulse 73   Temp 36.7 °C (98 °F) (Oral)   Resp 16   Ht 165.1 cm (65\")   Wt 65 kg (143 lb 6 oz)   SpO2 98%   BMI 23.86 kg/m²           "

## 2019-05-24 ENCOUNTER — TELEPHONE (OUTPATIENT)
Dept: GASTROENTEROLOGY | Facility: CLINIC | Age: 74
End: 2019-05-24

## 2019-05-24 LAB
CYTO UR: NORMAL
LAB AP CASE REPORT: NORMAL
PATH REPORT.FINAL DX SPEC: NORMAL
PATH REPORT.GROSS SPEC: NORMAL
UREASE TISS QL: NEGATIVE

## 2019-05-24 NOTE — TELEPHONE ENCOUNTER
Called pt and advised per Dr Rogers that the duodenum bx was benign.  The stomach bx showed mild chronic inflammation and was neg for  Hpylori.  The ge junction showed  Mild chronic inflammation and was neg for schrader's.  All colon bx were benign.  He recommends to continue ppi and repeat c/s in 5 yrs.   F/u yearly and call sooner if needed.  Pt verb understanding.     C/s placed in recall for 05/23/2024 and f/u placed in recall for 05/23/2020.

## 2019-05-24 NOTE — TELEPHONE ENCOUNTER
----- Message from Rigoberto MERAZ MD sent at 5/24/2019  1:49 PM EDT -----  Regarding: Biopsy results  Okay to call results, recommend continue PPI.  Follow-up colonoscopy in 5 years.  Office follow-up annually or sooner as needed.  ----- Message -----  From: Lab, Background User  Sent: 5/24/2019  10:00 AM  To: Rigoberto MERAZ MD

## 2019-05-29 ENCOUNTER — TRANSCRIBE ORDERS (OUTPATIENT)
Dept: ADMINISTRATIVE | Facility: HOSPITAL | Age: 74
End: 2019-05-29

## 2019-05-29 ENCOUNTER — LAB (OUTPATIENT)
Dept: LAB | Facility: HOSPITAL | Age: 74
End: 2019-05-29

## 2019-05-29 ENCOUNTER — HOSPITAL ENCOUNTER (OUTPATIENT)
Dept: CARDIOLOGY | Facility: HOSPITAL | Age: 74
Discharge: HOME | End: 2019-05-29
Admitting: INTERNAL MEDICINE

## 2019-05-29 DIAGNOSIS — M79.89 RIGHT LEG SWELLING: ICD-10-CM

## 2019-05-29 DIAGNOSIS — I82.90 CLOT: ICD-10-CM

## 2019-05-29 DIAGNOSIS — I82.90 CLOT: Primary | ICD-10-CM

## 2019-05-29 DIAGNOSIS — M79.604 RIGHT LEG PAIN: Primary | ICD-10-CM

## 2019-05-29 DIAGNOSIS — M79.604 RIGHT LEG PAIN: ICD-10-CM

## 2019-05-29 LAB
BH CV LOW VAS RIGHT POPLITEAL SPONT: 1
BH CV LOWER VASCULAR LEFT COMMON FEMORAL AUGMENT: NORMAL
BH CV LOWER VASCULAR LEFT COMMON FEMORAL COMPETENT: NORMAL
BH CV LOWER VASCULAR LEFT COMMON FEMORAL COMPRESS: NORMAL
BH CV LOWER VASCULAR LEFT COMMON FEMORAL PHASIC: NORMAL
BH CV LOWER VASCULAR LEFT COMMON FEMORAL SPONT: NORMAL
BH CV LOWER VASCULAR RIGHT COMMON FEMORAL AUGMENT: NORMAL
BH CV LOWER VASCULAR RIGHT COMMON FEMORAL COMPETENT: NORMAL
BH CV LOWER VASCULAR RIGHT COMMON FEMORAL COMPRESS: NORMAL
BH CV LOWER VASCULAR RIGHT COMMON FEMORAL PHASIC: NORMAL
BH CV LOWER VASCULAR RIGHT COMMON FEMORAL SPONT: NORMAL
BH CV LOWER VASCULAR RIGHT DISTAL FEMORAL COMPRESS: NORMAL
BH CV LOWER VASCULAR RIGHT GASTRONEMIUS COMPRESS: NORMAL
BH CV LOWER VASCULAR RIGHT GREATER SAPH AK COMPRESS: NORMAL
BH CV LOWER VASCULAR RIGHT GREATER SAPH BK COMPRESS: NORMAL
BH CV LOWER VASCULAR RIGHT LESSER SAPH COMPRESS: NORMAL
BH CV LOWER VASCULAR RIGHT MID FEMORAL AUGMENT: NORMAL
BH CV LOWER VASCULAR RIGHT MID FEMORAL COMPETENT: NORMAL
BH CV LOWER VASCULAR RIGHT MID FEMORAL COMPRESS: NORMAL
BH CV LOWER VASCULAR RIGHT MID FEMORAL PHASIC: NORMAL
BH CV LOWER VASCULAR RIGHT MID FEMORAL SPONT: NORMAL
BH CV LOWER VASCULAR RIGHT PERONEAL COMPRESS: NORMAL
BH CV LOWER VASCULAR RIGHT POPLITEAL AUGMENT: NORMAL
BH CV LOWER VASCULAR RIGHT POPLITEAL COMPETENT: NORMAL
BH CV LOWER VASCULAR RIGHT POPLITEAL COMPRESS: NORMAL
BH CV LOWER VASCULAR RIGHT POPLITEAL PHASIC: NORMAL
BH CV LOWER VASCULAR RIGHT POPLITEAL SPONT: NORMAL
BH CV LOWER VASCULAR RIGHT POSTERIOR TIBIAL COMPRESS: NORMAL
BH CV LOWER VASCULAR RIGHT PROXIMAL FEMORAL COMPRESS: NORMAL
BH CV LOWER VASCULAR RIGHT SAPHENOFEMORAL JUNCTION AUGMENT: NORMAL
BH CV LOWER VASCULAR RIGHT SAPHENOFEMORAL JUNCTION COMPETENT: NORMAL
BH CV LOWER VASCULAR RIGHT SAPHENOFEMORAL JUNCTION COMPRESS: NORMAL
BH CV LOWER VASCULAR RIGHT SAPHENOFEMORAL JUNCTION PHASIC: NORMAL
BH CV LOWER VASCULAR RIGHT SAPHENOFEMORAL JUNCTION SPONT: NORMAL
BH CV VAS POP FLUID COLLECTED: 1
D DIMER PPP FEU-MCNC: 0.79 MCGFEU/ML (ref 0–0.49)

## 2019-05-29 PROCEDURE — 36415 COLL VENOUS BLD VENIPUNCTURE: CPT

## 2019-05-29 PROCEDURE — 93971 EXTREMITY STUDY: CPT

## 2019-05-29 PROCEDURE — 85379 FIBRIN DEGRADATION QUANT: CPT | Performed by: INTERNAL MEDICINE

## 2019-05-29 NOTE — PROGRESS NOTES
Right lower venous doppler complete and preliminary is negative for dvt and incidental finding of echolucent fluid popliteal fossa to DR MIYA DOWNING  , pt. Was released

## 2019-06-04 ENCOUNTER — CLINICAL SUPPORT (OUTPATIENT)
Dept: OTHER | Facility: HOSPITAL | Age: 74
End: 2019-06-04

## 2019-06-04 DIAGNOSIS — Z17.0 MALIGNANT NEOPLASM OF LOWER-OUTER QUADRANT OF LEFT BREAST OF FEMALE, ESTROGEN RECEPTOR POSITIVE (HCC): Primary | ICD-10-CM

## 2019-06-04 DIAGNOSIS — C50.512 MALIGNANT NEOPLASM OF LOWER-OUTER QUADRANT OF LEFT BREAST OF FEMALE, ESTROGEN RECEPTOR POSITIVE (HCC): Primary | ICD-10-CM

## 2019-06-04 PROCEDURE — G0463 HOSPITAL OUTPT CLINIC VISIT: HCPCS

## 2019-06-04 NOTE — PATIENT INSTRUCTIONS
Pt seen by Dr. Mahoney for genetic counseling and testing. Lab drawn with 21g butterfly needle in RIght  AC x 1 attempt. Sent to [facility]. Pt informed she would receive a phone call when test results.

## 2019-07-11 ENCOUNTER — INFUSION (OUTPATIENT)
Dept: ONCOLOGY | Facility: HOSPITAL | Age: 74
End: 2019-07-11

## 2019-07-11 VITALS
TEMPERATURE: 98.2 F | WEIGHT: 140.4 LBS | DIASTOLIC BLOOD PRESSURE: 70 MMHG | OXYGEN SATURATION: 97 % | SYSTOLIC BLOOD PRESSURE: 110 MMHG | BODY MASS INDEX: 23.36 KG/M2 | HEART RATE: 76 BPM

## 2019-07-11 DIAGNOSIS — D83.9 COMMON VARIABLE IMMUNODEFICIENCY (HCC): Primary | ICD-10-CM

## 2019-07-11 DIAGNOSIS — D80.2 IGA DEFICIENCY (HCC): ICD-10-CM

## 2019-07-11 PROCEDURE — 96366 THER/PROPH/DIAG IV INF ADDON: CPT | Performed by: NURSE PRACTITIONER

## 2019-07-11 PROCEDURE — 96365 THER/PROPH/DIAG IV INF INIT: CPT | Performed by: NURSE PRACTITIONER

## 2019-07-11 PROCEDURE — 25010000002 IMMUNE GLOBULIN (HUMAN) PER 500 MG: Performed by: ALLERGY & IMMUNOLOGY

## 2019-07-11 RX ORDER — SODIUM CHLORIDE 9 MG/ML
250 INJECTION, SOLUTION INTRAVENOUS ONCE
Status: CANCELLED | OUTPATIENT
Start: 2019-07-11

## 2019-07-11 RX ORDER — DIPHENHYDRAMINE HCL 25 MG
25 CAPSULE ORAL ONCE
Status: CANCELLED | OUTPATIENT
Start: 2019-07-11

## 2019-07-11 RX ORDER — DIPHENHYDRAMINE HYDROCHLORIDE 50 MG/ML
50 INJECTION INTRAMUSCULAR; INTRAVENOUS AS NEEDED
Status: CANCELLED | OUTPATIENT
Start: 2019-07-11

## 2019-07-11 RX ORDER — ACETAMINOPHEN 500 MG
500 TABLET ORAL ONCE
Status: CANCELLED | OUTPATIENT
Start: 2019-07-11

## 2019-07-11 RX ORDER — SODIUM CHLORIDE 9 MG/ML
250 INJECTION, SOLUTION INTRAVENOUS ONCE
Status: COMPLETED | OUTPATIENT
Start: 2019-07-11 | End: 2019-07-11

## 2019-07-11 RX ADMIN — Medication 10 G: at 13:38

## 2019-07-11 RX ADMIN — IMMUNE GLOBULIN INTRAVENOUS (HUMAN) 10 G: KIT at 12:13

## 2019-07-11 RX ADMIN — SODIUM CHLORIDE 250 ML: 900 INJECTION, SOLUTION INTRAVENOUS at 11:43

## 2019-07-11 NOTE — PROGRESS NOTES
Patient took Tylenol 1000 mg PO x 1 prior to coming in for treatment. Patient does not take Benadryl as she could not drive with this medication on board.

## 2019-08-08 ENCOUNTER — INFUSION (OUTPATIENT)
Dept: ONCOLOGY | Facility: HOSPITAL | Age: 74
End: 2019-08-08

## 2019-08-08 VITALS
BODY MASS INDEX: 23.86 KG/M2 | TEMPERATURE: 97.6 F | SYSTOLIC BLOOD PRESSURE: 125 MMHG | DIASTOLIC BLOOD PRESSURE: 77 MMHG | WEIGHT: 143.4 LBS | RESPIRATION RATE: 20 BRPM | HEART RATE: 58 BPM | OXYGEN SATURATION: 98 %

## 2019-08-08 DIAGNOSIS — D83.9 COMMON VARIABLE IMMUNODEFICIENCY (HCC): Primary | ICD-10-CM

## 2019-08-08 DIAGNOSIS — D80.2 IGA DEFICIENCY (HCC): ICD-10-CM

## 2019-08-08 LAB — IGG1 SER-MCNC: 935 MG/DL (ref 700–1600)

## 2019-08-08 PROCEDURE — 96365 THER/PROPH/DIAG IV INF INIT: CPT | Performed by: NURSE PRACTITIONER

## 2019-08-08 PROCEDURE — 82784 ASSAY IGA/IGD/IGG/IGM EACH: CPT | Performed by: NURSE PRACTITIONER

## 2019-08-08 PROCEDURE — 96366 THER/PROPH/DIAG IV INF ADDON: CPT | Performed by: NURSE PRACTITIONER

## 2019-08-08 PROCEDURE — 25010000002 IMMUNE GLOBULIN (HUMAN) PER 500 MG: Performed by: ALLERGY & IMMUNOLOGY

## 2019-08-08 RX ORDER — SODIUM CHLORIDE 9 MG/ML
250 INJECTION, SOLUTION INTRAVENOUS ONCE
Status: CANCELLED | OUTPATIENT
Start: 2019-08-08

## 2019-08-08 RX ORDER — ACETAMINOPHEN 500 MG
500 TABLET ORAL ONCE
Status: CANCELLED | OUTPATIENT
Start: 2019-08-08

## 2019-08-08 RX ORDER — SODIUM CHLORIDE 9 MG/ML
250 INJECTION, SOLUTION INTRAVENOUS ONCE
Status: COMPLETED | OUTPATIENT
Start: 2019-08-08 | End: 2019-08-08

## 2019-08-08 RX ORDER — DIPHENHYDRAMINE HYDROCHLORIDE 50 MG/ML
50 INJECTION INTRAMUSCULAR; INTRAVENOUS AS NEEDED
Status: CANCELLED | OUTPATIENT
Start: 2019-08-08

## 2019-08-08 RX ORDER — DIPHENHYDRAMINE HCL 25 MG
25 CAPSULE ORAL ONCE
Status: CANCELLED | OUTPATIENT
Start: 2019-08-08

## 2019-08-08 RX ADMIN — Medication 10 G: at 13:30

## 2019-08-08 RX ADMIN — IMMUNE GLOBULIN INTRAVENOUS (HUMAN) 10 G: KIT at 12:01

## 2019-08-08 RX ADMIN — SODIUM CHLORIDE 250 ML: 900 INJECTION, SOLUTION INTRAVENOUS at 11:30

## 2019-09-04 RX ORDER — EPINEPHRINE 0.3 MG/.3ML
0.3 INJECTION SUBCUTANEOUS ONCE
Status: CANCELLED
Start: 2019-09-05 | End: 2019-09-05

## 2019-09-04 RX ORDER — DIPHENHYDRAMINE HCL 25 MG
50 CAPSULE ORAL ONCE AS NEEDED
Status: CANCELLED
Start: 2019-09-05

## 2019-09-05 ENCOUNTER — INFUSION (OUTPATIENT)
Dept: ONCOLOGY | Facility: HOSPITAL | Age: 74
End: 2019-09-05

## 2019-09-05 VITALS
BODY MASS INDEX: 24.1 KG/M2 | WEIGHT: 144.8 LBS | OXYGEN SATURATION: 97 % | HEART RATE: 72 BPM | DIASTOLIC BLOOD PRESSURE: 70 MMHG | SYSTOLIC BLOOD PRESSURE: 104 MMHG | TEMPERATURE: 97.4 F

## 2019-09-05 DIAGNOSIS — D83.9 COMMON VARIABLE IMMUNODEFICIENCY (HCC): Primary | ICD-10-CM

## 2019-09-05 DIAGNOSIS — D80.2 IGA DEFICIENCY (HCC): ICD-10-CM

## 2019-09-05 PROCEDURE — 96366 THER/PROPH/DIAG IV INF ADDON: CPT | Performed by: NURSE PRACTITIONER

## 2019-09-05 PROCEDURE — 96365 THER/PROPH/DIAG IV INF INIT: CPT | Performed by: NURSE PRACTITIONER

## 2019-09-05 PROCEDURE — 25010000002 IMMUNE GLOBULIN (HUMAN) PER 500 MG: Performed by: ALLERGY & IMMUNOLOGY

## 2019-09-05 RX ORDER — SODIUM CHLORIDE 9 MG/ML
250 INJECTION, SOLUTION INTRAVENOUS ONCE
Status: COMPLETED | OUTPATIENT
Start: 2019-09-05 | End: 2019-09-05

## 2019-09-05 RX ORDER — DIPHENHYDRAMINE HCL 25 MG
50 CAPSULE ORAL ONCE AS NEEDED
Status: CANCELLED
Start: 2019-09-05

## 2019-09-05 RX ORDER — EPINEPHRINE 0.3 MG/.3ML
0.3 INJECTION SUBCUTANEOUS ONCE
Status: CANCELLED
Start: 2019-09-05 | End: 2019-09-05

## 2019-09-05 RX ORDER — SODIUM CHLORIDE 9 MG/ML
250 INJECTION, SOLUTION INTRAVENOUS ONCE
Status: CANCELLED | OUTPATIENT
Start: 2019-09-05

## 2019-09-05 RX ADMIN — SODIUM CHLORIDE 250 ML: 900 INJECTION, SOLUTION INTRAVENOUS at 11:39

## 2019-09-05 RX ADMIN — IMMUNE GLOBULIN INTRAVENOUS (HUMAN) 20 G: KIT at 12:23

## 2019-10-03 ENCOUNTER — INFUSION (OUTPATIENT)
Dept: ONCOLOGY | Facility: HOSPITAL | Age: 74
End: 2019-10-03

## 2019-10-03 VITALS
OXYGEN SATURATION: 96 % | TEMPERATURE: 97.4 F | WEIGHT: 143.8 LBS | HEART RATE: 80 BPM | BODY MASS INDEX: 23.93 KG/M2 | SYSTOLIC BLOOD PRESSURE: 124 MMHG | DIASTOLIC BLOOD PRESSURE: 81 MMHG

## 2019-10-03 DIAGNOSIS — D83.9 COMMON VARIABLE IMMUNODEFICIENCY (HCC): Primary | ICD-10-CM

## 2019-10-03 DIAGNOSIS — D80.2 IGA DEFICIENCY (HCC): ICD-10-CM

## 2019-10-03 LAB
BASOPHILS # BLD AUTO: 0.03 10*3/MM3 (ref 0–0.2)
BASOPHILS NFR BLD AUTO: 0.8 % (ref 0–1.5)
DEPRECATED RDW RBC AUTO: 47.8 FL (ref 37–54)
EOSINOPHIL # BLD AUTO: 0.05 10*3/MM3 (ref 0–0.4)
EOSINOPHIL NFR BLD AUTO: 1.3 % (ref 0.3–6.2)
ERYTHROCYTE [DISTWIDTH] IN BLOOD BY AUTOMATED COUNT: 12.8 % (ref 12.3–15.4)
HCT VFR BLD AUTO: 35.2 % (ref 34–46.6)
HGB BLD-MCNC: 11.7 G/DL (ref 12–15.9)
IGG1 SER-MCNC: 955 MG/DL (ref 700–1600)
IMM GRANULOCYTES # BLD AUTO: 0 10*3/MM3 (ref 0–0.05)
IMM GRANULOCYTES NFR BLD AUTO: 0 % (ref 0–0.5)
LYMPHOCYTES # BLD AUTO: 1.43 10*3/MM3 (ref 0.7–3.1)
LYMPHOCYTES NFR BLD AUTO: 38.4 % (ref 19.6–45.3)
MCH RBC QN AUTO: 33.5 PG (ref 26.6–33)
MCHC RBC AUTO-ENTMCNC: 33.2 G/DL (ref 31.5–35.7)
MCV RBC AUTO: 100.9 FL (ref 79–97)
MONOCYTES # BLD AUTO: 0.33 10*3/MM3 (ref 0.1–0.9)
MONOCYTES NFR BLD AUTO: 8.9 % (ref 5–12)
NEUTROPHILS # BLD AUTO: 1.88 10*3/MM3 (ref 1.7–7)
NEUTROPHILS NFR BLD AUTO: 50.6 % (ref 42.7–76)
NRBC BLD AUTO-RTO: 0 /100 WBC (ref 0–0.2)
PLATELET # BLD AUTO: 221 10*3/MM3 (ref 140–450)
PMV BLD AUTO: 10.6 FL (ref 6–12)
RBC # BLD AUTO: 3.49 10*6/MM3 (ref 3.77–5.28)
WBC NRBC COR # BLD: 3.72 10*3/MM3 (ref 3.4–10.8)

## 2019-10-03 PROCEDURE — 96366 THER/PROPH/DIAG IV INF ADDON: CPT | Performed by: NURSE PRACTITIONER

## 2019-10-03 PROCEDURE — 96365 THER/PROPH/DIAG IV INF INIT: CPT | Performed by: NURSE PRACTITIONER

## 2019-10-03 PROCEDURE — 85025 COMPLETE CBC W/AUTO DIFF WBC: CPT | Performed by: ALLERGY & IMMUNOLOGY

## 2019-10-03 PROCEDURE — 25010000002 IMMUNE GLOBULIN (HUMAN) PER 500 MG: Performed by: ALLERGY & IMMUNOLOGY

## 2019-10-03 PROCEDURE — 82784 ASSAY IGA/IGD/IGG/IGM EACH: CPT | Performed by: ALLERGY & IMMUNOLOGY

## 2019-10-03 RX ORDER — DIPHENHYDRAMINE HCL 25 MG
50 CAPSULE ORAL ONCE AS NEEDED
Status: CANCELLED
Start: 2019-10-03

## 2019-10-03 RX ORDER — EPINEPHRINE 0.3 MG/.3ML
0.3 INJECTION SUBCUTANEOUS ONCE
Status: CANCELLED
Start: 2019-10-03 | End: 2019-10-03

## 2019-10-03 RX ORDER — SODIUM CHLORIDE 9 MG/ML
250 INJECTION, SOLUTION INTRAVENOUS ONCE
Status: COMPLETED | OUTPATIENT
Start: 2019-10-03 | End: 2019-10-03

## 2019-10-03 RX ORDER — SODIUM CHLORIDE 9 MG/ML
250 INJECTION, SOLUTION INTRAVENOUS ONCE
Status: CANCELLED | OUTPATIENT
Start: 2019-10-03

## 2019-10-03 RX ADMIN — Medication 20 G: at 11:50

## 2019-10-03 RX ADMIN — SODIUM CHLORIDE 250 ML: 900 INJECTION, SOLUTION INTRAVENOUS at 11:40

## 2019-10-21 ENCOUNTER — TELEPHONE (OUTPATIENT)
Dept: GASTROENTEROLOGY | Facility: CLINIC | Age: 74
End: 2019-10-21

## 2019-10-21 DIAGNOSIS — K21.9 GASTROESOPHAGEAL REFLUX DISEASE, ESOPHAGITIS PRESENCE NOT SPECIFIED: ICD-10-CM

## 2019-10-21 RX ORDER — PANTOPRAZOLE SODIUM 40 MG/1
40 TABLET, DELAYED RELEASE ORAL DAILY
Qty: 90 TABLET | Refills: 1 | Status: SHIPPED | OUTPATIENT
Start: 2019-10-21 | End: 2020-03-10 | Stop reason: SDUPTHER

## 2019-10-21 NOTE — TELEPHONE ENCOUNTER
----- Message from Radha Figueroa sent at 10/21/2019  4:05 PM EDT -----  Regarding: [employer]  Contact: [phone]  [facility] MARGARITO

## 2019-10-21 NOTE — TELEPHONE ENCOUNTER
Called pt and on vm advised that we have sent her refill to [facility].  Advised pt to call with questions. Per note pt taking pantoprazole 40mg po daily.  This was ordered #90 with 1 refill to her [facility] pharmacy.

## 2019-10-25 ENCOUNTER — APPOINTMENT (OUTPATIENT)
Dept: ONCOLOGY | Facility: CLINIC | Age: 74
End: 2019-10-25

## 2019-10-31 ENCOUNTER — INFUSION (OUTPATIENT)
Dept: ONCOLOGY | Facility: HOSPITAL | Age: 74
End: 2019-10-31

## 2019-10-31 VITALS
SYSTOLIC BLOOD PRESSURE: 126 MMHG | WEIGHT: 145 LBS | DIASTOLIC BLOOD PRESSURE: 80 MMHG | HEART RATE: 74 BPM | TEMPERATURE: 97.6 F | BODY MASS INDEX: 24.13 KG/M2 | OXYGEN SATURATION: 93 %

## 2019-10-31 DIAGNOSIS — D83.9 COMMON VARIABLE IMMUNODEFICIENCY (HCC): Primary | ICD-10-CM

## 2019-10-31 DIAGNOSIS — D80.2 IGA DEFICIENCY (HCC): ICD-10-CM

## 2019-10-31 PROCEDURE — 25010000002 IMMUNE GLOBULIN (HUMAN) PER 500 MG: Performed by: ALLERGY & IMMUNOLOGY

## 2019-10-31 PROCEDURE — 96366 THER/PROPH/DIAG IV INF ADDON: CPT | Performed by: NURSE PRACTITIONER

## 2019-10-31 PROCEDURE — 96365 THER/PROPH/DIAG IV INF INIT: CPT | Performed by: NURSE PRACTITIONER

## 2019-10-31 RX ORDER — EPINEPHRINE 0.3 MG/.3ML
0.3 INJECTION SUBCUTANEOUS ONCE
Status: CANCELLED
Start: 2019-10-31 | End: 2019-10-31

## 2019-10-31 RX ORDER — DIPHENHYDRAMINE HCL 25 MG
50 CAPSULE ORAL ONCE AS NEEDED
Status: CANCELLED
Start: 2019-10-31

## 2019-10-31 RX ORDER — SODIUM CHLORIDE 9 MG/ML
250 INJECTION, SOLUTION INTRAVENOUS ONCE
Status: COMPLETED | OUTPATIENT
Start: 2019-10-31 | End: 2019-10-31

## 2019-10-31 RX ORDER — AZITHROMYCIN 1 G
1 PACKET (EA) ORAL ONCE
COMMUNITY
End: 2020-03-05

## 2019-10-31 RX ORDER — SODIUM CHLORIDE 9 MG/ML
250 INJECTION, SOLUTION INTRAVENOUS ONCE
Status: CANCELLED | OUTPATIENT
Start: 2019-10-31

## 2019-10-31 RX ADMIN — SODIUM CHLORIDE 250 ML: 900 INJECTION, SOLUTION INTRAVENOUS at 11:40

## 2019-10-31 RX ADMIN — IMMUNE GLOBULIN INTRAVENOUS (HUMAN) 20 G: KIT at 11:53

## 2019-11-27 NOTE — PROGRESS NOTES
Postoperative visit    5/19/2017:  1. Left breast lumpectomy   2. Left axillary sentinel lymph node biopsy   3. Left axillary sentinel lymph node mapping    T1cN0 tumor, ER+ MI+ HER 2 -    Office visit: Incisions healing well, some minimal drainage from the breast incision with no evidence of infection.  Appointments made with medical and radiation oncology.  Return here as needed.     To  - please call patient to schedule pre op visit with DR. LIBBY ferrer

## 2019-12-03 ENCOUNTER — LAB (OUTPATIENT)
Dept: OTHER | Facility: HOSPITAL | Age: 74
End: 2019-12-03

## 2019-12-03 ENCOUNTER — OFFICE VISIT (OUTPATIENT)
Dept: ONCOLOGY | Facility: CLINIC | Age: 74
End: 2019-12-03

## 2019-12-03 VITALS
DIASTOLIC BLOOD PRESSURE: 92 MMHG | WEIGHT: 143.4 LBS | SYSTOLIC BLOOD PRESSURE: 142 MMHG | OXYGEN SATURATION: 96 % | BODY MASS INDEX: 23.86 KG/M2 | RESPIRATION RATE: 16 BRPM | HEART RATE: 82 BPM | TEMPERATURE: 98.2 F

## 2019-12-03 DIAGNOSIS — C50.512 MALIGNANT NEOPLASM OF LOWER-OUTER QUADRANT OF LEFT BREAST OF FEMALE, ESTROGEN RECEPTOR POSITIVE (HCC): ICD-10-CM

## 2019-12-03 DIAGNOSIS — C50.512 MALIGNANT NEOPLASM OF LOWER-OUTER QUADRANT OF LEFT BREAST OF FEMALE, ESTROGEN RECEPTOR POSITIVE (HCC): Primary | ICD-10-CM

## 2019-12-03 DIAGNOSIS — M81.0 OSTEOPOROSIS, UNSPECIFIED OSTEOPOROSIS TYPE, UNSPECIFIED PATHOLOGICAL FRACTURE PRESENCE: ICD-10-CM

## 2019-12-03 DIAGNOSIS — D83.9 COMMON VARIABLE IMMUNODEFICIENCY (HCC): ICD-10-CM

## 2019-12-03 DIAGNOSIS — Z17.0 MALIGNANT NEOPLASM OF LOWER-OUTER QUADRANT OF LEFT BREAST OF FEMALE, ESTROGEN RECEPTOR POSITIVE (HCC): Primary | ICD-10-CM

## 2019-12-03 DIAGNOSIS — Z17.0 MALIGNANT NEOPLASM OF LOWER-OUTER QUADRANT OF LEFT BREAST OF FEMALE, ESTROGEN RECEPTOR POSITIVE (HCC): ICD-10-CM

## 2019-12-03 LAB
ALBUMIN SERPL-MCNC: 4.4 G/DL (ref 3.5–5.2)
ALBUMIN/GLOB SERPL: 1.6 G/DL
ALP SERPL-CCNC: 48 U/L (ref 39–117)
ALT SERPL W P-5'-P-CCNC: 19 U/L (ref 1–33)
ANION GAP SERPL CALCULATED.3IONS-SCNC: 10.2 MMOL/L (ref 5–15)
AST SERPL-CCNC: 22 U/L (ref 1–32)
BASOPHILS # BLD AUTO: 0.04 10*3/MM3 (ref 0–0.2)
BASOPHILS NFR BLD AUTO: 0.6 % (ref 0–1.5)
BILIRUB SERPL-MCNC: 0.4 MG/DL (ref 0.1–1.2)
BUN BLD-MCNC: 15 MG/DL (ref 8–23)
BUN/CREAT SERPL: 17.6 (ref 7–25)
CALCIUM SPEC-SCNC: 9.7 MG/DL (ref 8.6–10.5)
CHLORIDE SERPL-SCNC: 107 MMOL/L (ref 98–107)
CO2 SERPL-SCNC: 27.8 MMOL/L (ref 22–29)
CREAT BLD-MCNC: 0.85 MG/DL (ref 0.57–1)
DEPRECATED RDW RBC AUTO: 47.8 FL (ref 37–54)
EOSINOPHIL # BLD AUTO: 0.09 10*3/MM3 (ref 0–0.4)
EOSINOPHIL NFR BLD AUTO: 1.3 % (ref 0.3–6.2)
ERYTHROCYTE [DISTWIDTH] IN BLOOD BY AUTOMATED COUNT: 12.8 % (ref 12.3–15.4)
GFR SERPL CREATININE-BSD FRML MDRD: 66 ML/MIN/1.73
GLOBULIN UR ELPH-MCNC: 2.7 GM/DL
GLUCOSE BLD-MCNC: 98 MG/DL (ref 65–99)
HCT VFR BLD AUTO: 36.3 % (ref 34–46.6)
HGB BLD-MCNC: 11.9 G/DL (ref 12–15.9)
IMM GRANULOCYTES # BLD AUTO: 0.01 10*3/MM3 (ref 0–0.05)
IMM GRANULOCYTES NFR BLD AUTO: 0.1 % (ref 0–0.5)
LYMPHOCYTES # BLD AUTO: 1.57 10*3/MM3 (ref 0.7–3.1)
LYMPHOCYTES NFR BLD AUTO: 22.6 % (ref 19.6–45.3)
MCH RBC QN AUTO: 32.9 PG (ref 26.6–33)
MCHC RBC AUTO-ENTMCNC: 32.8 G/DL (ref 31.5–35.7)
MCV RBC AUTO: 100.3 FL (ref 79–97)
MONOCYTES # BLD AUTO: 0.42 10*3/MM3 (ref 0.1–0.9)
MONOCYTES NFR BLD AUTO: 6 % (ref 5–12)
NEUTROPHILS # BLD AUTO: 4.82 10*3/MM3 (ref 1.7–7)
NEUTROPHILS NFR BLD AUTO: 69.4 % (ref 42.7–76)
NRBC BLD AUTO-RTO: 0 /100 WBC (ref 0–0.2)
PLATELET # BLD AUTO: 245 10*3/MM3 (ref 140–450)
PMV BLD AUTO: 9.4 FL (ref 6–12)
POTASSIUM BLD-SCNC: 4.3 MMOL/L (ref 3.5–5.2)
PROT SERPL-MCNC: 7.1 G/DL (ref 6–8.5)
RBC # BLD AUTO: 3.62 10*6/MM3 (ref 3.77–5.28)
SODIUM BLD-SCNC: 145 MMOL/L (ref 136–145)
WBC NRBC COR # BLD: 6.95 10*3/MM3 (ref 3.4–10.8)

## 2019-12-03 PROCEDURE — 85025 COMPLETE CBC W/AUTO DIFF WBC: CPT | Performed by: INTERNAL MEDICINE

## 2019-12-03 PROCEDURE — 36415 COLL VENOUS BLD VENIPUNCTURE: CPT

## 2019-12-03 PROCEDURE — 99214 OFFICE O/P EST MOD 30 MIN: CPT | Performed by: INTERNAL MEDICINE

## 2019-12-03 PROCEDURE — 80053 COMPREHEN METABOLIC PANEL: CPT | Performed by: INTERNAL MEDICINE

## 2019-12-03 RX ORDER — TAMOXIFEN CITRATE 20 MG/1
20 TABLET ORAL DAILY
Qty: 90 TABLET | Refills: 3 | Status: SHIPPED | OUTPATIENT
Start: 2019-12-03 | End: 2021-01-22

## 2019-12-03 NOTE — PROGRESS NOTES
Subjective     REASON FOR FOLLOW UP:   1. Stage I ( T1c,N0,M0 ) ER+,AZ+, HER 2 neg for a ductal carcinoma of the left breast status post lumpectomy 5/19/2017.  Patient started on adjuvant tamoxifen after the visit of 6/9/2017.  2.  Osteoporosis  3.  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  Development of draining seroma and poor incisional healing in the left breast that required further surgery on 7/3/2017.  5.  Genetic testing negative for any adverse mutations in June 2019    HISTORY OF PRESENT ILLNESS:  The patient is a 74 y.o. year old female who was noted to have a radiographic abnormality in the left breast in March.  She underwent an ultrasound-guided biopsy on 3/28/2017 showing invasive ductal carcinoma which was strongly hormone receptor positive and HER-2/trae negative.  She elected to undergo a lumpectomy procedure with Dr. Leon on 5/19/2017.  The tumor size was 1.2 x 1 x 0.9 cm and 3 sentinel lymph nodes were examined all of which were negative for metastatic cancer.    The patient was seen on 6/9/2017 to discuss postop adjuvant hormonal therapy.  She has a history of osteoporosis.  We discussed initiation of tamoxifen therapy which would have the advantage of helping to maintain her bone density.    She is here today for routine 6 month follow-up and seems to be getting along well on her tamoxifen.  She has been on the medication for 2 1/2 years now.    [facility] with no worrisome findings.  CT scan was also performed at that time and it apparently was stable.    Since her last visit here, Elaina underwent genetic testing with no detrimental mutations identified.    She continues to receive IVIG infusions for her common variable immunodeficiency.  History of Present Illness        ALLERGIES:    Allergies   Allergen Reactions   • Other Anaphylaxis     BLOOD PRODUCTS WITH IGA IN THEM   • Erythromycin GI  Intolerance     VOMITING   • Avelox [Moxifloxacin] Rash   • Levaquin [Levofloxacin] Rash         Review of Systems   Constitutional: Negative for activity change, appetite change, fatigue, fever and unexpected weight change.   HENT: Negative for hearing loss, nosebleeds, trouble swallowing and voice change.    Eyes: Negative for visual disturbance.   Respiratory: Negative for cough, shortness of breath and wheezing.    Cardiovascular: Negative for chest pain and palpitations.   Gastrointestinal: Negative for abdominal pain, diarrhea, nausea and vomiting.   Genitourinary: Negative for difficulty urinating, frequency, hematuria and urgency.   Musculoskeletal: Negative for back pain and neck pain.   Skin: Negative for rash.   Neurological: Negative for dizziness, seizures, syncope and headaches.   Hematological: Negative for adenopathy. Does not bruise/bleed easily.   Psychiatric/Behavioral: Negative for behavioral problems. The patient is not nervous/anxious.         Objective     Vitals:    12/03/19 1614   BP: 142/92   Pulse: 82   Resp: 16   Temp: 98.2 °F (36.8 °C)   SpO2: 96%   Weight: 65 kg (143 lb 6.4 oz)   PainSc: 0-No pain     Current Status 12/3/2019   ECOG score 0       Physical Exam   Constitutional: She is oriented to person, place, and time. She appears well-developed and well-nourished. No distress.   HENT:   Head: Normocephalic.   Eyes: Conjunctivae and EOM are normal. Pupils are equal, round, and reactive to light. No scleral icterus.   Neck: Normal range of motion. Neck supple. No JVD present. No thyromegaly present.   Cardiovascular: Normal rate and regular rhythm. Exam reveals no gallop and no friction rub.   No murmur heard.  Pulmonary/Chest: Effort normal and breath sounds normal. She has no wheezes. She has no rales. Right breast exhibits no mass, no nipple discharge and no skin change. Left breast exhibits skin change. Left breast exhibits no mass and no nipple discharge.   Left breast shows  healed lumpectomy scar and radiation tattoos.   Abdominal: Soft. Bowel sounds are normal. She exhibits no distension and no mass. There is no tenderness.   Musculoskeletal: Normal range of motion. She exhibits no edema or deformity.   Lymphadenopathy:     She has no cervical adenopathy.   Neurological: She is alert and oriented to person, place, and time. She has normal reflexes. No cranial nerve deficit.   Skin: Skin is warm and dry. No rash noted. No erythema.   Psychiatric: She has a normal mood and affect. Her behavior is normal. Judgment normal.         RECENT LABS:  Hematology WBC   Date Value Ref Range Status   12/03/2019 6.95 3.40 - 10.80 10*3/mm3 Final     RBC   Date Value Ref Range Status   12/03/2019 3.62 (L) 3.77 - 5.28 10*6/mm3 Final     Hemoglobin   Date Value Ref Range Status   12/03/2019 11.9 (L) 12.0 - 15.9 g/dL Final     Hematocrit   Date Value Ref Range Status   12/03/2019 36.3 34.0 - 46.6 % Final     Platelets   Date Value Ref Range Status   12/03/2019 245 140 - 450 10*3/mm3 Final          Assessment/Plan     1.  Stage I, hormone receptor positive, HER-2/trae negative invasive ductal carcinoma the left breast status post lumpectomy. Patient was started on adjuvant hormonal therapy with tamoxifen 20 mg daily in June 2017 and seems to be tolerating it well.  2.  Osteoporosis on her last DEXA scan.  3.  Common variable immunodeficiency requiring monthly IV immunoglobulin infusions.  4.  Mild anemia. Stable    Plan  1.  Continue tamoxifen 20 mg daily with plans to continue treatment for 5 years through June 2022.  2.  Return for follow-up in 6 months.  3.  Bilateral screening mammograms be due again in April 2020.

## 2019-12-05 ENCOUNTER — INFUSION (OUTPATIENT)
Dept: ONCOLOGY | Facility: HOSPITAL | Age: 74
End: 2019-12-05

## 2019-12-05 VITALS
SYSTOLIC BLOOD PRESSURE: 114 MMHG | OXYGEN SATURATION: 95 % | WEIGHT: 144.8 LBS | DIASTOLIC BLOOD PRESSURE: 74 MMHG | HEART RATE: 79 BPM | BODY MASS INDEX: 24.1 KG/M2 | TEMPERATURE: 97.6 F

## 2019-12-05 DIAGNOSIS — D80.2 IGA DEFICIENCY (HCC): ICD-10-CM

## 2019-12-05 DIAGNOSIS — D83.9 COMMON VARIABLE IMMUNODEFICIENCY (HCC): Primary | ICD-10-CM

## 2019-12-05 PROCEDURE — 96365 THER/PROPH/DIAG IV INF INIT: CPT | Performed by: NURSE PRACTITIONER

## 2019-12-05 PROCEDURE — 25010000002 IMMUNE GLOBULIN (HUMAN) PER 500 MG: Performed by: ALLERGY & IMMUNOLOGY

## 2019-12-05 PROCEDURE — 96366 THER/PROPH/DIAG IV INF ADDON: CPT | Performed by: NURSE PRACTITIONER

## 2019-12-05 RX ORDER — DIPHENHYDRAMINE HCL 25 MG
50 CAPSULE ORAL ONCE AS NEEDED
Status: CANCELLED
Start: 2019-12-22

## 2019-12-05 RX ORDER — EPINEPHRINE 0.3 MG/.3ML
0.3 INJECTION SUBCUTANEOUS ONCE
Status: CANCELLED
Start: 2019-12-22 | End: 2019-12-22

## 2019-12-05 RX ORDER — SODIUM CHLORIDE 9 MG/ML
250 INJECTION, SOLUTION INTRAVENOUS ONCE
Status: CANCELLED | OUTPATIENT
Start: 2019-12-22

## 2019-12-05 RX ORDER — SODIUM CHLORIDE 9 MG/ML
250 INJECTION, SOLUTION INTRAVENOUS ONCE
Status: COMPLETED | OUTPATIENT
Start: 2019-12-05 | End: 2019-12-05

## 2019-12-05 RX ADMIN — SODIUM CHLORIDE 250 ML: 900 INJECTION, SOLUTION INTRAVENOUS at 12:03

## 2019-12-05 RX ADMIN — IMMUNE GLOBULIN INTRAVENOUS (HUMAN) 20 G: KIT at 12:03

## 2020-01-02 ENCOUNTER — INFUSION (OUTPATIENT)
Dept: ONCOLOGY | Facility: HOSPITAL | Age: 75
End: 2020-01-02

## 2020-01-02 VITALS
BODY MASS INDEX: 24.33 KG/M2 | SYSTOLIC BLOOD PRESSURE: 121 MMHG | DIASTOLIC BLOOD PRESSURE: 77 MMHG | RESPIRATION RATE: 16 BRPM | WEIGHT: 146.2 LBS | OXYGEN SATURATION: 94 % | HEART RATE: 70 BPM | TEMPERATURE: 97.3 F

## 2020-01-02 DIAGNOSIS — D80.2 IGA DEFICIENCY (HCC): ICD-10-CM

## 2020-01-02 DIAGNOSIS — D83.9 COMMON VARIABLE IMMUNODEFICIENCY (HCC): Primary | ICD-10-CM

## 2020-01-02 PROCEDURE — 25010000002 IMMUNE GLOBULIN (HUMAN) PER 500 MG: Performed by: ALLERGY & IMMUNOLOGY

## 2020-01-02 PROCEDURE — 96365 THER/PROPH/DIAG IV INF INIT: CPT | Performed by: NURSE PRACTITIONER

## 2020-01-02 PROCEDURE — 96366 THER/PROPH/DIAG IV INF ADDON: CPT | Performed by: NURSE PRACTITIONER

## 2020-01-02 RX ORDER — EPINEPHRINE 0.3 MG/.3ML
0.3 INJECTION SUBCUTANEOUS ONCE
Status: CANCELLED
Start: 2020-01-23

## 2020-01-02 RX ORDER — DIPHENHYDRAMINE HCL 25 MG
50 CAPSULE ORAL ONCE AS NEEDED
Status: DISCONTINUED | OUTPATIENT
Start: 2020-01-02 | End: 2020-01-02 | Stop reason: HOSPADM

## 2020-01-02 RX ORDER — SODIUM CHLORIDE 9 MG/ML
250 INJECTION, SOLUTION INTRAVENOUS ONCE
Status: COMPLETED | OUTPATIENT
Start: 2020-01-02 | End: 2020-01-02

## 2020-01-02 RX ORDER — EPINEPHRINE 0.3 MG/.3ML
0.3 INJECTION SUBCUTANEOUS ONCE
Status: DISCONTINUED | OUTPATIENT
Start: 2020-01-02 | End: 2020-01-02 | Stop reason: HOSPADM

## 2020-01-02 RX ORDER — DIPHENHYDRAMINE HCL 25 MG
50 CAPSULE ORAL ONCE AS NEEDED
Status: CANCELLED
Start: 2020-01-23

## 2020-01-02 RX ORDER — SODIUM CHLORIDE 9 MG/ML
250 INJECTION, SOLUTION INTRAVENOUS ONCE
Status: CANCELLED | OUTPATIENT
Start: 2020-01-23

## 2020-01-02 RX ADMIN — SODIUM CHLORIDE 250 ML: 900 INJECTION, SOLUTION INTRAVENOUS at 12:21

## 2020-01-02 RX ADMIN — IMMUNE GLOBULIN INTRAVENOUS (HUMAN) 20 G: KIT at 12:21

## 2020-01-30 ENCOUNTER — INFUSION (OUTPATIENT)
Dept: ONCOLOGY | Facility: HOSPITAL | Age: 75
End: 2020-01-30

## 2020-01-30 VITALS
HEART RATE: 72 BPM | TEMPERATURE: 97.4 F | RESPIRATION RATE: 16 BRPM | BODY MASS INDEX: 24.33 KG/M2 | WEIGHT: 146.2 LBS | SYSTOLIC BLOOD PRESSURE: 122 MMHG | OXYGEN SATURATION: 95 % | DIASTOLIC BLOOD PRESSURE: 72 MMHG

## 2020-01-30 DIAGNOSIS — D83.9 COMMON VARIABLE IMMUNODEFICIENCY (HCC): Primary | ICD-10-CM

## 2020-01-30 DIAGNOSIS — D80.2 IGA DEFICIENCY (HCC): ICD-10-CM

## 2020-01-30 PROCEDURE — 25010000002 IMMUNE GLOBULIN (HUMAN) PER 500 MG: Performed by: ALLERGY & IMMUNOLOGY

## 2020-01-30 PROCEDURE — 96365 THER/PROPH/DIAG IV INF INIT: CPT | Performed by: NURSE PRACTITIONER

## 2020-01-30 PROCEDURE — 96366 THER/PROPH/DIAG IV INF ADDON: CPT | Performed by: NURSE PRACTITIONER

## 2020-01-30 RX ORDER — DIPHENHYDRAMINE HCL 25 MG
50 CAPSULE ORAL ONCE AS NEEDED
Status: CANCELLED
Start: 2020-02-27

## 2020-01-30 RX ORDER — SODIUM CHLORIDE 9 MG/ML
250 INJECTION, SOLUTION INTRAVENOUS ONCE
Status: COMPLETED | OUTPATIENT
Start: 2020-01-30 | End: 2020-01-30

## 2020-01-30 RX ORDER — SODIUM CHLORIDE 9 MG/ML
250 INJECTION, SOLUTION INTRAVENOUS ONCE
Status: CANCELLED | OUTPATIENT
Start: 2020-02-27

## 2020-01-30 RX ORDER — EPINEPHRINE 0.3 MG/.3ML
0.3 INJECTION SUBCUTANEOUS ONCE
Status: CANCELLED
Start: 2020-02-27

## 2020-01-30 RX ADMIN — SODIUM CHLORIDE 250 ML: 900 INJECTION, SOLUTION INTRAVENOUS at 12:07

## 2020-01-30 RX ADMIN — IMMUNE GLOBULIN INTRAVENOUS (HUMAN) 20 G: KIT at 12:03

## 2020-03-05 ENCOUNTER — INFUSION (OUTPATIENT)
Dept: ONCOLOGY | Facility: HOSPITAL | Age: 75
End: 2020-03-05

## 2020-03-05 VITALS
WEIGHT: 144.8 LBS | BODY MASS INDEX: 24.1 KG/M2 | DIASTOLIC BLOOD PRESSURE: 73 MMHG | TEMPERATURE: 98.5 F | HEART RATE: 71 BPM | SYSTOLIC BLOOD PRESSURE: 109 MMHG | OXYGEN SATURATION: 94 % | RESPIRATION RATE: 16 BRPM

## 2020-03-05 DIAGNOSIS — D83.9 COMMON VARIABLE IMMUNODEFICIENCY (HCC): Primary | ICD-10-CM

## 2020-03-05 DIAGNOSIS — D80.2 IGA DEFICIENCY (HCC): ICD-10-CM

## 2020-03-05 LAB — IGG1 SER-MCNC: 963 MG/DL (ref 700–1600)

## 2020-03-05 PROCEDURE — 96366 THER/PROPH/DIAG IV INF ADDON: CPT

## 2020-03-05 PROCEDURE — 25010000002 IMMUNE GLOBULIN (HUMAN) PER 500 MG: Performed by: ALLERGY & IMMUNOLOGY

## 2020-03-05 PROCEDURE — 96365 THER/PROPH/DIAG IV INF INIT: CPT

## 2020-03-05 PROCEDURE — 82784 ASSAY IGA/IGD/IGG/IGM EACH: CPT | Performed by: ALLERGY & IMMUNOLOGY

## 2020-03-05 RX ORDER — SODIUM CHLORIDE 9 MG/ML
250 INJECTION, SOLUTION INTRAVENOUS ONCE
Status: COMPLETED | OUTPATIENT
Start: 2020-03-05 | End: 2020-03-05

## 2020-03-05 RX ORDER — EPINEPHRINE 0.3 MG/.3ML
0.3 INJECTION SUBCUTANEOUS ONCE
Status: CANCELLED
Start: 2020-03-26

## 2020-03-05 RX ORDER — SODIUM CHLORIDE 9 MG/ML
250 INJECTION, SOLUTION INTRAVENOUS ONCE
Status: CANCELLED | OUTPATIENT
Start: 2020-03-26

## 2020-03-05 RX ORDER — DIPHENHYDRAMINE HCL 25 MG
50 CAPSULE ORAL ONCE AS NEEDED
Status: CANCELLED
Start: 2020-03-26

## 2020-03-05 RX ADMIN — SODIUM CHLORIDE 250 ML: 900 INJECTION, SOLUTION INTRAVENOUS at 11:37

## 2020-03-05 RX ADMIN — IMMUNE GLOBULIN INTRAVENOUS (HUMAN) 20 G: KIT at 12:14

## 2020-03-10 ENCOUNTER — OFFICE VISIT (OUTPATIENT)
Dept: GASTROENTEROLOGY | Facility: CLINIC | Age: 75
End: 2020-03-10

## 2020-03-10 VITALS
WEIGHT: 146.6 LBS | DIASTOLIC BLOOD PRESSURE: 84 MMHG | BODY MASS INDEX: 24.43 KG/M2 | TEMPERATURE: 97.9 F | SYSTOLIC BLOOD PRESSURE: 158 MMHG | HEIGHT: 65 IN

## 2020-03-10 DIAGNOSIS — K21.9 GASTROESOPHAGEAL REFLUX DISEASE, ESOPHAGITIS PRESENCE NOT SPECIFIED: ICD-10-CM

## 2020-03-10 DIAGNOSIS — C50.512 MALIGNANT NEOPLASM OF LOWER-OUTER QUADRANT OF LEFT BREAST OF FEMALE, ESTROGEN RECEPTOR POSITIVE (HCC): ICD-10-CM

## 2020-03-10 DIAGNOSIS — D83.9 COMMON VARIABLE IMMUNODEFICIENCY (HCC): ICD-10-CM

## 2020-03-10 DIAGNOSIS — A04.8 OTHER SPECIFIED BACTERIAL INTESTINAL INFECTIONS: ICD-10-CM

## 2020-03-10 DIAGNOSIS — K58.0 IRRITABLE BOWEL SYNDROME WITH DIARRHEA: Primary | ICD-10-CM

## 2020-03-10 DIAGNOSIS — Z17.0 MALIGNANT NEOPLASM OF LOWER-OUTER QUADRANT OF LEFT BREAST OF FEMALE, ESTROGEN RECEPTOR POSITIVE (HCC): ICD-10-CM

## 2020-03-10 PROCEDURE — 99214 OFFICE O/P EST MOD 30 MIN: CPT | Performed by: NURSE PRACTITIONER

## 2020-03-10 RX ORDER — PANTOPRAZOLE SODIUM 40 MG/1
40 TABLET, DELAYED RELEASE ORAL DAILY
Qty: 90 TABLET | Refills: 3 | Status: SHIPPED | OUTPATIENT
Start: 2020-03-10 | End: 2020-04-09 | Stop reason: SDUPTHER

## 2020-03-10 NOTE — PROGRESS NOTES
Chief Complaint   Patient presents with   • Irritable Bowel Syndrome       Elaina Tom is a  74 y.o. female here for a follow up visit for IBS.    HPI  74-year-old female presents today for follow-up visit for IBS-D and GERD.  She is a patient of Dr. Rogers.  She was last seen in the office on 10/18/2018.  She has a history of breast cancer and a family history of colon polyps.  She does me because of this breast cancer she has become very leery and paranoid of her symptoms at all.  She is sure she is probably overreacting but she is been really concerned lately with her change in bowel habits.  She does me she has a history of irritable bowel syndrome but lately her stools have been much more loose and she is noticed almost like sludge in the bottom of the toilet bowl.  She does me her stools are brown and watery.  She is on a daily probiotic.  She also takes Bentyl as needed.  She also is a history of GERD and tells me she does really well on Protonix 40 mg once daily.  Last EGD and colonoscopy was done on 5/23/2019.  EGD had showed gastritis with duodenitis but was otherwise unremarkable.  Her colonoscopy was negative except for some internal and external hemorrhoids.  She tells me after her scopes that she went out to eat and she just got really sick and she tells me it took about several weeks for her to seem to recover after that last year.  She does not take any fiber supplementation.  She denies any recent antibiotics.  She denies any dysphagia, abdominal pain, nausea and vomiting, constipation, rectal bleeding or melena.  She notes her appetite is good and her weight is stable.  Tells me her daughter has been dealing with breast cancer and going through chemo and radiation and so the patient has been been very stressed.  Past Medical History:   Diagnosis Date   • Allergic rhinitis    • Anxiety    • Arthritis    • Asthma     ONLY HAS ISSUES WHEN HAS RESPIRATORY INFECTION   • Cancer (CMS/[facility]) 2017     BREAST-LEFT   • Cataract    • CVID (common variable immunodeficiency) (CMS/HCC)    • External hemorrhoids    • Gastritis    • GERD (gastroesophageal reflux disease)    • H/O CT scan of abdomen     PELVIS AND CHEST: UNREMARKABLE   • Hiatal hernia    • History of colon polyps    • History of giardia infection    • History of Helicobacter pylori infection    • IBS (irritable bowel syndrome)    • IgA deficiency (CMS/HCC)     CANNOT HAVE ANYTHING WITH IGA IN IT WILL CAUSE ANAPHYLAXIS   • Immunoglobulin deficiency (CMS/HCC)    • Peptic ulcer disease    • PONV (postoperative nausea and vomiting)    • Torn meniscus    • Wound discharge     LT BREAST       Past Surgical History:   Procedure Laterality Date   • ABDOMINAL WALL ABSCESS INCISION AND DRAINAGE Left 7/3/2017    Procedure: BREAST ABSCESS INCISION AND DRAINAGE AND WOUND DEBRIDMENT;  Surgeon: Steve Leon MD;  Location: [facility] OR [facility] [place];  Service:    • BREAST BIOPSY Left 02/26/2007    Left breast needle localization biopsy; Dr. Steve Leon   • BREAST BIOPSY Left 02/02/2007    Left breast stereotactic vacuum assisted core biopsy with marker placement; Dr. Steve Leon   • BREAST BIOPSY Left 02/07/2003    Left breast needle localization biopsy; Dr. Steve Leon   • BREAST LUMPECTOMY WITH SENTINEL NODE BIOPSY Left 5/19/2017    Procedure: BREAST LUMPECTOMY WITH SENTINEL NODE BIOPSY & MAMMO NEEDLE LOCALIZATION;  Surgeon: Steve Leon MD;  Location: [facility] OR [facility] [place];  Service:    • COLONOSCOPY  01/10/2014    NTEH, TORTS, STOOL   • COLONOSCOPY  08/24/2010    IH, TORT, BX-   • COLONOSCOPY N/A 5/23/2019    Procedure: COLONOSCOPY TO CECUM AND TERM. ILEUM WITH BIOPSIES;  Surgeon: Rigoberto Rogers MD;  Location: [facility] ENDOSCOPY;  Service: Gastroenterology   • ENDOSCOPY  01/10/2014    Z-LINE REGULAR, 35 CM FROM INCISORS, HH, GASTRITIS, NO H-PYLORI   • ENDOSCOPY  08/24/2010    GASTRITIS   • ENDOSCOPY N/A 10/18/2016    Gastritis, duodenitis, HH    • ENDOSCOPY N/A  5/23/2019    Procedure: ESOPHAGOGASTRODUODENOSCOPY WITH BIOPSIES AND RAPID UREASE;  Surgeon: Rigoberto Rogers MD;  Location: [facility] ENDOSCOPY;  Service: Gastroenterology   • ENDOSCOPY AND COLONOSCOPY N/A 08/23/2007    Z-line irregular, normal esophagus, bilious gastric fluid, gastritis, hiatal hernia, normal duodenal bulb and 2nd part of the duodenum; non-thrombosed external hemorrhoids, torts, one 5 mm polyp in the mid sigmoid colon, stool in the sigmoid colon, descending colon, transverse colon, ascending colon and in the cecum-Dr. Rigoberto Rogers   • KNEE SURGERY Left 01/2015   • SEPTOPLASTY     • TONSILLECTOMY         Scheduled Meds:    Continuous Infusions:  No current facility-administered medications for this visit.     PRN Meds:.    Allergies   Allergen Reactions   • Other Anaphylaxis     BLOOD PRODUCTS WITH IGA IN THEM   • Erythromycin GI Intolerance     VOMITING   • Avelox [Moxifloxacin] Rash   • Levaquin [Levofloxacin] Rash       Social History     Socioeconomic History   • Marital status:      Spouse name: Ortiz   • Number of children: 3   • Years of education: College   • Highest education level: Not on file   Occupational History     Employer: RETIRED   Tobacco Use   • Smoking status: Never Smoker   • Smokeless tobacco: Never Used   Substance and Sexual Activity   • Alcohol use: Yes     Alcohol/week: 1.0 standard drinks     Types: 1 Glasses of wine per week     Comment: occasional   • Drug use: No   • Sexual activity: Defer       Family History   Problem Relation Age of Onset   • Other Father         POLYP   • Colon polyps Father    • Prostate cancer Father 68   • Pancreatitis Daughter    • Malig Hyperthermia Neg Hx        Review of Systems   Constitutional: Negative for appetite change, chills, diaphoresis, fatigue, fever and unexpected weight change.   HENT: Negative for nosebleeds, postnasal drip, sore throat, trouble swallowing and voice change.    Respiratory: Negative for cough,  choking, chest tightness, shortness of breath and wheezing.    Cardiovascular: Negative for chest pain, palpitations and leg swelling.   Gastrointestinal: Positive for diarrhea. Negative for abdominal distention, abdominal pain, anal bleeding, blood in stool, constipation, nausea, rectal pain and vomiting.   Endocrine: Negative for polydipsia, polyphagia and polyuria.   Musculoskeletal: Negative for gait problem.   Skin: Negative for rash and wound.   Allergic/Immunologic: Negative for food allergies.   Neurological: Negative for dizziness, speech difficulty and light-headedness.   Psychiatric/Behavioral: Negative for confusion, self-injury, sleep disturbance and suicidal ideas.       Vitals:    03/10/20 1127   BP: 158/84   Temp: 97.9 °F (36.6 °C)       Physical Exam   Constitutional: She is oriented to person, place, and time. She appears well-developed and well-nourished. She does not appear ill. No distress.   HENT:   Head: Normocephalic.   Eyes: Pupils are equal, round, and reactive to light.   Cardiovascular: Normal rate, regular rhythm and normal heart sounds.   Pulmonary/Chest: Effort normal and breath sounds normal.   Abdominal: Soft. Bowel sounds are normal. She exhibits no distension and no mass. There is no hepatosplenomegaly. There is no tenderness. There is no rebound and no guarding. No hernia.   Musculoskeletal: Normal range of motion.   Neurological: She is alert and oriented to person, place, and time.   Skin: Skin is warm and dry.   Psychiatric: She has a normal mood and affect. Her speech is normal and behavior is normal. Judgment normal.       No radiology results for the last 7 days     Assessment and plan     1. Irritable bowel syndrome with diarrhea  - CBC & Differential  - Comprehensive Metabolic Panel  - Amylase  - Lipase  - Gastrointestinal Panel, PCR - Stool, Per Rectum  - Clostridium Difficile Toxin, PCR - Stool, Per Rectum  - Fecal Lactoferrin - Stool, Per Rectum    2. Gastroesophageal  reflux disease, esophagitis presence not specified    3. Common variable immunodeficiency (CMS/HCC)    4. Malignant neoplasm of lower-outer quadrant of left breast of female, estrogen receptor positive (CMS/HCC)    5. Other specified bacterial intestinal infections   - Gastrointestinal Panel, PCR - Stool, Per Rectum    Given her history and current symptoms will go ahead and check labs and stool studies.  Sounds like to me that stress is really affecting her IBS.  Continue the Bentyl as needed.  Add a daily fiber supplement.  GERD seems well controlled on Protonix 40 mg once daily.  Continue GERD precautions.  May need to consider Xifaxan if her stool studies and labs come back unremarkable.  Patient to follow-up with me in 1 month.

## 2020-03-11 ENCOUNTER — TELEPHONE (OUTPATIENT)
Dept: GASTROENTEROLOGY | Facility: CLINIC | Age: 75
End: 2020-03-11

## 2020-03-11 DIAGNOSIS — K58.0 IRRITABLE BOWEL SYNDROME WITH DIARRHEA: Primary | ICD-10-CM

## 2020-03-11 LAB
ALBUMIN SERPL-MCNC: 4.1 G/DL (ref 3.5–5.2)
ALBUMIN/GLOB SERPL: 1.6 G/DL
ALP SERPL-CCNC: 49 U/L (ref 39–117)
ALT SERPL-CCNC: 16 U/L (ref 1–33)
AMYLASE SERPL-CCNC: 82 U/L (ref 28–100)
AST SERPL-CCNC: 17 U/L (ref 1–32)
BASOPHILS # BLD AUTO: 0.04 10*3/MM3 (ref 0–0.2)
BASOPHILS NFR BLD AUTO: 1.1 % (ref 0–1.5)
BILIRUB SERPL-MCNC: 0.5 MG/DL (ref 0.2–1.2)
BUN SERPL-MCNC: 15 MG/DL (ref 8–23)
BUN/CREAT SERPL: 20.5 (ref 7–25)
CALCIUM SERPL-MCNC: 9.2 MG/DL (ref 8.6–10.5)
CHLORIDE SERPL-SCNC: 105 MMOL/L (ref 98–107)
CO2 SERPL-SCNC: 26.1 MMOL/L (ref 22–29)
CREAT SERPL-MCNC: 0.73 MG/DL (ref 0.57–1)
EOSINOPHIL # BLD AUTO: 0.04 10*3/MM3 (ref 0–0.4)
EOSINOPHIL NFR BLD AUTO: 1.1 % (ref 0.3–6.2)
ERYTHROCYTE [DISTWIDTH] IN BLOOD BY AUTOMATED COUNT: 12.3 % (ref 12.3–15.4)
GLOBULIN SER CALC-MCNC: 2.6 GM/DL
GLUCOSE SERPL-MCNC: 93 MG/DL (ref 65–99)
HCT VFR BLD AUTO: 35.6 % (ref 34–46.6)
HGB BLD-MCNC: 11.7 G/DL (ref 12–15.9)
IMM GRANULOCYTES # BLD AUTO: 0.01 10*3/MM3 (ref 0–0.05)
IMM GRANULOCYTES NFR BLD AUTO: 0.3 % (ref 0–0.5)
LIPASE SERPL-CCNC: 82 U/L (ref 13–60)
LYMPHOCYTES # BLD AUTO: 1.18 10*3/MM3 (ref 0.7–3.1)
LYMPHOCYTES NFR BLD AUTO: 31.1 % (ref 19.6–45.3)
MCH RBC QN AUTO: 32.6 PG (ref 26.6–33)
MCHC RBC AUTO-ENTMCNC: 32.9 G/DL (ref 31.5–35.7)
MCV RBC AUTO: 99.2 FL (ref 79–97)
MONOCYTES # BLD AUTO: 0.27 10*3/MM3 (ref 0.1–0.9)
MONOCYTES NFR BLD AUTO: 7.1 % (ref 5–12)
NEUTROPHILS # BLD AUTO: 2.25 10*3/MM3 (ref 1.7–7)
NEUTROPHILS NFR BLD AUTO: 59.3 % (ref 42.7–76)
NRBC BLD AUTO-RTO: 0 /100 WBC (ref 0–0.2)
PLATELET # BLD AUTO: 233 10*3/MM3 (ref 140–450)
POTASSIUM SERPL-SCNC: 4.2 MMOL/L (ref 3.5–5.2)
PROT SERPL-MCNC: 6.7 G/DL (ref 6–8.5)
RBC # BLD AUTO: 3.59 10*6/MM3 (ref 3.77–5.28)
SODIUM SERPL-SCNC: 142 MMOL/L (ref 136–145)
WBC # BLD AUTO: 3.79 10*3/MM3 (ref 3.4–10.8)

## 2020-03-11 NOTE — TELEPHONE ENCOUNTER
Call to pt . Advise per M Kendy note.  Verb understanding  Lab appt scheduled for 3/26 @ 1pm.  Order placed for lipase - message to ALEX Venturar.

## 2020-03-11 NOTE — TELEPHONE ENCOUNTER
----- Message from BEATRICE Tuttle sent at 3/11/2020  9:23 AM EDT -----  Please call the patient and let her know her lab results.  Labs showed a good hemoglobin at 11.7.  White count was normal.  Only thing that slightly off is she has a slightly elevated lipase at 82.  I would like to repeat the lipase in 2 weeks.  Otherwise all of her labs look good.  We will wait for her stool studies.

## 2020-03-13 LAB — C DIFF TOX GENS STL QL NAA+PROBE: NEGATIVE

## 2020-03-16 LAB

## 2020-03-19 ENCOUNTER — RESULTS ENCOUNTER (OUTPATIENT)
Dept: GASTROENTEROLOGY | Facility: CLINIC | Age: 75
End: 2020-03-19

## 2020-03-19 DIAGNOSIS — K58.0 IRRITABLE BOWEL SYNDROME WITH DIARRHEA: ICD-10-CM

## 2020-03-27 LAB — LIPASE SERPL-CCNC: 84 U/L (ref 14–85)

## 2020-04-02 ENCOUNTER — INFUSION (OUTPATIENT)
Dept: ONCOLOGY | Facility: HOSPITAL | Age: 75
End: 2020-04-02

## 2020-04-02 VITALS
OXYGEN SATURATION: 98 % | WEIGHT: 146.6 LBS | BODY MASS INDEX: 24.4 KG/M2 | HEART RATE: 63 BPM | SYSTOLIC BLOOD PRESSURE: 120 MMHG | RESPIRATION RATE: 16 BRPM | DIASTOLIC BLOOD PRESSURE: 78 MMHG | TEMPERATURE: 98.3 F

## 2020-04-02 DIAGNOSIS — D80.2 IGA DEFICIENCY (HCC): ICD-10-CM

## 2020-04-02 DIAGNOSIS — D83.9 COMMON VARIABLE IMMUNODEFICIENCY (HCC): Primary | ICD-10-CM

## 2020-04-02 PROCEDURE — 96366 THER/PROPH/DIAG IV INF ADDON: CPT

## 2020-04-02 PROCEDURE — 96365 THER/PROPH/DIAG IV INF INIT: CPT

## 2020-04-02 PROCEDURE — 25010000002 IMMUNE GLOBULIN (HUMAN) PER 500 MG: Performed by: ALLERGY & IMMUNOLOGY

## 2020-04-02 RX ORDER — EPINEPHRINE 0.3 MG/.3ML
0.3 INJECTION SUBCUTANEOUS ONCE
Status: CANCELLED
Start: 2020-04-23

## 2020-04-02 RX ORDER — DIPHENHYDRAMINE HCL 25 MG
50 CAPSULE ORAL ONCE AS NEEDED
Status: CANCELLED
Start: 2020-04-23

## 2020-04-02 RX ORDER — SODIUM CHLORIDE 9 MG/ML
250 INJECTION, SOLUTION INTRAVENOUS ONCE
Status: CANCELLED | OUTPATIENT
Start: 2020-04-23

## 2020-04-02 RX ORDER — SODIUM CHLORIDE 9 MG/ML
250 INJECTION, SOLUTION INTRAVENOUS ONCE
Status: COMPLETED | OUTPATIENT
Start: 2020-04-02 | End: 2020-04-02

## 2020-04-02 RX ADMIN — Medication 20 G: at 12:23

## 2020-04-02 RX ADMIN — SODIUM CHLORIDE 250 ML: 900 INJECTION, SOLUTION INTRAVENOUS at 11:35

## 2020-04-09 ENCOUNTER — TELEMEDICINE (OUTPATIENT)
Dept: GASTROENTEROLOGY | Facility: CLINIC | Age: 75
End: 2020-04-09

## 2020-04-09 DIAGNOSIS — K21.9 GASTROESOPHAGEAL REFLUX DISEASE, ESOPHAGITIS PRESENCE NOT SPECIFIED: ICD-10-CM

## 2020-04-09 DIAGNOSIS — R74.8 ELEVATED LIPASE: ICD-10-CM

## 2020-04-09 DIAGNOSIS — Z85.09 PERSONAL HISTORY OF MALIGNANT NEOPLASM OF OTHER DIGESTIVE ORGANS: ICD-10-CM

## 2020-04-09 DIAGNOSIS — K58.0 IRRITABLE BOWEL SYNDROME WITH DIARRHEA: ICD-10-CM

## 2020-04-09 DIAGNOSIS — R10.10 PAIN OF UPPER ABDOMEN: Primary | ICD-10-CM

## 2020-04-09 PROCEDURE — 99214 OFFICE O/P EST MOD 30 MIN: CPT | Performed by: NURSE PRACTITIONER

## 2020-04-09 RX ORDER — PANTOPRAZOLE SODIUM 20 MG/1
20 TABLET, DELAYED RELEASE ORAL DAILY
Qty: 90 TABLET | Refills: 3 | Status: SHIPPED | OUTPATIENT
Start: 2020-04-09 | End: 2020-07-09

## 2020-04-09 NOTE — PROGRESS NOTES
No chief complaint on file.      Elaina Tom is a  74 y.o. female here for a follow up visit for IBS.    HPI  74-year-old female presents today for a follow-up visit for IBS-D, GERD and abdominal pain.  She is a patient of Dr. Rogers.  She was last seen in the office on 3/10/2020.  She is recently had some labs and stool studies done which were unremarkable except for some slightly elevated lipase levels done at 82 and 184.  Patient has a history of IBS-D and GERD as well as a personal history of breast cancer.  She does not with her IBS-D she has been trying to eat more bran muffins and she does feel like this is helped somewhat and the Bentyl also helps with the pain and cramping but she still having issues.  She does me she will have lots of upper abdominal pain and bloating and lots of diarrhea with urgency and there is no rhyme or reason to it.  She is on probiotics and admits she probably needs to switch up the brand.  She has not taken any Gas-X.  She also is a history of GERD and admits she has been doing really well on Protonix 40 mg once daily.  However she is read the side effects and really would like to come off of it.  She would like to see how she should best manage that.  She does have a history of breast cancer and she said this really worries her all the symptoms and she is worried about maybe having pancreatic cancer or something.  She is been doing a lot of reading and talking to her daughter.  She denies any dysphagia, reflux, nausea and vomiting, constipation, rectal bleeding or melena.  She much appetite is good and her weight is stable.  She also is a history of CVID as well as IgA deficiency.  Past Medical History:   Diagnosis Date   • Allergic rhinitis    • Anxiety    • Arthritis    • Asthma     ONLY HAS ISSUES WHEN HAS RESPIRATORY INFECTION   • Cancer (CMS/HCC) 2017    BREAST-LEFT   • Cataract    • CVID (common variable immunodeficiency) (CMS/HCC)    • External hemorrhoids    •  Gastritis    • GERD (gastroesophageal reflux disease)    • H/O CT scan of abdomen     PELVIS AND CHEST: UNREMARKABLE   • Hiatal hernia    • History of colon polyps    • History of giardia infection    • History of Helicobacter pylori infection    • IBS (irritable bowel syndrome)    • IgA deficiency (CMS/HCC)     CANNOT HAVE ANYTHING WITH IGA IN IT WILL CAUSE ANAPHYLAXIS   • Immunoglobulin deficiency (CMS/HCC)    • Peptic ulcer disease    • PONV (postoperative nausea and vomiting)    • Torn meniscus    • Wound discharge     LT BREAST       Past Surgical History:   Procedure Laterality Date   • ABDOMINAL WALL ABSCESS INCISION AND DRAINAGE Left 7/3/2017    Procedure: BREAST ABSCESS INCISION AND DRAINAGE AND WOUND DEBRIDMENT;  Surgeon: Steve Leon MD;  Location: [facility] OR [facility] [place];  Service:    • BREAST BIOPSY Left 02/26/2007    Left breast needle localization biopsy; Dr. Steve Leon   • BREAST BIOPSY Left 02/02/2007    Left breast stereotactic vacuum assisted core biopsy with marker placement; Dr. Steve Leon   • BREAST BIOPSY Left 02/07/2003    Left breast needle localization biopsy; Dr. Steve Leon   • BREAST LUMPECTOMY WITH SENTINEL NODE BIOPSY Left 5/19/2017    Procedure: BREAST LUMPECTOMY WITH SENTINEL NODE BIOPSY & MAMMO NEEDLE LOCALIZATION;  Surgeon: Steve Leon MD;  Location: [facility] OR [facility] [place];  Service:    • COLONOSCOPY  01/10/2014    NTEH, TORTS, STOOL   • COLONOSCOPY  08/24/2010    IH, TORT, BX-   • COLONOSCOPY N/A 5/23/2019    Procedure: COLONOSCOPY TO CECUM AND TERM. ILEUM WITH BIOPSIES;  Surgeon: Rigoberto Rogers MD;  Location: [facility] ENDOSCOPY;  Service: Gastroenterology   • ENDOSCOPY  01/10/2014    Z-LINE REGULAR, 35 CM FROM INCISORS, HH, GASTRITIS, NO H-PYLORI   • ENDOSCOPY  08/24/2010    GASTRITIS   • ENDOSCOPY N/A 10/18/2016    Gastritis, duodenitis, HH    • ENDOSCOPY N/A 5/23/2019    Procedure: ESOPHAGOGASTRODUODENOSCOPY WITH BIOPSIES AND RAPID UREASE;  Surgeon: Rigoberto Rogers  MD MARIYA;  Location: [facility] ENDOSCOPY;  Service: Gastroenterology   • ENDOSCOPY AND COLONOSCOPY N/A 08/23/2007    Z-line irregular, normal esophagus, bilious gastric fluid, gastritis, hiatal hernia, normal duodenal bulb and 2nd part of the duodenum; non-thrombosed external hemorrhoids, torts, one 5 mm polyp in the mid sigmoid colon, stool in the sigmoid colon, descending colon, transverse colon, ascending colon and in the cecum-Dr. Rigoberto Rogers   • KNEE SURGERY Left 01/2015   • SEPTOPLASTY     • TONSILLECTOMY         Scheduled Meds:    Continuous Infusions:  No current facility-administered medications for this visit.     PRN Meds:.    Allergies   Allergen Reactions   • Other Anaphylaxis     BLOOD PRODUCTS WITH IGA IN THEM   • Erythromycin GI Intolerance     VOMITING   • Avelox [Moxifloxacin] Rash   • Levaquin [Levofloxacin] Rash       Social History     Socioeconomic History   • Marital status:      Spouse name: Ortiz   • Number of children: 3   • Years of education: College   • Highest education level: Not on file   Occupational History     Employer: RETIRED   Tobacco Use   • Smoking status: Never Smoker   • Smokeless tobacco: Never Used   Substance and Sexual Activity   • Alcohol use: Yes     Alcohol/week: 1.0 standard drinks     Types: 1 Glasses of wine per week     Comment: occasional   • Drug use: No   • Sexual activity: Defer       Family History   Problem Relation Age of Onset   • Other Father         POLYP   • Colon polyps Father    • Prostate cancer Father 68   • Pancreatitis Daughter    • Malig Hyperthermia Neg Hx        Review of Systems   Constitutional: Negative for appetite change, chills, diaphoresis, fatigue, fever and unexpected weight change.   HENT: Negative for nosebleeds, postnasal drip, sore throat, trouble swallowing and voice change.    Respiratory: Negative for cough, choking, chest tightness, shortness of breath and wheezing.    Cardiovascular: Negative for chest pain,  palpitations and leg swelling.   Gastrointestinal: Positive for abdominal distention, abdominal pain and diarrhea. Negative for anal bleeding, blood in stool, constipation, nausea, rectal pain and vomiting.   Endocrine: Negative for polydipsia, polyphagia and polyuria.   Musculoskeletal: Negative for gait problem.   Skin: Negative for rash and wound.   Allergic/Immunologic: Negative for food allergies.   Neurological: Negative for dizziness, speech difficulty and light-headedness.   Psychiatric/Behavioral: Negative for confusion, self-injury, sleep disturbance and suicidal ideas.       There were no vitals filed for this visit.    Physical Exam   Constitutional: She is oriented to person, place, and time.   Neurological: She is alert and oriented to person, place, and time.   Psychiatric: She has a normal mood and affect. Her behavior is normal. Judgment and thought content normal.       No radiology results for the last 7 days     Assessment and plan     1. Pain of upper abdomen  - CBC & Differential  - Comprehensive Metabolic Panel  - Amylase  - Lipase  - Cancer Antigen 19-9  - CT Abdomen Pelvis With Contrast; Future    2. Elevated lipase  - CBC & Differential  - Comprehensive Metabolic Panel  - Amylase  - Lipase  - Cancer Antigen 19-9  - CT Abdomen Pelvis With Contrast; Future    3. Irritable bowel syndrome with diarrhea  - CBC & Differential  - Comprehensive Metabolic Panel  - Amylase  - Lipase  - Cancer Antigen 19-9  - CT Abdomen Pelvis With Contrast; Future    4. Personal history of malignant neoplasm of other digestive organs   - Cancer Antigen 19-9    5. Gastroesophageal reflux disease, esophagitis presence not specified  - pantoprazole (PROTONIX) 20 MG EC tablet; Take 1 tablet by mouth Daily.  Dispense: 90 tablet; Refill: 3    Today's telehealth visit was not able to be done due to the patient's nonworking equipment at home.  Today's visit was done over the telephone.  Total time over the telephone with the  patient was 25 minutes.  GERD seems well controlled at this time on Protonix 40 mg once daily.  However given the patient's history and her knowledge of the potential side effects she would like to take less of it if possible.  Recommend start with having her take Protonix 20 mg once daily and see how she does.  Eventually I like to switch her to Pepcid if she can tolerate it.  She is still continued to have episodes of upper abdominal pain and cramping.  She is also having episodes of diarrhea still.  Most recent stool studies were negative.  Labs were unremarkable except for a elevated lipase at 84.  She does have a history of breast cancer in the past.  She also has a history of colon polyps.  Even her history and continued symptoms we will go ahead and repeat labs and check a CT scan of the abdomen and pelvis.  Patient to call the office to make a follow-up telehealth or telephone visit after she has the CT scan labs done.  Patient to call the office with any issues.  Sounds like the bran muffins are definitely helping the diarrhea as well as the Bentyl so she can continue both of those for now.  Patient to call the office with any issues.

## 2020-04-28 ENCOUNTER — HOSPITAL ENCOUNTER (OUTPATIENT)
Dept: CT IMAGING | Facility: HOSPITAL | Age: 75
Discharge: HOME | End: 2020-04-28
Admitting: NURSE PRACTITIONER

## 2020-04-28 ENCOUNTER — TELEPHONE (OUTPATIENT)
Dept: GASTROENTEROLOGY | Facility: CLINIC | Age: 75
End: 2020-04-28

## 2020-04-28 DIAGNOSIS — R74.8 ELEVATED LIPASE: ICD-10-CM

## 2020-04-28 DIAGNOSIS — K58.0 IRRITABLE BOWEL SYNDROME WITH DIARRHEA: ICD-10-CM

## 2020-04-28 DIAGNOSIS — R10.10 PAIN OF UPPER ABDOMEN: ICD-10-CM

## 2020-04-28 LAB — CREAT BLDA-MCNC: 0.7 MG/DL (ref 0.6–1.3)

## 2020-04-28 PROCEDURE — 82565 ASSAY OF CREATININE: CPT

## 2020-04-28 PROCEDURE — 0 DIATRIZOATE MEGLUMINE & SODIUM PER 1 ML: Performed by: NURSE PRACTITIONER

## 2020-04-28 PROCEDURE — 74177 CT ABD & PELVIS W/CONTRAST: CPT

## 2020-04-28 PROCEDURE — 25010000002 IOPAMIDOL 61 % SOLUTION: Performed by: NURSE PRACTITIONER

## 2020-04-28 RX ADMIN — IOPAMIDOL 85 ML: 612 INJECTION, SOLUTION INTRAVENOUS at 15:31

## 2020-04-28 RX ADMIN — DIATRIZOATE MEGLUMINE AND DIATRIZOATE SODIUM 30 ML: 660; 100 LIQUID ORAL; RECTAL at 15:31

## 2020-04-28 NOTE — TELEPHONE ENCOUNTER
Called pt and pt reports she is having ct scan done today 04/28 and is having her lab work done tomorrow 04/29.

## 2020-04-29 ENCOUNTER — TELEPHONE (OUTPATIENT)
Dept: GASTROENTEROLOGY | Facility: CLINIC | Age: 75
End: 2020-04-29

## 2020-04-29 NOTE — TELEPHONE ENCOUNTER
----- Message from BEATRICE Tuttle sent at 4/29/2020  9:58 AM EDT -----  IMPRESSION:  1. No evidence for acute intra-abdominal process. No CT evidence for  pancreatitis.  2. Mild atherosclerotic disease without aneurysm.    Please call the patient and let her know her CT scan was negative for any acute abdominal process.  How is she doing?

## 2020-04-29 NOTE — TELEPHONE ENCOUNTER
Call to pt.  Advise per M Kendy note.  Verb understanding.      Reports slowly improving.  Eating bran muffins as recommended.  Stools more solid, although can still experience loose stool.  Reports intermittent abd discomfort, although now can go few days without issue.     Update to M Kendy.

## 2020-04-30 ENCOUNTER — INFUSION (OUTPATIENT)
Dept: ONCOLOGY | Facility: HOSPITAL | Age: 75
End: 2020-04-30

## 2020-04-30 VITALS
RESPIRATION RATE: 18 BRPM | HEIGHT: 65 IN | HEART RATE: 74 BPM | DIASTOLIC BLOOD PRESSURE: 80 MMHG | WEIGHT: 146.2 LBS | OXYGEN SATURATION: 73 % | TEMPERATURE: 98.1 F | SYSTOLIC BLOOD PRESSURE: 124 MMHG | BODY MASS INDEX: 24.36 KG/M2

## 2020-04-30 DIAGNOSIS — D83.9 COMMON VARIABLE IMMUNODEFICIENCY (HCC): Primary | ICD-10-CM

## 2020-04-30 DIAGNOSIS — D80.2 IGA DEFICIENCY (HCC): ICD-10-CM

## 2020-04-30 LAB
ALBUMIN SERPL-MCNC: 4.3 G/DL (ref 3.7–4.7)
ALBUMIN/GLOB SERPL: 1.7 {RATIO} (ref 1.2–2.2)
ALP SERPL-CCNC: 51 IU/L (ref 39–117)
ALT SERPL-CCNC: 17 IU/L (ref 0–32)
AMYLASE SERPL-CCNC: 80 U/L (ref 31–110)
AST SERPL-CCNC: 23 IU/L (ref 0–40)
BASOPHILS # BLD AUTO: 0.1 X10E3/UL (ref 0–0.2)
BASOPHILS NFR BLD AUTO: 1 %
BILIRUB SERPL-MCNC: 0.5 MG/DL (ref 0–1.2)
BUN SERPL-MCNC: 12 MG/DL (ref 8–27)
BUN/CREAT SERPL: 14 (ref 12–28)
CALCIUM SERPL-MCNC: 9.4 MG/DL (ref 8.7–10.3)
CHLORIDE SERPL-SCNC: 103 MMOL/L (ref 96–106)
CO2 SERPL-SCNC: 25 MMOL/L (ref 20–29)
CREAT SERPL-MCNC: 0.86 MG/DL (ref 0.57–1)
EOSINOPHIL # BLD AUTO: 0.1 X10E3/UL (ref 0–0.4)
EOSINOPHIL NFR BLD AUTO: 2 %
ERYTHROCYTE [DISTWIDTH] IN BLOOD BY AUTOMATED COUNT: 12.2 % (ref 11.7–15.4)
GLOBULIN SER CALC-MCNC: 2.5 G/DL (ref 1.5–4.5)
GLUCOSE SERPL-MCNC: 88 MG/DL (ref 65–99)
HCT VFR BLD AUTO: 37.7 % (ref 34–46.6)
HGB BLD-MCNC: 12.8 G/DL (ref 11.1–15.9)
IMM GRANULOCYTES # BLD AUTO: 0 X10E3/UL (ref 0–0.1)
IMM GRANULOCYTES NFR BLD AUTO: 0 %
LIPASE SERPL-CCNC: 71 U/L (ref 14–85)
LYMPHOCYTES # BLD AUTO: 1.7 X10E3/UL (ref 0.7–3.1)
LYMPHOCYTES NFR BLD AUTO: 34 %
MCH RBC QN AUTO: 33.2 PG (ref 26.6–33)
MCHC RBC AUTO-ENTMCNC: 34 G/DL (ref 31.5–35.7)
MCV RBC AUTO: 98 FL (ref 79–97)
MONOCYTES # BLD AUTO: 0.4 X10E3/UL (ref 0.1–0.9)
MONOCYTES NFR BLD AUTO: 9 %
NEUTROPHILS # BLD AUTO: 2.8 X10E3/UL (ref 1.4–7)
NEUTROPHILS NFR BLD AUTO: 54 %
PLATELET # BLD AUTO: 278 X10E3/UL (ref 150–450)
POTASSIUM SERPL-SCNC: 4.8 MMOL/L (ref 3.5–5.2)
PROT SERPL-MCNC: 6.8 G/DL (ref 6–8.5)
RBC # BLD AUTO: 3.85 X10E6/UL (ref 3.77–5.28)
SODIUM SERPL-SCNC: 143 MMOL/L (ref 134–144)
WBC # BLD AUTO: 5.1 X10E3/UL (ref 3.4–10.8)

## 2020-04-30 PROCEDURE — 96365 THER/PROPH/DIAG IV INF INIT: CPT

## 2020-04-30 PROCEDURE — 96366 THER/PROPH/DIAG IV INF ADDON: CPT

## 2020-04-30 PROCEDURE — 25010000002 IMMUNE GLOBULIN (HUMAN) PER 500 MG: Performed by: ALLERGY & IMMUNOLOGY

## 2020-04-30 RX ORDER — EPINEPHRINE 0.3 MG/.3ML
0.3 INJECTION SUBCUTANEOUS ONCE
Status: CANCELLED
Start: 2020-05-21

## 2020-04-30 RX ORDER — SODIUM CHLORIDE 9 MG/ML
250 INJECTION, SOLUTION INTRAVENOUS ONCE
Status: CANCELLED | OUTPATIENT
Start: 2020-05-21

## 2020-04-30 RX ORDER — DIPHENHYDRAMINE HCL 25 MG
50 CAPSULE ORAL ONCE AS NEEDED
Status: CANCELLED
Start: 2020-05-21

## 2020-04-30 RX ORDER — SODIUM CHLORIDE 9 MG/ML
250 INJECTION, SOLUTION INTRAVENOUS ONCE
Status: COMPLETED | OUTPATIENT
Start: 2020-04-30 | End: 2020-04-30

## 2020-04-30 RX ADMIN — Medication 20 G: at 12:06

## 2020-04-30 RX ADMIN — SODIUM CHLORIDE 250 ML: 9 INJECTION, SOLUTION INTRAVENOUS at 12:07

## 2020-05-01 LAB — CANCER AG19-9 SERPL-ACNC: 17 U/ML (ref 0–35)

## 2020-05-05 ENCOUNTER — OFFICE VISIT (OUTPATIENT)
Dept: GASTROENTEROLOGY | Facility: CLINIC | Age: 75
End: 2020-05-05

## 2020-05-05 VITALS — WEIGHT: 146 LBS | HEIGHT: 65 IN | BODY MASS INDEX: 24.32 KG/M2

## 2020-05-05 DIAGNOSIS — D83.9 COMMON VARIABLE IMMUNODEFICIENCY (HCC): ICD-10-CM

## 2020-05-05 DIAGNOSIS — R74.8 ELEVATED LIPASE: ICD-10-CM

## 2020-05-05 DIAGNOSIS — K21.9 GASTROESOPHAGEAL REFLUX DISEASE, ESOPHAGITIS PRESENCE NOT SPECIFIED: ICD-10-CM

## 2020-05-05 DIAGNOSIS — K58.0 IRRITABLE BOWEL SYNDROME WITH DIARRHEA: Primary | ICD-10-CM

## 2020-05-05 PROCEDURE — 99442 PR PHYS/QHP TELEPHONE EVALUATION 11-20 MIN: CPT | Performed by: NURSE PRACTITIONER

## 2020-05-05 NOTE — PROGRESS NOTES
Chief Complaint   Patient presents with   • Irritable Bowel Syndrome   • Heartburn       Elaina Tom is a  74 y.o. female here for a telephone follow up visit for IBS.    HPI  74-year-old female presents today for telephone follow-up visit for IBS-D, GERD and elevated lipase.  You have chosen to receive care through a telephone visit. Do you consent to use a telephone visit for your medical care today? YES.    She is a patient of Dr. Rogers.  She was last talked to on the phone on 4/9/2020.  At that time she been having lots of gas and bloating with abdominal pain and diarrhea.  With her history of breast cancer and an elevated lipase we went ahead and checked a CT scan of the abdomen and pelvis which was negative for any acute abdominal process.  She also had labs done which did show an elevated lipase but it had actually come down quite a bit it was all almost back down to normal.  A CA-19-9 was also done which was normal.  Stool studies were negative.  Since last visit she is happy to report that she is feeling a lot better.  She still not 100% but she admits the bran muffins really have helped her bowel movements and the bentyl taken as needed has really helped her abdominal pain and cramping.  She still having some gas but overall she thinks she is feeling much better.  She denies any dysphagia, reflux, nausea and vomiting, constipation, rectal bleeding or melena.  She was appetite is good and her weight is stable.  Past Medical History:   Diagnosis Date   • Allergic rhinitis    • Anxiety    • Arthritis    • Asthma     ONLY HAS ISSUES WHEN HAS RESPIRATORY INFECTION   • Cancer (CMS/[facility]) 2017    BREAST-LEFT   • Cataract    • CVID (common variable immunodeficiency) (CMS/[facility])    • External hemorrhoids    • Gastritis    • GERD (gastroesophageal reflux disease)    • H/O CT scan of abdomen     PELVIS AND CHEST: UNREMARKABLE   • Hiatal hernia    • History of colon polyps    • History of giardia infection    •  History of Helicobacter pylori infection    • IBS (irritable bowel syndrome)    • IgA deficiency (CMS/HCC)     CANNOT HAVE ANYTHING WITH IGA IN IT WILL CAUSE ANAPHYLAXIS   • Immunoglobulin deficiency (CMS/HCC)    • Peptic ulcer disease    • PONV (postoperative nausea and vomiting)    • Torn meniscus    • Wound discharge     LT BREAST       Past Surgical History:   Procedure Laterality Date   • ABDOMINAL WALL ABSCESS INCISION AND DRAINAGE Left 7/3/2017    Procedure: BREAST ABSCESS INCISION AND DRAINAGE AND WOUND DEBRIDMENT;  Surgeon: Steve Leon MD;  Location: [facility] OR [facility] [place];  Service:    • BREAST BIOPSY Left 02/26/2007    Left breast needle localization biopsy; Dr. Steve Leon   • BREAST BIOPSY Left 02/02/2007    Left breast stereotactic vacuum assisted core biopsy with marker placement; Dr. Steve Leon   • BREAST BIOPSY Left 02/07/2003    Left breast needle localization biopsy; Dr. Steve Leon   • BREAST LUMPECTOMY WITH SENTINEL NODE BIOPSY Left 5/19/2017    Procedure: BREAST LUMPECTOMY WITH SENTINEL NODE BIOPSY & MAMMO NEEDLE LOCALIZATION;  Surgeon: Steve Leon MD;  Location: [facility] OR [facility] [place];  Service:    • COLONOSCOPY  01/10/2014    NTEH, TORTS, STOOL   • COLONOSCOPY  08/24/2010    IH, TORT, BX-   • COLONOSCOPY N/A 5/23/2019    Procedure: COLONOSCOPY TO CECUM AND TERM. ILEUM WITH BIOPSIES;  Surgeon: Rigoberto Rogers MD;  Location: [facility] ENDOSCOPY;  Service: Gastroenterology   • ENDOSCOPY  01/10/2014    Z-LINE REGULAR, 35 CM FROM INCISORS, HH, GASTRITIS, NO H-PYLORI   • ENDOSCOPY  08/24/2010    GASTRITIS   • ENDOSCOPY N/A 10/18/2016    Gastritis, duodenitis, HH    • ENDOSCOPY N/A 5/23/2019    Procedure: ESOPHAGOGASTRODUODENOSCOPY WITH BIOPSIES AND RAPID UREASE;  Surgeon: Rigoberto Rogers MD;  Location: [facility] ENDOSCOPY;  Service: Gastroenterology   • ENDOSCOPY AND COLONOSCOPY N/A 08/23/2007    Z-line irregular, normal esophagus, bilious gastric fluid, gastritis, hiatal hernia, normal  duodenal bulb and 2nd part of the duodenum; non-thrombosed external hemorrhoids, torts, one 5 mm polyp in the mid sigmoid colon, stool in the sigmoid colon, descending colon, transverse colon, ascending colon and in the cecum-Dr. Rigoberto Rogers   • KNEE SURGERY Left 01/2015   • SEPTOPLASTY     • TONSILLECTOMY         Scheduled Meds:    Continuous Infusions:  No current facility-administered medications for this visit.     PRN Meds:.    Allergies   Allergen Reactions   • Other Anaphylaxis     BLOOD PRODUCTS WITH IGA IN THEM   • Erythromycin GI Intolerance     VOMITING   • Avelox [Moxifloxacin] Rash   • Levaquin [Levofloxacin] Rash       Social History     Socioeconomic History   • Marital status:      Spouse name: Ortiz   • Number of children: 3   • Years of education: College   • Highest education level: Not on file   Occupational History     Employer: RETIRED   Tobacco Use   • Smoking status: Never Smoker   • Smokeless tobacco: Never Used   Substance and Sexual Activity   • Alcohol use: Yes     Alcohol/week: 1.0 standard drinks     Types: 1 Glasses of wine per week     Comment: occasional   • Drug use: No   • Sexual activity: Defer       Family History   Problem Relation Age of Onset   • Other Father         POLYP   • Colon polyps Father    • Prostate cancer Father 68   • Pancreatitis Daughter    • Malig Hyperthermia Neg Hx        Review of Systems   Constitutional: Negative for appetite change, chills, diaphoresis, fatigue, fever and unexpected weight change.   HENT: Negative for nosebleeds, postnasal drip, sore throat, trouble swallowing and voice change.    Respiratory: Negative for cough, choking, chest tightness, shortness of breath and wheezing.    Cardiovascular: Negative for chest pain, palpitations and leg swelling.   Gastrointestinal: Positive for abdominal distention. Negative for abdominal pain, anal bleeding, blood in stool, constipation, diarrhea, nausea, rectal pain and vomiting.    Endocrine: Negative for polydipsia, polyphagia and polyuria.   Musculoskeletal: Negative for gait problem.   Skin: Negative for rash and wound.   Allergic/Immunologic: Negative for food allergies.   Neurological: Negative for dizziness, speech difficulty and light-headedness.   Psychiatric/Behavioral: Negative for confusion, self-injury, sleep disturbance and suicidal ideas.       There were no vitals filed for this visit.    Physical Exam   Constitutional: She is oriented to person, place, and time. No distress.   Neurological: She is alert and oriented to person, place, and time.   Psychiatric: She has a normal mood and affect. Her behavior is normal. Judgment and thought content normal.       Ct Abdomen Pelvis With Contrast    Result Date: 4/29/2020  1. No evidence for acute intra-abdominal process. No CT evidence for pancreatitis. 2. Mild atherosclerotic disease without aneurysm.   Radiation dose reduction techniques were utilized, including automated exposure control and exposure modulation based on body size.  This report was finalized on 4/29/2020 9:52 AM by Dr. Archie Lake M.D.          Assessment and plan     1. Irritable bowel syndrome with diarrhea    2. Gastroesophageal reflux disease, esophagitis presence not specified    3. Elevated lipase    4. Common variable immunodeficiency (CMS/HCC)      Today's visit was done over the telephone.  Total time on the phone with the patient was 15 minutes.  I did review her most recent lab results and her CT scan results with her today.  Overall she seems to be doing better.  She still having some problems with excessive gas and bloating.  But overall her bowels are moving much better with her addition of fiber into her diet and using the Bentyl as needed.  GERD seems well controlled on Protonix 20 mg daily.  At this point I can have her try taking it every other day because she would like to at some point come off of it but she does not want to do that right  now.  Continue GERD precautions.  Patient to call the office with any issues.  Patient to follow-up with Dr. Rogers as planned.

## 2020-05-28 ENCOUNTER — INFUSION (OUTPATIENT)
Dept: ONCOLOGY | Facility: HOSPITAL | Age: 75
End: 2020-05-28

## 2020-05-28 VITALS
BODY MASS INDEX: 24.13 KG/M2 | SYSTOLIC BLOOD PRESSURE: 125 MMHG | TEMPERATURE: 98.9 F | OXYGEN SATURATION: 98 % | RESPIRATION RATE: 18 BRPM | HEART RATE: 60 BPM | WEIGHT: 145 LBS | DIASTOLIC BLOOD PRESSURE: 80 MMHG

## 2020-05-28 DIAGNOSIS — C50.512 MALIGNANT NEOPLASM OF LOWER-OUTER QUADRANT OF LEFT BREAST OF FEMALE, ESTROGEN RECEPTOR POSITIVE (HCC): ICD-10-CM

## 2020-05-28 DIAGNOSIS — M81.0 OSTEOPOROSIS, UNSPECIFIED OSTEOPOROSIS TYPE, UNSPECIFIED PATHOLOGICAL FRACTURE PRESENCE: ICD-10-CM

## 2020-05-28 DIAGNOSIS — Z17.0 MALIGNANT NEOPLASM OF LOWER-OUTER QUADRANT OF LEFT BREAST OF FEMALE, ESTROGEN RECEPTOR POSITIVE (HCC): ICD-10-CM

## 2020-05-28 DIAGNOSIS — D80.2 IGA DEFICIENCY (HCC): ICD-10-CM

## 2020-05-28 DIAGNOSIS — D83.9 COMMON VARIABLE IMMUNODEFICIENCY (HCC): Primary | ICD-10-CM

## 2020-05-28 LAB
ALBUMIN SERPL-MCNC: 4.3 G/DL (ref 3.5–5.2)
ALBUMIN/GLOB SERPL: 1.7 G/DL
ALP SERPL-CCNC: 56 U/L (ref 39–117)
ALT SERPL W P-5'-P-CCNC: 16 U/L (ref 1–33)
ANION GAP SERPL CALCULATED.3IONS-SCNC: 10.3 MMOL/L (ref 5–15)
AST SERPL-CCNC: 23 U/L (ref 1–32)
BASOPHILS # BLD AUTO: 0.05 10*3/MM3 (ref 0–0.2)
BASOPHILS NFR BLD AUTO: 1.1 % (ref 0–1.5)
BILIRUB SERPL-MCNC: 0.5 MG/DL (ref 0.1–1.2)
BUN BLD-MCNC: 14 MG/DL (ref 8–23)
BUN/CREAT SERPL: 18.7 (ref 7–25)
CALCIUM SPEC-SCNC: 9 MG/DL (ref 8.6–10.5)
CHLORIDE SERPL-SCNC: 107 MMOL/L (ref 98–107)
CO2 SERPL-SCNC: 22.7 MMOL/L (ref 22–29)
CREAT BLD-MCNC: 0.75 MG/DL (ref 0.57–1)
DEPRECATED RDW RBC AUTO: 47 FL (ref 37–54)
EOSINOPHIL # BLD AUTO: 0.09 10*3/MM3 (ref 0–0.4)
EOSINOPHIL NFR BLD AUTO: 2 % (ref 0.3–6.2)
ERYTHROCYTE [DISTWIDTH] IN BLOOD BY AUTOMATED COUNT: 12.9 % (ref 12.3–15.4)
GFR SERPL CREATININE-BSD FRML MDRD: 76 ML/MIN/1.73
GLOBULIN UR ELPH-MCNC: 2.5 GM/DL
GLUCOSE BLD-MCNC: 95 MG/DL (ref 65–99)
HCT VFR BLD AUTO: 35.4 % (ref 34–46.6)
HGB BLD-MCNC: 11.8 G/DL (ref 12–15.9)
IGG1 SER-MCNC: 1005 MG/DL (ref 700–1600)
IMM GRANULOCYTES # BLD AUTO: 0 10*3/MM3 (ref 0–0.05)
IMM GRANULOCYTES NFR BLD AUTO: 0 % (ref 0–0.5)
LYMPHOCYTES # BLD AUTO: 1.53 10*3/MM3 (ref 0.7–3.1)
LYMPHOCYTES NFR BLD AUTO: 33.6 % (ref 19.6–45.3)
MCH RBC QN AUTO: 33 PG (ref 26.6–33)
MCHC RBC AUTO-ENTMCNC: 33.3 G/DL (ref 31.5–35.7)
MCV RBC AUTO: 98.9 FL (ref 79–97)
MONOCYTES # BLD AUTO: 0.4 10*3/MM3 (ref 0.1–0.9)
MONOCYTES NFR BLD AUTO: 8.8 % (ref 5–12)
NEUTROPHILS # BLD AUTO: 2.49 10*3/MM3 (ref 1.7–7)
NEUTROPHILS NFR BLD AUTO: 54.5 % (ref 42.7–76)
NRBC BLD AUTO-RTO: 0 /100 WBC (ref 0–0.2)
PLATELET # BLD AUTO: 225 10*3/MM3 (ref 140–450)
PMV BLD AUTO: 10 FL (ref 6–12)
POTASSIUM BLD-SCNC: 3.9 MMOL/L (ref 3.5–5.2)
PROT SERPL-MCNC: 6.8 G/DL (ref 6–8.5)
RBC # BLD AUTO: 3.58 10*6/MM3 (ref 3.77–5.28)
SODIUM BLD-SCNC: 140 MMOL/L (ref 136–145)
WBC NRBC COR # BLD: 4.56 10*3/MM3 (ref 3.4–10.8)

## 2020-05-28 PROCEDURE — 25010000002 IMMUNE GLOBULIN (HUMAN) PER 500 MG: Performed by: ALLERGY & IMMUNOLOGY

## 2020-05-28 PROCEDURE — 85025 COMPLETE CBC W/AUTO DIFF WBC: CPT | Performed by: INTERNAL MEDICINE

## 2020-05-28 PROCEDURE — 96365 THER/PROPH/DIAG IV INF INIT: CPT

## 2020-05-28 PROCEDURE — 82784 ASSAY IGA/IGD/IGG/IGM EACH: CPT | Performed by: ALLERGY & IMMUNOLOGY

## 2020-05-28 PROCEDURE — 96366 THER/PROPH/DIAG IV INF ADDON: CPT

## 2020-05-28 PROCEDURE — 80053 COMPREHEN METABOLIC PANEL: CPT | Performed by: INTERNAL MEDICINE

## 2020-05-28 RX ORDER — SODIUM CHLORIDE 9 MG/ML
250 INJECTION, SOLUTION INTRAVENOUS ONCE
Status: COMPLETED | OUTPATIENT
Start: 2020-05-28 | End: 2020-05-28

## 2020-05-28 RX ORDER — DIPHENHYDRAMINE HCL 25 MG
50 CAPSULE ORAL ONCE AS NEEDED
Status: CANCELLED
Start: 2020-06-18

## 2020-05-28 RX ORDER — EPINEPHRINE 0.3 MG/.3ML
0.3 INJECTION SUBCUTANEOUS ONCE
Status: CANCELLED
Start: 2020-06-18

## 2020-05-28 RX ORDER — SODIUM CHLORIDE 9 MG/ML
250 INJECTION, SOLUTION INTRAVENOUS ONCE
Status: CANCELLED | OUTPATIENT
Start: 2020-06-18

## 2020-05-28 RX ADMIN — SODIUM CHLORIDE 100 ML: 9 INJECTION, SOLUTION INTRAVENOUS at 12:22

## 2020-05-28 RX ADMIN — IMMUNE GLOBULIN INTRAVENOUS (HUMAN) 20 G: KIT at 12:42

## 2020-06-03 ENCOUNTER — APPOINTMENT (OUTPATIENT)
Dept: WOMENS IMAGING | Facility: HOSPITAL | Age: 75
End: 2020-06-03

## 2020-06-03 PROCEDURE — 77067 SCR MAMMO BI INCL CAD: CPT | Performed by: RADIOLOGY

## 2020-06-03 PROCEDURE — 77063 BREAST TOMOSYNTHESIS BI: CPT | Performed by: RADIOLOGY

## 2020-06-05 ENCOUNTER — OFFICE VISIT (OUTPATIENT)
Dept: GASTROENTEROLOGY | Facility: CLINIC | Age: 75
End: 2020-06-05

## 2020-06-05 VITALS
WEIGHT: 146.8 LBS | DIASTOLIC BLOOD PRESSURE: 84 MMHG | SYSTOLIC BLOOD PRESSURE: 184 MMHG | BODY MASS INDEX: 24.46 KG/M2 | TEMPERATURE: 98.1 F | HEIGHT: 65 IN

## 2020-06-05 DIAGNOSIS — R15.2 FECAL URGENCY: ICD-10-CM

## 2020-06-05 DIAGNOSIS — K58.0 IRRITABLE BOWEL SYNDROME WITH DIARRHEA: Primary | ICD-10-CM

## 2020-06-05 DIAGNOSIS — D83.9 COMMON VARIABLE IMMUNODEFICIENCY (HCC): ICD-10-CM

## 2020-06-05 DIAGNOSIS — K21.9 GASTROESOPHAGEAL REFLUX DISEASE, ESOPHAGITIS PRESENCE NOT SPECIFIED: ICD-10-CM

## 2020-06-05 PROCEDURE — 99214 OFFICE O/P EST MOD 30 MIN: CPT | Performed by: NURSE PRACTITIONER

## 2020-06-05 RX ORDER — FAMOTIDINE 20 MG/1
20 TABLET, FILM COATED ORAL 2 TIMES DAILY
Qty: 180 TABLET | Refills: 3 | Status: SHIPPED | OUTPATIENT
Start: 2020-06-05 | End: 2021-06-02 | Stop reason: SDUPTHER

## 2020-06-05 NOTE — PROGRESS NOTES
Chief Complaint   Patient presents with   • Follow-up   • Irritable Bowel Syndrome       Elaina Tom is a  75 y.o. female here for a follow up visit for IBS.    HPI  75-year-old female presents today for follow-up visit for IBS and GERD.  She is a patient of Dr. Rogers.  She was last talked to on the phone on 5/5/2020.  She has a history of GERD and is happy to report that she has been doing really good on Protonix 20 mg once daily.  She is read the side effects that could possibly happen and she would really like to come off of it.  She also has a history of IBS-D and admits she was doing really well on eating the fiber bran muffins every morning but over the last couple of weeks this is seemed to not work as much anymore.  Her bowels are once again very loose.  She not sure if she eats a lot of dairy if that could be affecting or not.  She does use Bentyl sometimes and this does somewhat help.  She admits her loose stools are just always first thing in the morning it takes her a couple hours to empty completely out and then when she is done she is good for the rest of the day.  She does drink tea every morning.  She is not on any probiotics or fiber supplements.  She denies any dysphagia, abdominal pain, nausea and vomiting, constipation, rectal bleeding or melena.  She is appetite is good and her weight is stable.  Patient also has history of breast cancer in the past.  She also has a history of common variable immunodeficiency.  Past Medical History:   Diagnosis Date   • Allergic rhinitis    • Anxiety    • Arthritis    • Asthma     ONLY HAS ISSUES WHEN HAS RESPIRATORY INFECTION   • Cancer (CMS/[facility]) 2017    BREAST-LEFT   • Cataract    • CVID (common variable immunodeficiency) (CMS/[facility])    • External hemorrhoids    • Gastritis    • GERD (gastroesophageal reflux disease)    • H/O CT scan of abdomen     PELVIS AND CHEST: UNREMARKABLE   • Hiatal hernia    • History of colon polyps    • History of giardia infection     • History of Helicobacter pylori infection    • IBS (irritable bowel syndrome)    • IgA deficiency (CMS/HCC)     CANNOT HAVE ANYTHING WITH IGA IN IT WILL CAUSE ANAPHYLAXIS   • Immunoglobulin deficiency (CMS/HCC)    • Peptic ulcer disease    • PONV (postoperative nausea and vomiting)    • Torn meniscus    • Wound discharge     LT BREAST       Past Surgical History:   Procedure Laterality Date   • ABDOMINAL WALL ABSCESS INCISION AND DRAINAGE Left 7/3/2017    Procedure: BREAST ABSCESS INCISION AND DRAINAGE AND WOUND DEBRIDMENT;  Surgeon: Steve Leon MD;  Location: [facility] OR [facility] [place];  Service:    • BREAST BIOPSY Left 02/26/2007    Left breast needle localization biopsy; Dr. Steve Leon   • BREAST BIOPSY Left 02/02/2007    Left breast stereotactic vacuum assisted core biopsy with marker placement; Dr. Steve Leon   • BREAST BIOPSY Left 02/07/2003    Left breast needle localization biopsy; Dr. Steve Leon   • BREAST LUMPECTOMY WITH SENTINEL NODE BIOPSY Left 5/19/2017    Procedure: BREAST LUMPECTOMY WITH SENTINEL NODE BIOPSY & MAMMO NEEDLE LOCALIZATION;  Surgeon: Steve Leon MD;  Location: [facility] OR [facility] [place];  Service:    • COLONOSCOPY  01/10/2014    NTEH, TORTS, STOOL   • COLONOSCOPY  08/24/2010    IH, TORT, BX-   • COLONOSCOPY N/A 5/23/2019    Procedure: COLONOSCOPY TO CECUM AND TERM. ILEUM WITH BIOPSIES;  Surgeon: Rigoberto Rogers MD;  Location: [facility] ENDOSCOPY;  Service: Gastroenterology   • ENDOSCOPY  01/10/2014    Z-LINE REGULAR, 35 CM FROM INCISORS, HH, GASTRITIS, NO H-PYLORI   • ENDOSCOPY  08/24/2010    GASTRITIS   • ENDOSCOPY N/A 10/18/2016    Gastritis, duodenitis, HH    • ENDOSCOPY N/A 5/23/2019    Procedure: ESOPHAGOGASTRODUODENOSCOPY WITH BIOPSIES AND RAPID UREASE;  Surgeon: Rigoberto Rogers MD;  Location: [facility] ENDOSCOPY;  Service: Gastroenterology   • ENDOSCOPY AND COLONOSCOPY N/A 08/23/2007    Z-line irregular, normal esophagus, bilious gastric fluid, gastritis, hiatal hernia,  normal duodenal bulb and 2nd part of the duodenum; non-thrombosed external hemorrhoids, torts, one 5 mm polyp in the mid sigmoid colon, stool in the sigmoid colon, descending colon, transverse colon, ascending colon and in the cecum-Dr. Rigoberto Rogers   • KNEE SURGERY Left 01/2015   • SEPTOPLASTY     • TONSILLECTOMY         Scheduled Meds:    Continuous Infusions:  No current facility-administered medications for this visit.     PRN Meds:.    Allergies   Allergen Reactions   • Other Anaphylaxis     BLOOD PRODUCTS WITH IGA IN THEM   • Erythromycin GI Intolerance     VOMITING   • Avelox [Moxifloxacin] Rash   • Levaquin [Levofloxacin] Rash       Social History     Socioeconomic History   • Marital status:      Spouse name: Ortiz   • Number of children: 3   • Years of education: College   • Highest education level: Not on file   Occupational History     Employer: RETIRED   Tobacco Use   • Smoking status: Never Smoker   • Smokeless tobacco: Never Used   Substance and Sexual Activity   • Alcohol use: Yes     Alcohol/week: 1.0 standard drinks     Types: 1 Glasses of wine per week     Comment: occasional   • Drug use: No   • Sexual activity: Defer       Family History   Problem Relation Age of Onset   • Other Father         POLYP   • Colon polyps Father    • Prostate cancer Father 68   • Pancreatitis Daughter    • Malig Hyperthermia Neg Hx        Review of Systems   Constitutional: Negative for appetite change, chills, diaphoresis, fatigue, fever and unexpected weight change.   HENT: Negative for nosebleeds, postnasal drip, sore throat, trouble swallowing and voice change.    Respiratory: Negative for cough, choking, chest tightness, shortness of breath and wheezing.    Cardiovascular: Negative for chest pain, palpitations and leg swelling.   Gastrointestinal: Positive for abdominal distention and diarrhea. Negative for abdominal pain, anal bleeding, blood in stool, constipation, nausea, rectal pain and vomiting.    Endocrine: Negative for polydipsia, polyphagia and polyuria.   Musculoskeletal: Negative for gait problem.   Skin: Negative for rash and wound.   Allergic/Immunologic: Negative for food allergies.   Neurological: Negative for dizziness, speech difficulty and light-headedness.   Psychiatric/Behavioral: Negative for confusion, self-injury, sleep disturbance and suicidal ideas.       Vitals:    06/05/20 1137   BP: (!) 184/84   Temp: 98.1 °F (36.7 °C)       Physical Exam   Constitutional: She is oriented to person, place, and time. She appears well-developed and well-nourished. She does not appear ill. No distress.   HENT:   Head: Normocephalic.   Eyes: Pupils are equal, round, and reactive to light.   Cardiovascular: Normal rate, regular rhythm and normal heart sounds.   Pulmonary/Chest: Effort normal and breath sounds normal.   Abdominal: Soft. Bowel sounds are normal. She exhibits distension. She exhibits no mass. There is no hepatosplenomegaly. There is no tenderness. There is no rebound and no guarding. No hernia.   Musculoskeletal: Normal range of motion.   Neurological: She is alert and oriented to person, place, and time.   Skin: Skin is warm and dry.   Psychiatric: She has a normal mood and affect. Her speech is normal and behavior is normal. Judgment normal.       No radiology results for the last 7 days     Assessment and plan     1. Irritable bowel syndrome with diarrhea    2. Gastroesophageal reflux disease, esophagitis presence not specified    3. Fecal urgency    4. Common variable immunodeficiency (CMS/HCC)      GERD seems really well controlled on Protonix 20 mg once daily.  I think at this point to have her go to Protonix 20 mg every other day and she can add Pepcid for the days that she is not taking any PPIs.  I also want her to continue GERD precautions.  I want her to add a daily fiber supplement like FiberCon.  Continue to increase fluids and exercise as tolerated.  I do want her to avoid dairy  for at least 2 weeks to see if that possibly contributing to her IBS symptoms.  Patient to call the office next week with an update.  Patient to follow-up with me in 1 month.  Patient is agreeable to the plan.

## 2020-06-09 ENCOUNTER — OFFICE VISIT (OUTPATIENT)
Dept: ONCOLOGY | Facility: CLINIC | Age: 75
End: 2020-06-09

## 2020-06-09 ENCOUNTER — APPOINTMENT (OUTPATIENT)
Dept: OTHER | Facility: HOSPITAL | Age: 75
End: 2020-06-09

## 2020-06-09 VITALS
BODY MASS INDEX: 24.54 KG/M2 | SYSTOLIC BLOOD PRESSURE: 138 MMHG | OXYGEN SATURATION: 95 % | HEART RATE: 88 BPM | DIASTOLIC BLOOD PRESSURE: 88 MMHG | RESPIRATION RATE: 18 BRPM | HEIGHT: 65 IN | TEMPERATURE: 97.5 F | WEIGHT: 147.3 LBS

## 2020-06-09 DIAGNOSIS — D83.9 COMMON VARIABLE IMMUNODEFICIENCY (HCC): ICD-10-CM

## 2020-06-09 DIAGNOSIS — C50.512 MALIGNANT NEOPLASM OF LOWER-OUTER QUADRANT OF LEFT BREAST OF FEMALE, ESTROGEN RECEPTOR POSITIVE (HCC): Primary | ICD-10-CM

## 2020-06-09 DIAGNOSIS — Z17.0 MALIGNANT NEOPLASM OF LOWER-OUTER QUADRANT OF LEFT BREAST OF FEMALE, ESTROGEN RECEPTOR POSITIVE (HCC): Primary | ICD-10-CM

## 2020-06-09 DIAGNOSIS — M81.0 OSTEOPOROSIS, UNSPECIFIED OSTEOPOROSIS TYPE, UNSPECIFIED PATHOLOGICAL FRACTURE PRESENCE: ICD-10-CM

## 2020-06-09 PROCEDURE — 99213 OFFICE O/P EST LOW 20 MIN: CPT | Performed by: INTERNAL MEDICINE

## 2020-06-09 NOTE — PROGRESS NOTES
Subjective     REASON FOR FOLLOW UP:   1. Stage I ( T1c,N0,M0 ) ER+,OR+, HER 2 neg for a ductal carcinoma of the left breast status post lumpectomy 5/19/2017.  Patient started on adjuvant tamoxifen after the visit of 6/9/2017.  2.  Osteoporosis  3.  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  Development of draining seroma and poor incisional healing in the left breast that required further surgery on 7/3/2017.  5.  Genetic testing negative for any adverse mutations in June 2019    HISTORY OF PRESENT ILLNESS:  The patient is a 75 y.o. year old female who was noted to have a radiographic abnormality in the left breast in March.  She underwent an ultrasound-guided biopsy on 3/28/2017 showing invasive ductal carcinoma which was strongly hormone receptor positive and HER-2/trae negative.  She elected to undergo a lumpectomy procedure with Dr. Leon on 5/19/2017.  The tumor size was 1.2 x 1 x 0.9 cm and 3 sentinel lymph nodes were examined all of which were negative for metastatic cancer.    The patient was seen on 6/9/2017 to discuss postop adjuvant hormonal therapy.  She has a history of osteoporosis.  We discussed initiation of tamoxifen therapy which would have the advantage of helping to maintain her bone density.    She is here today for routine 6 month follow-up and seems to be getting along well on her tamoxifen.  She has been on the medication for 3 years now.    [facility] with no worrisome findings.      She continues to receive IVIG infusions for her common variable immunodeficiency.  History of Present Illness        ALLERGIES:    Allergies   Allergen Reactions   • Other Anaphylaxis     BLOOD PRODUCTS WITH IGA IN THEM   • Erythromycin GI Intolerance     VOMITING   • Avelox [Moxifloxacin] Rash   • Levaquin [Levofloxacin] Rash         Review of Systems   Constitutional: Negative for activity change, appetite change,  "fatigue, fever and unexpected weight change.   HENT: Negative for hearing loss, nosebleeds, trouble swallowing and voice change.    Eyes: Negative for visual disturbance.   Respiratory: Negative for cough, shortness of breath and wheezing.    Cardiovascular: Negative for chest pain and palpitations.   Gastrointestinal: Negative for abdominal pain, diarrhea, nausea and vomiting.   Genitourinary: Negative for difficulty urinating, frequency, hematuria and urgency.   Musculoskeletal: Negative for back pain and neck pain.   Skin: Negative for rash.   Neurological: Negative for dizziness, seizures, syncope and headaches.   Hematological: Negative for adenopathy. Does not bruise/bleed easily.   Psychiatric/Behavioral: Negative for behavioral problems. The patient is not nervous/anxious.         Objective     Vitals:    06/09/20 1021   BP: 157/89   Pulse: 88   Resp: 18   SpO2: 95%   Weight: 66.8 kg (147 lb 4.8 oz)   Height: 165.1 cm (65\")   PainSc: 0-No pain     Current Status 6/9/2020   ECOG score 0       Physical Exam   Constitutional: She is oriented to person, place, and time. She appears well-developed and well-nourished. No distress.   HENT:   Head: Normocephalic.   Eyes: Pupils are equal, round, and reactive to light. Conjunctivae and EOM are normal. No scleral icterus.   Neck: Normal range of motion. Neck supple. No JVD present. No thyromegaly present.   Cardiovascular: Normal rate and regular rhythm. Exam reveals no gallop and no friction rub.   No murmur heard.  Pulmonary/Chest: Effort normal and breath sounds normal. She has no wheezes. She has no rales. Right breast exhibits no mass, no nipple discharge and no skin change. Left breast exhibits skin change. Left breast exhibits no mass and no nipple discharge.   Left breast shows healed lumpectomy scar and radiation tattoos.   Abdominal: Soft. Bowel sounds are normal. She exhibits no distension and no mass. There is no tenderness.   Musculoskeletal: Normal range " of motion. She exhibits no edema or deformity.   Lymphadenopathy:     She has no cervical adenopathy.   Neurological: She is alert and oriented to person, place, and time. She has normal reflexes. No cranial nerve deficit.   Skin: Skin is warm and dry. No rash noted. No erythema.   Psychiatric: She has a normal mood and affect. Her behavior is normal. Judgment normal.         RECENT LABS:  Hematology WBC   Date Value Ref Range Status   05/28/2020 4.56 3.40 - 10.80 10*3/mm3 Final   04/29/2020 5.1 3.4 - 10.8 x10E3/uL Final     RBC   Date Value Ref Range Status   05/28/2020 3.58 (L) 3.77 - 5.28 10*6/mm3 Final   04/29/2020 3.85 3.77 - 5.28 x10E6/uL Final     Hemoglobin   Date Value Ref Range Status   05/28/2020 11.8 (L) 12.0 - 15.9 g/dL Final     Hematocrit   Date Value Ref Range Status   05/28/2020 35.4 34.0 - 46.6 % Final     Platelets   Date Value Ref Range Status   05/28/2020 225 140 - 450 10*3/mm3 Final          Assessment/Plan     1.  Stage I, hormone receptor positive, HER-2/trae negative invasive ductal carcinoma the left breast status post lumpectomy. Patient was started on adjuvant hormonal therapy with tamoxifen 20 mg daily in June 2017 and seems to be tolerating it well.  2.  Osteoporosis on her last DEXA scan.  3.  Common variable immunodeficiency requiring monthly IV immunoglobulin infusions.  4.  Mild anemia. Stable    Plan  1.  Continue tamoxifen 20 mg daily with plans to continue treatment for 5 years through June 2022.  2.  Return for follow-up in 6 months.  3.  Bilateral screening mammograms be due again in June 2021.  4.  Her next DEXA scan will be due in spring 2021

## 2020-06-10 ENCOUNTER — TELEPHONE (OUTPATIENT)
Dept: GASTROENTEROLOGY | Facility: CLINIC | Age: 75
End: 2020-06-10

## 2020-06-10 NOTE — TELEPHONE ENCOUNTER
----- Message from Olegario Brewster sent at 6/10/2020  1:40 PM EDT -----  Regarding: pantoprazole (PROTONIX) 20 MG   Contact: [phone]  Pt is wanting to know when she is suppose to stop taking the pantoprazole

## 2020-06-10 NOTE — TELEPHONE ENCOUNTER
See o/v note of 6/5.     Call to pt.  Advise per 6/5 note to take protonix 20 mg every other day and add pepcid for the days not taking any ppi.  Call in 1 wk with update.     Verb understanding.  States just obtained pepcid today - will start above tomorrow.

## 2020-06-12 ENCOUNTER — TELEPHONE (OUTPATIENT)
Dept: GASTROENTEROLOGY | Facility: CLINIC | Age: 75
End: 2020-06-12

## 2020-06-12 NOTE — TELEPHONE ENCOUNTER
----- Message from Olegario Wooten sent at 6/12/2020  9:14 AM EDT -----  Regarding: Med Question   Contact: [phone]  Fibercon, only taking one a day, and is having problems with this. Tuesday she spent the whole day in the bathroom. Stated it was just a bad day, stomach hurt as well.   Wednesday she didn't take anything  Thursday she took 2 Fiber gummies, they doesn't bother her belly.    Does she need to take more of the Fibercon for it to do what its suppose to do? Would it be better to switch to the gummies and increase the dose on those?

## 2020-06-12 NOTE — TELEPHONE ENCOUNTER
Call to pt.  Advise that ALEX Larry and DR Rogers out of office today.  Will send message to DR Rogers for 6/15.  In the meantime. Avoid fibercon if upset stomach and maintain fiber gummies.  Pt asking if may take 2 fiber gummies 2x/day as per package instructions.  Advise this ok for interiim.  Verb understanding.     Message to DR Rogers.

## 2020-06-15 NOTE — TELEPHONE ENCOUNTER
If patient having difficulty taking FiberCon can switch to [employer]ru[employer]-based product either powder, or Gummies.  The whole point is to provide more form to her stools secondary to her IBS D.

## 2020-07-07 ENCOUNTER — OFFICE VISIT (OUTPATIENT)
Dept: GASTROENTEROLOGY | Facility: CLINIC | Age: 75
End: 2020-07-07

## 2020-07-07 VITALS — HEIGHT: 65 IN | BODY MASS INDEX: 24.16 KG/M2 | WEIGHT: 145 LBS | TEMPERATURE: 97.7 F

## 2020-07-07 DIAGNOSIS — K58.0 IRRITABLE BOWEL SYNDROME WITH DIARRHEA: Primary | ICD-10-CM

## 2020-07-07 DIAGNOSIS — R15.2 FECAL URGENCY: ICD-10-CM

## 2020-07-07 DIAGNOSIS — K21.9 GASTROESOPHAGEAL REFLUX DISEASE, ESOPHAGITIS PRESENCE NOT SPECIFIED: ICD-10-CM

## 2020-07-07 PROCEDURE — 99214 OFFICE O/P EST MOD 30 MIN: CPT | Performed by: NURSE PRACTITIONER

## 2020-07-07 RX ORDER — DICYCLOMINE HYDROCHLORIDE 10 MG/1
10 CAPSULE ORAL
Qty: 120 CAPSULE | Refills: 5 | Status: SHIPPED | OUTPATIENT
Start: 2020-07-07 | End: 2021-09-16

## 2020-07-07 RX ORDER — ACETAMINOPHEN 500 MG
500 TABLET ORAL ONCE
Status: CANCELLED
Start: 2020-07-07

## 2020-07-07 RX ORDER — DIPHENHYDRAMINE HCL 25 MG
25 CAPSULE ORAL ONCE
Status: CANCELLED
Start: 2020-07-07

## 2020-07-07 NOTE — PROGRESS NOTES
Chief Complaint   Patient presents with   • Irritable Bowel Syndrome       Elaina Tom is a  75 y.o. female here for a follow up visit for IBS.    HPI  75-year-old female presents today for follow-up visit for IBS.  She is a patient of Dr. Rogers.  She was last seen in the office on 6/5/2020.  She has a history of IBS-D with lots of fecal urgency, gas and bloating.  She is happy to report that since increasing the fiber Gummies and staying on her bran muffins every morning her bowels have improved.  She is now having more solid stool days versus her diarrhea days.  Her fecal urgency has also improved.  She still is having some diarrhea but it is much improved.  She tried the FiberCon pills but this was just too much for her so she had to quit them.  Currently she is taking 2 fiber Gummies a day.  She also has a history of GERD and admits she is done really well on Protonix 20 mg every other day alternating with Pepcid 20 mg.  She tells me she is ready to try just Pepcid because she would like to not have the potential side effects with the PPI.  She denies any dysphagia, reflux, abdominal pain, nausea vomiting, constipation, rectal bleeding or melena.  She is appetite is good and her weight is stable.  Past Medical History:   Diagnosis Date   • Allergic rhinitis    • Anxiety    • Arthritis    • Asthma     ONLY HAS ISSUES WHEN HAS RESPIRATORY INFECTION   • Cancer (CMS/HCC) 2017    BREAST-LEFT   • Cataract    • CVID (common variable immunodeficiency) (CMS/HCC)    • External hemorrhoids    • Gastritis    • GERD (gastroesophageal reflux disease)    • H/O CT scan of abdomen     PELVIS AND CHEST: UNREMARKABLE   • Hiatal hernia    • History of colon polyps    • History of giardia infection    • History of Helicobacter pylori infection    • IBS (irritable bowel syndrome)    • IgA deficiency (CMS/HCC)     CANNOT HAVE ANYTHING WITH IGA IN IT WILL CAUSE ANAPHYLAXIS   • Immunoglobulin deficiency (CMS/HCC)    • Peptic ulcer  disease    • PONV (postoperative nausea and vomiting)    • Torn meniscus    • Wound discharge     LT BREAST       Past Surgical History:   Procedure Laterality Date   • ABDOMINAL WALL ABSCESS INCISION AND DRAINAGE Left 7/3/2017    Procedure: BREAST ABSCESS INCISION AND DRAINAGE AND WOUND DEBRIDMENT;  Surgeon: Steve Leon MD;  Location: [facility] OR [facility];  Service:    • BREAST BIOPSY Left 02/26/2007    Left breast needle localization biopsy; Dr. Steve Leon   • BREAST BIOPSY Left 02/02/2007    Left breast stereotactic vacuum assisted core biopsy with marker placement; Dr. Steve Leon   • BREAST BIOPSY Left 02/07/2003    Left breast needle localization biopsy; Dr. Steve Leon   • BREAST LUMPECTOMY WITH SENTINEL NODE BIOPSY Left 5/19/2017    Procedure: BREAST LUMPECTOMY WITH SENTINEL NODE BIOPSY & MAMMO NEEDLE LOCALIZATION;  Surgeon: Steve Leon MD;  Location: [facility] OR [facility];  Service:    • COLONOSCOPY  01/10/2014    NTEH, TORTS, STOOL   • COLONOSCOPY  08/24/2010    IH, TORT, BX-   • COLONOSCOPY N/A 5/23/2019    Procedure: COLONOSCOPY TO CECUM AND TERM. ILEUM WITH BIOPSIES;  Surgeon: Rigoberto Rogers MD;  Location: [facility] ENDOSCOPY;  Service: Gastroenterology   • ENDOSCOPY  01/10/2014    Z-LINE REGULAR, 35 CM FROM INCISORS, HH, GASTRITIS, NO H-PYLORI   • ENDOSCOPY  08/24/2010    GASTRITIS   • ENDOSCOPY N/A 10/18/2016    Gastritis, duodenitis, HH    • ENDOSCOPY N/A 5/23/2019    Procedure: ESOPHAGOGASTRODUODENOSCOPY WITH BIOPSIES AND RAPID UREASE;  Surgeon: Rigoberto Rogers MD;  Location: [facility] ENDOSCOPY;  Service: Gastroenterology   • ENDOSCOPY AND COLONOSCOPY N/A 08/23/2007    Z-line irregular, normal esophagus, bilious gastric fluid, gastritis, hiatal hernia, normal duodenal bulb and 2nd part of the duodenum; non-thrombosed external hemorrhoids, torts, one 5 mm polyp in the mid sigmoid colon, stool in the sigmoid colon, descending colon, transverse colon, ascending colon and in the  cecum-Dr. Rigoberto Rogers   • KNEE SURGERY Left 01/2015   • SEPTOPLASTY     • TONSILLECTOMY         Scheduled Meds:    Continuous Infusions:  No current facility-administered medications for this visit.     PRN Meds:.    Allergies   Allergen Reactions   • Other Anaphylaxis     BLOOD PRODUCTS WITH IGA IN THEM   • Erythromycin GI Intolerance     VOMITING   • Avelox [Moxifloxacin] Rash   • Levaquin [Levofloxacin] Rash       Social History     Socioeconomic History   • Marital status:      Spouse name: Ortiz   • Number of children: 3   • Years of education: College   • Highest education level: Not on file   Occupational History     Employer: RETIRED   Tobacco Use   • Smoking status: Never Smoker   • Smokeless tobacco: Never Used   Substance and Sexual Activity   • Alcohol use: Yes     Alcohol/week: 1.0 standard drinks     Types: 1 Glasses of wine per week     Comment: occasional   • Drug use: No   • Sexual activity: Defer       Family History   Problem Relation Age of Onset   • Other Father         POLYP   • Colon polyps Father    • Prostate cancer Father 68   • Pancreatitis Daughter    • Malig Hyperthermia Neg Hx        Review of Systems   Constitutional: Negative for appetite change, chills, diaphoresis, fatigue, fever and unexpected weight change.   HENT: Negative for nosebleeds, postnasal drip, sore throat, trouble swallowing and voice change.    Respiratory: Negative for cough, choking, chest tightness, shortness of breath and wheezing.    Cardiovascular: Negative for chest pain, palpitations and leg swelling.   Gastrointestinal: Positive for abdominal distention. Negative for abdominal pain, anal bleeding, blood in stool, constipation, diarrhea, nausea, rectal pain and vomiting.   Endocrine: Negative for polydipsia, polyphagia and polyuria.   Musculoskeletal: Negative for gait problem.   Skin: Negative for rash and wound.   Allergic/Immunologic: Negative for food allergies.   Neurological: Negative for  dizziness, speech difficulty and light-headedness.   Psychiatric/Behavioral: Negative for confusion, self-injury, sleep disturbance and suicidal ideas.       Vitals:    07/07/20 1126   Temp: 97.7 °F (36.5 °C)       Physical Exam   Constitutional: She is oriented to person, place, and time. She appears well-developed and well-nourished. She does not appear ill. No distress.   HENT:   Head: Normocephalic.   Eyes: Pupils are equal, round, and reactive to light.   Cardiovascular: Normal rate, regular rhythm and normal heart sounds.   Pulmonary/Chest: Effort normal and breath sounds normal.   Abdominal: Soft. Bowel sounds are normal. She exhibits distension. She exhibits no mass. There is no hepatosplenomegaly. There is no tenderness. There is no rebound and no guarding. No hernia.   Musculoskeletal: Normal range of motion.   Neurological: She is alert and oriented to person, place, and time.   Skin: Skin is warm and dry.   Psychiatric: She has a normal mood and affect. Her speech is normal and behavior is normal. Judgment normal.       No radiology results for the last 7 days     Assessment and plan     1. Irritable bowel syndrome with diarrhea  - dicyclomine (BENTYL) 10 MG capsule; Take 1 capsule by mouth 4 (Four) Times a Day Before Meals & at Bedtime.  Dispense: 120 capsule; Refill: 5    2. Gastroesophageal reflux disease, esophagitis presence not specified    3. Fecal urgency    IBS-D is much improved on her fiber Gummies and high-fiber diet.  She can actually continue to increase those fiber Gummies slowly.  Continue Bentyl as needed.  GERD seems well controlled at this time on Protonix 20 mg every other day alternating with Pepcid.  At this time we will go ahead and switch her to Pepcid 20 mg twice daily every day and see how she does.  This would definitely lessen the potential side effects caused by the PPI.  Patient to call the office next week with an update.  Continue GERD precautions.  Patient to follow-up  with me in 3 months.

## 2020-07-09 ENCOUNTER — INFUSION (OUTPATIENT)
Dept: ONCOLOGY | Facility: HOSPITAL | Age: 75
End: 2020-07-09

## 2020-07-09 VITALS
TEMPERATURE: 97.4 F | OXYGEN SATURATION: 97 % | WEIGHT: 145.8 LBS | SYSTOLIC BLOOD PRESSURE: 122 MMHG | RESPIRATION RATE: 18 BRPM | HEIGHT: 65 IN | BODY MASS INDEX: 24.29 KG/M2 | HEART RATE: 60 BPM | DIASTOLIC BLOOD PRESSURE: 78 MMHG

## 2020-07-09 DIAGNOSIS — D80.2 IGA DEFICIENCY (HCC): ICD-10-CM

## 2020-07-09 DIAGNOSIS — D83.9 COMMON VARIABLE IMMUNODEFICIENCY (HCC): Primary | ICD-10-CM

## 2020-07-09 PROCEDURE — 25010000002 IMMUNE GLOBULIN (HUMAN) PER 500 MG: Performed by: ALLERGY & IMMUNOLOGY

## 2020-07-09 PROCEDURE — 96366 THER/PROPH/DIAG IV INF ADDON: CPT

## 2020-07-09 PROCEDURE — 96365 THER/PROPH/DIAG IV INF INIT: CPT

## 2020-07-09 RX ORDER — ACETAMINOPHEN 500 MG
500 TABLET ORAL ONCE
Status: CANCELLED
Start: 2020-07-16

## 2020-07-09 RX ORDER — DIPHENHYDRAMINE HCL 25 MG
50 CAPSULE ORAL ONCE AS NEEDED
Status: CANCELLED
Start: 2020-07-16

## 2020-07-09 RX ORDER — SODIUM CHLORIDE 9 MG/ML
250 INJECTION, SOLUTION INTRAVENOUS ONCE
Status: CANCELLED | OUTPATIENT
Start: 2020-07-16

## 2020-07-09 RX ORDER — EPINEPHRINE 0.3 MG/.3ML
0.3 INJECTION SUBCUTANEOUS ONCE
Status: CANCELLED
Start: 2020-07-16

## 2020-07-09 RX ORDER — SODIUM CHLORIDE 9 MG/ML
250 INJECTION, SOLUTION INTRAVENOUS ONCE
Status: COMPLETED | OUTPATIENT
Start: 2020-07-09 | End: 2020-07-09

## 2020-07-09 RX ORDER — DIPHENHYDRAMINE HCL 25 MG
25 CAPSULE ORAL ONCE
Status: CANCELLED
Start: 2020-07-16

## 2020-07-09 RX ADMIN — Medication 20 G: at 12:17

## 2020-07-09 RX ADMIN — SODIUM CHLORIDE 100 ML: 9 INJECTION, SOLUTION INTRAVENOUS at 11:42

## 2020-07-09 NOTE — NURSING NOTE
Patient states she took Tylenol 1000 mg PO x 1 and does not take Benadryl as premedication for this treatment.

## 2020-08-06 ENCOUNTER — INFUSION (OUTPATIENT)
Dept: ONCOLOGY | Facility: HOSPITAL | Age: 75
End: 2020-08-06

## 2020-08-06 VITALS
TEMPERATURE: 98.9 F | OXYGEN SATURATION: 100 % | HEART RATE: 73 BPM | DIASTOLIC BLOOD PRESSURE: 72 MMHG | WEIGHT: 146 LBS | BODY MASS INDEX: 24.3 KG/M2 | SYSTOLIC BLOOD PRESSURE: 106 MMHG

## 2020-08-06 DIAGNOSIS — D80.2 IGA DEFICIENCY (HCC): ICD-10-CM

## 2020-08-06 DIAGNOSIS — D83.9 COMMON VARIABLE IMMUNODEFICIENCY (HCC): Primary | ICD-10-CM

## 2020-08-06 LAB — IGG1 SER-MCNC: 1025 MG/DL (ref 700–1600)

## 2020-08-06 PROCEDURE — 25010000002 IMMUNE GLOBULIN (HUMAN) PER 500 MG: Performed by: ALLERGY & IMMUNOLOGY

## 2020-08-06 PROCEDURE — 96365 THER/PROPH/DIAG IV INF INIT: CPT

## 2020-08-06 PROCEDURE — 82784 ASSAY IGA/IGD/IGG/IGM EACH: CPT | Performed by: ALLERGY & IMMUNOLOGY

## 2020-08-06 PROCEDURE — 96366 THER/PROPH/DIAG IV INF ADDON: CPT

## 2020-08-06 RX ORDER — SODIUM CHLORIDE 9 MG/ML
250 INJECTION, SOLUTION INTRAVENOUS ONCE
Status: COMPLETED | OUTPATIENT
Start: 2020-08-06 | End: 2020-08-06

## 2020-08-06 RX ORDER — EPINEPHRINE 0.3 MG/.3ML
0.3 INJECTION SUBCUTANEOUS ONCE
Status: CANCELLED
Start: 2020-08-13

## 2020-08-06 RX ORDER — DIPHENHYDRAMINE HCL 25 MG
50 CAPSULE ORAL ONCE AS NEEDED
Status: CANCELLED
Start: 2020-08-13

## 2020-08-06 RX ORDER — ACETAMINOPHEN 500 MG
500 TABLET ORAL ONCE
Status: CANCELLED
Start: 2020-08-13

## 2020-08-06 RX ORDER — DIPHENHYDRAMINE HCL 25 MG
25 CAPSULE ORAL ONCE
Status: CANCELLED
Start: 2020-08-13

## 2020-08-06 RX ORDER — SODIUM CHLORIDE 9 MG/ML
250 INJECTION, SOLUTION INTRAVENOUS ONCE
Status: CANCELLED | OUTPATIENT
Start: 2020-08-13

## 2020-08-06 RX ADMIN — Medication 20 G: at 13:19

## 2020-08-06 RX ADMIN — SODIUM CHLORIDE 250 ML: 9 INJECTION, SOLUTION INTRAVENOUS at 13:19

## 2020-09-03 ENCOUNTER — INFUSION (OUTPATIENT)
Dept: ONCOLOGY | Facility: HOSPITAL | Age: 75
End: 2020-09-03

## 2020-09-03 VITALS
DIASTOLIC BLOOD PRESSURE: 76 MMHG | HEART RATE: 66 BPM | WEIGHT: 147 LBS | TEMPERATURE: 97.3 F | BODY MASS INDEX: 23.63 KG/M2 | OXYGEN SATURATION: 97 % | SYSTOLIC BLOOD PRESSURE: 127 MMHG | HEIGHT: 66 IN | RESPIRATION RATE: 18 BRPM

## 2020-09-03 DIAGNOSIS — D80.2 IGA DEFICIENCY (HCC): ICD-10-CM

## 2020-09-03 DIAGNOSIS — D83.9 COMMON VARIABLE IMMUNODEFICIENCY (HCC): Primary | ICD-10-CM

## 2020-09-03 PROCEDURE — 25010000002 IMMUNE GLOBULIN (HUMAN) PER 500 MG: Performed by: ALLERGY & IMMUNOLOGY

## 2020-09-03 PROCEDURE — 96366 THER/PROPH/DIAG IV INF ADDON: CPT

## 2020-09-03 PROCEDURE — 96365 THER/PROPH/DIAG IV INF INIT: CPT

## 2020-09-03 RX ORDER — DIPHENHYDRAMINE HCL 25 MG
50 CAPSULE ORAL ONCE AS NEEDED
Status: CANCELLED
Start: 2020-09-10

## 2020-09-03 RX ORDER — SODIUM CHLORIDE 9 MG/ML
250 INJECTION, SOLUTION INTRAVENOUS ONCE
Status: CANCELLED | OUTPATIENT
Start: 2020-09-10

## 2020-09-03 RX ORDER — ACETAMINOPHEN 500 MG
500 TABLET ORAL ONCE
Status: CANCELLED
Start: 2020-09-10

## 2020-09-03 RX ORDER — DIPHENHYDRAMINE HCL 25 MG
25 CAPSULE ORAL ONCE
Status: CANCELLED
Start: 2020-09-10

## 2020-09-03 RX ORDER — SODIUM CHLORIDE 9 MG/ML
250 INJECTION, SOLUTION INTRAVENOUS ONCE
Status: COMPLETED | OUTPATIENT
Start: 2020-09-03 | End: 2020-09-03

## 2020-09-03 RX ORDER — EPINEPHRINE 0.3 MG/.3ML
0.3 INJECTION SUBCUTANEOUS ONCE
Status: CANCELLED
Start: 2020-09-10

## 2020-09-03 RX ADMIN — IMMUNE GLOBULIN INTRAVENOUS (HUMAN) 20 G: KIT at 13:10

## 2020-09-03 RX ADMIN — SODIUM CHLORIDE 250 ML: 9 INJECTION, SOLUTION INTRAVENOUS at 12:50

## 2020-10-01 ENCOUNTER — INFUSION (OUTPATIENT)
Dept: ONCOLOGY | Facility: HOSPITAL | Age: 75
End: 2020-10-01

## 2020-10-01 VITALS
SYSTOLIC BLOOD PRESSURE: 125 MMHG | BODY MASS INDEX: 24.46 KG/M2 | RESPIRATION RATE: 18 BRPM | DIASTOLIC BLOOD PRESSURE: 80 MMHG | OXYGEN SATURATION: 99 % | HEIGHT: 65 IN | TEMPERATURE: 97.8 F | HEART RATE: 71 BPM | WEIGHT: 146.8 LBS

## 2020-10-01 DIAGNOSIS — D83.9 COMMON VARIABLE IMMUNODEFICIENCY (HCC): Primary | ICD-10-CM

## 2020-10-01 DIAGNOSIS — D80.2 IGA DEFICIENCY (HCC): ICD-10-CM

## 2020-10-01 LAB
ALBUMIN SERPL-MCNC: 4.4 G/DL (ref 3.5–5.2)
ALBUMIN/GLOB SERPL: 1.9 G/DL
ALP SERPL-CCNC: 52 U/L (ref 39–117)
ALT SERPL W P-5'-P-CCNC: 25 U/L (ref 1–33)
ANION GAP SERPL CALCULATED.3IONS-SCNC: 8.7 MMOL/L (ref 5–15)
AST SERPL-CCNC: 31 U/L (ref 1–32)
BASOPHILS # BLD AUTO: 0.04 10*3/MM3 (ref 0–0.2)
BASOPHILS NFR BLD AUTO: 0.7 % (ref 0–1.5)
BILIRUB SERPL-MCNC: 0.6 MG/DL (ref 0–1.2)
BUN SERPL-MCNC: 14 MG/DL (ref 8–23)
BUN/CREAT SERPL: 18.4 (ref 7–25)
CALCIUM SPEC-SCNC: 9.4 MG/DL (ref 8.6–10.5)
CHLORIDE SERPL-SCNC: 106 MMOL/L (ref 98–107)
CO2 SERPL-SCNC: 25.3 MMOL/L (ref 22–29)
CREAT SERPL-MCNC: 0.76 MG/DL (ref 0.57–1)
DEPRECATED RDW RBC AUTO: 48.8 FL (ref 37–54)
EOSINOPHIL # BLD AUTO: 0.07 10*3/MM3 (ref 0–0.4)
EOSINOPHIL NFR BLD AUTO: 1.2 % (ref 0.3–6.2)
ERYTHROCYTE [DISTWIDTH] IN BLOOD BY AUTOMATED COUNT: 13.3 % (ref 12.3–15.4)
GFR SERPL CREATININE-BSD FRML MDRD: 75 ML/MIN/1.73
GLOBULIN UR ELPH-MCNC: 2.3 GM/DL
GLUCOSE SERPL-MCNC: 85 MG/DL (ref 65–99)
HCT VFR BLD AUTO: 37.7 % (ref 34–46.6)
HGB BLD-MCNC: 12.5 G/DL (ref 12–15.9)
IGG1 SER-MCNC: 1071 MG/DL (ref 700–1600)
IMM GRANULOCYTES # BLD AUTO: 0.01 10*3/MM3 (ref 0–0.05)
IMM GRANULOCYTES NFR BLD AUTO: 0.2 % (ref 0–0.5)
LYMPHOCYTES # BLD AUTO: 1.53 10*3/MM3 (ref 0.7–3.1)
LYMPHOCYTES NFR BLD AUTO: 25.5 % (ref 19.6–45.3)
MCH RBC QN AUTO: 32.8 PG (ref 26.6–33)
MCHC RBC AUTO-ENTMCNC: 33.2 G/DL (ref 31.5–35.7)
MCV RBC AUTO: 99 FL (ref 79–97)
MONOCYTES # BLD AUTO: 0.5 10*3/MM3 (ref 0.1–0.9)
MONOCYTES NFR BLD AUTO: 8.3 % (ref 5–12)
NEUTROPHILS NFR BLD AUTO: 3.84 10*3/MM3 (ref 1.7–7)
NEUTROPHILS NFR BLD AUTO: 64.1 % (ref 42.7–76)
NRBC BLD AUTO-RTO: 0 /100 WBC (ref 0–0.2)
PLATELET # BLD AUTO: 219 10*3/MM3 (ref 140–450)
PMV BLD AUTO: 11.2 FL (ref 6–12)
POTASSIUM SERPL-SCNC: 4.5 MMOL/L (ref 3.5–5.2)
PROT SERPL-MCNC: 6.7 G/DL (ref 6–8.5)
RBC # BLD AUTO: 3.81 10*6/MM3 (ref 3.77–5.28)
SODIUM SERPL-SCNC: 140 MMOL/L (ref 136–145)
WBC # BLD AUTO: 5.99 10*3/MM3 (ref 3.4–10.8)

## 2020-10-01 PROCEDURE — 85025 COMPLETE CBC W/AUTO DIFF WBC: CPT | Performed by: ALLERGY & IMMUNOLOGY

## 2020-10-01 PROCEDURE — 80053 COMPREHEN METABOLIC PANEL: CPT | Performed by: ALLERGY & IMMUNOLOGY

## 2020-10-01 PROCEDURE — 96365 THER/PROPH/DIAG IV INF INIT: CPT

## 2020-10-01 PROCEDURE — 82784 ASSAY IGA/IGD/IGG/IGM EACH: CPT | Performed by: ALLERGY & IMMUNOLOGY

## 2020-10-01 PROCEDURE — 96366 THER/PROPH/DIAG IV INF ADDON: CPT

## 2020-10-01 PROCEDURE — 25010000002 IMMUNE GLOBULIN (HUMAN) PER 500 MG: Performed by: ALLERGY & IMMUNOLOGY

## 2020-10-01 RX ORDER — SODIUM CHLORIDE 9 MG/ML
250 INJECTION, SOLUTION INTRAVENOUS ONCE
Status: CANCELLED | OUTPATIENT
Start: 2020-10-08

## 2020-10-01 RX ORDER — DIPHENHYDRAMINE HCL 25 MG
25 CAPSULE ORAL ONCE
Status: CANCELLED
Start: 2020-10-08

## 2020-10-01 RX ORDER — EPINEPHRINE 0.3 MG/.3ML
0.3 INJECTION SUBCUTANEOUS ONCE
Status: CANCELLED
Start: 2020-10-08

## 2020-10-01 RX ORDER — ACETAMINOPHEN 500 MG
500 TABLET ORAL ONCE
Status: CANCELLED
Start: 2020-10-08

## 2020-10-01 RX ORDER — DIPHENHYDRAMINE HCL 25 MG
50 CAPSULE ORAL ONCE AS NEEDED
Status: CANCELLED
Start: 2020-10-08

## 2020-10-01 RX ORDER — SODIUM CHLORIDE 9 MG/ML
250 INJECTION, SOLUTION INTRAVENOUS ONCE
Status: COMPLETED | OUTPATIENT
Start: 2020-10-01 | End: 2020-10-01

## 2020-10-01 RX ADMIN — SODIUM CHLORIDE 100 ML: 900 INJECTION, SOLUTION INTRAVENOUS at 11:55

## 2020-10-01 RX ADMIN — IMMUNE GLOBULIN INTRAVENOUS (HUMAN) 20 G: KIT at 12:26

## 2020-10-07 ENCOUNTER — OFFICE VISIT (OUTPATIENT)
Dept: GASTROENTEROLOGY | Facility: CLINIC | Age: 75
End: 2020-10-07

## 2020-10-07 VITALS — TEMPERATURE: 97.2 F | BODY MASS INDEX: 25.1 KG/M2 | WEIGHT: 147 LBS | HEIGHT: 64 IN

## 2020-10-07 DIAGNOSIS — R14.3 EXCESSIVE GAS: ICD-10-CM

## 2020-10-07 DIAGNOSIS — R15.2 FECAL URGENCY: ICD-10-CM

## 2020-10-07 DIAGNOSIS — K58.0 IRRITABLE BOWEL SYNDROME WITH DIARRHEA: Primary | ICD-10-CM

## 2020-10-07 DIAGNOSIS — K21.9 GASTROESOPHAGEAL REFLUX DISEASE, UNSPECIFIED WHETHER ESOPHAGITIS PRESENT: ICD-10-CM

## 2020-10-07 PROCEDURE — 99214 OFFICE O/P EST MOD 30 MIN: CPT | Performed by: NURSE PRACTITIONER

## 2020-10-07 NOTE — PROGRESS NOTES
Chief Complaint   Patient presents with   • Irritable Bowel Syndrome       Elaina Tom is a  75 y.o. female here for a follow up visit for IBS.    HPI  75-year-old female presents today for follow-up visit for IBS-D.  She is a patient of Dr. Rogesr.  She was last seen in the office on 7/7/2020.  She is a history of IBS D with lots of fecal urgency and admits she is doing a lot better on a high-fiber diet and fiber Gummies.  She is also taking align probiotics.  She does me her biggest issue now is still really a lot of gas.  But she admits even that is better.  She also has a history of GERD and admits she is so proud that she has been on to come off the PPI and is now doing really well on Pepcid 20 mg twice daily.  She denies any breakthrough reflux at this time.  She denies any dysphagia, reflux, abdominal pain, nausea vomiting, diarrhea, constipation, rectal bleeding or melena.  She was appetite is good and her weight is stable.  Last EGD and colonoscopy was on 5/23/2019.  Past Medical History:   Diagnosis Date   • Allergic rhinitis    • Anxiety    • Arthritis    • Asthma     ONLY HAS ISSUES WHEN HAS RESPIRATORY INFECTION   • Cancer (CMS/HCC) 2017    BREAST-LEFT   • Cataract    • CVID (common variable immunodeficiency) (CMS/HCC)    • External hemorrhoids    • Gastritis    • GERD (gastroesophageal reflux disease)    • H/O CT scan of abdomen     PELVIS AND CHEST: UNREMARKABLE   • Hiatal hernia    • History of colon polyps    • History of giardia infection    • History of Helicobacter pylori infection    • IBS (irritable bowel syndrome)    • IgA deficiency (CMS/HCC)     CANNOT HAVE ANYTHING WITH IGA IN IT WILL CAUSE ANAPHYLAXIS   • Immunoglobulin deficiency (CMS/HCC)    • Peptic ulcer disease    • PONV (postoperative nausea and vomiting)    • Torn meniscus    • Wound discharge     LT BREAST       Past Surgical History:   Procedure Laterality Date   • ABDOMINAL WALL ABSCESS INCISION AND DRAINAGE Left 7/3/2017     Procedure: BREAST ABSCESS INCISION AND DRAINAGE AND WOUND DEBRIDMENT;  Surgeon: Steve Leon MD;  Location: [facility] OR [facility];  Service:    • BREAST BIOPSY Left 02/26/2007    Left breast needle localization biopsy; Dr. Steve Leon   • BREAST BIOPSY Left 02/02/2007    Left breast stereotactic vacuum assisted core biopsy with marker placement; Dr. Steve Leon   • BREAST BIOPSY Left 02/07/2003    Left breast needle localization biopsy; Dr. Steve Leon   • BREAST LUMPECTOMY WITH SENTINEL NODE BIOPSY Left 5/19/2017    Procedure: BREAST LUMPECTOMY WITH SENTINEL NODE BIOPSY & MAMMO NEEDLE LOCALIZATION;  Surgeon: Steve Leon MD;  Location:  [facility] OR [facility];  Service:    • COLONOSCOPY  01/10/2014    NTEH, TORTS, STOOL   • COLONOSCOPY  08/24/2010    IH, TORT, BX-   • COLONOSCOPY N/A 5/23/2019    Procedure: COLONOSCOPY TO CECUM AND TERM. ILEUM WITH BIOPSIES;  Surgeon: Rigoberto Rogers MD;  Location: [facility] ENDOSCOPY;  Service: Gastroenterology   • ENDOSCOPY  01/10/2014    Z-LINE REGULAR, 35 CM FROM INCISORS, HH, GASTRITIS, NO H-PYLORI   • ENDOSCOPY  08/24/2010    GASTRITIS   • ENDOSCOPY N/A 10/18/2016    Gastritis, duodenitis, HH    • ENDOSCOPY N/A 5/23/2019    Procedure: ESOPHAGOGASTRODUODENOSCOPY WITH BIOPSIES AND RAPID UREASE;  Surgeon: Rigoberto Rogers MD;  Location: [facility] ENDOSCOPY;  Service: Gastroenterology   • ENDOSCOPY AND COLONOSCOPY N/A 08/23/2007    Z-line irregular, normal esophagus, bilious gastric fluid, gastritis, hiatal hernia, normal duodenal bulb and 2nd part of the duodenum; non-thrombosed external hemorrhoids, torts, one 5 mm polyp in the mid sigmoid colon, stool in the sigmoid colon, descending colon, transverse colon, ascending colon and in the cecum-Dr. Rigoberto Rogers   • KNEE SURGERY Left 01/2015   • SEPTOPLASTY     • TONSILLECTOMY         Scheduled Meds:    Continuous Infusions:No current facility-administered medications for this visit.       PRN Meds:.    Allergies    Allergen Reactions   • Other Anaphylaxis     BLOOD PRODUCTS WITH IGA IN THEM   • Erythromycin GI Intolerance     VOMITING   • Avelox [Moxifloxacin] Rash   • Levaquin [Levofloxacin] Rash       Social History     Socioeconomic History   • Marital status:      Spouse name: Ortiz   • Number of children: 3   • Years of education: College   • Highest education level: Not on file   Occupational History     Employer: RETIRED   Tobacco Use   • Smoking status: Never Smoker   • Smokeless tobacco: Never Used   Substance and Sexual Activity   • Alcohol use: Yes     Alcohol/week: 1.0 standard drinks     Types: 1 Glasses of wine per week     Comment: occasional   • Drug use: No   • Sexual activity: Defer       Family History   Problem Relation Age of Onset   • Other Father         POLYP   • Colon polyps Father    • Prostate cancer Father 68   • Pancreatitis Daughter    • Malig Hyperthermia Neg Hx        Review of Systems   Constitutional: Negative for appetite change, chills, diaphoresis, fatigue, fever and unexpected weight change.   HENT: Negative for nosebleeds, postnasal drip, sore throat, trouble swallowing and voice change.    Respiratory: Negative for cough, choking, chest tightness, shortness of breath and wheezing.    Cardiovascular: Negative for chest pain, palpitations and leg swelling.   Gastrointestinal: Negative for abdominal distention, abdominal pain, anal bleeding, blood in stool, constipation, diarrhea, nausea, rectal pain and vomiting.   Endocrine: Negative for polydipsia, polyphagia and polyuria.   Musculoskeletal: Negative for gait problem.   Skin: Negative for rash and wound.   Allergic/Immunologic: Negative for food allergies.   Neurological: Negative for dizziness, speech difficulty and light-headedness.   Psychiatric/Behavioral: Negative for confusion, self-injury, sleep disturbance and suicidal ideas.       Vitals:    10/07/20 1106   Temp: 97.2 °F (36.2 °C)       Physical Exam  Constitutional:        General: She is not in acute distress.     Appearance: She is well-developed. She is not ill-appearing.   HENT:      Head: Normocephalic.   Eyes:      Pupils: Pupils are equal, round, and reactive to light.   Cardiovascular:      Rate and Rhythm: Normal rate and regular rhythm.      Heart sounds: Normal heart sounds.   Pulmonary:      Effort: Pulmonary effort is normal.      Breath sounds: Normal breath sounds.   Abdominal:      General: Bowel sounds are normal. There is no distension.      Palpations: Abdomen is soft. There is no mass.      Tenderness: There is no abdominal tenderness. There is no guarding or rebound.      Hernia: No hernia is present.   Musculoskeletal: Normal range of motion.   Skin:     General: Skin is warm and dry.   Neurological:      Mental Status: She is alert and oriented to person, place, and time.   Psychiatric:         Speech: Speech normal.         Behavior: Behavior normal.         Judgment: Judgment normal.         No radiology results for the last 7 days     Assessment and plan     1. Irritable bowel syndrome with diarrhea    2. Gastroesophageal reflux disease, unspecified whether esophagitis present    3. Excessive gas    4. Fecal urgency    IBS-D seems under much better control now on her daily fiber Gummies, high-fiber diet and probiotics.  Recommend some Gas-X OTC for the excessive gas.  Also she needs to increase her exercise and water as tolerated.  GERD seems well controlled on Pepcid 20 mg twice daily.  Continue GERD precautions.  Patient to call the office with any issues.  Patient to follow-up with me in 3 months.

## 2020-10-27 RX ORDER — EPINEPHRINE 0.3 MG/.3ML
0.3 INJECTION SUBCUTANEOUS ONCE
Status: CANCELLED
Start: 2020-10-27

## 2020-10-27 RX ORDER — SODIUM CHLORIDE 9 MG/ML
250 INJECTION, SOLUTION INTRAVENOUS ONCE
Status: CANCELLED | OUTPATIENT
Start: 2020-10-27

## 2020-10-27 RX ORDER — DIPHENHYDRAMINE HCL 25 MG
50 CAPSULE ORAL ONCE AS NEEDED
Status: CANCELLED
Start: 2020-10-27

## 2020-10-29 ENCOUNTER — INFUSION (OUTPATIENT)
Dept: ONCOLOGY | Facility: HOSPITAL | Age: 75
End: 2020-10-29

## 2020-10-29 VITALS
OXYGEN SATURATION: 96 % | SYSTOLIC BLOOD PRESSURE: 128 MMHG | RESPIRATION RATE: 18 BRPM | WEIGHT: 146 LBS | HEIGHT: 65 IN | TEMPERATURE: 98.2 F | HEART RATE: 80 BPM | BODY MASS INDEX: 24.32 KG/M2 | DIASTOLIC BLOOD PRESSURE: 76 MMHG

## 2020-10-29 DIAGNOSIS — D83.9 COMMON VARIABLE IMMUNODEFICIENCY (HCC): Primary | ICD-10-CM

## 2020-10-29 DIAGNOSIS — D80.2 IGA DEFICIENCY (HCC): ICD-10-CM

## 2020-10-29 PROCEDURE — 96365 THER/PROPH/DIAG IV INF INIT: CPT

## 2020-10-29 PROCEDURE — 96366 THER/PROPH/DIAG IV INF ADDON: CPT

## 2020-10-29 PROCEDURE — 25010000002 IMMUNE GLOBULIN (HUMAN) PER 500 MG: Performed by: ALLERGY & IMMUNOLOGY

## 2020-10-29 RX ORDER — SODIUM CHLORIDE 9 MG/ML
250 INJECTION, SOLUTION INTRAVENOUS ONCE
Status: COMPLETED | OUTPATIENT
Start: 2020-10-29 | End: 2020-10-29

## 2020-10-29 RX ORDER — ACETAMINOPHEN 500 MG
500 TABLET ORAL ONCE
Status: CANCELLED
Start: 2020-11-05

## 2020-10-29 RX ORDER — DIPHENHYDRAMINE HCL 25 MG
50 CAPSULE ORAL ONCE AS NEEDED
Status: CANCELLED
Start: 2020-11-05

## 2020-10-29 RX ORDER — EPINEPHRINE 0.3 MG/.3ML
0.3 INJECTION SUBCUTANEOUS ONCE
Status: CANCELLED
Start: 2020-11-05

## 2020-10-29 RX ORDER — SODIUM CHLORIDE 9 MG/ML
250 INJECTION, SOLUTION INTRAVENOUS ONCE
Status: CANCELLED | OUTPATIENT
Start: 2020-11-05

## 2020-10-29 RX ORDER — DIPHENHYDRAMINE HCL 25 MG
25 CAPSULE ORAL ONCE
Status: CANCELLED
Start: 2020-11-05

## 2020-10-29 RX ADMIN — IMMUNE GLOBULIN INTRAVENOUS (HUMAN) 20 G: KIT at 12:17

## 2020-10-29 RX ADMIN — SODIUM CHLORIDE 250 ML: 9 INJECTION, SOLUTION INTRAVENOUS at 11:53

## 2020-10-29 NOTE — NURSING NOTE
Patient states she took her own Tylenol 1000 mg PO x 1 and does not take Benadryl for her premedication to IVIG.

## 2020-11-16 ENCOUNTER — LAB (OUTPATIENT)
Dept: LAB | Facility: HOSPITAL | Age: 75
End: 2020-11-16

## 2020-11-16 ENCOUNTER — TRANSCRIBE ORDERS (OUTPATIENT)
Dept: ADMINISTRATIVE | Facility: HOSPITAL | Age: 75
End: 2020-11-16

## 2020-11-16 DIAGNOSIS — Z00.00 ROUTINE GENERAL MEDICAL EXAMINATION AT A HEALTH CARE FACILITY: Primary | ICD-10-CM

## 2020-11-16 DIAGNOSIS — Z00.00 ROUTINE GENERAL MEDICAL EXAMINATION AT A HEALTH CARE FACILITY: ICD-10-CM

## 2020-11-16 DIAGNOSIS — E78.5 HYPERLIPIDEMIA, UNSPECIFIED HYPERLIPIDEMIA TYPE: ICD-10-CM

## 2020-11-16 LAB
ALBUMIN SERPL-MCNC: 4 G/DL (ref 3.5–5.2)
ALBUMIN/GLOB SERPL: 1.8 G/DL
ALP SERPL-CCNC: 42 U/L (ref 39–117)
ALT SERPL W P-5'-P-CCNC: 18 U/L (ref 1–33)
ANION GAP SERPL CALCULATED.3IONS-SCNC: 5.9 MMOL/L (ref 5–15)
AST SERPL-CCNC: 22 U/L (ref 1–32)
BACTERIA UR QL AUTO: ABNORMAL /HPF
BASOPHILS # BLD AUTO: 0.05 10*3/MM3 (ref 0–0.2)
BASOPHILS NFR BLD AUTO: 1.2 % (ref 0–1.5)
BILIRUB SERPL-MCNC: 0.4 MG/DL (ref 0–1.2)
BILIRUB UR QL STRIP: NEGATIVE
BUN SERPL-MCNC: 12 MG/DL (ref 8–23)
BUN/CREAT SERPL: 17.9 (ref 7–25)
CALCIUM SPEC-SCNC: 8.7 MG/DL (ref 8.6–10.5)
CHLORIDE SERPL-SCNC: 107 MMOL/L (ref 98–107)
CHOLEST SERPL-MCNC: 158 MG/DL (ref 0–200)
CLARITY UR: CLEAR
CO2 SERPL-SCNC: 28.1 MMOL/L (ref 22–29)
COLOR UR: YELLOW
CREAT SERPL-MCNC: 0.67 MG/DL (ref 0.57–1)
DEPRECATED RDW RBC AUTO: 43.4 FL (ref 37–54)
EOSINOPHIL # BLD AUTO: 0.06 10*3/MM3 (ref 0–0.4)
EOSINOPHIL NFR BLD AUTO: 1.4 % (ref 0.3–6.2)
ERYTHROCYTE [DISTWIDTH] IN BLOOD BY AUTOMATED COUNT: 12.4 % (ref 12.3–15.4)
GFR SERPL CREATININE-BSD FRML MDRD: 86 ML/MIN/1.73
GLOBULIN UR ELPH-MCNC: 2.2 GM/DL
GLUCOSE SERPL-MCNC: 90 MG/DL (ref 65–99)
GLUCOSE UR STRIP-MCNC: NEGATIVE MG/DL
HCT VFR BLD AUTO: 36.7 % (ref 34–46.6)
HDLC SERPL-MCNC: 60 MG/DL (ref 40–60)
HGB BLD-MCNC: 12.1 G/DL (ref 12–15.9)
HGB UR QL STRIP.AUTO: NEGATIVE
HYALINE CASTS UR QL AUTO: ABNORMAL /LPF
IMM GRANULOCYTES # BLD AUTO: 0.01 10*3/MM3 (ref 0–0.05)
IMM GRANULOCYTES NFR BLD AUTO: 0.2 % (ref 0–0.5)
KETONES UR QL STRIP: NEGATIVE
LDLC SERPL CALC-MCNC: 83 MG/DL (ref 0–100)
LDLC/HDLC SERPL: 1.37 {RATIO}
LEUKOCYTE ESTERASE UR QL STRIP.AUTO: ABNORMAL
LYMPHOCYTES # BLD AUTO: 1.47 10*3/MM3 (ref 0.7–3.1)
LYMPHOCYTES NFR BLD AUTO: 33.9 % (ref 19.6–45.3)
MCH RBC QN AUTO: 32.5 PG (ref 26.6–33)
MCHC RBC AUTO-ENTMCNC: 33 G/DL (ref 31.5–35.7)
MCV RBC AUTO: 98.7 FL (ref 79–97)
MONOCYTES # BLD AUTO: 0.4 10*3/MM3 (ref 0.1–0.9)
MONOCYTES NFR BLD AUTO: 9.2 % (ref 5–12)
NEUTROPHILS NFR BLD AUTO: 2.34 10*3/MM3 (ref 1.7–7)
NEUTROPHILS NFR BLD AUTO: 54.1 % (ref 42.7–76)
NITRITE UR QL STRIP: NEGATIVE
NRBC BLD AUTO-RTO: 0 /100 WBC (ref 0–0.2)
PH UR STRIP.AUTO: 5.5 [PH] (ref 5–8)
PLATELET # BLD AUTO: 186 10*3/MM3 (ref 140–450)
PMV BLD AUTO: 10.6 FL (ref 6–12)
POTASSIUM SERPL-SCNC: 4 MMOL/L (ref 3.5–5.2)
PROT SERPL-MCNC: 6.2 G/DL (ref 6–8.5)
PROT UR QL STRIP: NEGATIVE
RBC # BLD AUTO: 3.72 10*6/MM3 (ref 3.77–5.28)
RBC # UR: ABNORMAL /HPF
REF LAB TEST METHOD: ABNORMAL
SODIUM SERPL-SCNC: 141 MMOL/L (ref 136–145)
SP GR UR STRIP: 1.02 (ref 1–1.03)
SQUAMOUS #/AREA URNS HPF: ABNORMAL /HPF
TRIGL SERPL-MCNC: 78 MG/DL (ref 0–150)
UROBILINOGEN UR QL STRIP: ABNORMAL
VLDLC SERPL-MCNC: 15 MG/DL (ref 5–40)
WBC # BLD AUTO: 4.33 10*3/MM3 (ref 3.4–10.8)
WBC UR QL AUTO: ABNORMAL /HPF

## 2020-11-16 PROCEDURE — 80061 LIPID PANEL: CPT

## 2020-11-16 PROCEDURE — 80053 COMPREHEN METABOLIC PANEL: CPT

## 2020-11-16 PROCEDURE — 36415 COLL VENOUS BLD VENIPUNCTURE: CPT

## 2020-11-16 PROCEDURE — 85025 COMPLETE CBC W/AUTO DIFF WBC: CPT

## 2020-11-16 PROCEDURE — 81001 URINALYSIS AUTO W/SCOPE: CPT

## 2020-11-23 ENCOUNTER — TRANSCRIBE ORDERS (OUTPATIENT)
Dept: ADMINISTRATIVE | Facility: HOSPITAL | Age: 75
End: 2020-11-23

## 2020-11-23 ENCOUNTER — LAB (OUTPATIENT)
Dept: LAB | Facility: HOSPITAL | Age: 75
End: 2020-11-23

## 2020-11-23 DIAGNOSIS — E03.9 HYPOTHYROIDISM, UNSPECIFIED TYPE: Primary | ICD-10-CM

## 2020-11-23 DIAGNOSIS — E55.9 AVITAMINOSIS D: ICD-10-CM

## 2020-11-23 DIAGNOSIS — E03.9 HYPOTHYROIDISM, UNSPECIFIED TYPE: ICD-10-CM

## 2020-11-23 LAB
25(OH)D3 SERPL-MCNC: 56 NG/ML (ref 30–100)
T3 SERPL-MCNC: 105 NG/DL (ref 80–200)
T4 FREE SERPL-MCNC: 1.35 NG/DL (ref 0.93–1.7)
TSH SERPL DL<=0.05 MIU/L-ACNC: 1.07 UIU/ML (ref 0.27–4.2)

## 2020-11-23 PROCEDURE — 36415 COLL VENOUS BLD VENIPUNCTURE: CPT

## 2020-11-23 PROCEDURE — 84443 ASSAY THYROID STIM HORMONE: CPT

## 2020-11-23 PROCEDURE — 82306 VITAMIN D 25 HYDROXY: CPT

## 2020-11-23 PROCEDURE — 84439 ASSAY OF FREE THYROXINE: CPT

## 2020-11-23 PROCEDURE — 84480 ASSAY TRIIODOTHYRONINE (T3): CPT

## 2020-12-03 ENCOUNTER — INFUSION (OUTPATIENT)
Dept: ONCOLOGY | Facility: HOSPITAL | Age: 75
End: 2020-12-03

## 2020-12-03 VITALS
TEMPERATURE: 97.7 F | DIASTOLIC BLOOD PRESSURE: 72 MMHG | HEIGHT: 64 IN | WEIGHT: 145 LBS | OXYGEN SATURATION: 98 % | HEART RATE: 70 BPM | BODY MASS INDEX: 24.75 KG/M2 | SYSTOLIC BLOOD PRESSURE: 116 MMHG | RESPIRATION RATE: 18 BRPM

## 2020-12-03 DIAGNOSIS — D80.2 IGA DEFICIENCY (HCC): ICD-10-CM

## 2020-12-03 DIAGNOSIS — D83.9 COMMON VARIABLE IMMUNODEFICIENCY (HCC): Primary | ICD-10-CM

## 2020-12-03 PROCEDURE — 96365 THER/PROPH/DIAG IV INF INIT: CPT

## 2020-12-03 PROCEDURE — 96366 THER/PROPH/DIAG IV INF ADDON: CPT

## 2020-12-03 PROCEDURE — 25010000002 IMMUNE GLOBULIN (HUMAN) PER 500 MG: Performed by: ALLERGY & IMMUNOLOGY

## 2020-12-03 RX ORDER — EPINEPHRINE 0.3 MG/.3ML
0.3 INJECTION SUBCUTANEOUS ONCE
Status: CANCELLED
Start: 2020-12-24

## 2020-12-03 RX ORDER — DIPHENHYDRAMINE HCL 25 MG
50 CAPSULE ORAL ONCE AS NEEDED
Status: CANCELLED
Start: 2020-12-24

## 2020-12-03 RX ORDER — DIPHENHYDRAMINE HCL 25 MG
25 CAPSULE ORAL ONCE
Status: CANCELLED
Start: 2020-12-24

## 2020-12-03 RX ORDER — SODIUM CHLORIDE 9 MG/ML
250 INJECTION, SOLUTION INTRAVENOUS ONCE
Status: COMPLETED | OUTPATIENT
Start: 2020-12-03 | End: 2020-12-03

## 2020-12-03 RX ORDER — SODIUM CHLORIDE 9 MG/ML
250 INJECTION, SOLUTION INTRAVENOUS ONCE
Status: CANCELLED | OUTPATIENT
Start: 2020-12-24

## 2020-12-03 RX ORDER — ACETAMINOPHEN 500 MG
500 TABLET ORAL ONCE
Status: CANCELLED
Start: 2020-12-24

## 2020-12-03 RX ADMIN — SODIUM CHLORIDE 250 ML: 9 INJECTION, SOLUTION INTRAVENOUS at 12:15

## 2020-12-03 RX ADMIN — IMMUNE GLOBULIN INTRAVENOUS (HUMAN) 20 G: KIT at 12:30

## 2020-12-08 ENCOUNTER — OFFICE VISIT (OUTPATIENT)
Dept: ONCOLOGY | Facility: CLINIC | Age: 75
End: 2020-12-08

## 2020-12-08 ENCOUNTER — LAB (OUTPATIENT)
Dept: OTHER | Facility: HOSPITAL | Age: 75
End: 2020-12-08

## 2020-12-08 VITALS
BODY MASS INDEX: 24.77 KG/M2 | RESPIRATION RATE: 16 BRPM | DIASTOLIC BLOOD PRESSURE: 83 MMHG | WEIGHT: 145.1 LBS | HEART RATE: 74 BPM | OXYGEN SATURATION: 97 % | TEMPERATURE: 97.3 F | HEIGHT: 64 IN | SYSTOLIC BLOOD PRESSURE: 148 MMHG

## 2020-12-08 DIAGNOSIS — Z79.810 LONG-TERM CURRENT USE OF TAMOXIFEN: ICD-10-CM

## 2020-12-08 DIAGNOSIS — Z17.0 MALIGNANT NEOPLASM OF LOWER-OUTER QUADRANT OF LEFT BREAST OF FEMALE, ESTROGEN RECEPTOR POSITIVE (HCC): ICD-10-CM

## 2020-12-08 DIAGNOSIS — D83.9 COMMON VARIABLE IMMUNODEFICIENCY (HCC): ICD-10-CM

## 2020-12-08 DIAGNOSIS — C50.512 MALIGNANT NEOPLASM OF LOWER-OUTER QUADRANT OF LEFT BREAST OF FEMALE, ESTROGEN RECEPTOR POSITIVE (HCC): ICD-10-CM

## 2020-12-08 DIAGNOSIS — D80.2 IGA DEFICIENCY (HCC): ICD-10-CM

## 2020-12-08 DIAGNOSIS — Z17.0 MALIGNANT NEOPLASM OF LOWER-OUTER QUADRANT OF LEFT BREAST OF FEMALE, ESTROGEN RECEPTOR POSITIVE (HCC): Primary | ICD-10-CM

## 2020-12-08 DIAGNOSIS — M81.0 OSTEOPOROSIS, UNSPECIFIED OSTEOPOROSIS TYPE, UNSPECIFIED PATHOLOGICAL FRACTURE PRESENCE: ICD-10-CM

## 2020-12-08 DIAGNOSIS — C50.512 MALIGNANT NEOPLASM OF LOWER-OUTER QUADRANT OF LEFT BREAST OF FEMALE, ESTROGEN RECEPTOR POSITIVE (HCC): Primary | ICD-10-CM

## 2020-12-08 LAB
ALBUMIN SERPL-MCNC: 4.3 G/DL (ref 3.5–5.2)
ALBUMIN/GLOB SERPL: 1.7 G/DL
ALP SERPL-CCNC: 44 U/L (ref 39–117)
ALT SERPL W P-5'-P-CCNC: 19 U/L (ref 1–33)
ANION GAP SERPL CALCULATED.3IONS-SCNC: 5.7 MMOL/L (ref 5–15)
AST SERPL-CCNC: 24 U/L (ref 1–32)
BASOPHILS # BLD AUTO: 0.03 10*3/MM3 (ref 0–0.2)
BASOPHILS NFR BLD AUTO: 0.7 % (ref 0–1.5)
BILIRUB SERPL-MCNC: 0.5 MG/DL (ref 0–1.2)
BUN SERPL-MCNC: 12 MG/DL (ref 8–23)
BUN/CREAT SERPL: 14.3 (ref 7–25)
CALCIUM SPEC-SCNC: 9.5 MG/DL (ref 8.6–10.5)
CHLORIDE SERPL-SCNC: 106 MMOL/L (ref 98–107)
CO2 SERPL-SCNC: 29.3 MMOL/L (ref 22–29)
CREAT SERPL-MCNC: 0.84 MG/DL (ref 0.57–1)
DEPRECATED RDW RBC AUTO: 47.6 FL (ref 37–54)
EOSINOPHIL # BLD AUTO: 0.07 10*3/MM3 (ref 0–0.4)
EOSINOPHIL NFR BLD AUTO: 1.7 % (ref 0.3–6.2)
ERYTHROCYTE [DISTWIDTH] IN BLOOD BY AUTOMATED COUNT: 12.8 % (ref 12.3–15.4)
GFR SERPL CREATININE-BSD FRML MDRD: 66 ML/MIN/1.73
GLOBULIN UR ELPH-MCNC: 2.5 GM/DL
GLUCOSE SERPL-MCNC: 92 MG/DL (ref 65–99)
HCT VFR BLD AUTO: 36.9 % (ref 34–46.6)
HGB BLD-MCNC: 12.1 G/DL (ref 12–15.9)
IMM GRANULOCYTES # BLD AUTO: 0 10*3/MM3 (ref 0–0.05)
IMM GRANULOCYTES NFR BLD AUTO: 0 % (ref 0–0.5)
LYMPHOCYTES # BLD AUTO: 1.37 10*3/MM3 (ref 0.7–3.1)
LYMPHOCYTES NFR BLD AUTO: 33.7 % (ref 19.6–45.3)
MCH RBC QN AUTO: 32.8 PG (ref 26.6–33)
MCHC RBC AUTO-ENTMCNC: 32.8 G/DL (ref 31.5–35.7)
MCV RBC AUTO: 100 FL (ref 79–97)
MONOCYTES # BLD AUTO: 0.37 10*3/MM3 (ref 0.1–0.9)
MONOCYTES NFR BLD AUTO: 9.1 % (ref 5–12)
NEUTROPHILS NFR BLD AUTO: 2.22 10*3/MM3 (ref 1.7–7)
NEUTROPHILS NFR BLD AUTO: 54.8 % (ref 42.7–76)
NRBC BLD AUTO-RTO: 0 /100 WBC (ref 0–0.2)
PLATELET # BLD AUTO: 215 10*3/MM3 (ref 140–450)
PMV BLD AUTO: 9.8 FL (ref 6–12)
POTASSIUM SERPL-SCNC: 4 MMOL/L (ref 3.5–5.2)
PROT SERPL-MCNC: 6.8 G/DL (ref 6–8.5)
RBC # BLD AUTO: 3.69 10*6/MM3 (ref 3.77–5.28)
SODIUM SERPL-SCNC: 141 MMOL/L (ref 136–145)
WBC # BLD AUTO: 4.06 10*3/MM3 (ref 3.4–10.8)

## 2020-12-08 PROCEDURE — 80053 COMPREHEN METABOLIC PANEL: CPT | Performed by: INTERNAL MEDICINE

## 2020-12-08 PROCEDURE — 85025 COMPLETE CBC W/AUTO DIFF WBC: CPT | Performed by: INTERNAL MEDICINE

## 2020-12-08 PROCEDURE — 36415 COLL VENOUS BLD VENIPUNCTURE: CPT

## 2020-12-08 PROCEDURE — 99213 OFFICE O/P EST LOW 20 MIN: CPT | Performed by: INTERNAL MEDICINE

## 2020-12-08 NOTE — PROGRESS NOTES
Subjective     REASON FOR FOLLOW UP:   1. Stage I ( T1c,N0,M0 ) ER+,IL+, HER 2 neg for a ductal carcinoma of the left breast status post lumpectomy 5/19/2017.  Patient started on adjuvant tamoxifen after the visit of 6/9/2017.  2.  Osteoporosis  3.  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  Development of draining seroma and poor incisional healing in the left breast that required further surgery on 7/3/2017.  5.  Genetic testing negative for any adverse mutations in June 2019    HISTORY OF PRESENT ILLNESS:  The patient is a 75 y.o. year old female who was noted to have a radiographic abnormality in the left breast in March.  She underwent an ultrasound-guided biopsy on 3/28/2017 showing invasive ductal carcinoma which was strongly hormone receptor positive and HER-2/trae negative.  She elected to undergo a lumpectomy procedure with Dr. Leon on 5/19/2017.  The tumor size was 1.2 x 1 x 0.9 cm and 3 sentinel lymph nodes were examined all of which were negative for metastatic cancer.    The patient was seen on 6/9/2017 to discuss postop adjuvant hormonal therapy.  She has a history of osteoporosis.  We discussed initiation of tamoxifen therapy which would have the advantage of helping to maintain her bone density.    She is here today for routine 6 month follow-up and seems to be getting along well on her tamoxifen.  She has been on the medication for 3.5 years now.    [facility] with no worrisome findings.      She continues to receive IVIG infusions for her common variable immunodeficiency.  Most recently received 20 g dose on 12/3/2020.  History of Present Illness        ALLERGIES:    Allergies   Allergen Reactions   • Other Anaphylaxis     BLOOD PRODUCTS WITH IGA IN THEM   • Erythromycin GI Intolerance     VOMITING   • Avelox [Moxifloxacin] Rash   • Levaquin [Levofloxacin] Rash         Review of Systems   Constitutional:  "Negative for activity change, appetite change, fatigue, fever and unexpected weight change.   HENT: Negative for hearing loss, nosebleeds, trouble swallowing and voice change.    Eyes: Negative for visual disturbance.   Respiratory: Negative for cough, shortness of breath and wheezing.    Cardiovascular: Negative for chest pain and palpitations.   Gastrointestinal: Negative for abdominal pain, diarrhea, nausea and vomiting.   Genitourinary: Negative for difficulty urinating, frequency, hematuria and urgency.   Musculoskeletal: Negative for back pain and neck pain.   Skin: Positive for rash.   Neurological: Negative for dizziness, seizures, syncope and headaches.   Hematological: Negative for adenopathy. Does not bruise/bleed easily.   Psychiatric/Behavioral: Negative for behavioral problems. The patient is not nervous/anxious.    Unchanged on the visit of 12/8/2020    Objective     Vitals:    12/08/20 0952   BP: 148/83   Pulse: 74   Resp: 16   Temp: 97.3 °F (36.3 °C)   TempSrc: Temporal   SpO2: 97%   Weight: 65.8 kg (145 lb 1.6 oz)   Height: 162.6 cm (64.02\")   PainSc: 0-No pain     Current Status 12/8/2020   ECOG score 1       Physical Exam   Constitutional: She is oriented to person, place, and time. She appears well-developed. No distress.   HENT:   Head: Normocephalic.   Eyes: Pupils are equal, round, and reactive to light. Conjunctivae are normal. No scleral icterus.   Neck: Normal range of motion. Neck supple. No JVD present. No thyromegaly present.   Cardiovascular: Normal rate and regular rhythm. Exam reveals no gallop and no friction rub.   No murmur heard.  Pulmonary/Chest: Effort normal and breath sounds normal. She has no wheezes. She has no rales. Right breast exhibits no mass, no nipple discharge and no skin change. Left breast exhibits skin change. Left breast exhibits no mass and no nipple discharge.   Left breast shows healed lumpectomy scar and radiation tattoos.   Abdominal: Soft. Bowel sounds are " normal. She exhibits no distension and no mass. There is no abdominal tenderness.   Musculoskeletal: Normal range of motion. No deformity.   Lymphadenopathy:     She has no cervical adenopathy.   Neurological: She is alert and oriented to person, place, and time. She has normal reflexes. No cranial nerve deficit.   Skin: Skin is warm and dry. Rash noted. No erythema.   Pruritic rash on the skin overlying the shins bilaterally   Psychiatric: Her behavior is normal. Judgment normal.   Repeated and unchanged on the visit of 12/8/2020    RECENT LABS:  Hematology WBC   Date Value Ref Range Status   12/08/2020 4.06 3.40 - 10.80 10*3/mm3 Final   04/29/2020 5.1 3.4 - 10.8 x10E3/uL Final     RBC   Date Value Ref Range Status   12/08/2020 3.69 (L) 3.77 - 5.28 10*6/mm3 Final   04/29/2020 3.85 3.77 - 5.28 x10E6/uL Final     Hemoglobin   Date Value Ref Range Status   12/08/2020 12.1 12.0 - 15.9 g/dL Final     Hematocrit   Date Value Ref Range Status   12/08/2020 36.9 34.0 - 46.6 % Final     Platelets   Date Value Ref Range Status   12/08/2020 215 140 - 450 10*3/mm3 Final          Assessment/Plan     1.  Stage I, hormone receptor positive, HER-2/trae negative invasive ductal carcinoma the left breast status post lumpectomy.  She has been on adjuvant hormonal therapy with tamoxifen 20 mg daily since June 2017 and continues to tolerate it well.  2.  Osteoporosis on her last DEXA scan.  3.  Common variable immunodeficiency requiring monthly IV immunoglobulin infusions.  4.  Mild anemia. Stable    Plan  1.  Continue tamoxifen 20 mg daily with plans to continue treatment for 5 years through June 2022.  2.  Return for follow-up in 6 months.  She will undergo a repeat bone density scan through her OB/GYN office in the spring 2020.    3.  Bilateral screening mammograms be due again in June 2021.

## 2020-12-31 ENCOUNTER — INFUSION (OUTPATIENT)
Dept: ONCOLOGY | Facility: HOSPITAL | Age: 75
End: 2020-12-31

## 2020-12-31 VITALS
HEIGHT: 64 IN | RESPIRATION RATE: 16 BRPM | SYSTOLIC BLOOD PRESSURE: 120 MMHG | HEART RATE: 68 BPM | DIASTOLIC BLOOD PRESSURE: 74 MMHG | WEIGHT: 146 LBS | OXYGEN SATURATION: 98 % | TEMPERATURE: 97.7 F | BODY MASS INDEX: 24.92 KG/M2

## 2020-12-31 DIAGNOSIS — D83.9 COMMON VARIABLE IMMUNODEFICIENCY (HCC): Primary | ICD-10-CM

## 2020-12-31 DIAGNOSIS — D80.2 IGA DEFICIENCY (HCC): ICD-10-CM

## 2020-12-31 PROCEDURE — 25010000002 IMMUNE GLOBULIN (HUMAN) PER 500 MG: Performed by: ALLERGY & IMMUNOLOGY

## 2020-12-31 PROCEDURE — 96366 THER/PROPH/DIAG IV INF ADDON: CPT

## 2020-12-31 PROCEDURE — 96365 THER/PROPH/DIAG IV INF INIT: CPT

## 2020-12-31 RX ORDER — DIPHENHYDRAMINE HCL 25 MG
25 CAPSULE ORAL ONCE
Status: CANCELLED
Start: 2021-01-21

## 2020-12-31 RX ORDER — EPINEPHRINE 0.3 MG/.3ML
0.3 INJECTION SUBCUTANEOUS ONCE
Status: CANCELLED
Start: 2021-01-21

## 2020-12-31 RX ORDER — SODIUM CHLORIDE 9 MG/ML
250 INJECTION, SOLUTION INTRAVENOUS ONCE
Status: COMPLETED | OUTPATIENT
Start: 2020-12-31 | End: 2020-12-31

## 2020-12-31 RX ORDER — ACETAMINOPHEN 500 MG
500 TABLET ORAL ONCE
Status: CANCELLED
Start: 2021-01-21

## 2020-12-31 RX ORDER — SODIUM CHLORIDE 9 MG/ML
250 INJECTION, SOLUTION INTRAVENOUS ONCE
Status: CANCELLED | OUTPATIENT
Start: 2021-01-21

## 2020-12-31 RX ORDER — DIPHENHYDRAMINE HCL 25 MG
50 CAPSULE ORAL ONCE AS NEEDED
Status: CANCELLED
Start: 2021-01-21

## 2020-12-31 RX ADMIN — IMMUNE GLOBULIN INTRAVENOUS (HUMAN) 20 G: KIT at 12:37

## 2020-12-31 RX ADMIN — SODIUM CHLORIDE 250 ML: 9 INJECTION, SOLUTION INTRAVENOUS at 12:07

## 2021-01-20 ENCOUNTER — OFFICE VISIT (OUTPATIENT)
Dept: GASTROENTEROLOGY | Facility: CLINIC | Age: 76
End: 2021-01-20

## 2021-01-20 VITALS — HEIGHT: 64 IN | TEMPERATURE: 97.8 F | WEIGHT: 145 LBS | BODY MASS INDEX: 24.75 KG/M2

## 2021-01-20 DIAGNOSIS — K58.0 IRRITABLE BOWEL SYNDROME WITH DIARRHEA: Primary | ICD-10-CM

## 2021-01-20 DIAGNOSIS — R14.0 ABDOMINAL BLOATING: ICD-10-CM

## 2021-01-20 DIAGNOSIS — R15.2 FECAL URGENCY: ICD-10-CM

## 2021-01-20 DIAGNOSIS — K21.9 GASTROESOPHAGEAL REFLUX DISEASE WITHOUT ESOPHAGITIS: ICD-10-CM

## 2021-01-20 PROCEDURE — 99214 OFFICE O/P EST MOD 30 MIN: CPT | Performed by: NURSE PRACTITIONER

## 2021-01-20 NOTE — PROGRESS NOTES
Chief Complaint   Patient presents with   • Irritable Bowel Syndrome   • Diarrhea       Elaina Tom is a  75 y.o. female here for a follow up visit for diarrhea.    HPI  75-year-old female presents today for follow-up visit for IBS-D.  She is a patient of Dr. Rogers.  She was last seen in the office by me on 10/7/2020.  She admits that her diet [place] is really getting out of hand again.  She admits he just kind of comes and goes out of nowhere and will last for several days in a row.  It usually accompanies lots of gas, bloating and fecal urgency.  She does not think there is any particular foods that seem to cause it.  She does take daily probiotics.  She also takes a daily fiber supplement.  She occasionally will use some Gas-X but admits he does not help that much.  She is on QuantaSol now.  She admits she just recently switched.  She also takes Bentyl as needed and this seems to really help the cramping but does not do anything for the diarrhea.  Her last EGD and colonoscopy was on 5/2019.  She denies any dysphagia, reflux, abdominal pain, nausea and vomiting, rectal bleeding or melena.  She is appetite is good and her weight is stable.  Past Medical History:   Diagnosis Date   • Allergic rhinitis    • Anxiety    • Arthritis    • Asthma     ONLY HAS ISSUES WHEN HAS RESPIRATORY INFECTION   • Cancer (CMS/HCC) 2017    BREAST-LEFT   • Cataract    • CVID (common variable immunodeficiency) (CMS/HCC)    • External hemorrhoids    • Gastritis    • GERD (gastroesophageal reflux disease)    • H/O CT scan of abdomen     PELVIS AND CHEST: UNREMARKABLE   • Hiatal hernia    • History of colon polyps    • History of giardia infection    • History of Helicobacter pylori infection    • IBS (irritable bowel syndrome)    • IgA deficiency (CMS/HCC)     CANNOT HAVE ANYTHING WITH IGA IN IT WILL CAUSE ANAPHYLAXIS   • Immunoglobulin deficiency (CMS/HCC)    • Peptic ulcer disease    • PONV (postoperative nausea and vomiting)     • Torn meniscus    • Wound discharge     LT BREAST       Past Surgical History:   Procedure Laterality Date   • ABDOMINAL WALL ABSCESS INCISION AND DRAINAGE Left 7/3/2017    Procedure: BREAST ABSCESS INCISION AND DRAINAGE AND WOUND DEBRIDMENT;  Surgeon: Steve Leon MD;  Location: [facility] OR [facility];  Service:    • BREAST BIOPSY Left 02/26/2007    Left breast needle localization biopsy; Dr. Steve Leon   • BREAST BIOPSY Left 02/02/2007    Left breast stereotactic vacuum assisted core biopsy with marker placement; Dr. Steve Leon   • BREAST BIOPSY Left 02/07/2003    Left breast needle localization biopsy; Dr. Steve Leon   • BREAST LUMPECTOMY WITH SENTINEL NODE BIOPSY Left 5/19/2017    Procedure: BREAST LUMPECTOMY WITH SENTINEL NODE BIOPSY & MAMMO NEEDLE LOCALIZATION;  Surgeon: Steve Leon MD;  Location: [facility] OR [facility];  Service:    • COLONOSCOPY  01/10/2014    NTEH, TORTS, STOOL   • COLONOSCOPY  08/24/2010    IH, TORT, BX-   • COLONOSCOPY N/A 5/23/2019    Procedure: COLONOSCOPY TO CECUM AND TERM. ILEUM WITH BIOPSIES;  Surgeon: Rigoberto Rogers MD;  Location: [facility] ENDOSCOPY;  Service: Gastroenterology   • ENDOSCOPY  01/10/2014    Z-LINE REGULAR, 35 CM FROM INCISORS, HH, GASTRITIS, NO H-PYLORI   • ENDOSCOPY  08/24/2010    GASTRITIS   • ENDOSCOPY N/A 10/18/2016    Gastritis, duodenitis, HH    • ENDOSCOPY N/A 5/23/2019    Procedure: ESOPHAGOGASTRODUODENOSCOPY WITH BIOPSIES AND RAPID UREASE;  Surgeon: Rigoberto Rogers MD;  Location: [facility] ENDOSCOPY;  Service: Gastroenterology   • ENDOSCOPY AND COLONOSCOPY N/A 08/23/2007    Z-line irregular, normal esophagus, bilious gastric fluid, gastritis, hiatal hernia, normal duodenal bulb and 2nd part of the duodenum; non-thrombosed external hemorrhoids, torts, one 5 mm polyp in the mid sigmoid colon, stool in the sigmoid colon, descending colon, transverse colon, ascending colon and in the cecum-Dr. Rigoberto Rogers   • KNEE SURGERY Left 01/2015   •  SEPTOPLASTY     • TONSILLECTOMY         Scheduled Meds:    Continuous Infusions:No current facility-administered medications for this visit.       PRN Meds:.    Allergies   Allergen Reactions   • Other Anaphylaxis     BLOOD PRODUCTS WITH IGA IN THEM   • Erythromycin GI Intolerance     VOMITING   • Avelox [Moxifloxacin] Rash   • Levaquin [Levofloxacin] Rash       Social History     Socioeconomic History   • Marital status:      Spouse name: Ortiz   • Number of children: 3   • Years of education: College   • Highest education level: Not on file   Occupational History     Employer: RETIRED   Tobacco Use   • Smoking status: Never Smoker   • Smokeless tobacco: Never Used   Substance and Sexual Activity   • Alcohol use: Yes     Alcohol/week: 1.0 standard drinks     Types: 1 Glasses of wine per week     Comment: occasional   • Drug use: No   • Sexual activity: Defer       Family History   Problem Relation Age of Onset   • Other Father         POLYP   • Colon polyps Father    • Prostate cancer Father 68   • Pancreatitis Daughter    • Malig Hyperthermia Neg Hx        Review of Systems   Constitutional: Negative for appetite change, chills, diaphoresis, fatigue, fever and unexpected weight change.   HENT: Negative for nosebleeds, postnasal drip, sore throat, trouble swallowing and voice change.    Respiratory: Negative for cough, choking, chest tightness, shortness of breath and wheezing.    Cardiovascular: Negative for chest pain, palpitations and leg swelling.   Gastrointestinal: Positive for abdominal distention and diarrhea. Negative for abdominal pain, anal bleeding, blood in stool, constipation, nausea, rectal pain and vomiting.   Endocrine: Negative for polydipsia, polyphagia and polyuria.   Musculoskeletal: Negative for gait problem.   Skin: Negative for rash and wound.   Allergic/Immunologic: Negative for food allergies.   Neurological: Negative for dizziness, speech difficulty and light-headedness.    Psychiatric/Behavioral: Negative for confusion, self-injury, sleep disturbance and suicidal ideas.       Vitals:    01/20/21 1430   Temp: 97.8 °F (36.6 °C)       Physical Exam  Constitutional:       General: She is not in acute distress.     Appearance: She is well-developed. She is not ill-appearing.   HENT:      Head: Normocephalic.   Eyes:      Pupils: Pupils are equal, round, and reactive to light.   Cardiovascular:      Rate and Rhythm: Normal rate and regular rhythm.      Heart sounds: Normal heart sounds.   Pulmonary:      Effort: Pulmonary effort is normal.      Breath sounds: Normal breath sounds.   Abdominal:      General: Bowel sounds are normal. There is no distension.      Palpations: Abdomen is soft. There is no mass.      Tenderness: There is no abdominal tenderness. There is no guarding or rebound.      Hernia: No hernia is present.   Musculoskeletal: Normal range of motion.   Skin:     General: Skin is warm and dry.   Neurological:      Mental Status: She is alert and oriented to person, place, and time.   Psychiatric:         Speech: Speech normal.         Behavior: Behavior normal.         Judgment: Judgment normal.         No radiology results for the last 7 days     Assessment and plan     1. Irritable bowel syndrome with diarrhea  - riFAXIMin (Xifaxan) 550 MG tablet; Take 1 tablet by mouth Every 8 (Eight) Hours.  Dispense: 42 tablet; Refill: 2    2. Fecal urgency    3. Gastroesophageal reflux disease without esophagitis    4. Abdominal bloating    IBS-D is still not well controlled.  She is still having episodes of diarrhea with lots of gas, bloating and fecal urgency.  Will trial Xifaxan and see how she does.  GERD seems well controlled on Pepcid 20 mg twice daily.  Continue GERD precautions.  Stop probiotics while on the Xifaxan.  Continue Bentyl as needed.  Patient to call the office in 2 weeks with an update.  Patient to follow-up with me in 3 months.  Patient is agreeable to the plan.

## 2021-01-21 ENCOUNTER — TELEPHONE (OUTPATIENT)
Dept: GASTROENTEROLOGY | Facility: CLINIC | Age: 76
End: 2021-01-21

## 2021-01-21 RX ORDER — NITAZOXANIDE 500 MG/1
500 TABLET ORAL 2 TIMES DAILY WITH MEALS
Qty: 10 TABLET | Refills: 0 | Status: SHIPPED | OUTPATIENT
Start: 2021-01-21 | End: 2021-01-26

## 2021-01-21 NOTE — TELEPHONE ENCOUNTER
----- Message from Olegario Brewster sent at 1/21/2021 10:13 AM EST -----  Regarding: riFAXIMin (Xifaxan) 550 MG tablet  Contact: [phone]  Pt left a message and said the prescription is too expensive.

## 2021-01-22 DIAGNOSIS — M81.0 OSTEOPOROSIS, UNSPECIFIED OSTEOPOROSIS TYPE, UNSPECIFIED PATHOLOGICAL FRACTURE PRESENCE: ICD-10-CM

## 2021-01-22 DIAGNOSIS — Z17.0 MALIGNANT NEOPLASM OF LOWER-OUTER QUADRANT OF LEFT BREAST OF FEMALE, ESTROGEN RECEPTOR POSITIVE (HCC): ICD-10-CM

## 2021-01-22 DIAGNOSIS — C50.512 MALIGNANT NEOPLASM OF LOWER-OUTER QUADRANT OF LEFT BREAST OF FEMALE, ESTROGEN RECEPTOR POSITIVE (HCC): ICD-10-CM

## 2021-01-22 DIAGNOSIS — D83.9 COMMON VARIABLE IMMUNODEFICIENCY (HCC): ICD-10-CM

## 2021-01-22 RX ORDER — METRONIDAZOLE 500 MG/1
500 TABLET ORAL 2 TIMES DAILY
Qty: 14 TABLET | Refills: 0 | Status: SHIPPED | OUTPATIENT
Start: 2021-01-22 | End: 2021-01-29

## 2021-01-22 RX ORDER — TAMOXIFEN CITRATE 20 MG/1
TABLET ORAL
Qty: 90 TABLET | Refills: 3 | Status: SHIPPED | OUTPATIENT
Start: 2021-01-22 | End: 2021-11-16 | Stop reason: SDUPTHER

## 2021-01-22 NOTE — TELEPHONE ENCOUNTER
Called pt and advised of Alea's note. She verb understanding but reports that the Alinia is over $300 for just 6 pills.  She can not afford this. Advised trujillo send message to Alea GUILLAUME.

## 2021-01-22 NOTE — TELEPHONE ENCOUNTER
The other thing we could try would be Flagyl so I will send this to her pharmacy and see what the coverage is.

## 2021-01-26 ENCOUNTER — PRIOR AUTHORIZATION (OUTPATIENT)
Dept: GASTROENTEROLOGY | Facility: CLINIC | Age: 76
End: 2021-01-26

## 2021-01-28 ENCOUNTER — INFUSION (OUTPATIENT)
Dept: ONCOLOGY | Facility: HOSPITAL | Age: 76
End: 2021-01-28

## 2021-01-28 ENCOUNTER — TELEPHONE (OUTPATIENT)
Dept: GASTROENTEROLOGY | Facility: CLINIC | Age: 76
End: 2021-01-28

## 2021-01-28 VITALS
TEMPERATURE: 97.5 F | HEIGHT: 66 IN | HEART RATE: 76 BPM | OXYGEN SATURATION: 97 % | BODY MASS INDEX: 23.3 KG/M2 | SYSTOLIC BLOOD PRESSURE: 121 MMHG | RESPIRATION RATE: 18 BRPM | WEIGHT: 145 LBS | DIASTOLIC BLOOD PRESSURE: 78 MMHG

## 2021-01-28 DIAGNOSIS — D83.9 COMMON VARIABLE IMMUNODEFICIENCY (HCC): Primary | ICD-10-CM

## 2021-01-28 DIAGNOSIS — D80.2 IGA DEFICIENCY (HCC): ICD-10-CM

## 2021-01-28 LAB — IGG1 SER-MCNC: 913 MG/DL (ref 700–1600)

## 2021-01-28 PROCEDURE — 82784 ASSAY IGA/IGD/IGG/IGM EACH: CPT | Performed by: ALLERGY & IMMUNOLOGY

## 2021-01-28 PROCEDURE — 25010000002 IMMUNE GLOBULIN (HUMAN) PER 500 MG: Performed by: ALLERGY & IMMUNOLOGY

## 2021-01-28 PROCEDURE — 96366 THER/PROPH/DIAG IV INF ADDON: CPT

## 2021-01-28 PROCEDURE — 96365 THER/PROPH/DIAG IV INF INIT: CPT

## 2021-01-28 RX ORDER — DIPHENHYDRAMINE HCL 25 MG
25 CAPSULE ORAL ONCE
Status: CANCELLED
Start: 2021-02-18

## 2021-01-28 RX ORDER — EPINEPHRINE 0.3 MG/.3ML
0.3 INJECTION SUBCUTANEOUS ONCE
Status: CANCELLED
Start: 2021-02-18

## 2021-01-28 RX ORDER — DIPHENHYDRAMINE HCL 25 MG
50 CAPSULE ORAL ONCE AS NEEDED
Status: CANCELLED
Start: 2021-02-18

## 2021-01-28 RX ORDER — ACETAMINOPHEN 500 MG
500 TABLET ORAL ONCE
Status: CANCELLED
Start: 2021-02-18

## 2021-01-28 RX ORDER — SODIUM CHLORIDE 9 MG/ML
250 INJECTION, SOLUTION INTRAVENOUS ONCE
Status: COMPLETED | OUTPATIENT
Start: 2021-01-28 | End: 2021-01-28

## 2021-01-28 RX ORDER — SODIUM CHLORIDE 9 MG/ML
250 INJECTION, SOLUTION INTRAVENOUS ONCE
Status: CANCELLED | OUTPATIENT
Start: 2021-02-18

## 2021-01-28 RX ADMIN — Medication 20 G: at 12:18

## 2021-01-28 RX ADMIN — SODIUM CHLORIDE 250 ML: 9 INJECTION, SOLUTION INTRAVENOUS at 11:46

## 2021-01-28 NOTE — TELEPHONE ENCOUNTER
----- Message from Olegario Ling sent at 1/28/2021 10:44 AM EST -----  Regarding: medication  Contact: [phone]  Pt states that diarrhea has worsened with use of medication. Please advise pt. Thank you    metroNIDAZOLE (Flagyl) 500 MG tablet 14 tablet 0 1/22/2021 1/29/2021   Sig - Route: Take 1 tablet by mouth 2 (Two) Times a Day for 7 days. - Oral   Sent to pharmacy as: metroNIDAZOLE 500 MG Oral Tablet (Flagyl)   E-Prescribing Status: Receipt confirmed by pharmacy (1/22/2021 12:37 PM EST)   Pharmacy    [id number] - [place], [place] - [address] AT [facility] [phone] [facility] [phone]

## 2021-01-28 NOTE — TELEPHONE ENCOUNTER
Yes sounds like she is worse with the Flagyl than she was before.  I would have her stop the Flagyl she can take some Imodium to slow things down if she needs to.  Have her stay hydrated.

## 2021-01-28 NOTE — TELEPHONE ENCOUNTER
Call to pt . States started flagyl 1/25 pm.  Last 2 days increasing diarrhea - up to 4x/day.  Watery brown sludge accompanied by gas and dull stomach aching.  Denies blood, fever.      Asking how best to proceed.     Update/question to ALEX Larry.

## 2021-02-25 ENCOUNTER — INFUSION (OUTPATIENT)
Dept: ONCOLOGY | Facility: HOSPITAL | Age: 76
End: 2021-02-25

## 2021-02-25 VITALS
OXYGEN SATURATION: 95 % | HEIGHT: 65 IN | TEMPERATURE: 97.7 F | RESPIRATION RATE: 18 BRPM | DIASTOLIC BLOOD PRESSURE: 81 MMHG | HEART RATE: 80 BPM | SYSTOLIC BLOOD PRESSURE: 130 MMHG | BODY MASS INDEX: 24.36 KG/M2 | WEIGHT: 146.2 LBS

## 2021-02-25 DIAGNOSIS — D80.2 IGA DEFICIENCY (HCC): ICD-10-CM

## 2021-02-25 DIAGNOSIS — D83.9 COMMON VARIABLE IMMUNODEFICIENCY (HCC): Primary | ICD-10-CM

## 2021-02-25 PROCEDURE — 96365 THER/PROPH/DIAG IV INF INIT: CPT

## 2021-02-25 PROCEDURE — 96366 THER/PROPH/DIAG IV INF ADDON: CPT

## 2021-02-25 PROCEDURE — 25010000002 IMMUNE GLOBULIN (HUMAN) PER 500 MG: Performed by: ALLERGY & IMMUNOLOGY

## 2021-02-25 RX ORDER — DIPHENHYDRAMINE HCL 25 MG
50 CAPSULE ORAL ONCE AS NEEDED
Status: CANCELLED
Start: 2021-03-18

## 2021-02-25 RX ORDER — EPINEPHRINE 0.3 MG/.3ML
0.3 INJECTION SUBCUTANEOUS ONCE
Status: CANCELLED
Start: 2021-03-18

## 2021-02-25 RX ORDER — DIPHENHYDRAMINE HCL 25 MG
25 CAPSULE ORAL ONCE
Status: CANCELLED
Start: 2021-03-18

## 2021-02-25 RX ORDER — SODIUM CHLORIDE 9 MG/ML
250 INJECTION, SOLUTION INTRAVENOUS ONCE
Status: COMPLETED | OUTPATIENT
Start: 2021-02-25 | End: 2021-02-25

## 2021-02-25 RX ORDER — SODIUM CHLORIDE 9 MG/ML
250 INJECTION, SOLUTION INTRAVENOUS ONCE
Status: CANCELLED | OUTPATIENT
Start: 2021-03-18

## 2021-02-25 RX ORDER — ACETAMINOPHEN 500 MG
500 TABLET ORAL ONCE
Status: CANCELLED
Start: 2021-03-18

## 2021-02-25 RX ADMIN — IMMUNE GLOBULIN INTRAVENOUS (HUMAN) 20 G: KIT at 12:21

## 2021-02-25 RX ADMIN — SODIUM CHLORIDE 250 ML: 9 INJECTION, SOLUTION INTRAVENOUS at 11:55

## 2021-02-25 NOTE — NURSING NOTE
Patient took Tylenol 1000 mg PO x 1 and does not take Benadryl for premedication for this treatment.

## 2021-03-03 ENCOUNTER — TELEPHONE (OUTPATIENT)
Dept: GASTROENTEROLOGY | Facility: CLINIC | Age: 76
End: 2021-03-03

## 2021-03-03 NOTE — TELEPHONE ENCOUNTER
PA for Xifaxan resubmitted through [facility] after Flagyl failure. Awaiting response from plan.

## 2021-03-04 NOTE — TELEPHONE ENCOUNTER
Xifaxan PA denied. Letter in media link below. Per 01/26/2021 telephone call, patient could not afford Xifaxan. Please advise.

## 2021-03-08 ENCOUNTER — TELEPHONE (OUTPATIENT)
Dept: GASTROENTEROLOGY | Facility: CLINIC | Age: 76
End: 2021-03-08

## 2021-03-08 NOTE — TELEPHONE ENCOUNTER
----- Message from Jody Street RN sent at 3/5/2021  3:48 PM EST -----  Regarding: FW: Appeal for med Xifaxan    ----- Message -----  From: Lizy Montenegro RegSched Rep  Sent: 3/5/2021   2:55 PM EST  To: [facility] [address] [place]  Subject: Appeal for med Xifaxan                           Osvialis from [facility] ([phone]) calling for info in regards to the appeal on this med. Please contact her.

## 2021-03-09 DIAGNOSIS — Z23 IMMUNIZATION DUE: ICD-10-CM

## 2021-03-25 ENCOUNTER — INFUSION (OUTPATIENT)
Dept: ONCOLOGY | Facility: HOSPITAL | Age: 76
End: 2021-03-25

## 2021-03-25 VITALS
DIASTOLIC BLOOD PRESSURE: 81 MMHG | RESPIRATION RATE: 18 BRPM | SYSTOLIC BLOOD PRESSURE: 130 MMHG | HEIGHT: 60 IN | OXYGEN SATURATION: 98 % | HEART RATE: 74 BPM | WEIGHT: 145.8 LBS | TEMPERATURE: 97.5 F | BODY MASS INDEX: 28.62 KG/M2

## 2021-03-25 DIAGNOSIS — D83.9 COMMON VARIABLE IMMUNODEFICIENCY (HCC): Primary | ICD-10-CM

## 2021-03-25 DIAGNOSIS — D80.2 IGA DEFICIENCY (HCC): ICD-10-CM

## 2021-03-25 PROCEDURE — 96366 THER/PROPH/DIAG IV INF ADDON: CPT

## 2021-03-25 PROCEDURE — 96365 THER/PROPH/DIAG IV INF INIT: CPT

## 2021-03-25 PROCEDURE — 25010000002 IMMUNE GLOBULIN (HUMAN) PER 500 MG: Performed by: ALLERGY & IMMUNOLOGY

## 2021-03-25 RX ORDER — EPINEPHRINE 0.3 MG/.3ML
0.3 INJECTION SUBCUTANEOUS ONCE
Status: CANCELLED
Start: 2021-04-15 | End: 2021-04-15

## 2021-03-25 RX ORDER — DIPHENHYDRAMINE HCL 25 MG
50 CAPSULE ORAL ONCE AS NEEDED
Status: CANCELLED
Start: 2021-04-15

## 2021-03-25 RX ORDER — SODIUM CHLORIDE 9 MG/ML
250 INJECTION, SOLUTION INTRAVENOUS ONCE
Status: CANCELLED | OUTPATIENT
Start: 2021-04-15

## 2021-03-25 RX ORDER — ACETAMINOPHEN 500 MG
500 TABLET ORAL ONCE
Status: CANCELLED
Start: 2021-04-15 | End: 2021-04-15

## 2021-03-25 RX ORDER — SODIUM CHLORIDE 9 MG/ML
250 INJECTION, SOLUTION INTRAVENOUS ONCE
Status: COMPLETED | OUTPATIENT
Start: 2021-03-25 | End: 2021-03-25

## 2021-03-25 RX ORDER — DIPHENHYDRAMINE HCL 25 MG
25 CAPSULE ORAL ONCE
Status: CANCELLED
Start: 2021-04-15 | End: 2021-04-15

## 2021-03-25 RX ADMIN — SODIUM CHLORIDE 250 ML: 9 INJECTION, SOLUTION INTRAVENOUS at 11:46

## 2021-03-25 RX ADMIN — IMMUNE GLOBULIN INTRAVENOUS (HUMAN) 20 G: KIT at 12:14

## 2021-04-20 ENCOUNTER — OFFICE VISIT (OUTPATIENT)
Dept: GASTROENTEROLOGY | Facility: CLINIC | Age: 76
End: 2021-04-20

## 2021-04-20 VITALS
HEIGHT: 66 IN | HEART RATE: 78 BPM | WEIGHT: 144.2 LBS | BODY MASS INDEX: 23.18 KG/M2 | RESPIRATION RATE: 20 BRPM | TEMPERATURE: 96.8 F | OXYGEN SATURATION: 95 %

## 2021-04-20 DIAGNOSIS — K21.9 GASTROESOPHAGEAL REFLUX DISEASE WITHOUT ESOPHAGITIS: ICD-10-CM

## 2021-04-20 DIAGNOSIS — K58.0 IRRITABLE BOWEL SYNDROME WITH DIARRHEA: Primary | ICD-10-CM

## 2021-04-20 DIAGNOSIS — R15.2 FECAL URGENCY: ICD-10-CM

## 2021-04-20 PROCEDURE — 99214 OFFICE O/P EST MOD 30 MIN: CPT | Performed by: NURSE PRACTITIONER

## 2021-04-20 NOTE — PROGRESS NOTES
Chief Complaint   Patient presents with   • Irritable Bowel Syndrome     fup ibs med eval refills    • Diarrhea       Elaina Tom is a  75 y.o. female here for a follow up visit for IBS.    HPI  75-year-old female presents today for follow-up visit for IBS-D.  She is a patient of Dr. Rogers.  She was last seen in the office by me on 1/20/2021.  At that time she was given a prescription for Xifaxan for IBS-D and suspected SIBO.  Unfortunately her insurance would not initially cover it and it had to have a prior authorization.  She did trial Flagyl and this did not seem to help at all.  Alinia was too expensive so she did not try that.  She continues to have IBS-D with lots of fecal urgency, gas and bloating.  She also has a history of GERD and admits she does really well with Pepcid 20 mg twice daily.  She denies any dysphagia, reflux, abdominal pain, nausea and vomiting, constipation, rectal bleeding or melena.  She was appetite is good and her weight is stable.  Her last EGD and colonoscopy was on 5/2019.  Past Medical History:   Diagnosis Date   • Allergic rhinitis    • Anxiety    • Arthritis    • Asthma     ONLY HAS ISSUES WHEN HAS RESPIRATORY INFECTION   • Cancer (CMS/HCC) 2017    BREAST-LEFT   • Cataract    • CVID (common variable immunodeficiency) (CMS/HCC)    • External hemorrhoids    • Gastritis    • GERD (gastroesophageal reflux disease)    • H/O CT scan of abdomen     PELVIS AND CHEST: UNREMARKABLE   • Hiatal hernia    • History of colon polyps    • History of giardia infection    • History of Helicobacter pylori infection    • IBS (irritable bowel syndrome)    • IgA deficiency (CMS/HCC)     CANNOT HAVE ANYTHING WITH IGA IN IT WILL CAUSE ANAPHYLAXIS   • Immunoglobulin deficiency (CMS/HCC)    • Peptic ulcer disease    • PONV (postoperative nausea and vomiting)    • Torn meniscus    • Wound discharge     LT BREAST       Past Surgical History:   Procedure Laterality Date   • ABDOMINAL WALL ABSCESS INCISION  AND DRAINAGE Left 7/3/2017    Procedure: BREAST ABSCESS INCISION AND DRAINAGE AND WOUND DEBRIDMENT;  Surgeon: Steve Leon MD;  Location:  [facility];  Service:    • BREAST BIOPSY Left 02/26/2007    Left breast needle localization biopsy; Dr. Steve Leon   • BREAST BIOPSY Left 02/02/2007    Left breast stereotactic vacuum assisted core biopsy with marker placement; Dr. Steve Leon   • BREAST BIOPSY Left 02/07/2003    Left breast needle localization biopsy; Dr. Steve Leon   • BREAST LUMPECTOMY WITH SENTINEL NODE BIOPSY Left 5/19/2017    Procedure: BREAST LUMPECTOMY WITH SENTINEL NODE BIOPSY & MAMMO NEEDLE LOCALIZATION;  Surgeon: Steve Leon MD;  Location:  [facility];  Service:    • COLONOSCOPY  01/10/2014    NTEH, TORTS, STOOL   • COLONOSCOPY  08/24/2010    IH, TORT, BX-   • COLONOSCOPY N/A 5/23/2019    Procedure: COLONOSCOPY TO CECUM AND TERM. ILEUM WITH BIOPSIES;  Surgeon: Rigoberto Rogers MD;  Location: [facility] ENDOSCOPY;  Service: Gastroenterology   • ENDOSCOPY  01/10/2014    Z-LINE REGULAR, 35 CM FROM INCISORS, HH, GASTRITIS, NO H-PYLORI   • ENDOSCOPY  08/24/2010    GASTRITIS   • ENDOSCOPY N/A 10/18/2016    Gastritis, duodenitis, HH    • ENDOSCOPY N/A 5/23/2019    Procedure: ESOPHAGOGASTRODUODENOSCOPY WITH BIOPSIES AND RAPID UREASE;  Surgeon: Rigoberto Rogers MD;  Location: [facility] ENDOSCOPY;  Service: Gastroenterology   • ENDOSCOPY AND COLONOSCOPY N/A 08/23/2007    Z-line irregular, normal esophagus, bilious gastric fluid, gastritis, hiatal hernia, normal duodenal bulb and 2nd part of the duodenum; non-thrombosed external hemorrhoids, torts, one 5 mm polyp in the mid sigmoid colon, stool in the sigmoid colon, descending colon, transverse colon, ascending colon and in the cecum-Dr. Rigoberto Rogers   • KNEE SURGERY Left 01/2015   • SEPTOPLASTY     • TONSILLECTOMY         Scheduled Meds:    Continuous Infusions:No current facility-administered medications for this  visit.      PRN Meds:.    Allergies   Allergen Reactions   • Other Anaphylaxis     BLOOD PRODUCTS WITH IGA IN THEM   • Erythromycin GI Intolerance     VOMITING   • Avelox [Moxifloxacin] Rash   • Levaquin [Levofloxacin] Rash       Social History     Socioeconomic History   • Marital status:      Spouse name: Ortiz   • Number of children: 3   • Years of education: College   • Highest education level: Not on file   Tobacco Use   • Smoking status: Never Smoker   • Smokeless tobacco: Never Used   Substance and Sexual Activity   • Alcohol use: Yes     Alcohol/week: 1.0 standard drinks     Types: 1 Glasses of wine per week     Comment: occasional   • Drug use: No   • Sexual activity: Defer       Family History   Problem Relation Age of Onset   • Other Father         POLYP   • Colon polyps Father    • Prostate cancer Father 68   • Pancreatitis Daughter    • Malig Hyperthermia Neg Hx        Review of Systems   Constitutional: Negative for appetite change, chills, diaphoresis, fatigue, fever and unexpected weight change.   HENT: Negative for nosebleeds, postnasal drip, sore throat, trouble swallowing and voice change.    Respiratory: Negative for cough, choking, chest tightness, shortness of breath and wheezing.    Cardiovascular: Negative for chest pain, palpitations and leg swelling.   Gastrointestinal: Positive for abdominal distention and diarrhea. Negative for abdominal pain, anal bleeding, blood in stool, constipation, nausea, rectal pain and vomiting.   Endocrine: Negative for polydipsia, polyphagia and polyuria.   Musculoskeletal: Negative for gait problem.   Skin: Negative for rash and wound.   Allergic/Immunologic: Negative for food allergies.   Neurological: Negative for dizziness, speech difficulty and light-headedness.   Psychiatric/Behavioral: Negative for confusion, self-injury, sleep disturbance and suicidal ideas.       Vitals:    04/20/21 1317   Pulse: 78   Resp: 20   Temp: 96.8 °F (36 °C)   SpO2:  95%       Physical Exam  Constitutional:       General: She is not in acute distress.     Appearance: She is well-developed. She is not ill-appearing.   HENT:      Head: Normocephalic.   Eyes:      Pupils: Pupils are equal, round, and reactive to light.   Cardiovascular:      Rate and Rhythm: Normal rate and regular rhythm.      Heart sounds: Normal heart sounds.   Pulmonary:      Effort: Pulmonary effort is normal.      Breath sounds: Normal breath sounds.   Abdominal:      General: Bowel sounds are normal. There is distension.      Palpations: Abdomen is soft. There is no mass.      Tenderness: There is no abdominal tenderness. There is no guarding or rebound.      Hernia: No hernia is present.   Musculoskeletal:         General: Normal range of motion.   Skin:     General: Skin is warm and dry.   Neurological:      Mental Status: She is alert and oriented to person, place, and time.   Psychiatric:         Speech: Speech normal.         Behavior: Behavior normal.         Judgment: Judgment normal.         No radiology results for the last 7 days     Assessment and plan     1. Irritable bowel syndrome with diarrhea  - riFAXIMin (Xifaxan) 550 MG tablet; Take 1 tablet by mouth Every 8 (Eight) Hours.  Dispense: 42 tablet; Refill: 2    2. Fecal urgency    3. Gastroesophageal reflux disease without esophagitis    GERD seems well controlled on Pepcid 20 mg twice daily.  Continue GERD precautions.  IBS-D is still not well controlled.  Looks like insurance finally approved the Xifaxan so I am happy for her to get started on it.  I want her to take the first 2 weeks and call me with an update.  Patient to follow-up with me in 1 month.  Patient is agreeable to the plan.

## 2021-04-22 ENCOUNTER — INFUSION (OUTPATIENT)
Dept: ONCOLOGY | Facility: HOSPITAL | Age: 76
End: 2021-04-22

## 2021-04-22 VITALS
HEIGHT: 65 IN | DIASTOLIC BLOOD PRESSURE: 75 MMHG | RESPIRATION RATE: 16 BRPM | HEART RATE: 65 BPM | BODY MASS INDEX: 24.09 KG/M2 | WEIGHT: 144.6 LBS | OXYGEN SATURATION: 98 % | SYSTOLIC BLOOD PRESSURE: 117 MMHG | TEMPERATURE: 97.5 F

## 2021-04-22 DIAGNOSIS — D80.2 IGA DEFICIENCY (HCC): ICD-10-CM

## 2021-04-22 DIAGNOSIS — D83.9 COMMON VARIABLE IMMUNODEFICIENCY (HCC): Primary | ICD-10-CM

## 2021-04-22 LAB
BASOPHILS # BLD AUTO: 0.04 10*3/MM3 (ref 0–0.2)
BASOPHILS NFR BLD AUTO: 0.7 % (ref 0–1.5)
DEPRECATED RDW RBC AUTO: 47.7 FL (ref 37–54)
EOSINOPHIL # BLD AUTO: 0.07 10*3/MM3 (ref 0–0.4)
EOSINOPHIL NFR BLD AUTO: 1.3 % (ref 0.3–6.2)
ERYTHROCYTE [DISTWIDTH] IN BLOOD BY AUTOMATED COUNT: 12.7 % (ref 12.3–15.4)
HCT VFR BLD AUTO: 36.7 % (ref 34–46.6)
HGB BLD-MCNC: 12 G/DL (ref 12–15.9)
IMM GRANULOCYTES # BLD AUTO: 0.01 10*3/MM3 (ref 0–0.05)
IMM GRANULOCYTES NFR BLD AUTO: 0.2 % (ref 0–0.5)
LYMPHOCYTES # BLD AUTO: 1.76 10*3/MM3 (ref 0.7–3.1)
LYMPHOCYTES NFR BLD AUTO: 33 % (ref 19.6–45.3)
MCH RBC QN AUTO: 33.1 PG (ref 26.6–33)
MCHC RBC AUTO-ENTMCNC: 32.7 G/DL (ref 31.5–35.7)
MCV RBC AUTO: 101.4 FL (ref 79–97)
MONOCYTES # BLD AUTO: 0.41 10*3/MM3 (ref 0.1–0.9)
MONOCYTES NFR BLD AUTO: 7.7 % (ref 5–12)
NEUTROPHILS NFR BLD AUTO: 3.05 10*3/MM3 (ref 1.7–7)
NEUTROPHILS NFR BLD AUTO: 57.1 % (ref 42.7–76)
NRBC BLD AUTO-RTO: 0 /100 WBC (ref 0–0.2)
PLATELET # BLD AUTO: 257 10*3/MM3 (ref 140–450)
PMV BLD AUTO: 9.9 FL (ref 6–12)
RBC # BLD AUTO: 3.62 10*6/MM3 (ref 3.77–5.28)
WBC # BLD AUTO: 5.34 10*3/MM3 (ref 3.4–10.8)

## 2021-04-22 PROCEDURE — 96366 THER/PROPH/DIAG IV INF ADDON: CPT

## 2021-04-22 PROCEDURE — 82784 ASSAY IGA/IGD/IGG/IGM EACH: CPT | Performed by: ALLERGY & IMMUNOLOGY

## 2021-04-22 PROCEDURE — 25010000002 IMMUNE GLOBULIN (HUMAN) PER 500 MG: Performed by: ALLERGY & IMMUNOLOGY

## 2021-04-22 PROCEDURE — 96365 THER/PROPH/DIAG IV INF INIT: CPT

## 2021-04-22 PROCEDURE — 85025 COMPLETE CBC W/AUTO DIFF WBC: CPT | Performed by: ALLERGY & IMMUNOLOGY

## 2021-04-22 RX ORDER — ACETAMINOPHEN 500 MG
500 TABLET ORAL ONCE
Status: CANCELLED
Start: 2021-05-13 | End: 2021-05-13

## 2021-04-22 RX ORDER — DIPHENHYDRAMINE HCL 25 MG
50 CAPSULE ORAL ONCE AS NEEDED
Status: CANCELLED
Start: 2021-05-13

## 2021-04-22 RX ORDER — DIPHENHYDRAMINE HCL 25 MG
25 CAPSULE ORAL ONCE
Status: CANCELLED
Start: 2021-05-13 | End: 2021-05-13

## 2021-04-22 RX ORDER — SODIUM CHLORIDE 9 MG/ML
250 INJECTION, SOLUTION INTRAVENOUS ONCE
Status: COMPLETED | OUTPATIENT
Start: 2021-04-22 | End: 2021-04-22

## 2021-04-22 RX ORDER — EPINEPHRINE 0.3 MG/.3ML
0.3 INJECTION SUBCUTANEOUS ONCE
Status: CANCELLED
Start: 2021-05-13 | End: 2021-05-13

## 2021-04-22 RX ORDER — SODIUM CHLORIDE 9 MG/ML
250 INJECTION, SOLUTION INTRAVENOUS ONCE
Status: CANCELLED | OUTPATIENT
Start: 2021-05-13

## 2021-04-22 RX ADMIN — Medication 20 G: at 12:24

## 2021-04-22 RX ADMIN — SODIUM CHLORIDE 250 ML: 9 INJECTION, SOLUTION INTRAVENOUS at 12:00

## 2021-04-23 LAB — IGG1 SER-MCNC: 965 MG/DL (ref 700–1600)

## 2021-04-27 ENCOUNTER — TELEPHONE (OUTPATIENT)
Dept: GASTROENTEROLOGY | Facility: CLINIC | Age: 76
End: 2021-04-27

## 2021-04-27 NOTE — TELEPHONE ENCOUNTER
Called pt and pt wanted to let Alea GUILLAUME know she will start the xifaxan when she get back off of vacation on 05/10/2021.  Pt states that it is going to cost $500, but if it wlll help she will try it.  Advised will update Alea GUILLAUME.

## 2021-04-27 NOTE — TELEPHONE ENCOUNTER
----- Message from Olegario Valdivia sent at 4/27/2021  2:06 PM EDT -----  Regarding: riFAXIMin (Xifaxan) 550 MG tablet  Contact: [phone]  PT states medication is still expensive but she is okay with it, PT states she is going on vacation and wants to start riFAXIMin (Xifaxan) 550 MG tablet when she gets back.

## 2021-04-28 NOTE — TELEPHONE ENCOUNTER
Called pt and advised of Alea's note. Verb understanding.   Pt states that she is ok with expense. Advised will update Alea NP.

## 2021-04-28 NOTE — TELEPHONE ENCOUNTER
$500 is a lot of money to spend for a prescription.  We can always try Alinia or Flagyl first before committing to the Xifaxan.  I does hate her making that kind of expense.

## 2021-05-12 ENCOUNTER — TELEPHONE (OUTPATIENT)
Dept: GASTROENTEROLOGY | Facility: CLINIC | Age: 76
End: 2021-05-12

## 2021-05-12 NOTE — TELEPHONE ENCOUNTER
Returned call to pt and pt reports she is back from her vacation and is doing better. She reports that her stools are now formed. Pt states she is going to wait on starting the xifaxan for and will update us with any changes.   Update sent to Alea GUILLAUME.

## 2021-05-20 ENCOUNTER — INFUSION (OUTPATIENT)
Dept: ONCOLOGY | Facility: HOSPITAL | Age: 76
End: 2021-05-20

## 2021-05-20 VITALS
SYSTOLIC BLOOD PRESSURE: 126 MMHG | DIASTOLIC BLOOD PRESSURE: 77 MMHG | BODY MASS INDEX: 23.96 KG/M2 | RESPIRATION RATE: 18 BRPM | WEIGHT: 143.8 LBS | TEMPERATURE: 97.3 F | HEIGHT: 65 IN | HEART RATE: 67 BPM | OXYGEN SATURATION: 98 %

## 2021-05-20 DIAGNOSIS — D83.9 COMMON VARIABLE IMMUNODEFICIENCY (HCC): Primary | ICD-10-CM

## 2021-05-20 DIAGNOSIS — D80.2 IGA DEFICIENCY (HCC): ICD-10-CM

## 2021-05-20 PROCEDURE — 96366 THER/PROPH/DIAG IV INF ADDON: CPT

## 2021-05-20 PROCEDURE — 96365 THER/PROPH/DIAG IV INF INIT: CPT

## 2021-05-20 PROCEDURE — 25010000002 IMMUNE GLOBULIN (HUMAN) PER 500 MG: Performed by: ALLERGY & IMMUNOLOGY

## 2021-05-20 RX ORDER — DIPHENHYDRAMINE HCL 25 MG
50 CAPSULE ORAL ONCE AS NEEDED
Status: CANCELLED
Start: 2021-06-10

## 2021-05-20 RX ORDER — DIPHENHYDRAMINE HCL 25 MG
25 CAPSULE ORAL ONCE
Status: CANCELLED
Start: 2021-06-10 | End: 2021-06-10

## 2021-05-20 RX ORDER — SODIUM CHLORIDE 9 MG/ML
250 INJECTION, SOLUTION INTRAVENOUS ONCE
Status: COMPLETED | OUTPATIENT
Start: 2021-05-20 | End: 2021-05-20

## 2021-05-20 RX ORDER — ACETAMINOPHEN 500 MG
500 TABLET ORAL ONCE
Status: CANCELLED
Start: 2021-06-10 | End: 2021-06-10

## 2021-05-20 RX ORDER — ACETAMINOPHEN 500 MG
500 TABLET ORAL ONCE
Status: DISCONTINUED | OUTPATIENT
Start: 2021-05-20 | End: 2021-05-20 | Stop reason: HOSPADM

## 2021-05-20 RX ORDER — EPINEPHRINE 0.3 MG/.3ML
0.3 INJECTION SUBCUTANEOUS ONCE
Status: CANCELLED
Start: 2021-06-10 | End: 2021-06-10

## 2021-05-20 RX ORDER — SODIUM CHLORIDE 9 MG/ML
250 INJECTION, SOLUTION INTRAVENOUS ONCE
Status: CANCELLED | OUTPATIENT
Start: 2021-06-10

## 2021-05-20 RX ADMIN — IMMUNE GLOBULIN INTRAVENOUS (HUMAN) 20 G: KIT at 12:19

## 2021-05-20 RX ADMIN — SODIUM CHLORIDE 250 ML: 9 INJECTION, SOLUTION INTRAVENOUS at 12:19

## 2021-05-24 ENCOUNTER — TELEPHONE (OUTPATIENT)
Dept: GASTROENTEROLOGY | Facility: CLINIC | Age: 76
End: 2021-05-24

## 2021-05-24 NOTE — TELEPHONE ENCOUNTER
Called pt and pt reports that she is doing much better. Pt cancelled her appt on 05/28 and rescheduled for 07/20 at 115p.  Update sent to Alea GUILLAUME.

## 2021-05-24 NOTE — TELEPHONE ENCOUNTER
----- Message from Olegario Bledsoe Rep sent at 5/24/2021  9:59 AM EDT -----  Regarding: Questions  Contact: [phone]  Pt called and left VM for a call back

## 2021-05-25 ENCOUNTER — LAB (OUTPATIENT)
Dept: OTHER | Facility: HOSPITAL | Age: 76
End: 2021-05-25

## 2021-05-25 ENCOUNTER — OFFICE VISIT (OUTPATIENT)
Dept: ONCOLOGY | Facility: CLINIC | Age: 76
End: 2021-05-25

## 2021-05-25 VITALS
HEART RATE: 75 BPM | OXYGEN SATURATION: 98 % | RESPIRATION RATE: 16 BRPM | BODY MASS INDEX: 24.65 KG/M2 | HEIGHT: 64 IN | TEMPERATURE: 97.7 F | WEIGHT: 144.4 LBS | DIASTOLIC BLOOD PRESSURE: 91 MMHG | SYSTOLIC BLOOD PRESSURE: 156 MMHG

## 2021-05-25 DIAGNOSIS — D83.9 COMMON VARIABLE IMMUNODEFICIENCY (HCC): ICD-10-CM

## 2021-05-25 DIAGNOSIS — D80.2 IGA DEFICIENCY (HCC): ICD-10-CM

## 2021-05-25 DIAGNOSIS — M81.0 OSTEOPOROSIS, UNSPECIFIED OSTEOPOROSIS TYPE, UNSPECIFIED PATHOLOGICAL FRACTURE PRESENCE: ICD-10-CM

## 2021-05-25 DIAGNOSIS — Z17.0 MALIGNANT NEOPLASM OF LOWER-OUTER QUADRANT OF LEFT BREAST OF FEMALE, ESTROGEN RECEPTOR POSITIVE (HCC): Primary | ICD-10-CM

## 2021-05-25 DIAGNOSIS — C50.512 MALIGNANT NEOPLASM OF LOWER-OUTER QUADRANT OF LEFT BREAST OF FEMALE, ESTROGEN RECEPTOR POSITIVE (HCC): ICD-10-CM

## 2021-05-25 DIAGNOSIS — Z17.0 MALIGNANT NEOPLASM OF LOWER-OUTER QUADRANT OF LEFT BREAST OF FEMALE, ESTROGEN RECEPTOR POSITIVE (HCC): ICD-10-CM

## 2021-05-25 DIAGNOSIS — Z79.810 LONG-TERM CURRENT USE OF TAMOXIFEN: ICD-10-CM

## 2021-05-25 DIAGNOSIS — C50.512 MALIGNANT NEOPLASM OF LOWER-OUTER QUADRANT OF LEFT BREAST OF FEMALE, ESTROGEN RECEPTOR POSITIVE (HCC): Primary | ICD-10-CM

## 2021-05-25 LAB
ALBUMIN SERPL-MCNC: 4 G/DL (ref 3.5–5.2)
ALBUMIN/GLOB SERPL: 1.5 G/DL
ALP SERPL-CCNC: 43 U/L (ref 39–117)
ALT SERPL W P-5'-P-CCNC: 16 U/L (ref 1–33)
ANION GAP SERPL CALCULATED.3IONS-SCNC: 10.5 MMOL/L (ref 5–15)
AST SERPL-CCNC: 29 U/L (ref 1–32)
BASOPHILS # BLD AUTO: 0.03 10*3/MM3 (ref 0–0.2)
BASOPHILS NFR BLD AUTO: 0.6 % (ref 0–1.5)
BILIRUB SERPL-MCNC: 0.5 MG/DL (ref 0–1.2)
BUN SERPL-MCNC: 12 MG/DL (ref 8–23)
BUN/CREAT SERPL: 16.9 (ref 7–25)
CALCIUM SPEC-SCNC: 9.1 MG/DL (ref 8.6–10.5)
CHLORIDE SERPL-SCNC: 107 MMOL/L (ref 98–107)
CO2 SERPL-SCNC: 23.5 MMOL/L (ref 22–29)
CREAT SERPL-MCNC: 0.71 MG/DL (ref 0.57–1)
DEPRECATED RDW RBC AUTO: 49 FL (ref 37–54)
EOSINOPHIL # BLD AUTO: 0.03 10*3/MM3 (ref 0–0.4)
EOSINOPHIL NFR BLD AUTO: 0.6 % (ref 0.3–6.2)
ERYTHROCYTE [DISTWIDTH] IN BLOOD BY AUTOMATED COUNT: 13.3 % (ref 12.3–15.4)
GFR SERPL CREATININE-BSD FRML MDRD: 81 ML/MIN/1.73
GLOBULIN UR ELPH-MCNC: 2.6 GM/DL
GLUCOSE SERPL-MCNC: 106 MG/DL (ref 65–99)
HCT VFR BLD AUTO: 34.2 % (ref 34–46.6)
HGB BLD-MCNC: 11.1 G/DL (ref 12–15.9)
IMM GRANULOCYTES # BLD AUTO: 0.01 10*3/MM3 (ref 0–0.05)
IMM GRANULOCYTES NFR BLD AUTO: 0.2 % (ref 0–0.5)
LYMPHOCYTES # BLD AUTO: 1.6 10*3/MM3 (ref 0.7–3.1)
LYMPHOCYTES NFR BLD AUTO: 33.5 % (ref 19.6–45.3)
MCH RBC QN AUTO: 32.6 PG (ref 26.6–33)
MCHC RBC AUTO-ENTMCNC: 32.5 G/DL (ref 31.5–35.7)
MCV RBC AUTO: 100.6 FL (ref 79–97)
MONOCYTES # BLD AUTO: 0.42 10*3/MM3 (ref 0.1–0.9)
MONOCYTES NFR BLD AUTO: 8.8 % (ref 5–12)
NEUTROPHILS NFR BLD AUTO: 2.68 10*3/MM3 (ref 1.7–7)
NEUTROPHILS NFR BLD AUTO: 56.3 % (ref 42.7–76)
NRBC BLD AUTO-RTO: 0 /100 WBC (ref 0–0.2)
PLATELET # BLD AUTO: 198 10*3/MM3 (ref 140–450)
PMV BLD AUTO: 9.5 FL (ref 6–12)
POTASSIUM SERPL-SCNC: 4 MMOL/L (ref 3.5–5.2)
PROT SERPL-MCNC: 6.6 G/DL (ref 6–8.5)
RBC # BLD AUTO: 3.4 10*6/MM3 (ref 3.77–5.28)
SODIUM SERPL-SCNC: 141 MMOL/L (ref 136–145)
WBC # BLD AUTO: 4.77 10*3/MM3 (ref 3.4–10.8)

## 2021-05-25 PROCEDURE — 36415 COLL VENOUS BLD VENIPUNCTURE: CPT

## 2021-05-25 PROCEDURE — 80053 COMPREHEN METABOLIC PANEL: CPT | Performed by: INTERNAL MEDICINE

## 2021-05-25 PROCEDURE — 85025 COMPLETE CBC W/AUTO DIFF WBC: CPT | Performed by: INTERNAL MEDICINE

## 2021-05-25 PROCEDURE — 99213 OFFICE O/P EST LOW 20 MIN: CPT | Performed by: INTERNAL MEDICINE

## 2021-05-25 NOTE — PROGRESS NOTES
Subjective     REASON FOR FOLLOW UP:   1. Stage I ( T1c,N0,M0 ) ER+,AL+, HER 2 neg for a ductal carcinoma of the left breast status post lumpectomy 5/19/2017.  Patient started on adjuvant tamoxifen after the visit of 6/9/2017.  2.  Osteoporosis  3.  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  Development of draining seroma and poor incisional healing in the left breast that required further surgery on 7/3/2017.  5.  Genetic testing negative for any adverse mutations in June 2019    HISTORY OF PRESENT ILLNESS:  The patient is a 75 y.o. year old female who was noted to have a radiographic abnormality in the left breast in March.  She underwent an ultrasound-guided biopsy on 3/28/2017 showing invasive ductal carcinoma which was strongly hormone receptor positive and HER-2/trae negative.  She elected to undergo a lumpectomy procedure with Dr. Leon on 5/19/2017.  The tumor size was 1.2 x 1 x 0.9 cm and 3 sentinel lymph nodes were examined all of which were negative for metastatic cancer.    The patient was seen on 6/9/2017 to discuss postop adjuvant hormonal therapy.  She has a history of osteoporosis.  We discussed initiation of tamoxifen therapy which would have the advantage of helping to maintain her bone density.    She is here today for routine 6 month follow-up and seems to be getting along well on her tamoxifen.  She has been on the medication for 4 years now.    [facility] with no worrisome findings.      She continues to receive IVIG infusions for her common variable immunodeficiency.  Most recently received 20 g dose on 5/20/2021.    She has had the Covid vaccine.  She has not had any significant infections of any type recently.  History of Present Illness        ALLERGIES:    Allergies   Allergen Reactions   • Other Anaphylaxis     BLOOD PRODUCTS WITH IGA IN THEM   • Erythromycin GI Intolerance     VOMITING   • Avelox  "[Moxifloxacin] Rash   • Levaquin [Levofloxacin] Rash         Review of Systems   Constitutional: Negative for activity change, appetite change, fatigue, fever and unexpected weight change.   HENT: Negative for hearing loss, nosebleeds, trouble swallowing and voice change.    Eyes: Negative for visual disturbance.   Respiratory: Negative for cough, shortness of breath and wheezing.    Cardiovascular: Negative for chest pain and palpitations.   Gastrointestinal: Negative for abdominal pain, diarrhea, nausea and vomiting.   Genitourinary: Negative for difficulty urinating, frequency, hematuria and urgency.   Musculoskeletal: Negative for back pain and neck pain.   Neurological: Negative for dizziness, seizures, syncope and headaches.   Hematological: Negative for adenopathy. Does not bruise/bleed easily.   Psychiatric/Behavioral: Negative for behavioral problems. The patient is not nervous/anxious.        Objective     Vitals:    05/25/21 1431   BP: 156/91   Pulse: 75   Resp: 16   Temp: 97.7 °F (36.5 °C)   TempSrc: Skin   SpO2: 98%   Weight: 65.5 kg (144 lb 6.4 oz)   Height: 162.6 cm (64.02\")   PainSc:   3   PainLoc: Knee  Comment: right     Current Status 12/8/2020   ECOG score 1       Physical Exam   Constitutional: She is oriented to person, place, and time. She appears well-developed. No distress.   HENT:   Head: Normocephalic.   Eyes: Pupils are equal, round, and reactive to light. Conjunctivae are normal. No scleral icterus.   Neck: No JVD present. No thyromegaly present.   Cardiovascular: Normal rate and regular rhythm. Exam reveals no gallop and no friction rub.   No murmur heard.  Pulmonary/Chest: Effort normal and breath sounds normal. She has no wheezes. She has no rales. Right breast exhibits no mass, no nipple discharge and no skin change. Left breast exhibits skin change. Left breast exhibits no mass and no nipple discharge.   Left breast shows healed lumpectomy scar and radiation tattoos.   Abdominal: " Soft. Bowel sounds are normal. She exhibits no distension and no mass. There is no abdominal tenderness.   Musculoskeletal: Normal range of motion. No deformity.   Lymphadenopathy:     She has no cervical adenopathy.   Neurological: She is alert and oriented to person, place, and time. She has normal reflexes. No cranial nerve deficit.   Skin: Skin is warm and dry. No erythema.   Psychiatric: Her behavior is normal. Judgment normal.     I have reexamined the patient and the results are consistent with the previously documented exam. Jj Hickman MD     RECENT LABS:  Hematology WBC   Date Value Ref Range Status   05/25/2021 4.77 3.40 - 10.80 10*3/mm3 Final   04/29/2020 5.1 3.4 - 10.8 x10E3/uL Final     RBC   Date Value Ref Range Status   05/25/2021 3.40 (L) 3.77 - 5.28 10*6/mm3 Final   04/29/2020 3.85 3.77 - 5.28 x10E6/uL Final     Hemoglobin   Date Value Ref Range Status   05/25/2021 11.1 (L) 12.0 - 15.9 g/dL Final     Hematocrit   Date Value Ref Range Status   05/25/2021 34.2 34.0 - 46.6 % Final     Platelets   Date Value Ref Range Status   05/25/2021 198 140 - 450 10*3/mm3 Final          Assessment/Plan     1.  Stage I, hormone receptor positive, HER-2/trae negative invasive ductal carcinoma the left breast status post lumpectomy.  She has been on adjuvant hormonal therapy with tamoxifen 20 mg daily since June 2017 and continues to tolerate it well.  2.  Osteoporosis on her last DEXA scan.  Will be having another bone density scan in August of this year when she follows up with her GYN office  3.  Common variable immunodeficiency requiring monthly IV immunoglobulin infusions.  4.  Mild anemia. Stable    Plan  1.  Continue tamoxifen 20 mg daily with plans to continue treatment for 5 years through June 2022.  2.  Return for follow-up in 6 months.      3.  Bilateral screening mammograms are scheduled for next month.

## 2021-06-02 ENCOUNTER — TELEPHONE (OUTPATIENT)
Dept: GASTROENTEROLOGY | Facility: CLINIC | Age: 76
End: 2021-06-02

## 2021-06-02 NOTE — TELEPHONE ENCOUNTER
Parish initiated for pepcid 20 mg 1 tab po 2x/day, #180, R0.  Pt has upcoming appt with ALEX Larry on 7/20.     Message to ALEX Larry.

## 2021-06-02 NOTE — TELEPHONE ENCOUNTER
----- Message from Olegario Brewster sent at 6/2/2021  9:37 AM EDT -----  Regarding: famotidine (Pepcid) 20 MG tablet  Contact: [phone]  Pt needs her prescription called in.     [facility]/pharmacy #[id number] - [place], [place] - [address] AT [facility] - [phone]  - [phone]    [id number], [facility] [address]   Phone:  [phone]  Fax:  [phone]

## 2021-06-03 RX ORDER — FAMOTIDINE 20 MG/1
20 TABLET, FILM COATED ORAL 2 TIMES DAILY
Qty: 180 TABLET | Refills: 0 | Status: SHIPPED | OUTPATIENT
Start: 2021-06-03 | End: 2021-08-11 | Stop reason: SDUPTHER

## 2021-06-04 ENCOUNTER — APPOINTMENT (OUTPATIENT)
Dept: WOMENS IMAGING | Facility: HOSPITAL | Age: 76
End: 2021-06-04

## 2021-06-04 PROCEDURE — 77063 BREAST TOMOSYNTHESIS BI: CPT | Performed by: RADIOLOGY

## 2021-06-04 PROCEDURE — 77067 SCR MAMMO BI INCL CAD: CPT | Performed by: RADIOLOGY

## 2021-07-01 ENCOUNTER — INFUSION (OUTPATIENT)
Dept: ONCOLOGY | Facility: HOSPITAL | Age: 76
End: 2021-07-01

## 2021-07-01 VITALS
BODY MASS INDEX: 23.76 KG/M2 | HEART RATE: 71 BPM | SYSTOLIC BLOOD PRESSURE: 115 MMHG | WEIGHT: 142.6 LBS | OXYGEN SATURATION: 95 % | DIASTOLIC BLOOD PRESSURE: 69 MMHG | TEMPERATURE: 97.7 F | HEIGHT: 65 IN

## 2021-07-01 DIAGNOSIS — D80.2 IGA DEFICIENCY (HCC): ICD-10-CM

## 2021-07-01 DIAGNOSIS — D83.9 COMMON VARIABLE IMMUNODEFICIENCY (HCC): Primary | ICD-10-CM

## 2021-07-01 LAB
FERRITIN SERPL-MCNC: 104.1 NG/ML (ref 13–150)
HGB RETIC QN AUTO: 36.4 PG (ref 29.8–36.1)
IGG1 SER-MCNC: 847 MG/DL (ref 700–1600)
IMM RETICS NFR: 8.5 % (ref 3–15.8)
IRON 24H UR-MRATE: 75 MCG/DL (ref 37–145)
IRON SATN MFR SERPL: 21 % (ref 20–50)
RETICS # AUTO: 0.04 10*6/MM3 (ref 0.02–0.13)
RETICS/RBC NFR AUTO: 1.27 % (ref 0.7–1.9)
TIBC SERPL-MCNC: 355 MCG/DL (ref 298–536)
TRANSFERRIN SERPL-MCNC: 238 MG/DL (ref 200–360)
VIT B12 BLD-MCNC: 1048 PG/ML (ref 211–946)

## 2021-07-01 PROCEDURE — 96365 THER/PROPH/DIAG IV INF INIT: CPT

## 2021-07-01 PROCEDURE — 25010000002 IMMUNE GLOBULIN (HUMAN) PER 500 MG: Performed by: ALLERGY & IMMUNOLOGY

## 2021-07-01 PROCEDURE — 84466 ASSAY OF TRANSFERRIN: CPT | Performed by: INTERNAL MEDICINE

## 2021-07-01 PROCEDURE — 82607 VITAMIN B-12: CPT | Performed by: INTERNAL MEDICINE

## 2021-07-01 PROCEDURE — 82784 ASSAY IGA/IGD/IGG/IGM EACH: CPT | Performed by: ALLERGY & IMMUNOLOGY

## 2021-07-01 PROCEDURE — 83540 ASSAY OF IRON: CPT | Performed by: INTERNAL MEDICINE

## 2021-07-01 PROCEDURE — 85046 RETICYTE/HGB CONCENTRATE: CPT | Performed by: INTERNAL MEDICINE

## 2021-07-01 PROCEDURE — 82728 ASSAY OF FERRITIN: CPT | Performed by: INTERNAL MEDICINE

## 2021-07-01 PROCEDURE — 96366 THER/PROPH/DIAG IV INF ADDON: CPT

## 2021-07-01 RX ORDER — SODIUM CHLORIDE 9 MG/ML
250 INJECTION, SOLUTION INTRAVENOUS ONCE
Status: COMPLETED | OUTPATIENT
Start: 2021-07-01 | End: 2021-07-01

## 2021-07-01 RX ORDER — SODIUM CHLORIDE 9 MG/ML
250 INJECTION, SOLUTION INTRAVENOUS ONCE
Status: CANCELLED | OUTPATIENT
Start: 2021-07-08

## 2021-07-01 RX ORDER — ACETAMINOPHEN 500 MG
500 TABLET ORAL ONCE
Status: CANCELLED
Start: 2021-07-08 | End: 2021-07-08

## 2021-07-01 RX ORDER — EPINEPHRINE 0.3 MG/.3ML
0.3 INJECTION SUBCUTANEOUS ONCE
Status: CANCELLED
Start: 2021-07-08 | End: 2021-07-08

## 2021-07-01 RX ORDER — DIPHENHYDRAMINE HCL 25 MG
50 CAPSULE ORAL ONCE AS NEEDED
Status: CANCELLED
Start: 2021-07-08

## 2021-07-01 RX ORDER — DIPHENHYDRAMINE HCL 25 MG
25 CAPSULE ORAL ONCE
Status: CANCELLED
Start: 2021-07-08 | End: 2021-07-08

## 2021-07-01 RX ADMIN — SODIUM CHLORIDE 250 ML: 9 INJECTION, SOLUTION INTRAVENOUS at 12:15

## 2021-07-01 RX ADMIN — IMMUNE GLOBULIN INTRAVENOUS (HUMAN) 20 G: KIT at 12:47

## 2021-07-29 ENCOUNTER — INFUSION (OUTPATIENT)
Dept: ONCOLOGY | Facility: HOSPITAL | Age: 76
End: 2021-07-29

## 2021-07-29 VITALS
DIASTOLIC BLOOD PRESSURE: 70 MMHG | RESPIRATION RATE: 18 BRPM | HEIGHT: 64 IN | OXYGEN SATURATION: 96 % | WEIGHT: 142.8 LBS | BODY MASS INDEX: 24.38 KG/M2 | SYSTOLIC BLOOD PRESSURE: 104 MMHG | TEMPERATURE: 97.1 F | HEART RATE: 62 BPM

## 2021-07-29 DIAGNOSIS — D80.2 IGA DEFICIENCY (HCC): ICD-10-CM

## 2021-07-29 DIAGNOSIS — D83.9 COMMON VARIABLE IMMUNODEFICIENCY (HCC): Primary | ICD-10-CM

## 2021-07-29 PROCEDURE — 96366 THER/PROPH/DIAG IV INF ADDON: CPT

## 2021-07-29 PROCEDURE — 25010000002 IMMUNE GLOBULIN (HUMAN) PER 500 MG: Performed by: ALLERGY & IMMUNOLOGY

## 2021-07-29 PROCEDURE — 96365 THER/PROPH/DIAG IV INF INIT: CPT

## 2021-07-29 RX ORDER — SODIUM CHLORIDE 9 MG/ML
250 INJECTION, SOLUTION INTRAVENOUS ONCE
Status: CANCELLED | OUTPATIENT
Start: 2021-08-05

## 2021-07-29 RX ORDER — EPINEPHRINE 0.3 MG/.3ML
0.3 INJECTION SUBCUTANEOUS ONCE
Status: CANCELLED
Start: 2021-08-05 | End: 2021-08-05

## 2021-07-29 RX ORDER — DIPHENHYDRAMINE HCL 25 MG
25 CAPSULE ORAL ONCE
Status: CANCELLED
Start: 2021-08-05 | End: 2021-08-05

## 2021-07-29 RX ORDER — SODIUM CHLORIDE 9 MG/ML
250 INJECTION, SOLUTION INTRAVENOUS ONCE
Status: COMPLETED | OUTPATIENT
Start: 2021-07-29 | End: 2021-07-29

## 2021-07-29 RX ORDER — DIPHENHYDRAMINE HCL 25 MG
50 CAPSULE ORAL ONCE AS NEEDED
Status: CANCELLED
Start: 2021-08-05

## 2021-07-29 RX ORDER — ACETAMINOPHEN 500 MG
500 TABLET ORAL ONCE
Status: CANCELLED
Start: 2021-08-05 | End: 2021-08-05

## 2021-07-29 RX ADMIN — Medication 20 G: at 11:57

## 2021-07-29 RX ADMIN — SODIUM CHLORIDE 250 ML: 9 INJECTION, SOLUTION INTRAVENOUS at 11:56

## 2021-07-29 NOTE — PROGRESS NOTES
Pt states she took tylenol premed at home prior to IVIG infusion. Declines benadryl. Pt tolerated well and voiced no complaints.

## 2021-08-11 ENCOUNTER — OFFICE VISIT (OUTPATIENT)
Dept: GASTROENTEROLOGY | Facility: CLINIC | Age: 76
End: 2021-08-11

## 2021-08-11 VITALS — HEIGHT: 64 IN | TEMPERATURE: 97.7 F | WEIGHT: 153.8 LBS | BODY MASS INDEX: 26.26 KG/M2

## 2021-08-11 DIAGNOSIS — K58.0 IRRITABLE BOWEL SYNDROME WITH DIARRHEA: Primary | ICD-10-CM

## 2021-08-11 DIAGNOSIS — D83.9 COMMON VARIABLE IMMUNODEFICIENCY (HCC): ICD-10-CM

## 2021-08-11 DIAGNOSIS — K21.9 GASTROESOPHAGEAL REFLUX DISEASE WITHOUT ESOPHAGITIS: ICD-10-CM

## 2021-08-11 PROCEDURE — 99214 OFFICE O/P EST MOD 30 MIN: CPT | Performed by: NURSE PRACTITIONER

## 2021-08-11 RX ORDER — FAMOTIDINE 20 MG/1
20 TABLET, FILM COATED ORAL 2 TIMES DAILY
Qty: 180 TABLET | Refills: 3 | Status: SHIPPED | OUTPATIENT
Start: 2021-08-11 | End: 2022-08-22

## 2021-08-11 RX ORDER — AMOXICILLIN AND CLAVULANATE POTASSIUM 875; 125 MG/1; MG/1
1 TABLET, FILM COATED ORAL 2 TIMES DAILY
COMMUNITY
Start: 2021-08-06 | End: 2021-11-16

## 2021-08-11 NOTE — PROGRESS NOTES
Chief Complaint   Patient presents with   • Diarrhea   • Irritable Bowel Syndrome   • Fecal Urgency   • Heartburn       Elaina Tom is a  76 y.o. female here for a follow up visit for GERD.    HPI  76-year-old female presents today for follow-up visit for GERD and IBS.  She is a patient of Dr. Rogers.  She was last seen in the office by me on 4/20/2021.  She has a history of IBS-D and admits lately she has been doing pretty well on bentyl as needed.  She was going to try the Xifaxan but unfortunately was too expensive with her insurance so she never took it.  She is currently on amoxicillin and probiotics so that has messed up her IBS a little bit but for the most part she is been having better days lately.  She also has a history of GERD and admits she does really well on Pepcid 20 mg twice daily.  She denies any breakthrough reflux at this time.  Her last EGD and colonoscopy was on 5/2019.  She does have a history of common variable immunodeficiency and gets infusions of IgG monthly.  She denies any dysphagia, reflux, abdominal pain, nausea and vomiting, constipation, rectal bleeding or melena.  She admits her appetite is good and her weight is up 11 pounds since last visit.  Past Medical History:   Diagnosis Date   • Allergic rhinitis    • Anxiety    • Arthritis    • Asthma     ONLY HAS ISSUES WHEN HAS RESPIRATORY INFECTION   • Cancer (CMS/[facility]) 2017    BREAST-LEFT   • Cataract    • CVID (common variable immunodeficiency) (CMS/[facility])    • External hemorrhoids    • Gastritis    • GERD (gastroesophageal reflux disease)    • H/O CT scan of abdomen     PELVIS AND CHEST: UNREMARKABLE   • Hiatal hernia    • History of colon polyps    • History of giardia infection    • History of Helicobacter pylori infection    • IBS (irritable bowel syndrome)    • IgA deficiency (CMS/[facility])     CANNOT HAVE ANYTHING WITH IGA IN IT WILL CAUSE ANAPHYLAXIS   • Immunoglobulin deficiency (CMS/HCC)    • Peptic ulcer disease    • PONV  (postoperative nausea and vomiting)    • Torn meniscus    • Wound discharge     LT BREAST       Past Surgical History:   Procedure Laterality Date   • ABDOMINAL WALL ABSCESS INCISION AND DRAINAGE Left 7/3/2017    Procedure: BREAST ABSCESS INCISION AND DRAINAGE AND WOUND DEBRIDMENT;  Surgeon: Steve Leon MD;  [place] [facility] [place] [facility]  Service:    • BREAST BIOPSY Left 02/26/2007    Left breast needle localization biopsy; Dr. Steve Leon   • BREAST BIOPSY Left 02/02/2007    Left breast stereotactic vacuum assisted core biopsy with marker placement; Dr. Steve Leon   • BREAST BIOPSY Left 02/07/2003    Left breast needle localization biopsy; Dr. Steve Leon   • BREAST LUMPECTOMY WITH SENTINEL NODE BIOPSY Left 5/19/2017    Procedure: BREAST LUMPECTOMY WITH SENTINEL NODE BIOPSY & MAMMO NEEDLE LOCALIZATION;  Surgeon: Steve Leon MD;  [place] [facility] [place] [facility]  Service:    • COLONOSCOPY  01/10/2014    NTEH, TORTS, STOOL   • COLONOSCOPY  08/24/2010    IH, TORT, BX-   • COLONOSCOPY N/A 5/23/2019    Procedure: COLONOSCOPY TO CECUM AND TERM. ILEUM WITH BIOPSIES;  Surgeon: Rigoberto Rogers MD;  Location: [facility] ENDOSCOPY;  Service: Gastroenterology   • ENDOSCOPY  01/10/2014    Z-LINE REGULAR, 35 CM FROM INCISORS, HH, GASTRITIS, NO H-PYLORI   • ENDOSCOPY  08/24/2010    GASTRITIS   • ENDOSCOPY N/A 10/18/2016    Gastritis, duodenitis, HH    • ENDOSCOPY N/A 5/23/2019    Procedure: ESOPHAGOGASTRODUODENOSCOPY WITH BIOPSIES AND RAPID UREASE;  Surgeon: Rigoberto Rogers MD;  Location: [facility] ENDOSCOPY;  Service: Gastroenterology   • ENDOSCOPY AND COLONOSCOPY N/A 08/23/2007    Z-line irregular, normal esophagus, bilious gastric fluid, gastritis, hiatal hernia, normal duodenal bulb and 2nd part of the duodenum; non-thrombosed external hemorrhoids, torts, one 5 mm polyp in the mid sigmoid colon, stool in the sigmoid colon, descending colon, transverse colon, ascending colon and in the cecum-Dr. Franklin  Sue   • KNEE SURGERY Left 01/2015   • SEPTOPLASTY     • TONSILLECTOMY         Scheduled Meds:    Continuous Infusions:No current facility-administered medications for this visit.      PRN Meds:.    Allergies   Allergen Reactions   • Other Anaphylaxis     BLOOD PRODUCTS WITH IGA IN THEM   • Erythromycin GI Intolerance     VOMITING   • Avelox [Moxifloxacin] Rash   • Levaquin [Levofloxacin] Rash       Social History     Socioeconomic History   • Marital status:      Spouse name: Ortiz   • Number of children: 3   • Years of education: College   • Highest education level: Not on file   Tobacco Use   • Smoking status: Never Smoker   • Smokeless tobacco: Never Used   Substance and Sexual Activity   • Alcohol use: Yes     Alcohol/week: 1.0 standard drinks     Types: 1 Glasses of wine per week     Comment: occasional   • Drug use: No   • Sexual activity: Defer       Family History   Problem Relation Age of Onset   • Other Father         POLYP   • Colon polyps Father    • Prostate cancer Father 68   • Pancreatitis Daughter    • Malig Hyperthermia Neg Hx        Review of Systems   Constitutional: Negative for appetite change, chills, diaphoresis, fatigue, fever and unexpected weight change.   HENT: Negative for nosebleeds, postnasal drip, sore throat, trouble swallowing and voice change.    Respiratory: Negative for cough, choking, chest tightness, shortness of breath and wheezing.    Cardiovascular: Negative for chest pain, palpitations and leg swelling.   Gastrointestinal: Negative for abdominal distention, abdominal pain, anal bleeding, blood in stool, constipation, diarrhea, nausea, rectal pain and vomiting.   Endocrine: Negative for polydipsia, polyphagia and polyuria.   Musculoskeletal: Negative for gait problem.   Skin: Negative for rash and wound.   Allergic/Immunologic: Negative for food allergies.   Neurological: Negative for dizziness, speech difficulty and light-headedness.   Psychiatric/Behavioral:  Negative for confusion, self-injury, sleep disturbance and suicidal ideas.       Vitals:    08/11/21 1130   Temp: 97.7 °F (36.5 °C)       Physical Exam  Constitutional:       General: She is not in acute distress.     Appearance: She is well-developed. She is not ill-appearing.   HENT:      Head: Normocephalic.   Eyes:      Pupils: Pupils are equal, round, and reactive to light.   Cardiovascular:      Rate and Rhythm: Normal rate and regular rhythm.      Heart sounds: Normal heart sounds.   Pulmonary:      Effort: Pulmonary effort is normal.      Breath sounds: Normal breath sounds.   Abdominal:      General: Bowel sounds are normal. There is no distension.      Palpations: Abdomen is soft. There is no mass.      Tenderness: There is no abdominal tenderness. There is no guarding or rebound.      Hernia: No hernia is present.   Musculoskeletal:         General: Normal range of motion.   Skin:     General: Skin is warm and dry.   Neurological:      Mental Status: She is alert and oriented to person, place, and time.   Psychiatric:         Speech: Speech normal.         Behavior: Behavior normal.         Judgment: Judgment normal.         No radiology results for the last 7 days     Assessment and plan     1. Irritable bowel syndrome with diarrhea    2. Gastroesophageal reflux disease without esophagitis    3. Common variable immunodeficiency (CMS/HCC)      IBS-D seems pretty well controlled on bentyl as needed.  Continue daily probiotics.  GERD seems well controlled on Pepcid 20 mg twice daily.  Continue GERD precautions.  Overall the patient seems to be doing pretty well.  Patient to call the office with any issues.  Patient to follow-up with me in 6 months.  Patient is agreeable to the plan.

## 2021-08-26 ENCOUNTER — INFUSION (OUTPATIENT)
Dept: ONCOLOGY | Facility: HOSPITAL | Age: 76
End: 2021-08-26

## 2021-08-26 VITALS
HEART RATE: 80 BPM | OXYGEN SATURATION: 96 % | WEIGHT: 141.4 LBS | HEIGHT: 64 IN | SYSTOLIC BLOOD PRESSURE: 114 MMHG | BODY MASS INDEX: 24.14 KG/M2 | TEMPERATURE: 98.5 F | DIASTOLIC BLOOD PRESSURE: 66 MMHG | RESPIRATION RATE: 18 BRPM

## 2021-08-26 DIAGNOSIS — D83.9 COMMON VARIABLE IMMUNODEFICIENCY (HCC): Primary | ICD-10-CM

## 2021-08-26 DIAGNOSIS — D80.2 IGA DEFICIENCY (HCC): ICD-10-CM

## 2021-08-26 PROCEDURE — 96366 THER/PROPH/DIAG IV INF ADDON: CPT

## 2021-08-26 PROCEDURE — 96365 THER/PROPH/DIAG IV INF INIT: CPT

## 2021-08-26 PROCEDURE — 25010000002 IMMUNE GLOBULIN (HUMAN) PER 500 MG: Performed by: ALLERGY & IMMUNOLOGY

## 2021-08-26 RX ORDER — EPINEPHRINE 0.3 MG/.3ML
0.3 INJECTION SUBCUTANEOUS ONCE
Status: CANCELLED
Start: 2021-09-02 | End: 2021-09-02

## 2021-08-26 RX ORDER — DIPHENHYDRAMINE HCL 25 MG
25 CAPSULE ORAL ONCE
Status: CANCELLED
Start: 2021-09-02 | End: 2021-09-02

## 2021-08-26 RX ORDER — SODIUM CHLORIDE 9 MG/ML
250 INJECTION, SOLUTION INTRAVENOUS ONCE
Status: CANCELLED | OUTPATIENT
Start: 2021-09-02

## 2021-08-26 RX ORDER — EPINEPHRINE 0.3 MG/.3ML
0.3 INJECTION SUBCUTANEOUS ONCE
Status: DISCONTINUED | OUTPATIENT
Start: 2021-08-26 | End: 2021-08-26 | Stop reason: HOSPADM

## 2021-08-26 RX ORDER — ACETAMINOPHEN 500 MG
500 TABLET ORAL ONCE
Status: CANCELLED
Start: 2021-09-02 | End: 2021-09-02

## 2021-08-26 RX ORDER — DIPHENHYDRAMINE HCL 25 MG
50 CAPSULE ORAL ONCE AS NEEDED
Status: DISCONTINUED | OUTPATIENT
Start: 2021-08-26 | End: 2021-08-26 | Stop reason: HOSPADM

## 2021-08-26 RX ORDER — DIPHENHYDRAMINE HCL 25 MG
50 CAPSULE ORAL ONCE AS NEEDED
Status: CANCELLED
Start: 2021-09-02

## 2021-08-26 RX ORDER — SODIUM CHLORIDE 9 MG/ML
250 INJECTION, SOLUTION INTRAVENOUS ONCE
Status: COMPLETED | OUTPATIENT
Start: 2021-08-26 | End: 2021-08-26

## 2021-08-26 RX ADMIN — SODIUM CHLORIDE 250 ML: 9 INJECTION, SOLUTION INTRAVENOUS at 12:15

## 2021-08-26 RX ADMIN — Medication 20 G: at 12:31

## 2021-09-16 DIAGNOSIS — K58.0 IRRITABLE BOWEL SYNDROME WITH DIARRHEA: ICD-10-CM

## 2021-09-16 RX ORDER — DICYCLOMINE HYDROCHLORIDE 10 MG/1
10 CAPSULE ORAL
Qty: 120 CAPSULE | Refills: 5 | Status: SHIPPED | OUTPATIENT
Start: 2021-09-16

## 2021-09-21 ENCOUNTER — TELEPHONE (OUTPATIENT)
Dept: GASTROENTEROLOGY | Facility: CLINIC | Age: 76
End: 2021-09-21

## 2021-09-23 ENCOUNTER — INFUSION (OUTPATIENT)
Dept: ONCOLOGY | Facility: HOSPITAL | Age: 76
End: 2021-09-23

## 2021-09-23 VITALS
HEIGHT: 65 IN | SYSTOLIC BLOOD PRESSURE: 128 MMHG | HEART RATE: 70 BPM | BODY MASS INDEX: 23.93 KG/M2 | RESPIRATION RATE: 18 BRPM | WEIGHT: 143.6 LBS | DIASTOLIC BLOOD PRESSURE: 79 MMHG

## 2021-09-23 DIAGNOSIS — D83.9 COMMON VARIABLE IMMUNODEFICIENCY (HCC): ICD-10-CM

## 2021-09-23 DIAGNOSIS — D80.2 IGA DEFICIENCY (HCC): ICD-10-CM

## 2021-09-23 DIAGNOSIS — D83.9 COMMON VARIABLE IMMUNODEFICIENCY (HCC): Primary | ICD-10-CM

## 2021-09-23 PROCEDURE — 96365 THER/PROPH/DIAG IV INF INIT: CPT

## 2021-09-23 PROCEDURE — 25010000002 IMMUNE GLOBULIN (HUMAN) PER 500 MG: Performed by: ALLERGY & IMMUNOLOGY

## 2021-09-23 PROCEDURE — 96366 THER/PROPH/DIAG IV INF ADDON: CPT

## 2021-09-23 RX ORDER — DIPHENHYDRAMINE HCL 25 MG
25 CAPSULE ORAL ONCE
Status: CANCELLED
Start: 2021-09-30 | End: 2021-09-30

## 2021-09-23 RX ORDER — EPINEPHRINE 0.3 MG/.3ML
0.3 INJECTION SUBCUTANEOUS ONCE
Status: CANCELLED
Start: 2021-09-30 | End: 2021-09-30

## 2021-09-23 RX ORDER — SODIUM CHLORIDE 9 MG/ML
250 INJECTION, SOLUTION INTRAVENOUS ONCE
Status: CANCELLED | OUTPATIENT
Start: 2021-09-30

## 2021-09-23 RX ORDER — DIPHENHYDRAMINE HCL 25 MG
50 CAPSULE ORAL ONCE AS NEEDED
Status: CANCELLED
Start: 2021-09-30

## 2021-09-23 RX ORDER — ACETAMINOPHEN 500 MG
500 TABLET ORAL ONCE
Status: DISCONTINUED | OUTPATIENT
Start: 2021-09-23 | End: 2021-09-23 | Stop reason: HOSPADM

## 2021-09-23 RX ORDER — DIPHENHYDRAMINE HCL 25 MG
25 CAPSULE ORAL ONCE
Status: DISCONTINUED | OUTPATIENT
Start: 2021-09-23 | End: 2021-09-23 | Stop reason: HOSPADM

## 2021-09-23 RX ORDER — ACETAMINOPHEN 500 MG
500 TABLET ORAL ONCE
Status: CANCELLED
Start: 2021-09-30 | End: 2021-09-30

## 2021-09-23 RX ORDER — SODIUM CHLORIDE 9 MG/ML
250 INJECTION, SOLUTION INTRAVENOUS ONCE
Status: COMPLETED | OUTPATIENT
Start: 2021-09-23 | End: 2021-09-23

## 2021-09-23 RX ADMIN — Medication 20 G: at 11:58

## 2021-09-23 RX ADMIN — SODIUM CHLORIDE 250 ML: 9 INJECTION, SOLUTION INTRAVENOUS at 11:38

## 2021-10-19 RX ORDER — ACETAMINOPHEN 500 MG
500 TABLET ORAL ONCE
Status: CANCELLED
Start: 2021-10-19 | End: 2021-10-19

## 2021-10-19 RX ORDER — DIPHENHYDRAMINE HCL 25 MG
50 CAPSULE ORAL ONCE AS NEEDED
Status: CANCELLED
Start: 2021-10-19

## 2021-10-19 RX ORDER — EPINEPHRINE 0.3 MG/.3ML
0.3 INJECTION SUBCUTANEOUS ONCE
Status: CANCELLED
Start: 2021-10-19 | End: 2021-10-19

## 2021-10-19 RX ORDER — DIPHENHYDRAMINE HCL 25 MG
25 CAPSULE ORAL ONCE
Status: CANCELLED
Start: 2021-10-19 | End: 2021-10-19

## 2021-10-21 ENCOUNTER — INFUSION (OUTPATIENT)
Dept: ONCOLOGY | Facility: HOSPITAL | Age: 76
End: 2021-10-21

## 2021-10-21 VITALS
DIASTOLIC BLOOD PRESSURE: 77 MMHG | WEIGHT: 143.6 LBS | BODY MASS INDEX: 23.93 KG/M2 | HEART RATE: 71 BPM | HEIGHT: 65 IN | SYSTOLIC BLOOD PRESSURE: 117 MMHG | RESPIRATION RATE: 18 BRPM | OXYGEN SATURATION: 98 % | TEMPERATURE: 96.9 F

## 2021-10-21 DIAGNOSIS — D83.9 COMMON VARIABLE IMMUNODEFICIENCY (HCC): Primary | ICD-10-CM

## 2021-10-21 DIAGNOSIS — D80.2 IGA DEFICIENCY (HCC): ICD-10-CM

## 2021-10-21 LAB
BASOPHILS # BLD AUTO: 0.04 10*3/MM3 (ref 0–0.2)
BASOPHILS NFR BLD AUTO: 0.9 % (ref 0–1.5)
DEPRECATED RDW RBC AUTO: 48.6 FL (ref 37–54)
EOSINOPHIL # BLD AUTO: 0.06 10*3/MM3 (ref 0–0.4)
EOSINOPHIL NFR BLD AUTO: 1.3 % (ref 0.3–6.2)
ERYTHROCYTE [DISTWIDTH] IN BLOOD BY AUTOMATED COUNT: 13.3 % (ref 12.3–15.4)
HCT VFR BLD AUTO: 36.4 % (ref 34–46.6)
HGB BLD-MCNC: 11.7 G/DL (ref 12–15.9)
IGG1 SER-MCNC: 962 MG/DL (ref 700–1600)
IMM GRANULOCYTES # BLD AUTO: 0.03 10*3/MM3 (ref 0–0.05)
IMM GRANULOCYTES NFR BLD AUTO: 0.7 % (ref 0–0.5)
LYMPHOCYTES # BLD AUTO: 1.54 10*3/MM3 (ref 0.7–3.1)
LYMPHOCYTES NFR BLD AUTO: 34.4 % (ref 19.6–45.3)
MCH RBC QN AUTO: 31.9 PG (ref 26.6–33)
MCHC RBC AUTO-ENTMCNC: 32.1 G/DL (ref 31.5–35.7)
MCV RBC AUTO: 99.2 FL (ref 79–97)
MONOCYTES # BLD AUTO: 0.4 10*3/MM3 (ref 0.1–0.9)
MONOCYTES NFR BLD AUTO: 8.9 % (ref 5–12)
NEUTROPHILS NFR BLD AUTO: 2.41 10*3/MM3 (ref 1.7–7)
NEUTROPHILS NFR BLD AUTO: 53.8 % (ref 42.7–76)
NRBC BLD AUTO-RTO: 0 /100 WBC (ref 0–0.2)
PLATELET # BLD AUTO: 218 10*3/MM3 (ref 140–450)
PMV BLD AUTO: 11.3 FL (ref 6–12)
RBC # BLD AUTO: 3.67 10*6/MM3 (ref 3.77–5.28)
WBC # BLD AUTO: 4.48 10*3/MM3 (ref 3.4–10.8)

## 2021-10-21 PROCEDURE — 96366 THER/PROPH/DIAG IV INF ADDON: CPT

## 2021-10-21 PROCEDURE — 25010000002 IMMUNE GLOBULIN (HUMAN) PER 500 MG: Performed by: ALLERGY & IMMUNOLOGY

## 2021-10-21 PROCEDURE — 96365 THER/PROPH/DIAG IV INF INIT: CPT

## 2021-10-21 PROCEDURE — 85025 COMPLETE CBC W/AUTO DIFF WBC: CPT | Performed by: ALLERGY & IMMUNOLOGY

## 2021-10-21 PROCEDURE — 82784 ASSAY IGA/IGD/IGG/IGM EACH: CPT | Performed by: ALLERGY & IMMUNOLOGY

## 2021-10-21 RX ORDER — DIPHENHYDRAMINE HCL 25 MG
50 CAPSULE ORAL ONCE AS NEEDED
Status: CANCELLED
Start: 2021-11-18

## 2021-10-21 RX ORDER — DIPHENHYDRAMINE HCL 25 MG
25 CAPSULE ORAL ONCE
Status: CANCELLED
Start: 2021-11-18 | End: 2021-11-18

## 2021-10-21 RX ORDER — SODIUM CHLORIDE 9 MG/ML
250 INJECTION, SOLUTION INTRAVENOUS ONCE
Status: COMPLETED | OUTPATIENT
Start: 2021-10-21 | End: 2021-10-21

## 2021-10-21 RX ORDER — EPINEPHRINE 0.3 MG/.3ML
0.3 INJECTION SUBCUTANEOUS ONCE
Status: CANCELLED
Start: 2021-11-18 | End: 2021-11-18

## 2021-10-21 RX ORDER — ACETAMINOPHEN 500 MG
500 TABLET ORAL ONCE
Status: CANCELLED
Start: 2021-11-18 | End: 2021-11-18

## 2021-10-21 RX ORDER — SODIUM CHLORIDE 9 MG/ML
250 INJECTION, SOLUTION INTRAVENOUS ONCE
Status: CANCELLED | OUTPATIENT
Start: 2021-11-18

## 2021-10-21 RX ADMIN — SODIUM CHLORIDE 250 ML: 9 INJECTION, SOLUTION INTRAVENOUS at 12:32

## 2021-10-21 RX ADMIN — IMMUNE GLOBULIN INTRAVENOUS (HUMAN) 20 G: KIT at 12:32

## 2021-10-21 NOTE — NURSING NOTE
Pt stated she took her premedication of Tylenol prior to her arrival for her infusion. Stated she does not need the Benadryl; only takes the Tylenol.

## 2021-11-16 ENCOUNTER — OFFICE VISIT (OUTPATIENT)
Dept: ONCOLOGY | Facility: CLINIC | Age: 76
End: 2021-11-16

## 2021-11-16 ENCOUNTER — LAB (OUTPATIENT)
Dept: OTHER | Facility: HOSPITAL | Age: 76
End: 2021-11-16

## 2021-11-16 VITALS
OXYGEN SATURATION: 98 % | BODY MASS INDEX: 24.01 KG/M2 | TEMPERATURE: 98 F | HEIGHT: 65 IN | WEIGHT: 144.1 LBS | RESPIRATION RATE: 16 BRPM | SYSTOLIC BLOOD PRESSURE: 117 MMHG | DIASTOLIC BLOOD PRESSURE: 75 MMHG | HEART RATE: 75 BPM

## 2021-11-16 DIAGNOSIS — K58.0 IRRITABLE BOWEL SYNDROME WITH DIARRHEA: ICD-10-CM

## 2021-11-16 DIAGNOSIS — Z17.0 MALIGNANT NEOPLASM OF LOWER-OUTER QUADRANT OF LEFT BREAST OF FEMALE, ESTROGEN RECEPTOR POSITIVE (HCC): Primary | ICD-10-CM

## 2021-11-16 DIAGNOSIS — D80.2 IGA DEFICIENCY (HCC): ICD-10-CM

## 2021-11-16 DIAGNOSIS — C50.512 MALIGNANT NEOPLASM OF LOWER-OUTER QUADRANT OF LEFT BREAST OF FEMALE, ESTROGEN RECEPTOR POSITIVE (HCC): Primary | ICD-10-CM

## 2021-11-16 DIAGNOSIS — M81.0 OSTEOPOROSIS, UNSPECIFIED OSTEOPOROSIS TYPE, UNSPECIFIED PATHOLOGICAL FRACTURE PRESENCE: ICD-10-CM

## 2021-11-16 DIAGNOSIS — D83.9 COMMON VARIABLE IMMUNODEFICIENCY (HCC): ICD-10-CM

## 2021-11-16 LAB
ALBUMIN SERPL-MCNC: 4.2 G/DL (ref 3.5–5.2)
ALBUMIN/GLOB SERPL: 1.6 G/DL
ALP SERPL-CCNC: 57 U/L (ref 39–117)
ALT SERPL W P-5'-P-CCNC: 18 U/L (ref 1–33)
ANION GAP SERPL CALCULATED.3IONS-SCNC: 8.4 MMOL/L (ref 5–15)
AST SERPL-CCNC: 21 U/L (ref 1–32)
BASOPHILS # BLD AUTO: 0.04 10*3/MM3 (ref 0–0.2)
BASOPHILS NFR BLD AUTO: 0.9 % (ref 0–1.5)
BILIRUB SERPL-MCNC: 0.4 MG/DL (ref 0–1.2)
BUN SERPL-MCNC: 12 MG/DL (ref 8–23)
BUN/CREAT SERPL: 17.1 (ref 7–25)
CALCIUM SPEC-SCNC: 9.2 MG/DL (ref 8.6–10.5)
CHLORIDE SERPL-SCNC: 107 MMOL/L (ref 98–107)
CO2 SERPL-SCNC: 26.6 MMOL/L (ref 22–29)
CREAT SERPL-MCNC: 0.7 MG/DL (ref 0.57–1)
DEPRECATED RDW RBC AUTO: 50.1 FL (ref 37–54)
EOSINOPHIL # BLD AUTO: 0.12 10*3/MM3 (ref 0–0.4)
EOSINOPHIL NFR BLD AUTO: 2.7 % (ref 0.3–6.2)
ERYTHROCYTE [DISTWIDTH] IN BLOOD BY AUTOMATED COUNT: 13.5 % (ref 12.3–15.4)
GFR SERPL CREATININE-BSD FRML MDRD: 82 ML/MIN/1.73
GLOBULIN UR ELPH-MCNC: 2.6 GM/DL
GLUCOSE SERPL-MCNC: 83 MG/DL (ref 65–99)
HCT VFR BLD AUTO: 36.4 % (ref 34–46.6)
HGB BLD-MCNC: 11.8 G/DL (ref 12–15.9)
IMM GRANULOCYTES # BLD AUTO: 0.01 10*3/MM3 (ref 0–0.05)
IMM GRANULOCYTES NFR BLD AUTO: 0.2 % (ref 0–0.5)
LYMPHOCYTES # BLD AUTO: 1.4 10*3/MM3 (ref 0.7–3.1)
LYMPHOCYTES NFR BLD AUTO: 31.6 % (ref 19.6–45.3)
MCH RBC QN AUTO: 32.6 PG (ref 26.6–33)
MCHC RBC AUTO-ENTMCNC: 32.4 G/DL (ref 31.5–35.7)
MCV RBC AUTO: 100.6 FL (ref 79–97)
MONOCYTES # BLD AUTO: 0.44 10*3/MM3 (ref 0.1–0.9)
MONOCYTES NFR BLD AUTO: 9.9 % (ref 5–12)
NEUTROPHILS NFR BLD AUTO: 2.42 10*3/MM3 (ref 1.7–7)
NEUTROPHILS NFR BLD AUTO: 54.7 % (ref 42.7–76)
NRBC BLD AUTO-RTO: 0 /100 WBC (ref 0–0.2)
PLATELET # BLD AUTO: 221 10*3/MM3 (ref 140–450)
PMV BLD AUTO: 9.6 FL (ref 6–12)
POTASSIUM SERPL-SCNC: 3.9 MMOL/L (ref 3.5–5.2)
PROT SERPL-MCNC: 6.8 G/DL (ref 6–8.5)
RBC # BLD AUTO: 3.62 10*6/MM3 (ref 3.77–5.28)
SODIUM SERPL-SCNC: 142 MMOL/L (ref 136–145)
WBC # BLD AUTO: 4.43 10*3/MM3 (ref 3.4–10.8)

## 2021-11-16 PROCEDURE — 80053 COMPREHEN METABOLIC PANEL: CPT | Performed by: INTERNAL MEDICINE

## 2021-11-16 PROCEDURE — 85025 COMPLETE CBC W/AUTO DIFF WBC: CPT | Performed by: INTERNAL MEDICINE

## 2021-11-16 PROCEDURE — 36415 COLL VENOUS BLD VENIPUNCTURE: CPT

## 2021-11-16 PROCEDURE — 99213 OFFICE O/P EST LOW 20 MIN: CPT | Performed by: INTERNAL MEDICINE

## 2021-11-16 RX ORDER — TAMOXIFEN CITRATE 20 MG/1
20 TABLET ORAL DAILY
Qty: 90 TABLET | Refills: 3 | Status: SHIPPED | OUTPATIENT
Start: 2021-11-16 | End: 2022-05-24 | Stop reason: SDUPTHER

## 2021-11-16 NOTE — PROGRESS NOTES
Subjective     REASON FOR FOLLOW UP:   1. Stage I ( T1c,N0,M0 ) ER+,WA+, HER 2 neg for a ductal carcinoma of the left breast status post lumpectomy 5/19/2017.  Patient started on adjuvant tamoxifen after the visit of 6/9/2017.  2.  Osteoporosis  3.  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  Development of draining seroma and poor incisional healing in the left breast that required further surgery on 7/3/2017.  5.  Genetic testing negative for any adverse mutations in June 2019    HISTORY OF PRESENT ILLNESS:  The patient is a 76 y.o. year old female who was noted to have a radiographic abnormality in the left breast in March.  She underwent an ultrasound-guided biopsy on 3/28/2017 showing invasive ductal carcinoma which was strongly hormone receptor positive and HER-2/trae negative.  She elected to undergo a lumpectomy procedure with Dr. Leon on 5/19/2017.  The tumor size was 1.2 x 1 x 0.9 cm and 3 sentinel lymph nodes were examined all of which were negative for metastatic cancer.    The patient was seen on 6/9/2017 to discuss postop adjuvant hormonal therapy.  She has a history of osteoporosis.  We discussed initiation of tamoxifen therapy which would have the advantage of helping to maintain her bone density.    She is here today for routine 6 month follow-up and seems to be getting along well on her tamoxifen.  She has been on the medication for 4.5  years now.    [facility] with no worrisome findings.      She continues to receive IVIG infusions for her common variable immunodeficiency.  Most recently received 20 g dose on 10/21/2021.    She has had the Covid vaccine and the Covid booster.  She has not had any significant infections of any type recently.  History of Present Illness        ALLERGIES:    Allergies   Allergen Reactions   • Other Anaphylaxis     BLOOD PRODUCTS WITH IGA IN THEM   • Erythromycin GI Intolerance  "    VOMITING   • Avelox [Moxifloxacin] Rash   • Levaquin [Levofloxacin] Rash         Review of Systems   Constitutional: Negative for activity change, appetite change, fatigue, fever and unexpected weight change.   HENT: Negative for hearing loss, nosebleeds, trouble swallowing and voice change.    Eyes: Negative for visual disturbance.   Respiratory: Negative for cough, shortness of breath and wheezing.    Cardiovascular: Negative for chest pain and palpitations.   Gastrointestinal: Negative for abdominal pain, diarrhea, nausea and vomiting.   Genitourinary: Negative for difficulty urinating, frequency, hematuria and urgency.   Musculoskeletal: Negative for back pain and neck pain.   Neurological: Negative for dizziness, seizures, syncope and headaches.   Hematological: Negative for adenopathy. Does not bruise/bleed easily.   Psychiatric/Behavioral: Negative for behavioral problems. The patient is not nervous/anxious.        Objective     Vitals:    11/16/21 1021   BP: 117/75   Pulse: 75   Resp: 16   Temp: 98 °F (36.7 °C)   TempSrc: Temporal   SpO2: 98%   Weight: 65.4 kg (144 lb 1.6 oz)   Height: 165.1 cm (65\")   PainSc: 0-No pain     Current Status 12/8/2020   ECOG score 1       Physical Exam   Constitutional: She is oriented to person, place, and time. She appears well-developed. No distress.   HENT:   Head: Normocephalic.   Eyes: Pupils are equal, round, and reactive to light. Conjunctivae are normal. No scleral icterus.   Neck: No JVD present. No thyromegaly present.   Cardiovascular: Normal rate and regular rhythm. Exam reveals no gallop and no friction rub.   No murmur heard.  Pulmonary/Chest: Effort normal and breath sounds normal. She has no wheezes. She has no rales. Right breast exhibits no mass, no nipple discharge and no skin change. Left breast exhibits skin change. Left breast exhibits no mass and no nipple discharge.   Left breast shows healed lumpectomy scar and radiation tattoos.   Abdominal: Soft. " Bowel sounds are normal. She exhibits no distension and no mass. There is no abdominal tenderness.   Musculoskeletal: Normal range of motion. No deformity.   Lymphadenopathy:     She has no cervical adenopathy.   Neurological: She is alert and oriented to person, place, and time. She has normal reflexes. No cranial nerve deficit.   Skin: Skin is warm and dry. No erythema.   Psychiatric: Her behavior is normal. Judgment normal.     I have reexamined the patient and the results are consistent with the previously documented exam. Jj Hickman MD     RECENT LABS:  Hematology WBC   Date Value Ref Range Status   11/16/2021 4.43 3.40 - 10.80 10*3/mm3 Final   04/29/2020 5.1 3.4 - 10.8 x10E3/uL Final     RBC   Date Value Ref Range Status   11/16/2021 3.62 (L) 3.77 - 5.28 10*6/mm3 Final   04/29/2020 3.85 3.77 - 5.28 x10E6/uL Final     Hemoglobin   Date Value Ref Range Status   11/16/2021 11.8 (L) 12.0 - 15.9 g/dL Final     Hematocrit   Date Value Ref Range Status   11/16/2021 36.4 34.0 - 46.6 % Final     Platelets   Date Value Ref Range Status   11/16/2021 221 140 - 450 10*3/mm3 Final          Assessment/Plan     1.  Stage I, hormone receptor positive, HER-2/trae negative invasive ductal carcinoma the left breast status post lumpectomy.  She has been on adjuvant hormonal therapy with tamoxifen 20 mg daily since June 2017 and continues to tolerate it well.  2.  Osteoporosis on her last DEXA scan.  Will be having another bone density scan in August of this year when she follows up with her GYN office  3.  Common variable immunodeficiency requiring monthly IV immunoglobulin infusions.  4.  Mild chronic anemia. Stable    Plan  1.  Continue tamoxifen 20 mg daily with plans to continue treatment for 5 years through June 2022.  2.  Return for follow-up in 6 months on that visit she will be completing 5 years of tamoxifen.  3.  Her next mammogram will be due in June 2022.  4.  We discussed today that after completing 5 years of  tamoxifen if she wanted to go on Evista has treatment for her osteoporosis that would be certainly okay with us.

## 2021-11-18 ENCOUNTER — INFUSION (OUTPATIENT)
Dept: ONCOLOGY | Facility: HOSPITAL | Age: 76
End: 2021-11-18

## 2021-11-18 VITALS
DIASTOLIC BLOOD PRESSURE: 62 MMHG | HEART RATE: 67 BPM | BODY MASS INDEX: 23.99 KG/M2 | TEMPERATURE: 97.6 F | RESPIRATION RATE: 18 BRPM | SYSTOLIC BLOOD PRESSURE: 112 MMHG | WEIGHT: 144 LBS | OXYGEN SATURATION: 95 % | HEIGHT: 65 IN

## 2021-11-18 DIAGNOSIS — D83.9 COMMON VARIABLE IMMUNODEFICIENCY (HCC): Primary | ICD-10-CM

## 2021-11-18 DIAGNOSIS — D80.2 IGA DEFICIENCY (HCC): ICD-10-CM

## 2021-11-18 PROCEDURE — 96366 THER/PROPH/DIAG IV INF ADDON: CPT

## 2021-11-18 PROCEDURE — 96365 THER/PROPH/DIAG IV INF INIT: CPT

## 2021-11-18 PROCEDURE — 25010000002 IMMUNE GLOBULIN (HUMAN) PER 500 MG: Performed by: ALLERGY & IMMUNOLOGY

## 2021-11-18 RX ORDER — EPINEPHRINE 0.3 MG/.3ML
0.3 INJECTION SUBCUTANEOUS ONCE
Status: CANCELLED
Start: 2021-12-16 | End: 2021-12-16

## 2021-11-18 RX ORDER — DIPHENHYDRAMINE HCL 25 MG
25 CAPSULE ORAL ONCE
Status: CANCELLED
Start: 2021-12-16 | End: 2021-12-16

## 2021-11-18 RX ORDER — ACETAMINOPHEN 500 MG
500 TABLET ORAL ONCE
Status: CANCELLED
Start: 2021-12-16 | End: 2021-12-16

## 2021-11-18 RX ORDER — DIPHENHYDRAMINE HCL 25 MG
50 CAPSULE ORAL ONCE AS NEEDED
Status: CANCELLED
Start: 2021-12-16

## 2021-11-18 RX ORDER — SODIUM CHLORIDE 9 MG/ML
250 INJECTION, SOLUTION INTRAVENOUS ONCE
Status: CANCELLED | OUTPATIENT
Start: 2021-12-16

## 2021-11-18 RX ORDER — SODIUM CHLORIDE 9 MG/ML
250 INJECTION, SOLUTION INTRAVENOUS ONCE
Status: COMPLETED | OUTPATIENT
Start: 2021-11-18 | End: 2021-11-18

## 2021-11-18 RX ADMIN — IMMUNE GLOBULIN INTRAVENOUS (HUMAN) 20 G: KIT at 12:14

## 2021-11-18 RX ADMIN — SODIUM CHLORIDE 250 ML: 9 INJECTION, SOLUTION INTRAVENOUS at 12:10

## 2021-12-16 ENCOUNTER — INFUSION (OUTPATIENT)
Dept: ONCOLOGY | Facility: HOSPITAL | Age: 76
End: 2021-12-16

## 2021-12-16 VITALS
SYSTOLIC BLOOD PRESSURE: 113 MMHG | OXYGEN SATURATION: 95 % | RESPIRATION RATE: 18 BRPM | HEIGHT: 64 IN | TEMPERATURE: 98 F | HEART RATE: 75 BPM | WEIGHT: 141.2 LBS | DIASTOLIC BLOOD PRESSURE: 74 MMHG | BODY MASS INDEX: 24.11 KG/M2

## 2021-12-16 DIAGNOSIS — D80.2 IGA DEFICIENCY (HCC): ICD-10-CM

## 2021-12-16 DIAGNOSIS — D83.9 COMMON VARIABLE IMMUNODEFICIENCY (HCC): Primary | ICD-10-CM

## 2021-12-16 PROCEDURE — 96365 THER/PROPH/DIAG IV INF INIT: CPT

## 2021-12-16 PROCEDURE — 96366 THER/PROPH/DIAG IV INF ADDON: CPT

## 2021-12-16 PROCEDURE — 25010000002 IMMUNE GLOBULIN (HUMAN) PER 500 MG: Performed by: ALLERGY & IMMUNOLOGY

## 2021-12-16 RX ORDER — ACETAMINOPHEN 500 MG
500 TABLET ORAL ONCE
Status: CANCELLED
Start: 2022-01-13 | End: 2022-01-13

## 2021-12-16 RX ORDER — SODIUM CHLORIDE 9 MG/ML
250 INJECTION, SOLUTION INTRAVENOUS ONCE
Status: COMPLETED | OUTPATIENT
Start: 2021-12-16 | End: 2021-12-16

## 2021-12-16 RX ORDER — DIPHENHYDRAMINE HCL 25 MG
50 CAPSULE ORAL ONCE AS NEEDED
Status: CANCELLED
Start: 2022-01-13

## 2021-12-16 RX ORDER — SODIUM CHLORIDE 9 MG/ML
250 INJECTION, SOLUTION INTRAVENOUS ONCE
Status: CANCELLED | OUTPATIENT
Start: 2022-01-13

## 2021-12-16 RX ORDER — DIPHENHYDRAMINE HCL 25 MG
25 CAPSULE ORAL ONCE
Status: CANCELLED
Start: 2022-01-13 | End: 2022-01-13

## 2021-12-16 RX ORDER — EPINEPHRINE 0.3 MG/.3ML
0.3 INJECTION SUBCUTANEOUS ONCE
Status: CANCELLED
Start: 2022-01-13 | End: 2022-01-13

## 2021-12-16 RX ADMIN — SODIUM CHLORIDE 250 ML: 9 INJECTION, SOLUTION INTRAVENOUS at 11:53

## 2021-12-16 RX ADMIN — IMMUNE GLOBULIN INTRAVENOUS (HUMAN) 20 G: KIT at 11:54

## 2022-01-20 ENCOUNTER — INFUSION (OUTPATIENT)
Dept: ONCOLOGY | Facility: HOSPITAL | Age: 77
End: 2022-01-20

## 2022-01-20 VITALS
BODY MASS INDEX: 23.82 KG/M2 | SYSTOLIC BLOOD PRESSURE: 125 MMHG | TEMPERATURE: 97.9 F | HEIGHT: 65 IN | HEART RATE: 66 BPM | DIASTOLIC BLOOD PRESSURE: 81 MMHG | OXYGEN SATURATION: 95 % | RESPIRATION RATE: 18 BRPM | WEIGHT: 143 LBS

## 2022-01-20 DIAGNOSIS — D80.2 IGA DEFICIENCY: ICD-10-CM

## 2022-01-20 DIAGNOSIS — D83.9 COMMON VARIABLE IMMUNODEFICIENCY: Primary | ICD-10-CM

## 2022-01-20 LAB — IGG1 SER-MCNC: 909 MG/DL (ref 700–1600)

## 2022-01-20 PROCEDURE — 96366 THER/PROPH/DIAG IV INF ADDON: CPT

## 2022-01-20 PROCEDURE — 25010000002 IMMUNE GLOBULIN (HUMAN) PER 500 MG: Performed by: ALLERGY & IMMUNOLOGY

## 2022-01-20 PROCEDURE — 96365 THER/PROPH/DIAG IV INF INIT: CPT

## 2022-01-20 PROCEDURE — 82784 ASSAY IGA/IGD/IGG/IGM EACH: CPT | Performed by: ALLERGY & IMMUNOLOGY

## 2022-01-20 RX ORDER — DIPHENHYDRAMINE HCL 25 MG
50 CAPSULE ORAL ONCE AS NEEDED
Status: CANCELLED
Start: 2022-02-10

## 2022-01-20 RX ORDER — SODIUM CHLORIDE 9 MG/ML
250 INJECTION, SOLUTION INTRAVENOUS ONCE
Status: COMPLETED | OUTPATIENT
Start: 2022-01-20 | End: 2022-01-20

## 2022-01-20 RX ORDER — EPINEPHRINE 0.3 MG/.3ML
0.3 INJECTION SUBCUTANEOUS ONCE
Status: CANCELLED
Start: 2022-02-10 | End: 2022-02-10

## 2022-01-20 RX ORDER — SODIUM CHLORIDE 9 MG/ML
250 INJECTION, SOLUTION INTRAVENOUS ONCE
Status: CANCELLED | OUTPATIENT
Start: 2022-02-10

## 2022-01-20 RX ORDER — ACETAMINOPHEN 500 MG
500 TABLET ORAL ONCE
Status: CANCELLED
Start: 2022-02-10 | End: 2022-02-10

## 2022-01-20 RX ORDER — DIPHENHYDRAMINE HCL 25 MG
25 CAPSULE ORAL ONCE
Status: CANCELLED
Start: 2022-02-10 | End: 2022-02-10

## 2022-01-20 RX ADMIN — IMMUNE GLOBULIN INTRAVENOUS (HUMAN) 20 G: KIT at 12:22

## 2022-01-20 RX ADMIN — SODIUM CHLORIDE 250 ML: 9 INJECTION, SOLUTION INTRAVENOUS at 12:15

## 2022-02-09 ENCOUNTER — TELEPHONE (OUTPATIENT)
Dept: ONCOLOGY | Facility: CLINIC | Age: 77
End: 2022-02-09

## 2022-02-09 NOTE — TELEPHONE ENCOUNTER
Caller: Elaina Tom    Relationship to patient: Self    Best call back number: [phone]    Chief complaint: CANC./JAIME., DUE TO BEING OUT OF TOWN    Type of visit: LAB/FOLLOW UP 1    Requested date: WEEK OF MAY 23RD    If rescheduling, when is the original appointment:  5/3/2022

## 2022-02-17 ENCOUNTER — INFUSION (OUTPATIENT)
Dept: ONCOLOGY | Facility: HOSPITAL | Age: 77
End: 2022-02-17

## 2022-02-17 VITALS
WEIGHT: 143.6 LBS | OXYGEN SATURATION: 99 % | RESPIRATION RATE: 18 BRPM | DIASTOLIC BLOOD PRESSURE: 73 MMHG | HEIGHT: 65 IN | BODY MASS INDEX: 23.93 KG/M2 | SYSTOLIC BLOOD PRESSURE: 120 MMHG | TEMPERATURE: 98.3 F | HEART RATE: 60 BPM

## 2022-02-17 DIAGNOSIS — D80.2 IGA DEFICIENCY: ICD-10-CM

## 2022-02-17 DIAGNOSIS — D83.9 COMMON VARIABLE IMMUNODEFICIENCY: Primary | ICD-10-CM

## 2022-02-17 PROCEDURE — 25010000002 IMMUNE GLOBULIN (HUMAN) PER 500 MG: Performed by: ALLERGY & IMMUNOLOGY

## 2022-02-17 PROCEDURE — 96366 THER/PROPH/DIAG IV INF ADDON: CPT

## 2022-02-17 PROCEDURE — 96365 THER/PROPH/DIAG IV INF INIT: CPT

## 2022-02-17 RX ORDER — DIPHENHYDRAMINE HCL 25 MG
50 CAPSULE ORAL ONCE AS NEEDED
Status: CANCELLED
Start: 2022-03-10

## 2022-02-17 RX ORDER — EPINEPHRINE 0.3 MG/.3ML
0.3 INJECTION SUBCUTANEOUS ONCE
Status: CANCELLED
Start: 2022-03-10 | End: 2022-03-10

## 2022-02-17 RX ORDER — SODIUM CHLORIDE 9 MG/ML
250 INJECTION, SOLUTION INTRAVENOUS ONCE
Status: COMPLETED | OUTPATIENT
Start: 2022-02-17 | End: 2022-02-17

## 2022-02-17 RX ORDER — SODIUM CHLORIDE 9 MG/ML
250 INJECTION, SOLUTION INTRAVENOUS ONCE
Status: CANCELLED | OUTPATIENT
Start: 2022-03-10

## 2022-02-17 RX ORDER — ACETAMINOPHEN 500 MG
500 TABLET ORAL ONCE
Status: CANCELLED
Start: 2022-03-10 | End: 2022-03-10

## 2022-02-17 RX ORDER — DIPHENHYDRAMINE HCL 25 MG
25 CAPSULE ORAL ONCE
Status: CANCELLED
Start: 2022-03-10 | End: 2022-03-10

## 2022-02-17 RX ADMIN — IMMUNE GLOBULIN INTRAVENOUS (HUMAN) 20 G: KIT at 11:46

## 2022-02-17 RX ADMIN — SODIUM CHLORIDE 250 ML: 9 INJECTION, SOLUTION INTRAVENOUS at 11:45

## 2022-03-17 ENCOUNTER — INFUSION (OUTPATIENT)
Dept: ONCOLOGY | Facility: HOSPITAL | Age: 77
End: 2022-03-17

## 2022-03-17 VITALS
TEMPERATURE: 98.3 F | DIASTOLIC BLOOD PRESSURE: 84 MMHG | HEIGHT: 64 IN | SYSTOLIC BLOOD PRESSURE: 129 MMHG | BODY MASS INDEX: 24.75 KG/M2 | WEIGHT: 145 LBS | HEART RATE: 56 BPM | OXYGEN SATURATION: 99 % | RESPIRATION RATE: 18 BRPM

## 2022-03-17 DIAGNOSIS — D83.9 COMMON VARIABLE IMMUNODEFICIENCY: Primary | ICD-10-CM

## 2022-03-17 DIAGNOSIS — D80.2 IGA DEFICIENCY: ICD-10-CM

## 2022-03-17 PROCEDURE — 96365 THER/PROPH/DIAG IV INF INIT: CPT

## 2022-03-17 PROCEDURE — 96366 THER/PROPH/DIAG IV INF ADDON: CPT

## 2022-03-17 PROCEDURE — 25010000002 IMMUNE GLOBULIN (HUMAN) PER 500 MG: Performed by: ALLERGY & IMMUNOLOGY

## 2022-03-17 RX ORDER — EPINEPHRINE 0.3 MG/.3ML
0.3 INJECTION SUBCUTANEOUS ONCE
Status: CANCELLED
Start: 2022-04-07 | End: 2022-04-07

## 2022-03-17 RX ORDER — ACETAMINOPHEN 500 MG
500 TABLET ORAL ONCE
Status: CANCELLED
Start: 2022-04-07 | End: 2022-04-07

## 2022-03-17 RX ORDER — SODIUM CHLORIDE 9 MG/ML
250 INJECTION, SOLUTION INTRAVENOUS ONCE
Status: CANCELLED | OUTPATIENT
Start: 2022-04-07

## 2022-03-17 RX ORDER — DIPHENHYDRAMINE HCL 25 MG
25 CAPSULE ORAL ONCE
Status: CANCELLED
Start: 2022-04-07 | End: 2022-04-07

## 2022-03-17 RX ORDER — SODIUM CHLORIDE 9 MG/ML
250 INJECTION, SOLUTION INTRAVENOUS ONCE
Status: COMPLETED | OUTPATIENT
Start: 2022-03-17 | End: 2022-03-17

## 2022-03-17 RX ORDER — DIPHENHYDRAMINE HCL 25 MG
50 CAPSULE ORAL ONCE AS NEEDED
Status: CANCELLED
Start: 2022-04-07

## 2022-03-17 RX ADMIN — SODIUM CHLORIDE 250 ML: 9 INJECTION, SOLUTION INTRAVENOUS at 12:07

## 2022-03-17 RX ADMIN — IMMUNE GLOBULIN INTRAVENOUS (HUMAN) 20 G: KIT at 12:07

## 2022-03-21 ENCOUNTER — OFFICE VISIT (OUTPATIENT)
Dept: GASTROENTEROLOGY | Facility: CLINIC | Age: 77
End: 2022-03-21

## 2022-03-21 VITALS — HEIGHT: 64 IN | TEMPERATURE: 97.1 F | WEIGHT: 143.4 LBS | BODY MASS INDEX: 24.48 KG/M2

## 2022-03-21 DIAGNOSIS — K58.0 IRRITABLE BOWEL SYNDROME WITH DIARRHEA: Primary | ICD-10-CM

## 2022-03-21 DIAGNOSIS — R15.2 FECAL URGENCY: ICD-10-CM

## 2022-03-21 DIAGNOSIS — K21.9 GASTROESOPHAGEAL REFLUX DISEASE WITHOUT ESOPHAGITIS: ICD-10-CM

## 2022-03-21 DIAGNOSIS — R14.0 BLOATING: ICD-10-CM

## 2022-03-21 PROCEDURE — 99214 OFFICE O/P EST MOD 30 MIN: CPT | Performed by: NURSE PRACTITIONER

## 2022-03-21 NOTE — PROGRESS NOTES
Chief Complaint   Patient presents with   • Alternating constipation and diarrhea        Elaina Tom is a  76 y.o. female here for a follow up visit for IBS.    HPI  76-year-old female presents today for follow-up visit for IBS-D.  She is a patient of Dr. Rogers.  She was last seen in the office by me on 8/11/2021.  She has a history of IBS-D and admits lately she has been having very erratic bowel movements.  She will have fecal urgency and then attempt to have a bowel movement and not emptied out very much except brown water.  Other times she admits she will have a formed normal-looking brown stool.  She is also having lots of gas, and bloating.  She takes bentyl maybe once a day as needed after she has the cramping and she admits it works really well for her.  She does have a history of colon polyps in the past.  She also has a history of GERD and admits she does really well on Pepcid 20 mg twice daily.  Her last EGD and colonoscopy was on 5/2019.  Next screening colonoscopy will be due in 2024.  She denies any dysphagia, reflux, nausea and vomiting, rectal bleeding or melena.  She admits her appetite is good and her weight is stable.  Past Medical History:   Diagnosis Date   • Allergic rhinitis    • Anxiety    • Arthritis    • Asthma     ONLY HAS ISSUES WHEN HAS RESPIRATORY INFECTION   • Cancer (HCC) 2017    BREAST-LEFT   • Cataract    • CVID (common variable immunodeficiency) ([facility])    • External hemorrhoids    • Gastritis    • GERD (gastroesophageal reflux disease)    • H/O CT scan of abdomen     PELVIS AND CHEST: UNREMARKABLE   • Hiatal hernia    • History of colon polyps    • History of giardia infection    • History of Helicobacter pylori infection    • IBS (irritable bowel syndrome)    • IgA deficiency ([facility])     CANNOT HAVE ANYTHING WITH IGA IN IT WILL CAUSE ANAPHYLAXIS   • Immunoglobulin deficiency ([facility])    • Peptic ulcer disease    • PONV (postoperative nausea and vomiting)    • Torn meniscus    •  Wound discharge     LT BREAST       Past Surgical History:   Procedure Laterality Date   • ABDOMINAL WALL ABSCESS INCISION AND DRAINAGE Left 07/03/2017    Procedure: BREAST ABSCESS INCISION AND DRAINAGE AND WOUND DEBRIDMENT;  Surgeon: Steve Leon MD;  [place] [facility] [place] [facility] [place]  Service:    • BREAST BIOPSY Left 02/26/2007    Left breast needle localization biopsy; Dr. Steve Leon   • BREAST BIOPSY Left 02/02/2007    Left breast stereotactic vacuum assisted core biopsy with marker placement; Dr. Steve Leon   • BREAST BIOPSY Left 02/07/2003    Left breast needle localization biopsy; Dr. Steve Leon   • BREAST LUMPECTOMY WITH SENTINEL NODE BIOPSY Left 05/19/2017    Procedure: BREAST LUMPECTOMY WITH SENTINEL NODE BIOPSY & MAMMO NEEDLE LOCALIZATION;  Surgeon: Steve Leon MD;  [place] [facility] [place] [facility] [place]  Service:    • COLONOSCOPY  01/10/2014    NTEH, TORTS, STOOL   • COLONOSCOPY  08/24/2010    IH, TORT, BX-   • COLONOSCOPY N/A 05/23/2019    Procedure: COLONOSCOPY TO CECUM AND TERM. ILEUM WITH BIOPSIES;  Surgeon: Rigoberto Rogers MD;  Location: [facility] ENDOSCOPY;  Service: Gastroenterology   • ENDOSCOPY  01/10/2014    Z-LINE REGULAR, 35 CM FROM INCISORS, HH, GASTRITIS, NO H-PYLORI   • ENDOSCOPY  08/24/2010    GASTRITIS   • ENDOSCOPY N/A 10/18/2016    Gastritis, duodenitis, HH    • ENDOSCOPY N/A 05/23/2019    Procedure: ESOPHAGOGASTRODUODENOSCOPY WITH BIOPSIES AND RAPID UREASE;  Surgeon: Rigoberto Rogers MD;  Location: [facility] ENDOSCOPY;  Service: Gastroenterology   • ENDOSCOPY AND COLONOSCOPY N/A 08/23/2007    Z-line irregular, normal esophagus, bilious gastric fluid, gastritis, hiatal hernia, normal duodenal bulb and 2nd part of the duodenum; non-thrombosed external hemorrhoids, torts, one 5 mm polyp in the mid sigmoid colon, stool in the sigmoid colon, descending colon, transverse colon, ascending colon and in the cecum-Dr. Rigoberto Rogers   • KNEE SURGERY Left 01/2015   • SEPTOPLASTY     •  TONSILLECTOMY     • TUBAL ABDOMINAL LIGATION  10/2/81   • UPPER GASTROINTESTINAL ENDOSCOPY  5/23/19       Scheduled Meds:    Continuous Infusions:No current facility-administered medications for this visit.      PRN Meds:.    Allergies   Allergen Reactions   • Other Anaphylaxis     BLOOD PRODUCTS WITH IGA IN THEM   • Erythromycin GI Intolerance     VOMITING   • Avelox [Moxifloxacin] Rash   • Levaquin [Levofloxacin] Rash       Social History     Socioeconomic History   • Marital status:      Spouse name: Ortiz   • Number of children: 3   • Years of education: College   Tobacco Use   • Smoking status: Never Smoker   • Smokeless tobacco: Never Used   Substance and Sexual Activity   • Alcohol use: Yes     Alcohol/week: 1.0 standard drink     Types: 1 Glasses of wine per week     Comment: occasional   • Drug use: No   • Sexual activity: Defer       Family History   Problem Relation Age of Onset   • Other Father         POLYP   • Colon polyps Father    • Prostate cancer Father 68   • Pancreatitis Daughter    • Malig Hyperthermia Neg Hx        Review of Systems   Constitutional: Negative for appetite change, chills, diaphoresis, fatigue, fever and unexpected weight change.   HENT: Negative for nosebleeds, postnasal drip, sore throat, trouble swallowing and voice change.    Respiratory: Negative for cough, choking, chest tightness, shortness of breath, wheezing and stridor.    Cardiovascular: Negative for chest pain, palpitations and leg swelling.   Gastrointestinal: Positive for abdominal distention and diarrhea. Negative for abdominal pain, anal bleeding, blood in stool, constipation, nausea, rectal pain and vomiting.   Endocrine: Negative for polydipsia, polyphagia and polyuria.   Musculoskeletal: Negative for gait problem.   Skin: Negative for rash and wound.   Allergic/Immunologic: Negative for food allergies.   Neurological: Negative for dizziness, speech difficulty and light-headedness.    Psychiatric/Behavioral: Negative for confusion, self-injury, sleep disturbance and suicidal ideas.       Vitals:    03/21/22 1137   Temp: 97.1 °F (36.2 °C)       Physical Exam  Constitutional:       General: She is not in acute distress.     Appearance: She is well-developed. She is not ill-appearing.   HENT:      Head: Normocephalic.   Eyes:      Pupils: Pupils are equal, round, and reactive to light.   Cardiovascular:      Rate and Rhythm: Normal rate and regular rhythm.      Heart sounds: Normal heart sounds.   Pulmonary:      Effort: Pulmonary effort is normal.      Breath sounds: Normal breath sounds.   Abdominal:      General: Bowel sounds are normal. There is distension.      Palpations: Abdomen is soft. There is no mass.      Tenderness: There is no abdominal tenderness. There is no guarding or rebound.      Hernia: No hernia is present.   Musculoskeletal:         General: Normal range of motion.   Skin:     General: Skin is warm and dry.   Neurological:      Mental Status: She is alert and oriented to person, place, and time.   Psychiatric:         Speech: Speech normal.         Behavior: Behavior normal.         Judgment: Judgment normal.         No radiology results for the last 7 days     Diagnoses and all orders for this visit:    1. Irritable bowel syndrome with diarrhea (Primary)  Overview:  Added automatically from request for surgery [id number]      2. Gastroesophageal reflux disease without esophagitis  Overview:  Added automatically from request for surgery [id number]      3. Fecal urgency    4. Bloating    IBS-D is still not well controlled.  Recommend that she go ahead and increase the bentyl maybe take it before bed at night so that she has more relief in the morning.  Would also have her stop the magnesium supplement she is taking as well as it could be contributing to her IBS.  Continue daily probiotics.  GERD seems well controlled on Pepcid 20 mg twice daily.  Continue GERD precautions.  Patient  to call the office next week with an update.  Patient to follow-up with me in 3 months.  Patient is agreeable to the plan.

## 2022-04-14 ENCOUNTER — INFUSION (OUTPATIENT)
Dept: ONCOLOGY | Facility: HOSPITAL | Age: 77
End: 2022-04-14

## 2022-04-14 VITALS
TEMPERATURE: 96.8 F | OXYGEN SATURATION: 99 % | SYSTOLIC BLOOD PRESSURE: 120 MMHG | DIASTOLIC BLOOD PRESSURE: 76 MMHG | WEIGHT: 145.2 LBS | BODY MASS INDEX: 24.92 KG/M2 | HEART RATE: 66 BPM

## 2022-04-14 DIAGNOSIS — D83.9 COMMON VARIABLE IMMUNODEFICIENCY: Primary | ICD-10-CM

## 2022-04-14 DIAGNOSIS — D80.2 IGA DEFICIENCY: ICD-10-CM

## 2022-04-14 LAB
BASOPHILS # BLD AUTO: 0.03 10*3/MM3 (ref 0–0.2)
BASOPHILS NFR BLD AUTO: 0.7 % (ref 0–1.5)
DEPRECATED RDW RBC AUTO: 47.1 FL (ref 37–54)
EOSINOPHIL # BLD AUTO: 0.06 10*3/MM3 (ref 0–0.4)
EOSINOPHIL NFR BLD AUTO: 1.4 % (ref 0.3–6.2)
ERYTHROCYTE [DISTWIDTH] IN BLOOD BY AUTOMATED COUNT: 13.1 % (ref 12.3–15.4)
HCT VFR BLD AUTO: 35.8 % (ref 34–46.6)
HGB BLD-MCNC: 12 G/DL (ref 12–15.9)
IGG1 SER-MCNC: 900 MG/DL (ref 700–1600)
IMM GRANULOCYTES # BLD AUTO: 0.01 10*3/MM3 (ref 0–0.05)
IMM GRANULOCYTES NFR BLD AUTO: 0.2 % (ref 0–0.5)
LYMPHOCYTES # BLD AUTO: 1.38 10*3/MM3 (ref 0.7–3.1)
LYMPHOCYTES NFR BLD AUTO: 33.3 % (ref 19.6–45.3)
MCH RBC QN AUTO: 33 PG (ref 26.6–33)
MCHC RBC AUTO-ENTMCNC: 33.5 G/DL (ref 31.5–35.7)
MCV RBC AUTO: 98.4 FL (ref 79–97)
MONOCYTES # BLD AUTO: 0.39 10*3/MM3 (ref 0.1–0.9)
MONOCYTES NFR BLD AUTO: 9.4 % (ref 5–12)
NEUTROPHILS NFR BLD AUTO: 2.28 10*3/MM3 (ref 1.7–7)
NEUTROPHILS NFR BLD AUTO: 55 % (ref 42.7–76)
NRBC BLD AUTO-RTO: 0 /100 WBC (ref 0–0.2)
PLATELET # BLD AUTO: 222 10*3/MM3 (ref 140–450)
PMV BLD AUTO: 10.9 FL (ref 6–12)
RBC # BLD AUTO: 3.64 10*6/MM3 (ref 3.77–5.28)
WBC NRBC COR # BLD: 4.15 10*3/MM3 (ref 3.4–10.8)

## 2022-04-14 PROCEDURE — 96365 THER/PROPH/DIAG IV INF INIT: CPT

## 2022-04-14 PROCEDURE — 85025 COMPLETE CBC W/AUTO DIFF WBC: CPT | Performed by: ALLERGY & IMMUNOLOGY

## 2022-04-14 PROCEDURE — 96366 THER/PROPH/DIAG IV INF ADDON: CPT

## 2022-04-14 PROCEDURE — 82784 ASSAY IGA/IGD/IGG/IGM EACH: CPT | Performed by: ALLERGY & IMMUNOLOGY

## 2022-04-14 PROCEDURE — 25010000002 IMMUNE GLOBULIN (HUMAN) PER 500 MG: Performed by: ALLERGY & IMMUNOLOGY

## 2022-04-14 RX ORDER — SODIUM CHLORIDE 9 MG/ML
250 INJECTION, SOLUTION INTRAVENOUS ONCE
Status: COMPLETED | OUTPATIENT
Start: 2022-04-14 | End: 2022-04-14

## 2022-04-14 RX ORDER — ACETAMINOPHEN 500 MG
500 TABLET ORAL ONCE
Status: CANCELLED
Start: 2022-05-05 | End: 2022-05-05

## 2022-04-14 RX ORDER — DIPHENHYDRAMINE HCL 25 MG
25 CAPSULE ORAL ONCE
Status: CANCELLED
Start: 2022-05-05 | End: 2022-05-05

## 2022-04-14 RX ORDER — DIPHENHYDRAMINE HCL 25 MG
50 CAPSULE ORAL ONCE AS NEEDED
Status: CANCELLED
Start: 2022-05-05

## 2022-04-14 RX ORDER — EPINEPHRINE 0.3 MG/.3ML
0.3 INJECTION SUBCUTANEOUS ONCE
Status: CANCELLED
Start: 2022-05-05 | End: 2022-05-05

## 2022-04-14 RX ORDER — SODIUM CHLORIDE 9 MG/ML
250 INJECTION, SOLUTION INTRAVENOUS ONCE
Status: CANCELLED | OUTPATIENT
Start: 2022-05-05

## 2022-04-14 RX ADMIN — SODIUM CHLORIDE 250 ML: 9 INJECTION, SOLUTION INTRAVENOUS at 11:40

## 2022-04-14 RX ADMIN — IMMUNE GLOBULIN INTRAVENOUS (HUMAN) 20 G: KIT at 11:54

## 2022-05-12 ENCOUNTER — INFUSION (OUTPATIENT)
Dept: ONCOLOGY | Facility: HOSPITAL | Age: 77
End: 2022-05-12

## 2022-05-12 VITALS
BODY MASS INDEX: 24.48 KG/M2 | RESPIRATION RATE: 18 BRPM | HEIGHT: 64 IN | HEART RATE: 71 BPM | DIASTOLIC BLOOD PRESSURE: 72 MMHG | WEIGHT: 143.4 LBS | OXYGEN SATURATION: 98 % | TEMPERATURE: 98.5 F | SYSTOLIC BLOOD PRESSURE: 110 MMHG

## 2022-05-12 DIAGNOSIS — D80.2 IGA DEFICIENCY: ICD-10-CM

## 2022-05-12 DIAGNOSIS — D83.9 COMMON VARIABLE IMMUNODEFICIENCY: Primary | ICD-10-CM

## 2022-05-12 PROCEDURE — 96366 THER/PROPH/DIAG IV INF ADDON: CPT

## 2022-05-12 PROCEDURE — 25010000002 IMMUNE GLOBULIN (HUMAN) PER 500 MG: Performed by: ALLERGY & IMMUNOLOGY

## 2022-05-12 PROCEDURE — 96365 THER/PROPH/DIAG IV INF INIT: CPT

## 2022-05-12 RX ORDER — EPINEPHRINE 0.3 MG/.3ML
0.3 INJECTION SUBCUTANEOUS ONCE
Status: CANCELLED
Start: 2022-06-02 | End: 2022-06-02

## 2022-05-12 RX ORDER — SODIUM CHLORIDE 9 MG/ML
250 INJECTION, SOLUTION INTRAVENOUS ONCE
Status: CANCELLED | OUTPATIENT
Start: 2022-06-02

## 2022-05-12 RX ORDER — SODIUM CHLORIDE 9 MG/ML
250 INJECTION, SOLUTION INTRAVENOUS ONCE
Status: COMPLETED | OUTPATIENT
Start: 2022-05-12 | End: 2022-05-12

## 2022-05-12 RX ORDER — DIPHENHYDRAMINE HCL 25 MG
50 CAPSULE ORAL ONCE AS NEEDED
Status: CANCELLED
Start: 2022-06-02

## 2022-05-12 RX ORDER — DIPHENHYDRAMINE HCL 25 MG
50 CAPSULE ORAL ONCE AS NEEDED
Status: DISCONTINUED | OUTPATIENT
Start: 2022-05-12 | End: 2022-05-12 | Stop reason: HOSPADM

## 2022-05-12 RX ORDER — EPINEPHRINE 0.3 MG/.3ML
0.3 INJECTION SUBCUTANEOUS ONCE
Status: DISCONTINUED | OUTPATIENT
Start: 2022-05-12 | End: 2022-05-12 | Stop reason: HOSPADM

## 2022-05-12 RX ORDER — ACETAMINOPHEN 500 MG
500 TABLET ORAL ONCE
Status: CANCELLED
Start: 2022-06-02 | End: 2022-06-02

## 2022-05-12 RX ORDER — DIPHENHYDRAMINE HCL 25 MG
25 CAPSULE ORAL ONCE
Status: CANCELLED
Start: 2022-06-02 | End: 2022-06-02

## 2022-05-12 RX ADMIN — IMMUNE GLOBULIN INTRAVENOUS (HUMAN) 20 G: KIT at 12:08

## 2022-05-12 RX ADMIN — SODIUM CHLORIDE 250 ML: 9 INJECTION, SOLUTION INTRAVENOUS at 12:45

## 2022-05-24 ENCOUNTER — OFFICE VISIT (OUTPATIENT)
Dept: ONCOLOGY | Facility: CLINIC | Age: 77
End: 2022-05-24

## 2022-05-24 ENCOUNTER — LAB (OUTPATIENT)
Dept: OTHER | Facility: HOSPITAL | Age: 77
End: 2022-05-24

## 2022-05-24 VITALS
HEART RATE: 74 BPM | BODY MASS INDEX: 24.67 KG/M2 | OXYGEN SATURATION: 96 % | RESPIRATION RATE: 18 BRPM | WEIGHT: 144.5 LBS | SYSTOLIC BLOOD PRESSURE: 151 MMHG | HEIGHT: 64 IN | DIASTOLIC BLOOD PRESSURE: 91 MMHG | TEMPERATURE: 97.3 F

## 2022-05-24 DIAGNOSIS — M81.0 OSTEOPOROSIS, UNSPECIFIED OSTEOPOROSIS TYPE, UNSPECIFIED PATHOLOGICAL FRACTURE PRESENCE: ICD-10-CM

## 2022-05-24 DIAGNOSIS — K58.0 IRRITABLE BOWEL SYNDROME WITH DIARRHEA: ICD-10-CM

## 2022-05-24 DIAGNOSIS — C50.512 MALIGNANT NEOPLASM OF LOWER-OUTER QUADRANT OF LEFT BREAST OF FEMALE, ESTROGEN RECEPTOR POSITIVE: Primary | ICD-10-CM

## 2022-05-24 DIAGNOSIS — D80.2 IGA DEFICIENCY: ICD-10-CM

## 2022-05-24 DIAGNOSIS — Z17.0 MALIGNANT NEOPLASM OF LOWER-OUTER QUADRANT OF LEFT BREAST OF FEMALE, ESTROGEN RECEPTOR POSITIVE: ICD-10-CM

## 2022-05-24 DIAGNOSIS — D83.9 COMMON VARIABLE IMMUNODEFICIENCY: ICD-10-CM

## 2022-05-24 DIAGNOSIS — Z17.0 MALIGNANT NEOPLASM OF LOWER-OUTER QUADRANT OF LEFT BREAST OF FEMALE, ESTROGEN RECEPTOR POSITIVE: Primary | ICD-10-CM

## 2022-05-24 DIAGNOSIS — C50.512 MALIGNANT NEOPLASM OF LOWER-OUTER QUADRANT OF LEFT BREAST OF FEMALE, ESTROGEN RECEPTOR POSITIVE: ICD-10-CM

## 2022-05-24 LAB
ALBUMIN SERPL-MCNC: 4.6 G/DL (ref 3.5–5.2)
ALBUMIN/GLOB SERPL: 1.8 G/DL
ALP SERPL-CCNC: 56 U/L (ref 39–117)
ALT SERPL W P-5'-P-CCNC: 17 U/L (ref 1–33)
ANION GAP SERPL CALCULATED.3IONS-SCNC: 7.4 MMOL/L (ref 5–15)
AST SERPL-CCNC: 24 U/L (ref 1–32)
BASOPHILS # BLD AUTO: 0.05 10*3/MM3 (ref 0–0.2)
BASOPHILS NFR BLD AUTO: 1.2 % (ref 0–1.5)
BILIRUB SERPL-MCNC: 0.6 MG/DL (ref 0–1.2)
BUN SERPL-MCNC: 17 MG/DL (ref 8–23)
BUN/CREAT SERPL: 23.3 (ref 7–25)
CALCIUM SPEC-SCNC: 9.6 MG/DL (ref 8.6–10.5)
CHLORIDE SERPL-SCNC: 105 MMOL/L (ref 98–107)
CO2 SERPL-SCNC: 28.6 MMOL/L (ref 22–29)
CREAT SERPL-MCNC: 0.73 MG/DL (ref 0.57–1)
DEPRECATED RDW RBC AUTO: 49.4 FL (ref 37–54)
EGFRCR SERPLBLD CKD-EPI 2021: 86.4 ML/MIN/1.73
EOSINOPHIL # BLD AUTO: 0.05 10*3/MM3 (ref 0–0.4)
EOSINOPHIL NFR BLD AUTO: 1.2 % (ref 0.3–6.2)
ERYTHROCYTE [DISTWIDTH] IN BLOOD BY AUTOMATED COUNT: 13.2 % (ref 12.3–15.4)
GLOBULIN UR ELPH-MCNC: 2.5 GM/DL
GLUCOSE SERPL-MCNC: 75 MG/DL (ref 65–99)
HCT VFR BLD AUTO: 37.4 % (ref 34–46.6)
HGB BLD-MCNC: 12.4 G/DL (ref 12–15.9)
IMM GRANULOCYTES # BLD AUTO: 0.01 10*3/MM3 (ref 0–0.05)
IMM GRANULOCYTES NFR BLD AUTO: 0.2 % (ref 0–0.5)
LYMPHOCYTES # BLD AUTO: 1.43 10*3/MM3 (ref 0.7–3.1)
LYMPHOCYTES NFR BLD AUTO: 33.6 % (ref 19.6–45.3)
MCH RBC QN AUTO: 33.2 PG (ref 26.6–33)
MCHC RBC AUTO-ENTMCNC: 33.2 G/DL (ref 31.5–35.7)
MCV RBC AUTO: 100.3 FL (ref 79–97)
MONOCYTES # BLD AUTO: 0.4 10*3/MM3 (ref 0.1–0.9)
MONOCYTES NFR BLD AUTO: 9.4 % (ref 5–12)
NEUTROPHILS NFR BLD AUTO: 2.31 10*3/MM3 (ref 1.7–7)
NEUTROPHILS NFR BLD AUTO: 54.4 % (ref 42.7–76)
NRBC BLD AUTO-RTO: 0 /100 WBC (ref 0–0.2)
PLATELET # BLD AUTO: 234 10*3/MM3 (ref 140–450)
PMV BLD AUTO: 9.5 FL (ref 6–12)
POTASSIUM SERPL-SCNC: 4.5 MMOL/L (ref 3.5–5.2)
PROT SERPL-MCNC: 7.1 G/DL (ref 6–8.5)
RBC # BLD AUTO: 3.73 10*6/MM3 (ref 3.77–5.28)
SODIUM SERPL-SCNC: 141 MMOL/L (ref 136–145)
WBC NRBC COR # BLD: 4.25 10*3/MM3 (ref 3.4–10.8)

## 2022-05-24 PROCEDURE — 85025 COMPLETE CBC W/AUTO DIFF WBC: CPT | Performed by: INTERNAL MEDICINE

## 2022-05-24 PROCEDURE — 99213 OFFICE O/P EST LOW 20 MIN: CPT | Performed by: INTERNAL MEDICINE

## 2022-05-24 PROCEDURE — 80053 COMPREHEN METABOLIC PANEL: CPT | Performed by: INTERNAL MEDICINE

## 2022-05-24 PROCEDURE — 36415 COLL VENOUS BLD VENIPUNCTURE: CPT

## 2022-05-24 RX ORDER — TAMOXIFEN CITRATE 20 MG/1
20 TABLET ORAL DAILY
Qty: 90 TABLET | Refills: 3 | Status: SHIPPED | OUTPATIENT
Start: 2022-05-24

## 2022-05-24 NOTE — PROGRESS NOTES
Subjective     REASON FOR FOLLOW UP:   1. Stage I ( T1c,N0,M0 ) ER+,CT+, HER 2 neg for a ductal carcinoma of the left breast status post lumpectomy 5/19/2017.  Patient started on adjuvant tamoxifen after the visit of 6/9/2017.  2.  Osteoporosis  3.  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  Development of draining seroma and poor incisional healing in the left breast that required further surgery on 7/3/2017.  5.  Genetic testing negative for any adverse mutations in June 2019    HISTORY OF PRESENT ILLNESS:  The patient is a 76 y.o. year old female who was noted to have a radiographic abnormality in the left breast in March.  She underwent an ultrasound-guided biopsy on 3/28/2017 showing invasive ductal carcinoma which was strongly hormone receptor positive and HER-2/trae negative.  She elected to undergo a lumpectomy procedure with Dr. Leon on 5/19/2017.  The tumor size was 1.2 x 1 x 0.9 cm and 3 sentinel lymph nodes were examined all of which were negative for metastatic cancer.    The patient was seen on 6/9/2017 to discuss postop adjuvant hormonal therapy.  She has a history of osteoporosis.  We discussed initiation of tamoxifen therapy which would have the advantage of helping to maintain her bone density.    She is here today for routine 6 month follow-up and seems to be getting along well on her tamoxifen.  She has been on the medication for 5  years now.    She [facility]    She continues to receive IVIG infusions for her common variable immunodeficiency.  Most recently received 20 g dose on 5/12/2022.  Reports she has not had any significant infections since her last visit here.      History of Present Illness        ALLERGIES:    Allergies   Allergen Reactions   • Other Anaphylaxis     BLOOD PRODUCTS WITH IGA IN THEM   • Erythromycin GI Intolerance     VOMITING   • Avelox [Moxifloxacin] Rash   •  "Levaquin [Levofloxacin] Rash         Review of Systems   Constitutional: Negative for activity change, appetite change, fatigue, fever and unexpected weight change.   HENT: Negative for hearing loss, nosebleeds, trouble swallowing and voice change.    Eyes: Negative for visual disturbance.   Respiratory: Negative for cough, shortness of breath and wheezing.    Cardiovascular: Negative for chest pain and palpitations.   Gastrointestinal: Negative for abdominal pain, diarrhea, nausea and vomiting.   Genitourinary: Negative for difficulty urinating, frequency, hematuria and urgency.   Musculoskeletal: Negative for back pain and neck pain.   Neurological: Negative for dizziness, seizures, syncope and headaches.   Hematological: Negative for adenopathy. Does not bruise/bleed easily.   Psychiatric/Behavioral: Negative for behavioral problems. The patient is not nervous/anxious.        Objective     Vitals:    05/24/22 1009   BP: 151/91   Pulse: 74   Resp: 18   Temp: 97.3 °F (36.3 °C)   TempSrc: Temporal   SpO2: 96%   Weight: 65.5 kg (144 lb 8 oz)   Height: 162.6 cm (64.02\")   PainSc: 0-No pain     Current Status 5/24/2022   ECOG score 0       Physical Exam   Constitutional: She is oriented to person, place, and time. She appears well-developed. No distress.   HENT:   Head: Normocephalic.   Eyes: Pupils are equal, round, and reactive to light. Conjunctivae are normal. No scleral icterus.   Neck: No JVD present. No thyromegaly present.   Cardiovascular: Normal rate and regular rhythm. Exam reveals no gallop and no friction rub.   No murmur heard.  Pulmonary/Chest: Effort normal and breath sounds normal. She has no wheezes. She has no rales. Right breast exhibits no mass, no nipple discharge and no skin change. Left breast exhibits skin change. Left breast exhibits no mass and no nipple discharge.   Left breast shows healed lumpectomy scar and radiation tattoos.   Abdominal: Soft. Bowel sounds are normal. She exhibits no " distension and no mass. There is no abdominal tenderness.   Musculoskeletal: Normal range of motion. No deformity.   Lymphadenopathy:     She has no cervical adenopathy.   Neurological: She is alert and oriented to person, place, and time. She has normal reflexes. No cranial nerve deficit.   Skin: Skin is warm and dry. No erythema.   Psychiatric: Her behavior is normal. Judgment normal.     I have reexamined the patient and the results are consistent with the previously documented exam. Jj Hickman MD     RECENT LABS:  Hematology WBC   Date Value Ref Range Status   05/24/2022 4.25 3.40 - 10.80 10*3/mm3 Final   04/29/2020 5.1 3.4 - 10.8 x10E3/uL Final     RBC   Date Value Ref Range Status   05/24/2022 3.73 (L) 3.77 - 5.28 10*6/mm3 Final   04/29/2020 3.85 3.77 - 5.28 x10E6/uL Final     Hemoglobin   Date Value Ref Range Status   05/24/2022 12.4 12.0 - 15.9 g/dL Final     Hematocrit   Date Value Ref Range Status   05/24/2022 37.4 34.0 - 46.6 % Final     Platelets   Date Value Ref Range Status   05/24/2022 234 140 - 450 10*3/mm3 Final          Assessment & Plan     1.  Stage I, hormone receptor positive, HER-2/trae negative invasive ductal carcinoma the left breast status post lumpectomy.  She has been on adjuvant hormonal therapy with tamoxifen 20 mg daily since June 2017 and continues to tolerate it well.  She has been on the medication for 5 years and we discussed the pros and cons of continuing tamoxifen.  She has elected to remain on tamoxifen for now with plans to continue for up to 10 years.  2.  Osteoporosis on her last DEXA scan.  3.  Common variable immunodeficiency requiring monthly IV immunoglobulin infusions.  4.  Mild chronic anemia. Stable    Plan  1.  Continue tamoxifen 20 mg daily with plans to continue treatment for up to 10 years through June 2027.  2.  Return for follow-up in 6 months.   3.  Her [facility] in a couple of weeks..

## 2022-06-07 ENCOUNTER — APPOINTMENT (OUTPATIENT)
Dept: WOMENS IMAGING | Facility: HOSPITAL | Age: 77
End: 2022-06-07

## 2022-06-07 PROCEDURE — 77063 BREAST TOMOSYNTHESIS BI: CPT | Performed by: RADIOLOGY

## 2022-06-07 PROCEDURE — 77067 SCR MAMMO BI INCL CAD: CPT | Performed by: RADIOLOGY

## 2022-06-14 DIAGNOSIS — Z17.0 MALIGNANT NEOPLASM OF LOWER-OUTER QUADRANT OF LEFT BREAST OF FEMALE, ESTROGEN RECEPTOR POSITIVE: Primary | ICD-10-CM

## 2022-06-14 DIAGNOSIS — R92.8 ABNORMAL SCREENING MAMMOGRAM: ICD-10-CM

## 2022-06-14 DIAGNOSIS — C50.512 MALIGNANT NEOPLASM OF LOWER-OUTER QUADRANT OF LEFT BREAST OF FEMALE, ESTROGEN RECEPTOR POSITIVE: Primary | ICD-10-CM

## 2022-06-15 ENCOUNTER — TELEPHONE (OUTPATIENT)
Dept: ONCOLOGY | Facility: CLINIC | Age: 77
End: 2022-06-15

## 2022-06-15 NOTE — TELEPHONE ENCOUNTER
Caller: Elaina Tom    Relationship: Self    Best call back number: [phone]    What is the best time to reach you: ANYTIME    Who are you requesting to speak with (clinical staff, provider,  specific staff member): DR NOVAK OR NURSE    What was the call regarding: PT STATED THAT SHE COMPLETED HER MAMMOGRAM LAST WEEK ON 6/7. SHE ISNT SURE WHY SHE RECVD A CALL STATING SHE HAD ANOTHER MAMMO AND US SCHED. PLEASE CALL PT TO ADVISE. SHE IS OUT OF TOWN AND IS NOW A LITTLE WORRIED DUE TO NOT HAVING AN US RECENTLY.    Do you require a callback: YES

## 2022-06-15 NOTE — TELEPHONE ENCOUNTER
Spoke with patient and let her know that the radiologist wanted additional imaging for focal asymmetry. She had no further questions and v/u.

## 2022-06-15 NOTE — TELEPHONE ENCOUNTER
Patient had her mammogram on 6/7/2022, she was not told that anything was abnormal at the time.  She has received a call that she is scheduled for another mammogram and ultrasound.  She is out of town and now she is worried.  Please advise.

## 2022-06-30 ENCOUNTER — INFUSION (OUTPATIENT)
Dept: ONCOLOGY | Facility: HOSPITAL | Age: 77
End: 2022-06-30

## 2022-06-30 VITALS
TEMPERATURE: 97.9 F | BODY MASS INDEX: 24.65 KG/M2 | SYSTOLIC BLOOD PRESSURE: 112 MMHG | DIASTOLIC BLOOD PRESSURE: 69 MMHG | HEIGHT: 64 IN | OXYGEN SATURATION: 99 % | RESPIRATION RATE: 18 BRPM | WEIGHT: 144.4 LBS | HEART RATE: 60 BPM

## 2022-06-30 DIAGNOSIS — D80.2 IGA DEFICIENCY: ICD-10-CM

## 2022-06-30 DIAGNOSIS — D83.9 COMMON VARIABLE IMMUNODEFICIENCY: Primary | ICD-10-CM

## 2022-06-30 LAB — IGG1 SER-MCNC: 877 MG/DL (ref 700–1600)

## 2022-06-30 PROCEDURE — 96366 THER/PROPH/DIAG IV INF ADDON: CPT

## 2022-06-30 PROCEDURE — 25010000002 IMMUNE GLOBULIN (HUMAN) PER 500 MG: Performed by: ALLERGY & IMMUNOLOGY

## 2022-06-30 PROCEDURE — 96365 THER/PROPH/DIAG IV INF INIT: CPT

## 2022-06-30 PROCEDURE — 82784 ASSAY IGA/IGD/IGG/IGM EACH: CPT | Performed by: ALLERGY & IMMUNOLOGY

## 2022-06-30 RX ORDER — EPINEPHRINE 0.3 MG/.3ML
0.3 INJECTION SUBCUTANEOUS ONCE
Status: CANCELLED
Start: 2022-07-07 | End: 2022-07-07

## 2022-06-30 RX ORDER — SODIUM CHLORIDE 9 MG/ML
250 INJECTION, SOLUTION INTRAVENOUS ONCE
Status: CANCELLED | OUTPATIENT
Start: 2022-07-07

## 2022-06-30 RX ORDER — DIPHENHYDRAMINE HCL 25 MG
25 CAPSULE ORAL ONCE
Status: CANCELLED
Start: 2022-07-07 | End: 2022-07-07

## 2022-06-30 RX ORDER — ACETAMINOPHEN 500 MG
500 TABLET ORAL ONCE
Status: CANCELLED
Start: 2022-07-07 | End: 2022-07-07

## 2022-06-30 RX ORDER — SODIUM CHLORIDE 9 MG/ML
250 INJECTION, SOLUTION INTRAVENOUS ONCE
Status: COMPLETED | OUTPATIENT
Start: 2022-06-30 | End: 2022-06-30

## 2022-06-30 RX ORDER — DIPHENHYDRAMINE HCL 25 MG
50 CAPSULE ORAL ONCE AS NEEDED
Status: CANCELLED
Start: 2022-07-07

## 2022-06-30 RX ADMIN — SODIUM CHLORIDE 250 ML: 9 INJECTION, SOLUTION INTRAVENOUS at 12:30

## 2022-06-30 RX ADMIN — IMMUNE GLOBULIN INTRAVENOUS (HUMAN) 20 G: KIT at 13:08

## 2022-07-01 ENCOUNTER — APPOINTMENT (OUTPATIENT)
Dept: WOMENS IMAGING | Facility: HOSPITAL | Age: 77
End: 2022-07-01

## 2022-07-01 PROCEDURE — G0279 TOMOSYNTHESIS, MAMMO: HCPCS | Performed by: RADIOLOGY

## 2022-07-01 PROCEDURE — 77065 DX MAMMO INCL CAD UNI: CPT | Performed by: RADIOLOGY

## 2022-07-01 PROCEDURE — 76641 ULTRASOUND BREAST COMPLETE: CPT | Performed by: RADIOLOGY

## 2022-07-06 ENCOUNTER — OFFICE VISIT (OUTPATIENT)
Dept: GASTROENTEROLOGY | Facility: CLINIC | Age: 77
End: 2022-07-06

## 2022-07-06 VITALS
WEIGHT: 145 LBS | DIASTOLIC BLOOD PRESSURE: 86 MMHG | SYSTOLIC BLOOD PRESSURE: 149 MMHG | HEART RATE: 72 BPM | BODY MASS INDEX: 24.16 KG/M2 | HEIGHT: 65 IN | TEMPERATURE: 96.3 F

## 2022-07-06 DIAGNOSIS — R10.30 LOWER ABDOMINAL PAIN: ICD-10-CM

## 2022-07-06 DIAGNOSIS — Z85.3 HISTORY OF BREAST CANCER: ICD-10-CM

## 2022-07-06 DIAGNOSIS — R15.2 FECAL URGENCY: ICD-10-CM

## 2022-07-06 DIAGNOSIS — K21.9 GASTROESOPHAGEAL REFLUX DISEASE WITHOUT ESOPHAGITIS: ICD-10-CM

## 2022-07-06 DIAGNOSIS — K58.0 IRRITABLE BOWEL SYNDROME WITH DIARRHEA: Primary | ICD-10-CM

## 2022-07-06 PROCEDURE — 99214 OFFICE O/P EST MOD 30 MIN: CPT | Performed by: NURSE PRACTITIONER

## 2022-07-06 NOTE — PROGRESS NOTES
Chief Complaint   Patient presents with   • Irritable Bowel Syndrome   • Diarrhea   • Heartburn       Elaina Tom is a  77 y.o. female here for a follow up visit for GERD.    HPI  77-year-old female presents today for follow-up visit for GERD.  She is a patient of Dr. Rogers.  She was last seen in the office by me on 3/21/2022.  She has a history of GERD and admits she is done really well on Pepcid 20 mg twice daily.  She denies any breakthrough reflux at this time.  She also has a history of IBS-D.  She admits lately she has been under a lot of stress mostly due to waiting on the results of her mammogram and ultrasound.  And her IBS has been flaring.  She admits her stools have been looser lately and more urgent.  But she does feel like this is stress-induced.  She does not take any fiber supplementation.  She does take a daily probiotic.  Her last EGD and colonoscopy was on 5/2019.  Screening colonoscopy due again in 2024.  She denies any dysphagia, reflux, nausea and vomiting, rectal bleeding or melena.  She admits her appetite is good and her weight appears stable.He does have a GI family history with her father having had colon polyps.  Her daughter has had pancreatitis.  Past Medical History:   Diagnosis Date   • Allergic rhinitis    • Anxiety    • Arthritis    • Asthma     ONLY HAS ISSUES WHEN HAS RESPIRATORY INFECTION   • Cancer (HCC) 2017    BREAST-LEFT   • Cataract    • CVID (common variable immunodeficiency) ([facility])    • External hemorrhoids    • Gastritis    • GERD (gastroesophageal reflux disease)    • H/O CT scan of abdomen     PELVIS AND CHEST: UNREMARKABLE   • Hiatal hernia    • History of colon polyps    • History of giardia infection    • History of Helicobacter pylori infection    • IBS (irritable bowel syndrome)    • IgA deficiency (HCC)     CANNOT HAVE ANYTHING WITH IGA IN IT WILL CAUSE ANAPHYLAXIS   • Immunoglobulin deficiency ([facility])    • Peptic ulcer disease    • PONV (postoperative nausea and  vomiting)    • Torn meniscus    • Wound discharge     LT BREAST       Past Surgical History:   Procedure Laterality Date   • ABDOMINAL WALL ABSCESS INCISION AND DRAINAGE Left 07/03/2017    Procedure: BREAST ABSCESS INCISION AND DRAINAGE AND WOUND DEBRIDMENT;  Surgeon: Steve Leon MD;  [place]: [facility] [place] [facility] [place];  Service:    • BREAST BIOPSY Left 02/26/2007    Left breast needle localization biopsy; Dr. Steve Leon   • BREAST BIOPSY Left 02/02/2007    Left breast stereotactic vacuum assisted core biopsy with marker placement; Dr. Steve Leon   • BREAST BIOPSY Left 02/07/2003    Left breast needle localization biopsy; Dr. Steve Leon   • BREAST LUMPECTOMY WITH SENTINEL NODE BIOPSY Left 05/19/2017    Procedure: BREAST LUMPECTOMY WITH SENTINEL NODE BIOPSY & MAMMO NEEDLE LOCALIZATION;  Surgeon: Steve Leon MD;  Location: [facility] [place];  Service:    • COLONOSCOPY  01/10/2014    NTEH, TORTS, STOOL   • COLONOSCOPY  08/24/2010    IH, TORT, BX-   • COLONOSCOPY N/A 05/23/2019    Procedure: COLONOSCOPY TO CECUM AND TERM. ILEUM WITH BIOPSIES;  Surgeon: Rigoberto Rogers MD;  Location: [facility] ENDOSCOPY;  Service: Gastroenterology   • ENDOSCOPY  01/10/2014    Z-LINE REGULAR, 35 CM FROM INCISORS, HH, GASTRITIS, NO H-PYLORI   • ENDOSCOPY  08/24/2010    GASTRITIS   • ENDOSCOPY N/A 10/18/2016    Gastritis, duodenitis, HH    • ENDOSCOPY N/A 05/23/2019    Procedure: ESOPHAGOGASTRODUODENOSCOPY WITH BIOPSIES AND RAPID UREASE;  Surgeon: Rigoberto Rogers MD;  Location: [facility] ENDOSCOPY;  Service: Gastroenterology   • ENDOSCOPY AND COLONOSCOPY N/A 08/23/2007    Z-line irregular, normal esophagus, bilious gastric fluid, gastritis, hiatal hernia, normal duodenal bulb and 2nd part of the duodenum; non-thrombosed external hemorrhoids, torts, one 5 mm polyp in the mid sigmoid colon, stool in the sigmoid colon, descending colon, transverse colon, ascending colon and in the cecum-Dr. Rigoberto Rogers   • KNEE SURGERY  Left 01/2015   • SEPTOPLASTY     • TONSILLECTOMY     • TUBAL ABDOMINAL LIGATION  10/2/81   • UPPER GASTROINTESTINAL ENDOSCOPY  5/23/19       Scheduled Meds:    Continuous Infusions:No current facility-administered medications for this visit.      PRN Meds:.    Allergies   Allergen Reactions   • Other Anaphylaxis     BLOOD PRODUCTS WITH IGA IN THEM   • Erythromycin GI Intolerance     VOMITING   • Avelox [Moxifloxacin] Rash   • Levaquin [Levofloxacin] Rash       Social History     Socioeconomic History   • Marital status:      Spouse name: Ortiz   • Number of children: 3   • Years of education: College   Tobacco Use   • Smoking status: Never Smoker   • Smokeless tobacco: Never Used   Substance and Sexual Activity   • Alcohol use: Yes     Alcohol/week: 1.0 standard drink     Types: 1 Glasses of wine per week     Comment: occasional   • Drug use: No   • Sexual activity: Not Currently       Family History   Problem Relation Age of Onset   • Other Father         POLYP   • Colon polyps Father    • Prostate cancer Father 68   • Pancreatitis Daughter    • Malig Hyperthermia Neg Hx        Review of Systems   Constitutional: Negative for appetite change, chills, diaphoresis, fatigue, fever and unexpected weight change.   HENT: Negative for nosebleeds, postnasal drip, sore throat, trouble swallowing and voice change.    Respiratory: Negative for cough, choking, chest tightness, shortness of breath, wheezing and stridor.    Cardiovascular: Negative for chest pain, palpitations and leg swelling.   Gastrointestinal: Positive for abdominal distention and diarrhea. Negative for abdominal pain, anal bleeding, blood in stool, constipation, nausea, rectal pain and vomiting.   Endocrine: Negative for polydipsia, polyphagia and polyuria.   Musculoskeletal: Negative for gait problem.   Skin: Negative for rash and wound.   Allergic/Immunologic: Negative for food allergies.   Neurological: Negative for dizziness, speech difficulty  and light-headedness.   Psychiatric/Behavioral: Negative for confusion, self-injury, sleep disturbance and suicidal ideas.       Vitals:    07/06/22 1101   BP: 149/86   Pulse: 72   Temp: 96.3 °F (35.7 °C)       Physical Exam  Constitutional:       General: She is not in acute distress.     Appearance: She is well-developed. She is not ill-appearing.   HENT:      Head: Normocephalic.   Eyes:      Pupils: Pupils are equal, round, and reactive to light.   Cardiovascular:      Rate and Rhythm: Normal rate and regular rhythm.      Heart sounds: Normal heart sounds.   Pulmonary:      Effort: Pulmonary effort is normal.      Breath sounds: Normal breath sounds.   Abdominal:      General: Bowel sounds are normal. There is distension.      Palpations: Abdomen is soft. There is no mass.      Tenderness: There is no abdominal tenderness. There is no guarding or rebound.      Hernia: No hernia is present.   Musculoskeletal:         General: Normal range of motion.   Skin:     General: Skin is warm and dry.   Neurological:      Mental Status: She is alert and oriented to person, place, and time.   Psychiatric:         Speech: Speech normal.         Behavior: Behavior normal.         Judgment: Judgment normal.         No radiology results for the last 7 days     Diagnoses and all orders for this visit:    1. Irritable bowel syndrome with diarrhea (Primary)  Overview:  Added automatically from request for surgery [id number]      2. Fecal urgency    3. Lower abdominal pain    4. Gastroesophageal reflux disease without esophagitis  Overview:  Added automatically from request for surgery [id number]      5. History of breast cancer      GERD seems well controlled on Pepcid 20 mg twice daily.  Continue GERD precautions.  IBS seems to be uncontrolled right now.  But patient has been under a lot of stress waiting on mammogram results.  Recommend that she start a daily fiber supplement to help form up her stool a little bit better.  Continue  daily probiotics.  I do think she might benefit from adding some bentyl to her evenings.  Since it does seem to help with cramping and urgency.  Patient to call the office next week with an update.  Patient to follow-up with me in 6 months.  Patient is agreeable to the plan.  Next screening colonoscopy due in 2024.

## 2022-07-28 ENCOUNTER — INFUSION (OUTPATIENT)
Dept: ONCOLOGY | Facility: HOSPITAL | Age: 77
End: 2022-07-28

## 2022-07-28 VITALS
HEIGHT: 65 IN | TEMPERATURE: 98 F | DIASTOLIC BLOOD PRESSURE: 92 MMHG | SYSTOLIC BLOOD PRESSURE: 146 MMHG | WEIGHT: 144.6 LBS | RESPIRATION RATE: 18 BRPM | BODY MASS INDEX: 24.09 KG/M2 | OXYGEN SATURATION: 95 % | HEART RATE: 85 BPM

## 2022-07-28 DIAGNOSIS — D80.2 IGA DEFICIENCY: ICD-10-CM

## 2022-07-28 DIAGNOSIS — D83.9 COMMON VARIABLE IMMUNODEFICIENCY: Primary | ICD-10-CM

## 2022-07-28 PROCEDURE — 96366 THER/PROPH/DIAG IV INF ADDON: CPT

## 2022-07-28 PROCEDURE — 25010000002 IMMUNE GLOBULIN (HUMAN) PER 500 MG: Performed by: ALLERGY & IMMUNOLOGY

## 2022-07-28 PROCEDURE — 96365 THER/PROPH/DIAG IV INF INIT: CPT

## 2022-07-28 RX ORDER — SODIUM CHLORIDE 9 MG/ML
250 INJECTION, SOLUTION INTRAVENOUS ONCE
Status: CANCELLED | OUTPATIENT
Start: 2022-08-04

## 2022-07-28 RX ORDER — ACETAMINOPHEN 500 MG
500 TABLET ORAL ONCE
Status: CANCELLED
Start: 2022-08-04 | End: 2022-08-04

## 2022-07-28 RX ORDER — DIPHENHYDRAMINE HCL 25 MG
50 CAPSULE ORAL ONCE AS NEEDED
Status: CANCELLED
Start: 2022-08-04

## 2022-07-28 RX ORDER — SODIUM CHLORIDE 9 MG/ML
250 INJECTION, SOLUTION INTRAVENOUS ONCE
Status: COMPLETED | OUTPATIENT
Start: 2022-07-28 | End: 2022-07-28

## 2022-07-28 RX ORDER — EPINEPHRINE 0.3 MG/.3ML
0.3 INJECTION SUBCUTANEOUS ONCE
Status: CANCELLED
Start: 2022-08-04 | End: 2022-08-04

## 2022-07-28 RX ORDER — DIPHENHYDRAMINE HCL 25 MG
25 CAPSULE ORAL ONCE
Status: CANCELLED
Start: 2022-08-04 | End: 2022-08-04

## 2022-07-28 RX ADMIN — SODIUM CHLORIDE 250 ML: 9 INJECTION, SOLUTION INTRAVENOUS at 11:50

## 2022-07-28 RX ADMIN — IMMUNE GLOBULIN INTRAVENOUS (HUMAN) 20 G: KIT at 11:58

## 2022-08-22 RX ORDER — FAMOTIDINE 20 MG/1
TABLET, FILM COATED ORAL
Qty: 180 TABLET | Refills: 3 | Status: SHIPPED | OUTPATIENT
Start: 2022-08-22

## 2022-08-25 ENCOUNTER — INFUSION (OUTPATIENT)
Dept: ONCOLOGY | Facility: HOSPITAL | Age: 77
End: 2022-08-25

## 2022-08-25 VITALS
BODY MASS INDEX: 24.32 KG/M2 | HEIGHT: 65 IN | TEMPERATURE: 98.2 F | SYSTOLIC BLOOD PRESSURE: 115 MMHG | OXYGEN SATURATION: 99 % | WEIGHT: 146 LBS | RESPIRATION RATE: 18 BRPM | HEART RATE: 66 BPM | DIASTOLIC BLOOD PRESSURE: 76 MMHG

## 2022-08-25 DIAGNOSIS — D83.9 COMMON VARIABLE IMMUNODEFICIENCY: Primary | ICD-10-CM

## 2022-08-25 DIAGNOSIS — D80.2 IGA DEFICIENCY: ICD-10-CM

## 2022-08-25 PROCEDURE — 96366 THER/PROPH/DIAG IV INF ADDON: CPT

## 2022-08-25 PROCEDURE — 96365 THER/PROPH/DIAG IV INF INIT: CPT

## 2022-08-25 PROCEDURE — A9270 NON-COVERED ITEM OR SERVICE: HCPCS | Performed by: ALLERGY & IMMUNOLOGY

## 2022-08-25 PROCEDURE — 25010000002 IMMUNE GLOBULIN (HUMAN) PER 500 MG: Performed by: ALLERGY & IMMUNOLOGY

## 2022-08-25 PROCEDURE — 63710000001 ACETAMINOPHEN 500 MG TABLET: Performed by: ALLERGY & IMMUNOLOGY

## 2022-08-25 RX ORDER — ACETAMINOPHEN 500 MG
500 TABLET ORAL ONCE
Status: COMPLETED | OUTPATIENT
Start: 2022-08-25 | End: 2022-08-25

## 2022-08-25 RX ORDER — DIPHENHYDRAMINE HCL 25 MG
50 CAPSULE ORAL ONCE AS NEEDED
Status: CANCELLED
Start: 2022-09-01

## 2022-08-25 RX ORDER — EPINEPHRINE 0.3 MG/.3ML
0.3 INJECTION SUBCUTANEOUS ONCE
Status: CANCELLED
Start: 2022-09-01 | End: 2022-09-01

## 2022-08-25 RX ORDER — ACETAMINOPHEN 500 MG
500 TABLET ORAL ONCE
Status: CANCELLED
Start: 2022-09-01 | End: 2022-09-01

## 2022-08-25 RX ORDER — SODIUM CHLORIDE 9 MG/ML
250 INJECTION, SOLUTION INTRAVENOUS ONCE
Status: COMPLETED | OUTPATIENT
Start: 2022-08-25 | End: 2022-08-25

## 2022-08-25 RX ORDER — SODIUM CHLORIDE 9 MG/ML
250 INJECTION, SOLUTION INTRAVENOUS ONCE
Status: CANCELLED | OUTPATIENT
Start: 2022-09-01

## 2022-08-25 RX ORDER — DIPHENHYDRAMINE HCL 25 MG
25 CAPSULE ORAL ONCE
Status: CANCELLED
Start: 2022-09-01 | End: 2022-09-01

## 2022-08-25 RX ORDER — ACETAMINOPHEN 500 MG
500 TABLET ORAL ONCE
Status: DISPENSED | OUTPATIENT
Start: 2022-08-25

## 2022-08-25 RX ADMIN — ACETAMINOPHEN 500 MG: 500 TABLET, FILM COATED ORAL at 11:40

## 2022-08-25 RX ADMIN — SODIUM CHLORIDE 250 ML: 9 INJECTION, SOLUTION INTRAVENOUS at 12:21

## 2022-08-25 RX ADMIN — IMMUNE GLOBULIN INTRAVENOUS (HUMAN) 20 G: KIT at 12:21

## 2022-09-22 ENCOUNTER — INFUSION (OUTPATIENT)
Dept: ONCOLOGY | Facility: HOSPITAL | Age: 77
End: 2022-09-22

## 2022-09-22 VITALS
DIASTOLIC BLOOD PRESSURE: 73 MMHG | SYSTOLIC BLOOD PRESSURE: 117 MMHG | RESPIRATION RATE: 16 BRPM | BODY MASS INDEX: 24.13 KG/M2 | HEART RATE: 70 BPM | TEMPERATURE: 97.7 F | WEIGHT: 145.02 LBS | OXYGEN SATURATION: 96 %

## 2022-09-22 DIAGNOSIS — D83.9 COMMON VARIABLE IMMUNODEFICIENCY: ICD-10-CM

## 2022-09-22 DIAGNOSIS — D80.2 IGA DEFICIENCY: Primary | ICD-10-CM

## 2022-09-22 LAB — IGG1 SER-MCNC: 1012 MG/DL (ref 700–1600)

## 2022-09-22 PROCEDURE — 82784 ASSAY IGA/IGD/IGG/IGM EACH: CPT | Performed by: ALLERGY & IMMUNOLOGY

## 2022-09-22 PROCEDURE — 96365 THER/PROPH/DIAG IV INF INIT: CPT

## 2022-09-22 PROCEDURE — 25010000002 IMMUNE GLOBULIN (HUMAN) PER 500 MG: Performed by: ALLERGY & IMMUNOLOGY

## 2022-09-22 PROCEDURE — 96366 THER/PROPH/DIAG IV INF ADDON: CPT

## 2022-09-22 RX ORDER — SODIUM CHLORIDE 9 MG/ML
250 INJECTION, SOLUTION INTRAVENOUS ONCE
Status: COMPLETED | OUTPATIENT
Start: 2022-09-22 | End: 2022-09-22

## 2022-09-22 RX ORDER — ACETAMINOPHEN 500 MG
500 TABLET ORAL ONCE
Status: CANCELLED
Start: 2022-09-22 | End: 2022-09-22

## 2022-09-22 RX ORDER — ACETAMINOPHEN 500 MG
500 TABLET ORAL ONCE
Status: CANCELLED
Start: 2022-10-20 | End: 2022-10-20

## 2022-09-22 RX ORDER — DIPHENHYDRAMINE HCL 25 MG
25 CAPSULE ORAL ONCE
Status: CANCELLED
Start: 2022-10-20 | End: 2022-10-20

## 2022-09-22 RX ORDER — DIPHENHYDRAMINE HCL 25 MG
25 CAPSULE ORAL ONCE
Status: CANCELLED
Start: 2022-09-22 | End: 2022-09-22

## 2022-09-22 RX ORDER — EPINEPHRINE 0.3 MG/.3ML
0.3 INJECTION SUBCUTANEOUS ONCE
Status: CANCELLED
Start: 2022-10-20 | End: 2022-10-20

## 2022-09-22 RX ORDER — EPINEPHRINE 0.3 MG/.3ML
0.3 INJECTION SUBCUTANEOUS ONCE
Status: CANCELLED
Start: 2022-09-22 | End: 2022-09-22

## 2022-09-22 RX ORDER — DIPHENHYDRAMINE HCL 25 MG
50 CAPSULE ORAL ONCE AS NEEDED
Status: CANCELLED
Start: 2022-10-20

## 2022-09-22 RX ORDER — DIPHENHYDRAMINE HCL 25 MG
50 CAPSULE ORAL ONCE AS NEEDED
Status: CANCELLED
Start: 2022-09-22

## 2022-09-22 RX ORDER — SODIUM CHLORIDE 9 MG/ML
250 INJECTION, SOLUTION INTRAVENOUS ONCE
Status: CANCELLED | OUTPATIENT
Start: 2022-10-20

## 2022-09-22 RX ADMIN — Medication 20 G: at 12:18

## 2022-09-22 RX ADMIN — SODIUM CHLORIDE 250 ML: 9 INJECTION, SOLUTION INTRAVENOUS at 12:17

## 2022-10-20 ENCOUNTER — INFUSION (OUTPATIENT)
Dept: ONCOLOGY | Facility: HOSPITAL | Age: 77
End: 2022-10-20

## 2022-10-20 VITALS
HEIGHT: 64 IN | BODY MASS INDEX: 24.72 KG/M2 | HEART RATE: 69 BPM | SYSTOLIC BLOOD PRESSURE: 110 MMHG | WEIGHT: 144.8 LBS | TEMPERATURE: 98.1 F | OXYGEN SATURATION: 97 % | RESPIRATION RATE: 18 BRPM | DIASTOLIC BLOOD PRESSURE: 63 MMHG

## 2022-10-20 DIAGNOSIS — D83.9 COMMON VARIABLE IMMUNODEFICIENCY: Primary | ICD-10-CM

## 2022-10-20 DIAGNOSIS — D80.2 IGA DEFICIENCY: ICD-10-CM

## 2022-10-20 LAB
BASOPHILS # BLD AUTO: 0.04 10*3/MM3 (ref 0–0.2)
BASOPHILS NFR BLD AUTO: 0.7 % (ref 0–1.5)
DEPRECATED RDW RBC AUTO: 48 FL (ref 37–54)
EOSINOPHIL # BLD AUTO: 0.08 10*3/MM3 (ref 0–0.4)
EOSINOPHIL NFR BLD AUTO: 1.4 % (ref 0.3–6.2)
ERYTHROCYTE [DISTWIDTH] IN BLOOD BY AUTOMATED COUNT: 13.2 % (ref 12.3–15.4)
HCT VFR BLD AUTO: 38.6 % (ref 34–46.6)
HGB BLD-MCNC: 12.8 G/DL (ref 12–15.9)
IGG1 SER-MCNC: 1106 MG/DL (ref 700–1600)
IMM GRANULOCYTES # BLD AUTO: 0.01 10*3/MM3 (ref 0–0.05)
IMM GRANULOCYTES NFR BLD AUTO: 0.2 % (ref 0–0.5)
LYMPHOCYTES # BLD AUTO: 1.86 10*3/MM3 (ref 0.7–3.1)
LYMPHOCYTES NFR BLD AUTO: 33.2 % (ref 19.6–45.3)
MCH RBC QN AUTO: 32.8 PG (ref 26.6–33)
MCHC RBC AUTO-ENTMCNC: 33.2 G/DL (ref 31.5–35.7)
MCV RBC AUTO: 99 FL (ref 79–97)
MONOCYTES # BLD AUTO: 0.46 10*3/MM3 (ref 0.1–0.9)
MONOCYTES NFR BLD AUTO: 8.2 % (ref 5–12)
NEUTROPHILS NFR BLD AUTO: 3.16 10*3/MM3 (ref 1.7–7)
NEUTROPHILS NFR BLD AUTO: 56.3 % (ref 42.7–76)
NRBC BLD AUTO-RTO: 0 /100 WBC (ref 0–0.2)
PLATELET # BLD AUTO: 227 10*3/MM3 (ref 140–450)
PMV BLD AUTO: 9.9 FL (ref 6–12)
RBC # BLD AUTO: 3.9 10*6/MM3 (ref 3.77–5.28)
WBC NRBC COR # BLD: 5.61 10*3/MM3 (ref 3.4–10.8)

## 2022-10-20 PROCEDURE — 96366 THER/PROPH/DIAG IV INF ADDON: CPT

## 2022-10-20 PROCEDURE — 82784 ASSAY IGA/IGD/IGG/IGM EACH: CPT | Performed by: ALLERGY & IMMUNOLOGY

## 2022-10-20 PROCEDURE — 85025 COMPLETE CBC W/AUTO DIFF WBC: CPT | Performed by: ALLERGY & IMMUNOLOGY

## 2022-10-20 PROCEDURE — 25010000002 IMMUNE GLOBULIN (HUMAN) PER 500 MG: Performed by: ALLERGY & IMMUNOLOGY

## 2022-10-20 PROCEDURE — 96365 THER/PROPH/DIAG IV INF INIT: CPT

## 2022-10-20 RX ORDER — SODIUM CHLORIDE 9 MG/ML
250 INJECTION, SOLUTION INTRAVENOUS ONCE
Status: CANCELLED | OUTPATIENT
Start: 2022-11-17

## 2022-10-20 RX ORDER — EPINEPHRINE 0.3 MG/.3ML
0.3 INJECTION SUBCUTANEOUS ONCE
Status: CANCELLED
Start: 2022-11-17 | End: 2022-11-17

## 2022-10-20 RX ORDER — DIPHENHYDRAMINE HCL 25 MG
25 CAPSULE ORAL ONCE
Status: CANCELLED
Start: 2022-11-17 | End: 2022-11-17

## 2022-10-20 RX ORDER — DIPHENHYDRAMINE HCL 25 MG
50 CAPSULE ORAL ONCE AS NEEDED
Status: CANCELLED
Start: 2022-11-17

## 2022-10-20 RX ORDER — ACETAMINOPHEN 500 MG
500 TABLET ORAL ONCE
Status: CANCELLED
Start: 2022-11-17 | End: 2022-11-17

## 2022-10-20 RX ORDER — SODIUM CHLORIDE 9 MG/ML
250 INJECTION, SOLUTION INTRAVENOUS ONCE
Status: COMPLETED | OUTPATIENT
Start: 2022-10-20 | End: 2022-10-20

## 2022-10-20 RX ADMIN — SODIUM CHLORIDE 250 ML: 9 INJECTION, SOLUTION INTRAVENOUS at 11:43

## 2022-10-20 RX ADMIN — IMMUNE GLOBULIN INTRAVENOUS (HUMAN) 20 G: KIT at 12:17

## 2022-11-17 ENCOUNTER — INFUSION (OUTPATIENT)
Dept: ONCOLOGY | Facility: HOSPITAL | Age: 77
End: 2022-11-17

## 2022-11-17 VITALS
BODY MASS INDEX: 24.59 KG/M2 | HEART RATE: 79 BPM | TEMPERATURE: 97.6 F | RESPIRATION RATE: 18 BRPM | WEIGHT: 144 LBS | OXYGEN SATURATION: 97 % | HEIGHT: 64 IN | SYSTOLIC BLOOD PRESSURE: 120 MMHG | DIASTOLIC BLOOD PRESSURE: 80 MMHG

## 2022-11-17 DIAGNOSIS — D80.2 IGA DEFICIENCY: ICD-10-CM

## 2022-11-17 DIAGNOSIS — D83.9 COMMON VARIABLE IMMUNODEFICIENCY: Primary | ICD-10-CM

## 2022-11-17 PROCEDURE — 25010000002 IMMUNE GLOBULIN (HUMAN) PER 500 MG: Performed by: ALLERGY & IMMUNOLOGY

## 2022-11-17 PROCEDURE — 96365 THER/PROPH/DIAG IV INF INIT: CPT

## 2022-11-17 PROCEDURE — 96366 THER/PROPH/DIAG IV INF ADDON: CPT

## 2022-11-17 RX ORDER — SODIUM CHLORIDE 9 MG/ML
250 INJECTION, SOLUTION INTRAVENOUS ONCE
Status: CANCELLED | OUTPATIENT
Start: 2022-12-15

## 2022-11-17 RX ORDER — DIPHENHYDRAMINE HCL 25 MG
50 CAPSULE ORAL ONCE AS NEEDED
Status: CANCELLED
Start: 2022-12-15

## 2022-11-17 RX ORDER — DIPHENHYDRAMINE HCL 25 MG
25 CAPSULE ORAL ONCE
Status: CANCELLED
Start: 2022-12-15 | End: 2022-12-15

## 2022-11-17 RX ORDER — ACETAMINOPHEN 500 MG
500 TABLET ORAL ONCE
Status: CANCELLED
Start: 2022-12-15 | End: 2022-12-15

## 2022-11-17 RX ORDER — SODIUM CHLORIDE 9 MG/ML
250 INJECTION, SOLUTION INTRAVENOUS ONCE
Status: COMPLETED | OUTPATIENT
Start: 2022-11-17 | End: 2022-11-17

## 2022-11-17 RX ORDER — EPINEPHRINE 0.3 MG/.3ML
0.3 INJECTION SUBCUTANEOUS ONCE
Status: CANCELLED
Start: 2022-12-15 | End: 2022-12-15

## 2022-11-17 RX ADMIN — IMMUNE GLOBULIN INTRAVENOUS (HUMAN) 20 G: KIT at 12:03

## 2022-11-17 RX ADMIN — SODIUM CHLORIDE 250 ML: 9 INJECTION, SOLUTION INTRAVENOUS at 11:40

## 2022-11-22 ENCOUNTER — OFFICE VISIT (OUTPATIENT)
Dept: ONCOLOGY | Facility: CLINIC | Age: 77
End: 2022-11-22

## 2022-11-22 ENCOUNTER — LAB (OUTPATIENT)
Dept: OTHER | Facility: HOSPITAL | Age: 77
End: 2022-11-22

## 2022-11-22 VITALS
SYSTOLIC BLOOD PRESSURE: 133 MMHG | HEIGHT: 64 IN | RESPIRATION RATE: 18 BRPM | WEIGHT: 144.9 LBS | HEART RATE: 83 BPM | BODY MASS INDEX: 24.74 KG/M2 | TEMPERATURE: 98.2 F | DIASTOLIC BLOOD PRESSURE: 81 MMHG | OXYGEN SATURATION: 99 %

## 2022-11-22 DIAGNOSIS — C50.512 MALIGNANT NEOPLASM OF LOWER-OUTER QUADRANT OF LEFT BREAST OF FEMALE, ESTROGEN RECEPTOR POSITIVE: ICD-10-CM

## 2022-11-22 DIAGNOSIS — Z17.0 MALIGNANT NEOPLASM OF LOWER-OUTER QUADRANT OF LEFT BREAST OF FEMALE, ESTROGEN RECEPTOR POSITIVE: ICD-10-CM

## 2022-11-22 DIAGNOSIS — D83.9 COMMON VARIABLE IMMUNODEFICIENCY: ICD-10-CM

## 2022-11-22 DIAGNOSIS — C50.512 MALIGNANT NEOPLASM OF LOWER-OUTER QUADRANT OF LEFT BREAST OF FEMALE, ESTROGEN RECEPTOR POSITIVE: Primary | ICD-10-CM

## 2022-11-22 DIAGNOSIS — M81.0 OSTEOPOROSIS, UNSPECIFIED OSTEOPOROSIS TYPE, UNSPECIFIED PATHOLOGICAL FRACTURE PRESENCE: ICD-10-CM

## 2022-11-22 DIAGNOSIS — Z17.0 MALIGNANT NEOPLASM OF LOWER-OUTER QUADRANT OF LEFT BREAST OF FEMALE, ESTROGEN RECEPTOR POSITIVE: Primary | ICD-10-CM

## 2022-11-22 DIAGNOSIS — K58.0 IRRITABLE BOWEL SYNDROME WITH DIARRHEA: ICD-10-CM

## 2022-11-22 DIAGNOSIS — D80.2 IGA DEFICIENCY: ICD-10-CM

## 2022-11-22 LAB
ALBUMIN SERPL-MCNC: 4.1 G/DL (ref 3.5–5.2)
ALBUMIN/GLOB SERPL: 1.5 G/DL
ALP SERPL-CCNC: 52 U/L (ref 39–117)
ALT SERPL W P-5'-P-CCNC: 17 U/L (ref 1–33)
ANION GAP SERPL CALCULATED.3IONS-SCNC: 6.7 MMOL/L (ref 5–15)
AST SERPL-CCNC: 23 U/L (ref 1–32)
BASOPHILS # BLD AUTO: 0.03 10*3/MM3 (ref 0–0.2)
BASOPHILS NFR BLD AUTO: 0.6 % (ref 0–1.5)
BILIRUB SERPL-MCNC: 0.4 MG/DL (ref 0–1.2)
BUN SERPL-MCNC: 13 MG/DL (ref 8–23)
BUN/CREAT SERPL: 17.8 (ref 7–25)
CALCIUM SPEC-SCNC: 9.6 MG/DL (ref 8.6–10.5)
CHLORIDE SERPL-SCNC: 105 MMOL/L (ref 98–107)
CO2 SERPL-SCNC: 28.3 MMOL/L (ref 22–29)
CREAT SERPL-MCNC: 0.73 MG/DL (ref 0.57–1)
DEPRECATED RDW RBC AUTO: 48.6 FL (ref 37–54)
EGFRCR SERPLBLD CKD-EPI 2021: 85.9 ML/MIN/1.73
EOSINOPHIL # BLD AUTO: 0.03 10*3/MM3 (ref 0–0.4)
EOSINOPHIL NFR BLD AUTO: 0.6 % (ref 0.3–6.2)
ERYTHROCYTE [DISTWIDTH] IN BLOOD BY AUTOMATED COUNT: 13.2 % (ref 12.3–15.4)
GLOBULIN UR ELPH-MCNC: 2.8 GM/DL
GLUCOSE SERPL-MCNC: 112 MG/DL (ref 65–99)
HCT VFR BLD AUTO: 36.7 % (ref 34–46.6)
HGB BLD-MCNC: 12 G/DL (ref 12–15.9)
IMM GRANULOCYTES # BLD AUTO: 0.01 10*3/MM3 (ref 0–0.05)
IMM GRANULOCYTES NFR BLD AUTO: 0.2 % (ref 0–0.5)
LYMPHOCYTES # BLD AUTO: 1.81 10*3/MM3 (ref 0.7–3.1)
LYMPHOCYTES NFR BLD AUTO: 34.1 % (ref 19.6–45.3)
MCH RBC QN AUTO: 32.4 PG (ref 26.6–33)
MCHC RBC AUTO-ENTMCNC: 32.7 G/DL (ref 31.5–35.7)
MCV RBC AUTO: 99.2 FL (ref 79–97)
MONOCYTES # BLD AUTO: 0.3 10*3/MM3 (ref 0.1–0.9)
MONOCYTES NFR BLD AUTO: 5.6 % (ref 5–12)
NEUTROPHILS NFR BLD AUTO: 3.13 10*3/MM3 (ref 1.7–7)
NEUTROPHILS NFR BLD AUTO: 58.9 % (ref 42.7–76)
NRBC BLD AUTO-RTO: 0 /100 WBC (ref 0–0.2)
PLATELET # BLD AUTO: 227 10*3/MM3 (ref 140–450)
PMV BLD AUTO: 9.6 FL (ref 6–12)
POTASSIUM SERPL-SCNC: 4 MMOL/L (ref 3.5–5.2)
PROT SERPL-MCNC: 6.9 G/DL (ref 6–8.5)
RBC # BLD AUTO: 3.7 10*6/MM3 (ref 3.77–5.28)
SODIUM SERPL-SCNC: 140 MMOL/L (ref 136–145)
WBC NRBC COR # BLD: 5.31 10*3/MM3 (ref 3.4–10.8)

## 2022-11-22 PROCEDURE — 85025 COMPLETE CBC W/AUTO DIFF WBC: CPT | Performed by: INTERNAL MEDICINE

## 2022-11-22 PROCEDURE — 99213 OFFICE O/P EST LOW 20 MIN: CPT | Performed by: INTERNAL MEDICINE

## 2022-11-22 PROCEDURE — 36415 COLL VENOUS BLD VENIPUNCTURE: CPT

## 2022-11-22 PROCEDURE — 80053 COMPREHEN METABOLIC PANEL: CPT | Performed by: INTERNAL MEDICINE

## 2022-11-22 NOTE — PROGRESS NOTES
Subjective     REASON FOR FOLLOW UP:   1. Stage I ( T1c,N0,M0 ) ER+,NE+, HER 2 neg for a ductal carcinoma of the left breast status post lumpectomy 5/19/2017.  Patient started on adjuvant tamoxifen after the visit of 6/9/2017.  2.  Osteoporosis  3.  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  Development of draining seroma and poor incisional healing in the left breast that required further surgery on 7/3/2017.  5.  Genetic testing negative for any adverse mutations in June 2019    HISTORY OF PRESENT ILLNESS:  The patient is a 77 y.o. year old female who was noted to have a radiographic abnormality in the left breast in March 2017.  She underwent an ultrasound-guided biopsy on 3/28/2017 showing invasive ductal carcinoma which was strongly hormone receptor positive and HER-2/trae negative.  She elected to undergo a lumpectomy procedure with Dr. Leon on 5/19/2017.  The tumor size was 1.2 x 1 x 0.9 cm and 3 sentinel lymph nodes were examined all of which were negative for metastatic cancer.    The patient was seen on 6/9/2017 to discuss postop adjuvant hormonal therapy.  She has a history of osteoporosis.  We discussed initiation of tamoxifen therapy which would have the advantage of helping to maintain her bone density.    She is here today for routine 6 month follow-up and seems to be getting along well on her tamoxifen.  She has been on the medication for 5.5  years now.    She had her annual screening mammogram 6/7/2022 and at that time additional imaging of the left breast was recommended.  She had further diagnostic mammogram images and ultrasound of the left breast and July with no suspicious findings..    She continues to receive IVIG infusions for her common variable immunodeficiency.  Most recently received 20 g dose on 11/17/2022.  Reports she has not had any significant infections since her last visit here.    History of Present Illness        ALLERGIES:    Allergies  "  Allergen Reactions   • Other Anaphylaxis     BLOOD PRODUCTS WITH IGA IN THEM   • Erythromycin GI Intolerance     VOMITING   • Avelox [Moxifloxacin] Rash   • Levaquin [Levofloxacin] Rash         Review of Systems   Constitutional: Negative for activity change, appetite change, fatigue, fever and unexpected weight change.   HENT: Negative for hearing loss, nosebleeds, trouble swallowing and voice change.    Eyes: Negative for visual disturbance.   Respiratory: Negative for cough, shortness of breath and wheezing.    Cardiovascular: Negative for chest pain and palpitations.   Gastrointestinal: Negative for abdominal pain, diarrhea, nausea and vomiting.   Genitourinary: Negative for difficulty urinating, frequency, hematuria and urgency.   Musculoskeletal: Negative for back pain and neck pain.   Neurological: Negative for dizziness, seizures, syncope and headaches.   Hematological: Negative for adenopathy. Does not bruise/bleed easily.   Psychiatric/Behavioral: Negative for behavioral problems. The patient is not nervous/anxious.        Objective     Vitals:    11/22/22 1636   BP: 133/81   Pulse: 83   Resp: 18   Temp: 98.2 °F (36.8 °C)   TempSrc: Temporal   SpO2: 99%   Weight: 65.7 kg (144 lb 14.4 oz)   Height: 162.6 cm (64.02\")   PainSc: 0-No pain     Current Status 11/22/2022   ECOG score 0       Physical Exam   Constitutional: She is oriented to person, place, and time. She appears well-developed. No distress.   HENT:   Head: Normocephalic.   Eyes: Pupils are equal, round, and reactive to light. Conjunctivae are normal. No scleral icterus.   Neck: No JVD present. No thyromegaly present.   Cardiovascular: Normal rate and regular rhythm. Exam reveals no gallop and no friction rub.   No murmur heard.  Pulmonary/Chest: Effort normal and breath sounds normal. She has no wheezes. She has no rales. Right breast exhibits no mass, no nipple discharge and no skin change. Left breast exhibits skin change. Left breast exhibits " no mass and no nipple discharge.   Left breast shows healed lumpectomy scar and radiation tattoos.   Abdominal: Soft. Bowel sounds are normal. She exhibits no distension and no mass. There is no abdominal tenderness.   Musculoskeletal: Normal range of motion. No deformity.   Lymphadenopathy:     She has no cervical adenopathy.   Neurological: She is alert and oriented to person, place, and time. She has normal reflexes. No cranial nerve deficit.   Skin: Skin is warm and dry. No erythema.   Psychiatric: Her behavior is normal. Judgment normal.     I have reexamined the patient and the results are consistent with the previously documented exam. Jj Hickman MD     RECENT LABS:  Hematology WBC   Date Value Ref Range Status   11/22/2022 5.31 3.40 - 10.80 10*3/mm3 Final   04/29/2020 5.1 3.4 - 10.8 x10E3/uL Final     RBC   Date Value Ref Range Status   11/22/2022 3.70 (L) 3.77 - 5.28 10*6/mm3 Final   04/29/2020 3.85 3.77 - 5.28 x10E6/uL Final     Hemoglobin   Date Value Ref Range Status   11/22/2022 12.0 12.0 - 15.9 g/dL Final     Hematocrit   Date Value Ref Range Status   11/22/2022 36.7 34.0 - 46.6 % Final     Platelets   Date Value Ref Range Status   11/22/2022 227 140 - 450 10*3/mm3 Final          Assessment & Plan     1.  Stage I, hormone receptor positive, HER-2/trae negative invasive ductal carcinoma the left breast status post lumpectomy.  She has been on adjuvant hormonal therapy with tamoxifen 20 mg daily since June 2017 and continues to tolerate it well.  She has been on the medication for 5 years and we discussed the pros and cons of continuing tamoxifen.  She has elected to remain on tamoxifen for now with plans to continue for up to 10 years.  2.  Osteoporosis on her last DEXA scan.  3.  Common variable immunodeficiency requiring monthly IV immunoglobulin infusions.  4.  Mild chronic anemia. Stable  5.  COVID infection in September 2022.  Patient reports that she had relatively mild symptoms    Plan  1.   Continue tamoxifen 20 mg daily with plans to continue treatment for up to 10 years through June 2027.  2.  Return for follow-up in 6 months.   3.  [facility] June or July  4.  She will be due for a repeat bone density scan in September 2023    5.  She has been vigilant about having [facility]

## 2022-12-01 ENCOUNTER — TRANSCRIBE ORDERS (OUTPATIENT)
Dept: ADMINISTRATIVE | Facility: HOSPITAL | Age: 77
End: 2022-12-01

## 2022-12-01 ENCOUNTER — LAB (OUTPATIENT)
Dept: LAB | Facility: HOSPITAL | Age: 77
End: 2022-12-01

## 2022-12-01 DIAGNOSIS — Z00.00 ROUTINE GENERAL MEDICAL EXAMINATION AT A HEALTH CARE FACILITY: Primary | ICD-10-CM

## 2022-12-01 DIAGNOSIS — Z00.00 ROUTINE GENERAL MEDICAL EXAMINATION AT A HEALTH CARE FACILITY: ICD-10-CM

## 2022-12-01 LAB
ALBUMIN SERPL-MCNC: 4.2 G/DL (ref 3.5–5.2)
ALBUMIN/GLOB SERPL: 1.7 G/DL
ALP SERPL-CCNC: 48 U/L (ref 39–117)
ALT SERPL W P-5'-P-CCNC: 21 U/L (ref 1–33)
ANION GAP SERPL CALCULATED.3IONS-SCNC: 9.1 MMOL/L (ref 5–15)
AST SERPL-CCNC: 24 U/L (ref 1–32)
BASOPHILS # BLD AUTO: 0.03 10*3/MM3 (ref 0–0.2)
BASOPHILS NFR BLD AUTO: 0.7 % (ref 0–1.5)
BILIRUB SERPL-MCNC: 0.5 MG/DL (ref 0–1.2)
BILIRUB UR QL STRIP: NEGATIVE
BUN SERPL-MCNC: 15 MG/DL (ref 8–23)
BUN/CREAT SERPL: 19 (ref 7–25)
CALCIUM SPEC-SCNC: 9.2 MG/DL (ref 8.6–10.5)
CHLORIDE SERPL-SCNC: 104 MMOL/L (ref 98–107)
CHOLEST SERPL-MCNC: 172 MG/DL (ref 0–200)
CLARITY UR: CLEAR
CO2 SERPL-SCNC: 27.9 MMOL/L (ref 22–29)
COLOR UR: YELLOW
CREAT SERPL-MCNC: 0.79 MG/DL (ref 0.57–1)
DEPRECATED RDW RBC AUTO: 46.8 FL (ref 37–54)
EGFRCR SERPLBLD CKD-EPI 2021: 78.1 ML/MIN/1.73
EOSINOPHIL # BLD AUTO: 0.06 10*3/MM3 (ref 0–0.4)
EOSINOPHIL NFR BLD AUTO: 1.5 % (ref 0.3–6.2)
ERYTHROCYTE [DISTWIDTH] IN BLOOD BY AUTOMATED COUNT: 12.6 % (ref 12.3–15.4)
GLOBULIN UR ELPH-MCNC: 2.5 GM/DL
GLUCOSE SERPL-MCNC: 90 MG/DL (ref 65–99)
GLUCOSE UR STRIP-MCNC: NEGATIVE MG/DL
HCT VFR BLD AUTO: 37.3 % (ref 34–46.6)
HDLC SERPL-MCNC: 66 MG/DL (ref 40–60)
HGB BLD-MCNC: 12.1 G/DL (ref 12–15.9)
HGB UR QL STRIP.AUTO: NEGATIVE
IMM GRANULOCYTES # BLD AUTO: 0.01 10*3/MM3 (ref 0–0.05)
IMM GRANULOCYTES NFR BLD AUTO: 0.2 % (ref 0–0.5)
KETONES UR QL STRIP: NEGATIVE
LDLC SERPL CALC-MCNC: 95 MG/DL (ref 0–100)
LDLC/HDLC SERPL: 1.43 {RATIO}
LEUKOCYTE ESTERASE UR QL STRIP.AUTO: NEGATIVE
LYMPHOCYTES # BLD AUTO: 1.43 10*3/MM3 (ref 0.7–3.1)
LYMPHOCYTES NFR BLD AUTO: 35.2 % (ref 19.6–45.3)
MCH RBC QN AUTO: 33.2 PG (ref 26.6–33)
MCHC RBC AUTO-ENTMCNC: 32.4 G/DL (ref 31.5–35.7)
MCV RBC AUTO: 102.2 FL (ref 79–97)
MONOCYTES # BLD AUTO: 0.4 10*3/MM3 (ref 0.1–0.9)
MONOCYTES NFR BLD AUTO: 9.9 % (ref 5–12)
NEUTROPHILS NFR BLD AUTO: 2.13 10*3/MM3 (ref 1.7–7)
NEUTROPHILS NFR BLD AUTO: 52.5 % (ref 42.7–76)
NITRITE UR QL STRIP: NEGATIVE
NRBC BLD AUTO-RTO: 0 /100 WBC (ref 0–0.2)
PH UR STRIP.AUTO: 6 [PH] (ref 5–8)
PLATELET # BLD AUTO: 155 10*3/MM3 (ref 140–450)
PMV BLD AUTO: 11.3 FL (ref 6–12)
POTASSIUM SERPL-SCNC: 4.1 MMOL/L (ref 3.5–5.2)
PROT SERPL-MCNC: 6.7 G/DL (ref 6–8.5)
PROT UR QL STRIP: NEGATIVE
RBC # BLD AUTO: 3.65 10*6/MM3 (ref 3.77–5.28)
SODIUM SERPL-SCNC: 141 MMOL/L (ref 136–145)
SP GR UR STRIP: 1.02 (ref 1–1.03)
TRIGL SERPL-MCNC: 57 MG/DL (ref 0–150)
UROBILINOGEN UR QL STRIP: NORMAL
VLDLC SERPL-MCNC: 11 MG/DL (ref 5–40)
WBC NRBC COR # BLD: 4.06 10*3/MM3 (ref 3.4–10.8)

## 2022-12-01 PROCEDURE — 36415 COLL VENOUS BLD VENIPUNCTURE: CPT

## 2022-12-01 PROCEDURE — 81003 URINALYSIS AUTO W/O SCOPE: CPT

## 2022-12-01 PROCEDURE — 85025 COMPLETE CBC W/AUTO DIFF WBC: CPT

## 2022-12-01 PROCEDURE — 80061 LIPID PANEL: CPT

## 2022-12-01 PROCEDURE — 80053 COMPREHEN METABOLIC PANEL: CPT

## 2022-12-15 ENCOUNTER — INFUSION (OUTPATIENT)
Dept: ONCOLOGY | Facility: HOSPITAL | Age: 77
End: 2022-12-15

## 2022-12-15 VITALS
BODY MASS INDEX: 24.16 KG/M2 | WEIGHT: 145 LBS | DIASTOLIC BLOOD PRESSURE: 71 MMHG | HEART RATE: 60 BPM | RESPIRATION RATE: 18 BRPM | OXYGEN SATURATION: 97 % | TEMPERATURE: 97.5 F | HEIGHT: 65 IN | SYSTOLIC BLOOD PRESSURE: 113 MMHG

## 2022-12-15 DIAGNOSIS — D80.2 IGA DEFICIENCY: ICD-10-CM

## 2022-12-15 DIAGNOSIS — D83.9 COMMON VARIABLE IMMUNODEFICIENCY: Primary | ICD-10-CM

## 2022-12-15 PROCEDURE — 96365 THER/PROPH/DIAG IV INF INIT: CPT

## 2022-12-15 PROCEDURE — 96366 THER/PROPH/DIAG IV INF ADDON: CPT

## 2022-12-15 PROCEDURE — 25010000002 IMMUNE GLOBULIN (HUMAN) PER 500 MG: Performed by: ALLERGY & IMMUNOLOGY

## 2022-12-15 RX ORDER — DIPHENHYDRAMINE HCL 25 MG
25 CAPSULE ORAL ONCE
Status: CANCELLED
Start: 2023-01-12 | End: 2023-01-12

## 2022-12-15 RX ORDER — SODIUM CHLORIDE 9 MG/ML
250 INJECTION, SOLUTION INTRAVENOUS ONCE
Status: CANCELLED | OUTPATIENT
Start: 2023-01-12

## 2022-12-15 RX ORDER — SODIUM CHLORIDE 9 MG/ML
250 INJECTION, SOLUTION INTRAVENOUS ONCE
Status: COMPLETED | OUTPATIENT
Start: 2022-12-15 | End: 2022-12-15

## 2022-12-15 RX ORDER — EPINEPHRINE 0.3 MG/.3ML
0.3 INJECTION SUBCUTANEOUS ONCE
Status: CANCELLED
Start: 2023-01-12 | End: 2023-01-12

## 2022-12-15 RX ORDER — ACETAMINOPHEN 500 MG
500 TABLET ORAL ONCE
Status: CANCELLED
Start: 2023-01-12 | End: 2023-01-12

## 2022-12-15 RX ORDER — DIPHENHYDRAMINE HCL 25 MG
50 CAPSULE ORAL ONCE AS NEEDED
Status: CANCELLED
Start: 2023-01-12

## 2022-12-15 RX ADMIN — SODIUM CHLORIDE 250 ML: 9 INJECTION, SOLUTION INTRAVENOUS at 11:30

## 2022-12-15 RX ADMIN — Medication 20 G: at 12:18

## 2023-01-06 ENCOUNTER — OFFICE VISIT (OUTPATIENT)
Dept: GASTROENTEROLOGY | Facility: CLINIC | Age: 78
End: 2023-01-06
Payer: MEDICARE

## 2023-01-06 VITALS
TEMPERATURE: 96.6 F | HEIGHT: 65 IN | BODY MASS INDEX: 24.03 KG/M2 | SYSTOLIC BLOOD PRESSURE: 160 MMHG | HEART RATE: 80 BPM | OXYGEN SATURATION: 94 % | DIASTOLIC BLOOD PRESSURE: 81 MMHG | WEIGHT: 144.2 LBS

## 2023-01-06 DIAGNOSIS — Z83.71 FH: COLON POLYPS: ICD-10-CM

## 2023-01-06 DIAGNOSIS — K58.2 IRRITABLE BOWEL SYNDROME WITH BOTH CONSTIPATION AND DIARRHEA: Primary | ICD-10-CM

## 2023-01-06 DIAGNOSIS — R10.30 LOWER ABDOMINAL PAIN: ICD-10-CM

## 2023-01-06 DIAGNOSIS — K21.9 GASTROESOPHAGEAL REFLUX DISEASE WITHOUT ESOPHAGITIS: ICD-10-CM

## 2023-01-06 DIAGNOSIS — R15.0 INCOMPLETE DEFECATION: ICD-10-CM

## 2023-01-06 PROCEDURE — 99214 OFFICE O/P EST MOD 30 MIN: CPT | Performed by: NURSE PRACTITIONER

## 2023-01-06 NOTE — PROGRESS NOTES
Chief Complaint   Patient presents with   • Diarrhea   • Levy Tom is a  77 y.o. female here for a follow up visit for IBS.    HPI  77-year-old female presents today for a follow-up visit for IBS.  She is a patient of Dr. Rogers.  She was last seen in the office By me on 7/6/2022.  She has a history of IBS-mixed and normally suffers more from diarrhea.  But she tells me lately the diarrhea has been less but she is having a harder time completely emptying.  She still has 2 bowel movements every morning but they are more solid lately.  She does still have some gas and bloating with it.  She is not on any fiber.  She does take daily probiotics.  She is happy to report that bentyl once or twice a day really does help her cramping.  She does admit she still under a lot of stress her and her  are currently building a home.  She does have a personal history of breast cancer.  Her last EGD and colonoscopy was on 5/2019.  Recall colonoscopy in 2024.  She does have a history of GERD and admits she does really well on Pepcid 20 mg twice a day.  She denies any breakthrough reflux at this time.  She does have a GI family history of colon polyps.  She denies any dysphagia, nausea and vomiting, rectal bleeding or melena.  She admits her appetite is good and her weight is stable.  Past Medical History:   Diagnosis Date   • Allergic rhinitis    • Anxiety    • Arthritis    • Asthma     ONLY HAS ISSUES WHEN HAS RESPIRATORY INFECTION   • Cancer (HCC) 2017    BREAST-LEFT   • Cataract    • CVID (common variable immunodeficiency) ([facility])    • External hemorrhoids    • Gastritis    • GERD (gastroesophageal reflux disease)    • H/O CT scan of abdomen     PELVIS AND CHEST: UNREMARKABLE   • Hiatal hernia    • History of colon polyps    • History of giardia infection    • History of Helicobacter pylori infection    • IBS (irritable bowel syndrome)    • IgA deficiency (HCC)     CANNOT HAVE ANYTHING WITH IGA IN IT WILL CAUSE  ANAPHYLAXIS   • Immunoglobulin deficiency (HCC)    • Peptic ulcer disease    • PONV (postoperative nausea and vomiting)    • Torn meniscus    • Wound discharge     LT BREAST       Past Surgical History:   Procedure Laterality Date   • ABDOMINAL WALL ABSCESS INCISION AND DRAINAGE Left 07/03/2017    Procedure: BREAST ABSCESS INCISION AND DRAINAGE AND WOUND DEBRIDMENT;  Surgeon: Steve Leon MD;  Location: [facility] OR [facility] [place];  Service:    • BREAST BIOPSY Left 02/26/2007    Left breast needle localization biopsy; Dr. Steve Leon   • BREAST BIOPSY Left 02/02/2007    Left breast stereotactic vacuum assisted core biopsy with marker placement; Dr. Steve Leon   • BREAST BIOPSY Left 02/07/2003    Left breast needle localization biopsy; Dr. Steve Leon   • BREAST LUMPECTOMY WITH SENTINEL NODE BIOPSY Left 05/19/2017    Procedure: BREAST LUMPECTOMY WITH SENTINEL NODE BIOPSY & MAMMO NEEDLE LOCALIZATION;  Surgeon: Steve Leon MD;  Location: [facility] OR [facility] [place];  Service:    • COLONOSCOPY  01/10/2014    NTEH, TORTS, STOOL   • COLONOSCOPY  08/24/2010    IH, TORT, BX-   • COLONOSCOPY N/A 05/23/2019    Procedure: COLONOSCOPY TO CECUM AND TERM. ILEUM WITH BIOPSIES;  Surgeon: Rigoberto Rogers MD;  Location: [facility] ENDOSCOPY;  Service: Gastroenterology   • ENDOSCOPY  01/10/2014    Z-LINE REGULAR, 35 CM FROM INCISORS, HH, GASTRITIS, NO H-PYLORI   • ENDOSCOPY  08/24/2010    GASTRITIS   • ENDOSCOPY N/A 10/18/2016    Gastritis, duodenitis, HH    • ENDOSCOPY N/A 05/23/2019    Procedure: ESOPHAGOGASTRODUODENOSCOPY WITH BIOPSIES AND RAPID UREASE;  Surgeon: Rigoberto Rogers MD;  Location: [facility] ENDOSCOPY;  Service: Gastroenterology   • ENDOSCOPY AND COLONOSCOPY N/A 08/23/2007    Z-line irregular, normal esophagus, bilious gastric fluid, gastritis, hiatal hernia, normal duodenal bulb and 2nd part of the duodenum; non-thrombosed external hemorrhoids, torts, one 5 mm polyp in the mid sigmoid colon, stool in the sigmoid colon,  descending colon, transverse colon, ascending colon and in the cecum-Dr. Rigoberto Rogers   • KNEE SURGERY Left 01/2015   • SEPTOPLASTY     • TONSILLECTOMY     • TUBAL ABDOMINAL LIGATION  10/2/81   • UPPER GASTROINTESTINAL ENDOSCOPY  5/23/19       Scheduled Meds:acetaminophen, 500 mg, Oral, Once        Continuous Infusions:     PRN Meds:.    Allergies   Allergen Reactions   • Other Anaphylaxis     BLOOD PRODUCTS WITH IGA IN THEM   • Erythromycin GI Intolerance     VOMITING   • Avelox [Moxifloxacin] Rash   • Levaquin [Levofloxacin] Rash       Social History     Socioeconomic History   • Marital status:      Spouse name: Ortiz   • Number of children: 3   • Years of education: College   Tobacco Use   • Smoking status: Never   • Smokeless tobacco: Never   Substance and Sexual Activity   • Alcohol use: Yes     Alcohol/week: 1.0 standard drink     Types: 1 Glasses of wine per week     Comment: occasional   • Drug use: No   • Sexual activity: Not Currently       Family History   Problem Relation Age of Onset   • Other Father         POLYP   • Colon polyps Father    • Prostate cancer Father 68   • Pancreatitis Daughter    • Malig Hyperthermia Neg Hx        Review of Systems   Constitutional: Negative for appetite change, chills, diaphoresis, fatigue, fever and unexpected weight change.   HENT: Negative for nosebleeds, postnasal drip, sore throat, trouble swallowing and voice change.    Respiratory: Negative for cough, choking, chest tightness, shortness of breath, wheezing and stridor.    Cardiovascular: Negative for chest pain, palpitations and leg swelling.   Gastrointestinal: Positive for abdominal distention and abdominal pain. Negative for anal bleeding, blood in stool, constipation, diarrhea, nausea, rectal pain and vomiting.   Endocrine: Negative for polydipsia, polyphagia and polyuria.   Musculoskeletal: Negative for gait problem.   Skin: Negative for rash and wound.   Allergic/Immunologic: Negative for  food allergies.   Neurological: Negative for dizziness, speech difficulty and light-headedness.   Psychiatric/Behavioral: Negative for confusion, self-injury, sleep disturbance and suicidal ideas.       Vitals:    01/06/23 1126   BP: 160/81   Pulse: 80   Temp: 96.6 °F (35.9 °C)   SpO2: 94%       Physical Exam  Constitutional:       General: She is not in acute distress.     Appearance: She is well-developed. She is not ill-appearing.   HENT:      Head: Normocephalic.   Eyes:      Pupils: Pupils are equal, round, and reactive to light.   Cardiovascular:      Rate and Rhythm: Normal rate and regular rhythm.      Heart sounds: Normal heart sounds.   Pulmonary:      Effort: Pulmonary effort is normal.      Breath sounds: Normal breath sounds.   Abdominal:      General: Bowel sounds are normal. There is distension.      Palpations: Abdomen is soft. There is no mass.      Tenderness: There is no abdominal tenderness. There is no guarding or rebound.      Hernia: No hernia is present.   Musculoskeletal:         General: Normal range of motion.   Skin:     General: Skin is warm and dry.   Neurological:      Mental Status: She is alert and oriented to person, place, and time.   Psychiatric:         Speech: Speech normal.         Behavior: Behavior normal.         Judgment: Judgment normal.         No radiology results for the last 7 days     Diagnoses and all orders for this visit:    1. Irritable bowel syndrome with both constipation and diarrhea (Primary)    2. Incomplete defecation    3. Gastroesophageal reflux disease without esophagitis  Overview:  Added automatically from request for surgery [id number]      4. Lower abdominal pain    5. FH: colon polyps    IBS-makes it still not well controlled.  Now having more issues which is incomplete bowel emptying.  No longer really having the diarrhea as much.  Would like her to restart daily fiber.  Continue bentyl as needed.  GERD seems well controlled on Pepcid 20 mg twice  daily.  Continue daily probiotics.  Patient to call the office next week with an update.  Patient to follow-up with me in 3 months.  Patient is agreeable to the plan.

## 2023-01-12 ENCOUNTER — INFUSION (OUTPATIENT)
Dept: ONCOLOGY | Facility: HOSPITAL | Age: 78
End: 2023-01-12
Payer: MEDICARE

## 2023-01-12 VITALS
HEIGHT: 65 IN | OXYGEN SATURATION: 96 % | HEART RATE: 65 BPM | TEMPERATURE: 98 F | BODY MASS INDEX: 24.32 KG/M2 | DIASTOLIC BLOOD PRESSURE: 80 MMHG | SYSTOLIC BLOOD PRESSURE: 136 MMHG | RESPIRATION RATE: 18 BRPM | WEIGHT: 146 LBS

## 2023-01-12 DIAGNOSIS — D80.2 IGA DEFICIENCY: ICD-10-CM

## 2023-01-12 DIAGNOSIS — D83.9 COMMON VARIABLE IMMUNODEFICIENCY: Primary | ICD-10-CM

## 2023-01-12 LAB — IGG1 SER-MCNC: 972 MG/DL (ref 700–1600)

## 2023-01-12 PROCEDURE — 25010000002 IMMUNE GLOBULIN (HUMAN) PER 500 MG: Performed by: ALLERGY & IMMUNOLOGY

## 2023-01-12 PROCEDURE — 82784 ASSAY IGA/IGD/IGG/IGM EACH: CPT | Performed by: ALLERGY & IMMUNOLOGY

## 2023-01-12 PROCEDURE — 96365 THER/PROPH/DIAG IV INF INIT: CPT

## 2023-01-12 PROCEDURE — 96366 THER/PROPH/DIAG IV INF ADDON: CPT

## 2023-01-12 RX ORDER — EPINEPHRINE 0.3 MG/.3ML
0.3 INJECTION SUBCUTANEOUS ONCE
Status: CANCELLED
Start: 2023-02-09 | End: 2023-02-09

## 2023-01-12 RX ORDER — DIPHENHYDRAMINE HCL 25 MG
25 CAPSULE ORAL ONCE
Status: CANCELLED
Start: 2023-02-09 | End: 2023-02-09

## 2023-01-12 RX ORDER — ACETAMINOPHEN 500 MG
500 TABLET ORAL ONCE
Status: CANCELLED
Start: 2023-02-09 | End: 2023-02-09

## 2023-01-12 RX ORDER — DIPHENHYDRAMINE HCL 25 MG
50 CAPSULE ORAL ONCE AS NEEDED
Status: CANCELLED
Start: 2023-02-09

## 2023-01-12 RX ORDER — SODIUM CHLORIDE 9 MG/ML
250 INJECTION, SOLUTION INTRAVENOUS ONCE
Status: CANCELLED | OUTPATIENT
Start: 2023-02-09

## 2023-01-12 RX ORDER — SODIUM CHLORIDE 9 MG/ML
250 INJECTION, SOLUTION INTRAVENOUS ONCE
Status: COMPLETED | OUTPATIENT
Start: 2023-01-12 | End: 2023-01-12

## 2023-01-12 RX ADMIN — IMMUNE GLOBULIN INTRAVENOUS (HUMAN) 20 G: KIT at 12:45

## 2023-01-12 RX ADMIN — SODIUM CHLORIDE 250 ML: 9 INJECTION, SOLUTION INTRAVENOUS at 12:50

## 2023-02-09 ENCOUNTER — INFUSION (OUTPATIENT)
Dept: ONCOLOGY | Facility: HOSPITAL | Age: 78
End: 2023-02-09
Payer: MEDICARE

## 2023-02-09 VITALS
WEIGHT: 145.6 LBS | OXYGEN SATURATION: 97 % | DIASTOLIC BLOOD PRESSURE: 81 MMHG | HEART RATE: 73 BPM | TEMPERATURE: 97.4 F | BODY MASS INDEX: 24.26 KG/M2 | HEIGHT: 65 IN | SYSTOLIC BLOOD PRESSURE: 125 MMHG | RESPIRATION RATE: 18 BRPM

## 2023-02-09 DIAGNOSIS — D80.2 IGA DEFICIENCY: ICD-10-CM

## 2023-02-09 DIAGNOSIS — D83.9 COMMON VARIABLE IMMUNODEFICIENCY: Primary | ICD-10-CM

## 2023-02-09 PROCEDURE — 96365 THER/PROPH/DIAG IV INF INIT: CPT

## 2023-02-09 PROCEDURE — 96366 THER/PROPH/DIAG IV INF ADDON: CPT

## 2023-02-09 PROCEDURE — 25010000002 IMMUNE GLOBULIN (HUMAN) PER 500 MG: Performed by: ALLERGY & IMMUNOLOGY

## 2023-02-09 RX ORDER — EPINEPHRINE 0.3 MG/.3ML
0.3 INJECTION SUBCUTANEOUS ONCE
Status: CANCELLED
Start: 2023-03-09 | End: 2023-03-09

## 2023-02-09 RX ORDER — DIPHENHYDRAMINE HCL 25 MG
25 CAPSULE ORAL ONCE
Status: CANCELLED
Start: 2023-03-09 | End: 2023-03-09

## 2023-02-09 RX ORDER — SODIUM CHLORIDE 9 MG/ML
250 INJECTION, SOLUTION INTRAVENOUS ONCE
Status: CANCELLED | OUTPATIENT
Start: 2023-03-09

## 2023-02-09 RX ORDER — DIPHENHYDRAMINE HCL 25 MG
50 CAPSULE ORAL ONCE AS NEEDED
Status: CANCELLED
Start: 2023-03-09

## 2023-02-09 RX ORDER — ACETAMINOPHEN 500 MG
500 TABLET ORAL ONCE
Status: CANCELLED
Start: 2023-03-09 | End: 2023-03-09

## 2023-02-09 RX ORDER — SODIUM CHLORIDE 9 MG/ML
250 INJECTION, SOLUTION INTRAVENOUS ONCE
Status: COMPLETED | OUTPATIENT
Start: 2023-02-09 | End: 2023-02-09

## 2023-02-09 RX ADMIN — Medication 10 G: at 12:23

## 2023-02-09 RX ADMIN — Medication 10 G: at 13:42

## 2023-02-09 RX ADMIN — SODIUM CHLORIDE 250 ML: 9 INJECTION, SOLUTION INTRAVENOUS at 12:20

## 2023-03-09 ENCOUNTER — INFUSION (OUTPATIENT)
Dept: ONCOLOGY | Facility: HOSPITAL | Age: 78
End: 2023-03-09
Payer: MEDICARE

## 2023-03-09 VITALS
DIASTOLIC BLOOD PRESSURE: 70 MMHG | TEMPERATURE: 97.7 F | BODY MASS INDEX: 24.19 KG/M2 | HEART RATE: 82 BPM | WEIGHT: 145.2 LBS | OXYGEN SATURATION: 99 % | HEIGHT: 65 IN | RESPIRATION RATE: 18 BRPM | SYSTOLIC BLOOD PRESSURE: 104 MMHG

## 2023-03-09 DIAGNOSIS — D83.9 COMMON VARIABLE IMMUNODEFICIENCY: Primary | ICD-10-CM

## 2023-03-09 DIAGNOSIS — D80.2 IGA DEFICIENCY: ICD-10-CM

## 2023-03-09 PROCEDURE — 96365 THER/PROPH/DIAG IV INF INIT: CPT

## 2023-03-09 PROCEDURE — 96366 THER/PROPH/DIAG IV INF ADDON: CPT

## 2023-03-09 PROCEDURE — 25010000002 IMMUNE GLOBULIN (HUMAN) PER 500 MG: Performed by: ALLERGY & IMMUNOLOGY

## 2023-03-09 RX ORDER — SODIUM CHLORIDE 9 MG/ML
250 INJECTION, SOLUTION INTRAVENOUS ONCE
Status: COMPLETED | OUTPATIENT
Start: 2023-03-09 | End: 2023-03-09

## 2023-03-09 RX ORDER — SODIUM CHLORIDE 9 MG/ML
250 INJECTION, SOLUTION INTRAVENOUS ONCE
Status: CANCELLED | OUTPATIENT
Start: 2023-04-06

## 2023-03-09 RX ORDER — DIPHENHYDRAMINE HCL 25 MG
25 CAPSULE ORAL ONCE
Status: CANCELLED
Start: 2023-04-06 | End: 2023-04-06

## 2023-03-09 RX ORDER — DIPHENHYDRAMINE HCL 25 MG
50 CAPSULE ORAL ONCE AS NEEDED
Status: CANCELLED
Start: 2023-04-06

## 2023-03-09 RX ORDER — EPINEPHRINE 0.3 MG/.3ML
0.3 INJECTION SUBCUTANEOUS ONCE
Status: CANCELLED
Start: 2023-04-06 | End: 2023-04-06

## 2023-03-09 RX ORDER — ACETAMINOPHEN 500 MG
500 TABLET ORAL ONCE
Status: CANCELLED
Start: 2023-04-06 | End: 2023-04-06

## 2023-03-09 RX ADMIN — Medication 10 G: at 13:25

## 2023-03-09 RX ADMIN — IMMUNE GLOBULIN INTRAVENOUS (HUMAN) 10 G: KIT at 12:00

## 2023-03-09 RX ADMIN — SODIUM CHLORIDE 250 ML: 9 INJECTION, SOLUTION INTRAVENOUS at 11:40

## 2023-04-06 ENCOUNTER — INFUSION (OUTPATIENT)
Dept: ONCOLOGY | Facility: HOSPITAL | Age: 78
End: 2023-04-06
Payer: MEDICARE

## 2023-04-06 VITALS
RESPIRATION RATE: 18 BRPM | HEART RATE: 68 BPM | DIASTOLIC BLOOD PRESSURE: 85 MMHG | HEIGHT: 65 IN | BODY MASS INDEX: 24.03 KG/M2 | OXYGEN SATURATION: 98 % | SYSTOLIC BLOOD PRESSURE: 124 MMHG | TEMPERATURE: 98 F | WEIGHT: 144.2 LBS

## 2023-04-06 DIAGNOSIS — D83.9 COMMON VARIABLE IMMUNODEFICIENCY: Primary | ICD-10-CM

## 2023-04-06 DIAGNOSIS — D80.2 IGA DEFICIENCY: ICD-10-CM

## 2023-04-06 LAB
BASOPHILS # BLD AUTO: 0.05 10*3/MM3 (ref 0–0.2)
BASOPHILS NFR BLD AUTO: 1.1 % (ref 0–1.5)
DEPRECATED RDW RBC AUTO: 46.9 FL (ref 37–54)
EOSINOPHIL # BLD AUTO: 0.06 10*3/MM3 (ref 0–0.4)
EOSINOPHIL NFR BLD AUTO: 1.3 % (ref 0.3–6.2)
ERYTHROCYTE [DISTWIDTH] IN BLOOD BY AUTOMATED COUNT: 13 % (ref 12.3–15.4)
HCT VFR BLD AUTO: 35.9 % (ref 34–46.6)
HGB BLD-MCNC: 12.5 G/DL (ref 12–15.9)
IGG1 SER-MCNC: 1098 MG/DL (ref 700–1600)
IMM GRANULOCYTES # BLD AUTO: 0.02 10*3/MM3 (ref 0–0.05)
IMM GRANULOCYTES NFR BLD AUTO: 0.4 % (ref 0–0.5)
LYMPHOCYTES # BLD AUTO: 1.56 10*3/MM3 (ref 0.7–3.1)
LYMPHOCYTES NFR BLD AUTO: 33.3 % (ref 19.6–45.3)
MCH RBC QN AUTO: 34.2 PG (ref 26.6–33)
MCHC RBC AUTO-ENTMCNC: 34.8 G/DL (ref 31.5–35.7)
MCV RBC AUTO: 98.1 FL (ref 79–97)
MONOCYTES # BLD AUTO: 0.41 10*3/MM3 (ref 0.1–0.9)
MONOCYTES NFR BLD AUTO: 8.7 % (ref 5–12)
NEUTROPHILS NFR BLD AUTO: 2.59 10*3/MM3 (ref 1.7–7)
NEUTROPHILS NFR BLD AUTO: 55.2 % (ref 42.7–76)
NRBC BLD AUTO-RTO: 0 /100 WBC (ref 0–0.2)
PLATELET # BLD AUTO: 232 10*3/MM3 (ref 140–450)
PMV BLD AUTO: 9.8 FL (ref 6–12)
RBC # BLD AUTO: 3.66 10*6/MM3 (ref 3.77–5.28)
WBC NRBC COR # BLD: 4.69 10*3/MM3 (ref 3.4–10.8)

## 2023-04-06 PROCEDURE — 25010000002 IMMUNE GLOBULIN (HUMAN) PER 500 MG: Performed by: ALLERGY & IMMUNOLOGY

## 2023-04-06 PROCEDURE — 96366 THER/PROPH/DIAG IV INF ADDON: CPT

## 2023-04-06 PROCEDURE — 85025 COMPLETE CBC W/AUTO DIFF WBC: CPT | Performed by: ALLERGY & IMMUNOLOGY

## 2023-04-06 PROCEDURE — 96365 THER/PROPH/DIAG IV INF INIT: CPT

## 2023-04-06 PROCEDURE — 82784 ASSAY IGA/IGD/IGG/IGM EACH: CPT | Performed by: ALLERGY & IMMUNOLOGY

## 2023-04-06 RX ORDER — SODIUM CHLORIDE 9 MG/ML
250 INJECTION, SOLUTION INTRAVENOUS ONCE
Status: COMPLETED | OUTPATIENT
Start: 2023-04-06 | End: 2023-04-06

## 2023-04-06 RX ORDER — SODIUM CHLORIDE 9 MG/ML
250 INJECTION, SOLUTION INTRAVENOUS ONCE
OUTPATIENT
Start: 2023-05-04

## 2023-04-06 RX ORDER — DIPHENHYDRAMINE HCL 25 MG
25 CAPSULE ORAL ONCE
Start: 2023-05-04 | End: 2023-05-04

## 2023-04-06 RX ORDER — EPINEPHRINE 0.3 MG/.3ML
0.3 INJECTION SUBCUTANEOUS ONCE
Start: 2023-05-04 | End: 2023-05-04

## 2023-04-06 RX ORDER — DIPHENHYDRAMINE HCL 25 MG
50 CAPSULE ORAL ONCE AS NEEDED
Start: 2023-05-04

## 2023-04-06 RX ORDER — ACETAMINOPHEN 500 MG
500 TABLET ORAL ONCE
Start: 2023-05-04 | End: 2023-05-04

## 2023-04-06 RX ADMIN — IMMUNE GLOBULIN INTRAVENOUS (HUMAN) 10 G: KIT at 12:37

## 2023-04-06 RX ADMIN — IMMUNE GLOBULIN INTRAVENOUS (HUMAN) 10 G: KIT at 14:11

## 2023-04-06 RX ADMIN — SODIUM CHLORIDE 250 ML: 9 INJECTION, SOLUTION INTRAVENOUS at 11:50

## 2023-04-11 ENCOUNTER — OFFICE VISIT (OUTPATIENT)
Dept: GASTROENTEROLOGY | Facility: CLINIC | Age: 78
End: 2023-04-11
Payer: MEDICARE

## 2023-04-11 ENCOUNTER — TELEPHONE (OUTPATIENT)
Dept: GASTROENTEROLOGY | Facility: CLINIC | Age: 78
End: 2023-04-11

## 2023-04-11 VITALS
WEIGHT: 144.2 LBS | BODY MASS INDEX: 24.03 KG/M2 | HEART RATE: 84 BPM | TEMPERATURE: 97.2 F | DIASTOLIC BLOOD PRESSURE: 72 MMHG | SYSTOLIC BLOOD PRESSURE: 112 MMHG | HEIGHT: 65 IN | OXYGEN SATURATION: 98 %

## 2023-04-11 DIAGNOSIS — K21.9 GASTROESOPHAGEAL REFLUX DISEASE, UNSPECIFIED WHETHER ESOPHAGITIS PRESENT: ICD-10-CM

## 2023-04-11 DIAGNOSIS — K58.2 IRRITABLE BOWEL SYNDROME WITH BOTH CONSTIPATION AND DIARRHEA: Primary | ICD-10-CM

## 2023-04-11 DIAGNOSIS — Z83.71 FH: COLON POLYPS: ICD-10-CM

## 2023-04-11 PROCEDURE — 99213 OFFICE O/P EST LOW 20 MIN: CPT | Performed by: INTERNAL MEDICINE

## 2023-04-11 PROCEDURE — 1160F RVW MEDS BY RX/DR IN RCRD: CPT | Performed by: INTERNAL MEDICINE

## 2023-04-11 PROCEDURE — 1159F MED LIST DOCD IN RCRD: CPT | Performed by: INTERNAL MEDICINE

## 2023-04-11 NOTE — PROGRESS NOTES
"Chief Complaint   Patient presents with   • IBS-Mix        Elaina Tom is a  77 y.o. female here for a follow up visit for IBS, GERD, family history of polyps    HPI this 77-year-old white female patient of Dr. Christian Chandler presents in follow-up since last seen in January.  She has irritable bowel syndrome with mixed constipation and diarrhea.  She recounts bowel pattern can range from formed stools to combination of liquid and formed to liquid alone.  She has been advised to initiate fiber to help normalize her bowel consistency and frequency.  She also uses an antispasmodic as needed for discomfort.  She takes famotidine 20 mg twice daily for reflux and would like to lower the dose if possible.  I advised her to cut down to once a day and if that is still effective she may want to stop and see if she can tolerate being off the medication.  She warrants follow-up colonoscopy next year because of a family history of polyps.  We reviewed previous endoscopic findings from 2019.     Past Medical History:   Diagnosis Date   • Allergic rhinitis    • Anxiety    • Arthritis    • Asthma     ONLY HAS ISSUES WHEN HAS RESPIRATORY INFECTION   • Cancer 2017    BREAST-LEFT   • Cataract    • CVID (common variable immunodeficiency)    • External hemorrhoids    • Gastritis    • GERD (gastroesophageal reflux disease)    • H/O CT scan of abdomen     PELVIS AND CHEST: UNREMARKABLE   • Hiatal hernia    • History of colon polyps    • History of giardia infection    • History of Helicobacter pylori infection    • IBS (irritable bowel syndrome)    • IgA deficiency     CANNOT HAVE ANYTHING WITH IGA IN IT WILL CAUSE ANAPHYLAXIS   • Immunoglobulin deficiency    • Peptic ulcer disease    • PONV (postoperative nausea and vomiting)    • Torn meniscus    • Wound discharge     LT BREAST       Current Outpatient Medications   Medication Sig Dispense Refill   • ALBUTEROL IN Inhale 2 puffs As Needed (\"USES ONLY WTH A RESPIRATORY INFECTION\").     • " Ascorbic Acid (VITAMIN C) 500 MG capsule Take 1 tablet by mouth Daily. PT TO HOLD MED PRIOR TO OR     • b complex vitamins tablet Take 1 tablet by mouth Daily. PT TO HOLD MED PRIOR TO OR     • Calcium-Magnesium-Vitamin D (CALCIUM MAGNESIUM PO) Take 1 tablet by mouth Daily. PT TO HOLD MED PRIOR TO OR     • Cholecalciferol (VITAMIN D-3 PO) Take 1 tablet by mouth Daily. PT TO HOLD MED PRIOR TO OR     • Coenzyme Q10 (CO Q 10 PO) Take 1 tablet by mouth Daily. PT TO HOLD MED PRIOR TO OR     • CRANBERRY PO Take 2 tablets by mouth Daily.     • dicyclomine (BENTYL) 10 MG capsule TAKE 1 CAPSULE BY MOUTH 4 (FOUR) TIMES A DAY BEFORE MEALS & AT BEDTIME. 120 capsule 5   • famotidine (PEPCID) 20 MG tablet TAKE 1 TABLET BY MOUTH TWICE A  tablet 3   • Flaxseed, Linseed, (FLAX SEEDS PO) Take 1 capsule by mouth Daily. PT TO HOLD MED PRIOR TO OR     • fluticasone (FLONASE) 50 MCG/ACT nasal spray 2 sprays into each nostril As Needed.     • FOLIC ACID PO Take 1 tablet by mouth Daily. PT TO HOLD MED PRIOR TO OR     • Ginkgo 60 MG tablet Take 1 tablet by mouth Daily. PT TO HOLD MED PRIOR TO OR     • GuaiFENesin (MUCINEX PO) Take 1 tablet by mouth As Needed.     • Immune Globulin, Human, (GAMMAGARD IJ) Inject  as directed Every 28 (Twenty-Eight) Days. INFUSION LAST DOSE 3/17/22     • LECITHIN PO Take 1 tablet by mouth Daily. PT TO HOLD MED PRIOR TO OR     • Multiple Vitamins-Minerals (MULTIVITAMIN ADULT PO) Take 1 tablet by mouth Daily. PT TO HOLD MED PRIOR TO OR     • Omega-3 Fatty Acids (OMEGA 3 PO) Take 1,000 mg by mouth Daily. LAST DOSE 5/12/17     • RESVERATROL PO Take 1 tablet by mouth Daily. PT TO HOLD MED PRIOR TO OR     • tamoxifen (NOLVADEX) 20 MG chemo tablet Take 1 tablet by mouth Daily. 90 tablet 3   • VITAMIN A PO Take 1 tablet by mouth Every Other Day. LAST DOSE 5/12/17     • Vitamin Mixture (VITAMIN E COMPLETE) capsule Take 2 capsules by mouth Daily. PT TO HOLD MED PRIOR TO OR     • Zinc 15 MG capsule Take 15 mg by  mouth 2 (two) times a day.       Current Facility-Administered Medications   Medication Dose Route Frequency Provider Last Rate Last Admin   • acetaminophen (TYLENOL) tablet 500 mg  500 mg Oral Once Jonh Collier MD           PRN Meds:.    Allergies   Allergen Reactions   • Other Anaphylaxis     BLOOD PRODUCTS WITH IGA IN THEM   • Erythromycin GI Intolerance     VOMITING   • Avelox [Moxifloxacin] Rash   • Levaquin [Levofloxacin] Rash       Social History     Socioeconomic History   • Marital status:      Spouse name: Ortiz   • Number of children: 3   • Years of education: College   Tobacco Use   • Smoking status: Never   • Smokeless tobacco: Never   Substance and Sexual Activity   • Alcohol use: Yes     Alcohol/week: 1.0 standard drink     Types: 1 Glasses of wine per week     Comment: occasional   • Drug use: No   • Sexual activity: Not Currently       Family History   Problem Relation Age of Onset   • Other Father         POLYP   • Colon polyps Father    • Prostate cancer Father 68   • Pancreatitis Daughter    • Malig Hyperthermia Neg Hx        Review of Systems   Constitutional: Negative for activity change, appetite change, fatigue and unexpected weight change.   HENT: Negative for congestion, facial swelling, sore throat, trouble swallowing and voice change.    Eyes: Negative for photophobia and visual disturbance.   Respiratory: Negative for cough and choking.    Cardiovascular: Negative for chest pain.   Gastrointestinal: Positive for constipation and diarrhea. Negative for abdominal distention, abdominal pain, anal bleeding, blood in stool, nausea, rectal pain and vomiting.        GERD   Endocrine: Negative for polyphagia.   Musculoskeletal: Negative for arthralgias, gait problem and joint swelling.   Skin: Negative for color change, pallor and rash.   Allergic/Immunologic: Negative for food allergies.   Neurological: Negative for speech difficulty and headaches.   Hematological: Does not  bruise/bleed easily.   Psychiatric/Behavioral: Negative for agitation, confusion and sleep disturbance.       Vitals:    04/11/23 1320   Temp: 97.2 °F (36.2 °C)       Physical Exam  Constitutional:       Appearance: She is well-developed.   HENT:      Head: Normocephalic.   Eyes:      Conjunctiva/sclera: Conjunctivae normal.   Cardiovascular:      Rate and Rhythm: Normal rate and regular rhythm.   Pulmonary:      Breath sounds: Normal breath sounds.   Abdominal:      General: Bowel sounds are normal.      Palpations: Abdomen is soft.   Musculoskeletal:         General: Normal range of motion.      Cervical back: Normal range of motion.   Skin:     General: Skin is warm and dry.   Neurological:      Mental Status: She is alert and oriented to person, place, and time.   Psychiatric:         Behavior: Behavior normal.         ASSESSMENT   #1 IBS with mixed constipation and diarrhea  #2 GERD  #3 family history of polyps      PLAN  Continue current medical regimen  Taper Pepcid as tolerated  Office follow-up in 6 months  Anticipate colonoscopy in 2024.      ICD-10-CM ICD-9-CM   1. Irritable bowel syndrome with both constipation and diarrhea  K58.2 564.1   2. Gastroesophageal reflux disease, unspecified whether esophagitis present  K21.9 530.81   3. FH: colon polyps  Z83.71 V18.51

## 2023-05-04 ENCOUNTER — INFUSION (OUTPATIENT)
Dept: ONCOLOGY | Facility: HOSPITAL | Age: 78
End: 2023-05-04
Payer: MEDICARE

## 2023-05-04 VITALS
BODY MASS INDEX: 24.03 KG/M2 | OXYGEN SATURATION: 94 % | HEART RATE: 69 BPM | RESPIRATION RATE: 15 BRPM | WEIGHT: 144.2 LBS | SYSTOLIC BLOOD PRESSURE: 120 MMHG | TEMPERATURE: 98.4 F | DIASTOLIC BLOOD PRESSURE: 81 MMHG | HEIGHT: 65 IN

## 2023-05-04 DIAGNOSIS — D80.2 IGA DEFICIENCY: ICD-10-CM

## 2023-05-04 DIAGNOSIS — D83.9 COMMON VARIABLE IMMUNODEFICIENCY: Primary | ICD-10-CM

## 2023-05-04 PROCEDURE — 96366 THER/PROPH/DIAG IV INF ADDON: CPT

## 2023-05-04 PROCEDURE — 96365 THER/PROPH/DIAG IV INF INIT: CPT

## 2023-05-04 PROCEDURE — 25010000002 IMMUNE GLOBULIN (HUMAN) PER 500 MG: Performed by: ALLERGY & IMMUNOLOGY

## 2023-05-04 RX ORDER — EPINEPHRINE 0.3 MG/.3ML
0.3 INJECTION SUBCUTANEOUS ONCE
Start: 2023-06-01 | End: 2023-06-01

## 2023-05-04 RX ORDER — DIPHENHYDRAMINE HCL 25 MG
25 CAPSULE ORAL ONCE
Start: 2023-06-01 | End: 2023-06-01

## 2023-05-04 RX ORDER — DIPHENHYDRAMINE HCL 25 MG
50 CAPSULE ORAL ONCE AS NEEDED
Start: 2023-06-01

## 2023-05-04 RX ORDER — ACETAMINOPHEN 500 MG
500 TABLET ORAL ONCE
Start: 2023-06-01 | End: 2023-06-01

## 2023-05-04 RX ORDER — SODIUM CHLORIDE 9 MG/ML
250 INJECTION, SOLUTION INTRAVENOUS ONCE
OUTPATIENT
Start: 2023-06-01

## 2023-05-04 RX ORDER — SODIUM CHLORIDE 9 MG/ML
250 INJECTION, SOLUTION INTRAVENOUS ONCE
Status: COMPLETED | OUTPATIENT
Start: 2023-05-04 | End: 2023-05-04

## 2023-05-04 RX ADMIN — IMMUNE GLOBULIN INTRAVENOUS (HUMAN) 10 G: KIT at 12:06

## 2023-05-04 RX ADMIN — IMMUNE GLOBULIN INTRAVENOUS (HUMAN) 10 G: KIT at 13:29

## 2023-05-04 RX ADMIN — SODIUM CHLORIDE 250 ML: 9 INJECTION, SOLUTION INTRAVENOUS at 12:06

## 2023-05-23 ENCOUNTER — OFFICE VISIT (OUTPATIENT)
Dept: ONCOLOGY | Facility: CLINIC | Age: 78
End: 2023-05-23
Payer: MEDICARE

## 2023-05-23 ENCOUNTER — LAB (OUTPATIENT)
Dept: OTHER | Facility: HOSPITAL | Age: 78
End: 2023-05-23
Payer: MEDICARE

## 2023-05-23 VITALS
HEART RATE: 82 BPM | DIASTOLIC BLOOD PRESSURE: 80 MMHG | BODY MASS INDEX: 24.55 KG/M2 | OXYGEN SATURATION: 98 % | TEMPERATURE: 96.4 F | WEIGHT: 143.8 LBS | HEIGHT: 64 IN | SYSTOLIC BLOOD PRESSURE: 144 MMHG | RESPIRATION RATE: 18 BRPM

## 2023-05-23 DIAGNOSIS — K58.0 IRRITABLE BOWEL SYNDROME WITH DIARRHEA: ICD-10-CM

## 2023-05-23 DIAGNOSIS — D83.9 COMMON VARIABLE IMMUNODEFICIENCY: Primary | ICD-10-CM

## 2023-05-23 DIAGNOSIS — D83.9 COMMON VARIABLE IMMUNODEFICIENCY: ICD-10-CM

## 2023-05-23 DIAGNOSIS — C50.512 MALIGNANT NEOPLASM OF LOWER-OUTER QUADRANT OF LEFT BREAST OF FEMALE, ESTROGEN RECEPTOR POSITIVE: ICD-10-CM

## 2023-05-23 DIAGNOSIS — Z17.0 MALIGNANT NEOPLASM OF LOWER-OUTER QUADRANT OF LEFT BREAST OF FEMALE, ESTROGEN RECEPTOR POSITIVE: ICD-10-CM

## 2023-05-23 DIAGNOSIS — M81.0 OSTEOPOROSIS, UNSPECIFIED OSTEOPOROSIS TYPE, UNSPECIFIED PATHOLOGICAL FRACTURE PRESENCE: ICD-10-CM

## 2023-05-23 DIAGNOSIS — R92.8 ABNORMAL SCREENING MAMMOGRAM: ICD-10-CM

## 2023-05-23 DIAGNOSIS — D80.2 IGA DEFICIENCY: ICD-10-CM

## 2023-05-23 LAB
ALBUMIN SERPL-MCNC: 3.9 G/DL (ref 3.5–5.2)
ALBUMIN/GLOB SERPL: 1.5 G/DL
ALP SERPL-CCNC: 48 U/L (ref 39–117)
ALT SERPL W P-5'-P-CCNC: 16 U/L (ref 1–33)
ANION GAP SERPL CALCULATED.3IONS-SCNC: 8.4 MMOL/L (ref 5–15)
AST SERPL-CCNC: 23 U/L (ref 1–32)
BASOPHILS # BLD AUTO: 0.04 10*3/MM3 (ref 0–0.2)
BASOPHILS NFR BLD AUTO: 0.9 % (ref 0–1.5)
BILIRUB SERPL-MCNC: 0.5 MG/DL (ref 0–1.2)
BUN SERPL-MCNC: 14 MG/DL (ref 8–23)
BUN/CREAT SERPL: 18.9 (ref 7–25)
CALCIUM SPEC-SCNC: 9.2 MG/DL (ref 8.6–10.5)
CHLORIDE SERPL-SCNC: 109 MMOL/L (ref 98–107)
CLUMPED PLATELETS: PRESENT
CO2 SERPL-SCNC: 24.6 MMOL/L (ref 22–29)
CREAT SERPL-MCNC: 0.74 MG/DL (ref 0.57–1)
DEPRECATED RDW RBC AUTO: 48.8 FL (ref 37–54)
EGFRCR SERPLBLD CKD-EPI 2021: 84.5 ML/MIN/1.73
EOSINOPHIL # BLD AUTO: 0.06 10*3/MM3 (ref 0–0.4)
EOSINOPHIL NFR BLD AUTO: 1.4 % (ref 0.3–6.2)
ERYTHROCYTE [DISTWIDTH] IN BLOOD BY AUTOMATED COUNT: 13.2 % (ref 12.3–15.4)
GLOBULIN UR ELPH-MCNC: 2.6 GM/DL
GLUCOSE SERPL-MCNC: 89 MG/DL (ref 65–99)
HCT VFR BLD AUTO: 36.3 % (ref 34–46.6)
HGB BLD-MCNC: 12 G/DL (ref 12–15.9)
IMM GRANULOCYTES # BLD AUTO: 0.01 10*3/MM3 (ref 0–0.05)
IMM GRANULOCYTES NFR BLD AUTO: 0.2 % (ref 0–0.5)
LARGE PLATELETS: NORMAL
LYMPHOCYTES # BLD AUTO: 1.44 10*3/MM3 (ref 0.7–3.1)
LYMPHOCYTES NFR BLD AUTO: 32.4 % (ref 19.6–45.3)
MCH RBC QN AUTO: 33.3 PG (ref 26.6–33)
MCHC RBC AUTO-ENTMCNC: 33.1 G/DL (ref 31.5–35.7)
MCV RBC AUTO: 100.8 FL (ref 79–97)
MONOCYTES # BLD AUTO: 0.37 10*3/MM3 (ref 0.1–0.9)
MONOCYTES NFR BLD AUTO: 8.3 % (ref 5–12)
NEUTROPHILS NFR BLD AUTO: 2.52 10*3/MM3 (ref 1.7–7)
NEUTROPHILS NFR BLD AUTO: 56.8 % (ref 42.7–76)
NRBC BLD AUTO-RTO: 0 /100 WBC (ref 0–0.2)
PLATELET # BLD AUTO: 153 10*3/MM3 (ref 140–450)
PMV BLD AUTO: 11.4 FL (ref 6–12)
POTASSIUM SERPL-SCNC: 3.8 MMOL/L (ref 3.5–5.2)
PROT SERPL-MCNC: 6.5 G/DL (ref 6–8.5)
RBC # BLD AUTO: 3.6 10*6/MM3 (ref 3.77–5.28)
RBC MORPH BLD: NORMAL
SODIUM SERPL-SCNC: 142 MMOL/L (ref 136–145)
WBC MORPH BLD: NORMAL
WBC NRBC COR # BLD: 4.44 10*3/MM3 (ref 3.4–10.8)

## 2023-05-23 PROCEDURE — 85025 COMPLETE CBC W/AUTO DIFF WBC: CPT | Performed by: INTERNAL MEDICINE

## 2023-05-23 PROCEDURE — 85007 BL SMEAR W/DIFF WBC COUNT: CPT | Performed by: INTERNAL MEDICINE

## 2023-05-23 PROCEDURE — 36415 COLL VENOUS BLD VENIPUNCTURE: CPT

## 2023-05-23 PROCEDURE — 80053 COMPREHEN METABOLIC PANEL: CPT | Performed by: INTERNAL MEDICINE

## 2023-05-23 RX ORDER — TAMOXIFEN CITRATE 20 MG/1
20 TABLET ORAL DAILY
Qty: 90 TABLET | Refills: 3 | Status: SHIPPED | OUTPATIENT
Start: 2023-05-23

## 2023-05-23 NOTE — PROGRESS NOTES
Subjective     REASON FOR FOLLOW UP:   1. Stage I ( T1c,N0,M0 ) ER+,MD+, HER 2 neg for a ductal carcinoma of the left breast status post lumpectomy 5/19/2017.  Patient started on adjuvant tamoxifen after the visit of 6/9/2017.  2.  Osteoporosis  3.  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  Development of draining seroma and poor incisional healing in the left breast that required further surgery on 7/3/2017.  5.  Genetic testing negative for any adverse mutations in June 2019    HISTORY OF PRESENT ILLNESS:  The patient is a 77 y.o. year old female who was noted to have a radiographic abnormality in the left breast in March 2017.  She underwent an ultrasound-guided biopsy on 3/28/2017 showing invasive ductal carcinoma which was strongly hormone receptor positive and HER-2/trae negative.  She elected to undergo a lumpectomy procedure with Dr. Leon on 5/19/2017.  The tumor size was 1.2 x 1 x 0.9 cm and 3 sentinel lymph nodes were examined all of which were negative for metastatic cancer.    The patient was seen on 6/9/2017 to discuss postop adjuvant hormonal therapy.  She has a history of osteoporosis.  We discussed initiation of tamoxifen therapy which would have the advantage of helping to maintain her bone density.    She is here today for routine 6 month follow-up. She has been on tamoxifen for 6  years now.    She had her annual screening mammogram 6/7/2022 and at that time additional imaging of the left breast was recommended.  She had further diagnostic mammogram images and ultrasound of the left breast and July with no suspicious findings.    She looks great in the office today.  Her CBC is normal.  She tells me she and her  are building a house in [facility].  She anticipates moving around November.    History of Present Illness        ALLERGIES:    Allergies   Allergen Reactions   • Other Anaphylaxis     BLOOD PRODUCTS WITH IGA IN THEM   • Erythromycin GI  "Intolerance     VOMITING   • Avelox [Moxifloxacin] Rash   • Levaquin [Levofloxacin] Rash         Review of Systems   Constitutional: Negative for activity change, appetite change, fatigue, fever and unexpected weight change.   HENT: Negative for hearing loss, nosebleeds, trouble swallowing and voice change.    Eyes: Negative for visual disturbance.   Respiratory: Negative for cough, shortness of breath and wheezing.    Cardiovascular: Negative for chest pain and palpitations.   Gastrointestinal: Negative for abdominal pain, diarrhea, nausea and vomiting.   Genitourinary: Negative for difficulty urinating, frequency, hematuria and urgency.   Musculoskeletal: Negative for back pain and neck pain.   Neurological: Negative for dizziness, seizures, syncope and headaches.   Hematological: Negative for adenopathy. Does not bruise/bleed easily.   Psychiatric/Behavioral: Negative for behavioral problems. The patient is not nervous/anxious.        Objective     Vitals:    05/23/23 1457   BP: 144/80   Pulse: 82   Resp: 18   Temp: 96.4 °F (35.8 °C)   TempSrc: Temporal   SpO2: 98%   Weight: 65.2 kg (143 lb 12.8 oz)   Height: 163.8 cm (64.49\")   PainSc: 0-No pain         5/23/2023     2:59 PM   Current Status   ECOG score 0       Physical Exam   Constitutional: She is oriented to person, place, and time. She appears well-developed. No distress.   HENT:   Head: Normocephalic.   Eyes: Pupils are equal, round, and reactive to light. Conjunctivae are normal. No scleral icterus.   Neck: No JVD present. No thyromegaly present.   Cardiovascular: Normal rate and regular rhythm. Exam reveals no gallop and no friction rub.   No murmur heard.  Pulmonary/Chest: Effort normal and breath sounds normal. She has no wheezes. She has no rales. Right breast exhibits no mass, no nipple discharge and no skin change. Left breast exhibits skin change. Left breast exhibits no mass and no nipple discharge.   Left breast shows healed lumpectomy scar and " radiation tattoos.   Abdominal: Soft. Bowel sounds are normal. She exhibits no distension and no mass. There is no abdominal tenderness.   Musculoskeletal: Normal range of motion. No deformity.   Lymphadenopathy:     She has no cervical adenopathy.   Neurological: She is alert and oriented to person, place, and time. She has normal reflexes. No cranial nerve deficit.   Skin: Skin is warm and dry. No erythema.   Psychiatric: Her behavior is normal. Judgment normal.     I have reexamined the patient and the results are consistent with the previously documented exam. Jj Hickman MD     RECENT LABS:  Hematology WBC   Date Value Ref Range Status   05/23/2023 4.44 3.40 - 10.80 10*3/mm3 Final   04/29/2020 5.1 3.4 - 10.8 x10E3/uL Final     RBC   Date Value Ref Range Status   05/23/2023 3.60 (L) 3.77 - 5.28 10*6/mm3 Final   04/29/2020 3.85 3.77 - 5.28 x10E6/uL Final     Hemoglobin   Date Value Ref Range Status   05/23/2023 12.0 12.0 - 15.9 g/dL Final     Hematocrit   Date Value Ref Range Status   05/23/2023 36.3 34.0 - 46.6 % Final     Platelets   Date Value Ref Range Status   05/23/2023 153 140 - 450 10*3/mm3 Final     Comment:     Checked for clot; none detected          Assessment & Plan     1.  Stage I, hormone receptor positive, HER-2/trae negative invasive ductal carcinoma the left breast status post lumpectomy.  She has been on adjuvant hormonal therapy with tamoxifen 20 mg daily since June 2017 and continues to tolerate it well.  She has been on the medication for 5 years and we discussed the pros and cons of continuing tamoxifen.  She has elected to remain on tamoxifen for now with plans to continue for up to 10 years.  2.  Osteoporosis on her last DEXA scan.  3.  Common variable immunodeficiency requiring monthly IV immunoglobulin infusions.  4.  Mild chronic anemia. Stable      Plan  1.  Continue tamoxifen 20 mg daily with plans to continue treatment for up to 10 years through June 2027.  2.  Return for  follow-up in 6 months.   3.  [facility].  She has been vigilant about having [facility]

## 2023-05-24 DIAGNOSIS — K58.0 IRRITABLE BOWEL SYNDROME WITH DIARRHEA: ICD-10-CM

## 2023-05-24 NOTE — TELEPHONE ENCOUNTER
"Escribe request for bentyl with 5 refills to DR Rogers.  See notes of 4/11/23: \"She also uses an antispasmodic as needed for discomfort. \"  "

## 2023-05-26 RX ORDER — DICYCLOMINE HYDROCHLORIDE 10 MG/1
10 CAPSULE ORAL
Qty: 120 CAPSULE | Refills: 5 | Status: SHIPPED | OUTPATIENT
Start: 2023-05-26

## 2023-06-01 ENCOUNTER — INFUSION (OUTPATIENT)
Dept: ONCOLOGY | Facility: HOSPITAL | Age: 78
End: 2023-06-01

## 2023-06-01 VITALS
TEMPERATURE: 97.8 F | WEIGHT: 145 LBS | OXYGEN SATURATION: 97 % | RESPIRATION RATE: 15 BRPM | DIASTOLIC BLOOD PRESSURE: 73 MMHG | HEART RATE: 74 BPM | SYSTOLIC BLOOD PRESSURE: 124 MMHG | BODY MASS INDEX: 24.16 KG/M2 | HEIGHT: 65 IN

## 2023-06-01 DIAGNOSIS — D80.2 IGA DEFICIENCY: ICD-10-CM

## 2023-06-01 DIAGNOSIS — D83.9 COMMON VARIABLE IMMUNODEFICIENCY: Primary | ICD-10-CM

## 2023-06-01 PROCEDURE — 25010000002 IMMUNE GLOBULIN (HUMAN) PER 500 MG: Performed by: ALLERGY & IMMUNOLOGY

## 2023-06-01 PROCEDURE — 96366 THER/PROPH/DIAG IV INF ADDON: CPT

## 2023-06-01 PROCEDURE — 96365 THER/PROPH/DIAG IV INF INIT: CPT

## 2023-06-01 RX ORDER — DIPHENHYDRAMINE HCL 25 MG
50 CAPSULE ORAL ONCE AS NEEDED
Start: 2023-06-29

## 2023-06-01 RX ORDER — DIPHENHYDRAMINE HCL 25 MG
25 CAPSULE ORAL ONCE
Start: 2023-06-29 | End: 2023-06-29

## 2023-06-01 RX ORDER — SODIUM CHLORIDE 9 MG/ML
250 INJECTION, SOLUTION INTRAVENOUS ONCE
OUTPATIENT
Start: 2023-06-29

## 2023-06-01 RX ORDER — SODIUM CHLORIDE 9 MG/ML
250 INJECTION, SOLUTION INTRAVENOUS ONCE
Status: COMPLETED | OUTPATIENT
Start: 2023-06-01 | End: 2023-06-01

## 2023-06-01 RX ORDER — EPINEPHRINE 0.3 MG/.3ML
0.3 INJECTION SUBCUTANEOUS ONCE
Start: 2023-06-29 | End: 2023-06-29

## 2023-06-01 RX ORDER — ACETAMINOPHEN 500 MG
500 TABLET ORAL ONCE
Start: 2023-06-29 | End: 2023-06-29

## 2023-06-01 RX ADMIN — IMMUNE GLOBULIN INTRAVENOUS (HUMAN) 10 G: KIT at 12:24

## 2023-06-01 RX ADMIN — IMMUNE GLOBULIN INTRAVENOUS (HUMAN) 10 G: KIT at 13:44

## 2023-06-01 RX ADMIN — SODIUM CHLORIDE 250 ML: 9 INJECTION, SOLUTION INTRAVENOUS at 12:24

## 2023-07-05 ENCOUNTER — APPOINTMENT (OUTPATIENT)
Dept: WOMENS IMAGING | Facility: HOSPITAL | Age: 78
End: 2023-07-05
Payer: MEDICARE

## 2023-07-05 PROCEDURE — 77067 SCR MAMMO BI INCL CAD: CPT | Performed by: RADIOLOGY

## 2023-07-05 PROCEDURE — 77063 BREAST TOMOSYNTHESIS BI: CPT | Performed by: RADIOLOGY

## 2023-07-27 ENCOUNTER — INFUSION (OUTPATIENT)
Dept: ONCOLOGY | Facility: HOSPITAL | Age: 78
End: 2023-07-27
Payer: MEDICARE

## 2023-07-27 VITALS
RESPIRATION RATE: 15 BRPM | DIASTOLIC BLOOD PRESSURE: 83 MMHG | WEIGHT: 144.2 LBS | HEART RATE: 80 BPM | HEIGHT: 64 IN | SYSTOLIC BLOOD PRESSURE: 134 MMHG | TEMPERATURE: 97.7 F | OXYGEN SATURATION: 97 % | BODY MASS INDEX: 24.62 KG/M2

## 2023-07-27 DIAGNOSIS — D80.2 IGA DEFICIENCY: ICD-10-CM

## 2023-07-27 DIAGNOSIS — D83.9 COMMON VARIABLE IMMUNODEFICIENCY: Primary | ICD-10-CM

## 2023-07-27 PROCEDURE — 96365 THER/PROPH/DIAG IV INF INIT: CPT

## 2023-07-27 PROCEDURE — 96366 THER/PROPH/DIAG IV INF ADDON: CPT

## 2023-07-27 PROCEDURE — 25010000002 IMMUNE GLOBULIN (HUMAN) PER 500 MG: Performed by: ALLERGY & IMMUNOLOGY

## 2023-07-27 RX ORDER — DIPHENHYDRAMINE HCL 25 MG
50 CAPSULE ORAL ONCE AS NEEDED
Start: 2023-08-24

## 2023-07-27 RX ORDER — SODIUM CHLORIDE 9 MG/ML
250 INJECTION, SOLUTION INTRAVENOUS ONCE
Status: COMPLETED | OUTPATIENT
Start: 2023-07-27 | End: 2023-07-27

## 2023-07-27 RX ORDER — EPINEPHRINE 0.3 MG/.3ML
0.3 INJECTION SUBCUTANEOUS ONCE
Start: 2023-08-24 | End: 2023-08-24

## 2023-07-27 RX ORDER — DIPHENHYDRAMINE HCL 25 MG
25 CAPSULE ORAL ONCE
Start: 2023-08-24 | End: 2023-08-24

## 2023-07-27 RX ORDER — DIPHENHYDRAMINE HCL 25 MG
25 CAPSULE ORAL ONCE
Status: DISCONTINUED | OUTPATIENT
Start: 2023-07-27 | End: 2023-07-27 | Stop reason: HOSPADM

## 2023-07-27 RX ORDER — SODIUM CHLORIDE 9 MG/ML
250 INJECTION, SOLUTION INTRAVENOUS ONCE
OUTPATIENT
Start: 2023-08-24

## 2023-07-27 RX ORDER — ACETAMINOPHEN 500 MG
500 TABLET ORAL ONCE
Status: DISCONTINUED | OUTPATIENT
Start: 2023-07-27 | End: 2023-07-27 | Stop reason: HOSPADM

## 2023-07-27 RX ORDER — ACETAMINOPHEN 500 MG
500 TABLET ORAL ONCE
Start: 2023-08-24 | End: 2023-08-24

## 2023-07-27 RX ADMIN — SODIUM CHLORIDE 250 ML: 9 INJECTION, SOLUTION INTRAVENOUS at 12:10

## 2023-07-27 RX ADMIN — IMMUNE GLOBULIN INTRAVENOUS (HUMAN) 10 G: KIT at 12:51

## 2023-07-27 RX ADMIN — IMMUNE GLOBULIN INTRAVENOUS (HUMAN) 10 G: KIT at 14:16

## 2023-08-22 RX ORDER — ACETAMINOPHEN 500 MG
500 TABLET ORAL ONCE
Status: CANCELLED
Start: 2023-08-24 | End: 2023-08-24

## 2023-08-22 RX ORDER — DIPHENHYDRAMINE HCL 25 MG
25 CAPSULE ORAL ONCE
Status: CANCELLED
Start: 2023-08-24 | End: 2023-08-24

## 2023-08-22 RX ORDER — EPINEPHRINE 0.3 MG/.3ML
0.3 INJECTION SUBCUTANEOUS ONCE AS NEEDED
Status: CANCELLED
Start: 2023-08-24

## 2023-08-22 RX ORDER — EPINEPHRINE 0.3 MG/.3ML
0.3 INJECTION SUBCUTANEOUS ONCE AS NEEDED
Status: CANCELLED
Start: 2023-08-22

## 2023-08-22 RX ORDER — DIPHENHYDRAMINE HCL 25 MG
50 CAPSULE ORAL ONCE AS NEEDED
Status: CANCELLED
Start: 2023-08-24

## 2023-08-24 ENCOUNTER — INFUSION (OUTPATIENT)
Dept: ONCOLOGY | Facility: HOSPITAL | Age: 78
End: 2023-08-24
Payer: MEDICARE

## 2023-08-24 VITALS
DIASTOLIC BLOOD PRESSURE: 69 MMHG | OXYGEN SATURATION: 97 % | BODY MASS INDEX: 24.79 KG/M2 | HEIGHT: 64 IN | HEART RATE: 70 BPM | TEMPERATURE: 97.8 F | SYSTOLIC BLOOD PRESSURE: 109 MMHG | WEIGHT: 145.2 LBS | RESPIRATION RATE: 15 BRPM

## 2023-08-24 DIAGNOSIS — D83.9 COMMON VARIABLE IMMUNODEFICIENCY: Primary | ICD-10-CM

## 2023-08-24 DIAGNOSIS — D80.2 IGA DEFICIENCY: ICD-10-CM

## 2023-08-24 PROCEDURE — 96365 THER/PROPH/DIAG IV INF INIT: CPT

## 2023-08-24 PROCEDURE — 25010000002 IMMUNE GLOBULIN (HUMAN) PER 500 MG: Performed by: ALLERGY & IMMUNOLOGY

## 2023-08-24 PROCEDURE — 96366 THER/PROPH/DIAG IV INF ADDON: CPT

## 2023-08-24 RX ORDER — DIPHENHYDRAMINE HCL 25 MG
25 CAPSULE ORAL ONCE
Start: 2023-09-21 | End: 2023-09-21

## 2023-08-24 RX ORDER — DIPHENHYDRAMINE HCL 25 MG
50 CAPSULE ORAL ONCE AS NEEDED
Start: 2023-09-21

## 2023-08-24 RX ORDER — ACETAMINOPHEN 500 MG
500 TABLET ORAL ONCE
Start: 2023-09-21 | End: 2023-09-21

## 2023-08-24 RX ORDER — EPINEPHRINE 0.3 MG/.3ML
0.3 INJECTION SUBCUTANEOUS ONCE AS NEEDED
Start: 2023-09-21

## 2023-08-24 RX ORDER — SODIUM CHLORIDE 9 MG/ML
250 INJECTION, SOLUTION INTRAVENOUS ONCE
OUTPATIENT
Start: 2023-09-21

## 2023-08-24 RX ORDER — SODIUM CHLORIDE 9 MG/ML
250 INJECTION, SOLUTION INTRAVENOUS ONCE
Status: COMPLETED | OUTPATIENT
Start: 2023-08-24 | End: 2023-08-24

## 2023-08-24 RX ADMIN — SODIUM CHLORIDE 250 ML: 9 INJECTION, SOLUTION INTRAVENOUS at 11:30

## 2023-08-24 RX ADMIN — IMMUNE GLOBULIN INTRAVENOUS (HUMAN) 10 G: KIT at 12:20

## 2023-08-24 RX ADMIN — IMMUNE GLOBULIN INTRAVENOUS (HUMAN) 10 G: KIT at 13:47

## 2023-08-24 NOTE — NURSING NOTE
Patient states she took her tylenol at home prior to coming into clinic today. States she does not take any benadryl because it makes her very sleepy and she would not be able to drive home.

## 2023-09-21 ENCOUNTER — INFUSION (OUTPATIENT)
Dept: ONCOLOGY | Facility: HOSPITAL | Age: 78
End: 2023-09-21
Payer: MEDICARE

## 2023-09-21 VITALS
RESPIRATION RATE: 15 BRPM | BODY MASS INDEX: 24.22 KG/M2 | OXYGEN SATURATION: 97 % | DIASTOLIC BLOOD PRESSURE: 61 MMHG | TEMPERATURE: 98 F | HEART RATE: 84 BPM | HEIGHT: 65 IN | WEIGHT: 145.4 LBS | SYSTOLIC BLOOD PRESSURE: 96 MMHG

## 2023-09-21 DIAGNOSIS — D80.2 IGA DEFICIENCY: ICD-10-CM

## 2023-09-21 DIAGNOSIS — D83.9 COMMON VARIABLE IMMUNODEFICIENCY: Primary | ICD-10-CM

## 2023-09-21 LAB — IGG1 SER-MCNC: 1046 MG/DL (ref 700–1600)

## 2023-09-21 PROCEDURE — 96365 THER/PROPH/DIAG IV INF INIT: CPT

## 2023-09-21 PROCEDURE — 25010000002 IMMUNE GLOBULIN (HUMAN) PER 500 MG: Performed by: ALLERGY & IMMUNOLOGY

## 2023-09-21 PROCEDURE — 82784 ASSAY IGA/IGD/IGG/IGM EACH: CPT | Performed by: ALLERGY & IMMUNOLOGY

## 2023-09-21 PROCEDURE — 96366 THER/PROPH/DIAG IV INF ADDON: CPT

## 2023-09-21 RX ORDER — SODIUM CHLORIDE 9 MG/ML
250 INJECTION, SOLUTION INTRAVENOUS ONCE
Status: COMPLETED | OUTPATIENT
Start: 2023-09-21 | End: 2023-09-21

## 2023-09-21 RX ORDER — ACETAMINOPHEN 500 MG
500 TABLET ORAL ONCE
Start: 2023-10-19 | End: 2023-10-19

## 2023-09-21 RX ORDER — DIPHENHYDRAMINE HCL 25 MG
50 CAPSULE ORAL ONCE AS NEEDED
Start: 2023-10-19

## 2023-09-21 RX ORDER — EPINEPHRINE 0.3 MG/.3ML
0.3 INJECTION SUBCUTANEOUS ONCE AS NEEDED
Start: 2023-10-19

## 2023-09-21 RX ORDER — DIPHENHYDRAMINE HCL 25 MG
25 CAPSULE ORAL ONCE
Start: 2023-10-19 | End: 2023-10-19

## 2023-09-21 RX ORDER — SODIUM CHLORIDE 9 MG/ML
250 INJECTION, SOLUTION INTRAVENOUS ONCE
OUTPATIENT
Start: 2023-10-19

## 2023-09-21 RX ADMIN — SODIUM CHLORIDE 250 ML: 9 INJECTION, SOLUTION INTRAVENOUS at 12:04

## 2023-09-21 RX ADMIN — IMMUNE GLOBULIN INTRAVENOUS (HUMAN) 10 G: KIT at 12:06

## 2023-09-21 RX ADMIN — IMMUNE GLOBULIN INTRAVENOUS (HUMAN) 10 G: KIT at 13:33

## 2023-09-28 RX ORDER — FAMOTIDINE 20 MG/1
TABLET, FILM COATED ORAL
Qty: 180 TABLET | Refills: 3 | Status: SHIPPED | OUTPATIENT
Start: 2023-09-28

## 2023-10-16 ENCOUNTER — TELEPHONE (OUTPATIENT)
Dept: GASTROENTEROLOGY | Facility: CLINIC | Age: 78
End: 2023-10-16
Payer: MEDICARE

## 2023-10-18 DIAGNOSIS — D83.9 COMMON VARIABLE IMMUNODEFICIENCY: Primary | ICD-10-CM

## 2023-10-18 DIAGNOSIS — D80.2 IGA DEFICIENCY: ICD-10-CM

## 2023-10-19 ENCOUNTER — INFUSION (OUTPATIENT)
Dept: ONCOLOGY | Facility: HOSPITAL | Age: 78
End: 2023-10-19
Payer: MEDICARE

## 2023-10-19 VITALS
SYSTOLIC BLOOD PRESSURE: 115 MMHG | HEART RATE: 84 BPM | OXYGEN SATURATION: 96 % | WEIGHT: 145.4 LBS | TEMPERATURE: 98 F | RESPIRATION RATE: 15 BRPM | DIASTOLIC BLOOD PRESSURE: 77 MMHG | HEIGHT: 63 IN | BODY MASS INDEX: 25.76 KG/M2

## 2023-10-19 DIAGNOSIS — D80.2 IGA DEFICIENCY: Primary | ICD-10-CM

## 2023-10-19 DIAGNOSIS — D83.9 COMMON VARIABLE IMMUNODEFICIENCY: ICD-10-CM

## 2023-10-19 LAB
BASOPHILS # BLD AUTO: 0.04 10*3/MM3 (ref 0–0.2)
BASOPHILS NFR BLD AUTO: 1.1 % (ref 0–1.5)
DEPRECATED RDW RBC AUTO: 48 FL (ref 37–54)
EOSINOPHIL # BLD AUTO: 0.08 10*3/MM3 (ref 0–0.4)
EOSINOPHIL NFR BLD AUTO: 2.3 % (ref 0.3–6.2)
ERYTHROCYTE [DISTWIDTH] IN BLOOD BY AUTOMATED COUNT: 13.3 % (ref 12.3–15.4)
HCT VFR BLD AUTO: 36.4 % (ref 34–46.6)
HGB BLD-MCNC: 12 G/DL (ref 12–15.9)
IMM GRANULOCYTES # BLD AUTO: 0.01 10*3/MM3 (ref 0–0.05)
IMM GRANULOCYTES NFR BLD AUTO: 0.3 % (ref 0–0.5)
LYMPHOCYTES # BLD AUTO: 1.21 10*3/MM3 (ref 0.7–3.1)
LYMPHOCYTES NFR BLD AUTO: 34.3 % (ref 19.6–45.3)
MCH RBC QN AUTO: 32.6 PG (ref 26.6–33)
MCHC RBC AUTO-ENTMCNC: 33 G/DL (ref 31.5–35.7)
MCV RBC AUTO: 98.9 FL (ref 79–97)
MONOCYTES # BLD AUTO: 0.37 10*3/MM3 (ref 0.1–0.9)
MONOCYTES NFR BLD AUTO: 10.5 % (ref 5–12)
NEUTROPHILS NFR BLD AUTO: 1.82 10*3/MM3 (ref 1.7–7)
NEUTROPHILS NFR BLD AUTO: 51.5 % (ref 42.7–76)
NRBC BLD AUTO-RTO: 0 /100 WBC (ref 0–0.2)
PLATELET # BLD AUTO: 211 10*3/MM3 (ref 140–450)
PMV BLD AUTO: 9.6 FL (ref 6–12)
RBC # BLD AUTO: 3.68 10*6/MM3 (ref 3.77–5.28)
WBC NRBC COR # BLD: 3.53 10*3/MM3 (ref 3.4–10.8)

## 2023-10-19 PROCEDURE — 96366 THER/PROPH/DIAG IV INF ADDON: CPT

## 2023-10-19 PROCEDURE — 25010000002 IMMUNE GLOBULIN (HUMAN) PER 500 MG: Performed by: ALLERGY & IMMUNOLOGY

## 2023-10-19 PROCEDURE — 85025 COMPLETE CBC W/AUTO DIFF WBC: CPT | Performed by: ALLERGY & IMMUNOLOGY

## 2023-10-19 PROCEDURE — 96365 THER/PROPH/DIAG IV INF INIT: CPT

## 2023-10-19 PROCEDURE — 25810000003 SODIUM CHLORIDE 0.9 % SOLUTION: Performed by: ALLERGY & IMMUNOLOGY

## 2023-10-19 RX ORDER — DIPHENHYDRAMINE HCL 25 MG
50 CAPSULE ORAL ONCE AS NEEDED
Start: 2023-11-16

## 2023-10-19 RX ORDER — DIPHENHYDRAMINE HCL 25 MG
25 CAPSULE ORAL ONCE
Start: 2023-11-16 | End: 2023-11-16

## 2023-10-19 RX ORDER — EPINEPHRINE 0.3 MG/.3ML
0.3 INJECTION SUBCUTANEOUS ONCE AS NEEDED
Start: 2023-11-16

## 2023-10-19 RX ORDER — SODIUM CHLORIDE 9 MG/ML
250 INJECTION, SOLUTION INTRAVENOUS ONCE
Status: COMPLETED | OUTPATIENT
Start: 2023-10-19 | End: 2023-10-19

## 2023-10-19 RX ORDER — ACETAMINOPHEN 500 MG
500 TABLET ORAL ONCE
Start: 2023-11-16 | End: 2023-11-16

## 2023-10-19 RX ORDER — SODIUM CHLORIDE 9 MG/ML
250 INJECTION, SOLUTION INTRAVENOUS ONCE
OUTPATIENT
Start: 2023-11-16

## 2023-10-19 RX ADMIN — SODIUM CHLORIDE 250 ML: 9 INJECTION, SOLUTION INTRAVENOUS at 12:23

## 2023-10-19 RX ADMIN — IMMUNE GLOBULIN INTRAVENOUS (HUMAN) 10 G: KIT at 12:24

## 2023-10-19 RX ADMIN — IMMUNE GLOBULIN INTRAVENOUS (HUMAN) 10 G: KIT at 13:48

## 2023-10-19 NOTE — NURSING NOTE
Pt to infusion room room for  gammagard infusion . Pt reports she took tylenol at home and she does not take benadryl

## 2023-11-16 ENCOUNTER — INFUSION (OUTPATIENT)
Dept: ONCOLOGY | Facility: HOSPITAL | Age: 78
End: 2023-11-16
Payer: MEDICARE

## 2023-11-16 VITALS
RESPIRATION RATE: 15 BRPM | OXYGEN SATURATION: 98 % | BODY MASS INDEX: 25.69 KG/M2 | TEMPERATURE: 97.7 F | HEIGHT: 63 IN | HEART RATE: 69 BPM | DIASTOLIC BLOOD PRESSURE: 73 MMHG | WEIGHT: 145 LBS | SYSTOLIC BLOOD PRESSURE: 110 MMHG

## 2023-11-16 DIAGNOSIS — D80.2 IGA DEFICIENCY: ICD-10-CM

## 2023-11-16 DIAGNOSIS — D83.9 COMMON VARIABLE IMMUNODEFICIENCY: Primary | ICD-10-CM

## 2023-11-16 PROCEDURE — 96365 THER/PROPH/DIAG IV INF INIT: CPT

## 2023-11-16 PROCEDURE — 96366 THER/PROPH/DIAG IV INF ADDON: CPT

## 2023-11-16 PROCEDURE — 25010000002 IMMUNE GLOBULIN (HUMAN) PER 500 MG: Performed by: ALLERGY & IMMUNOLOGY

## 2023-11-16 PROCEDURE — 25810000003 SODIUM CHLORIDE 0.9 % SOLUTION: Performed by: ALLERGY & IMMUNOLOGY

## 2023-11-16 RX ORDER — SODIUM CHLORIDE 9 MG/ML
250 INJECTION, SOLUTION INTRAVENOUS ONCE
OUTPATIENT
Start: 2023-12-14

## 2023-11-16 RX ORDER — SODIUM CHLORIDE 9 MG/ML
250 INJECTION, SOLUTION INTRAVENOUS ONCE
Status: COMPLETED | OUTPATIENT
Start: 2023-11-16 | End: 2023-11-16

## 2023-11-16 RX ORDER — EPINEPHRINE 0.3 MG/.3ML
0.3 INJECTION SUBCUTANEOUS ONCE AS NEEDED
Start: 2023-12-14

## 2023-11-16 RX ORDER — DIPHENHYDRAMINE HCL 25 MG
50 CAPSULE ORAL ONCE AS NEEDED
Start: 2023-12-14

## 2023-11-16 RX ORDER — DIPHENHYDRAMINE HCL 25 MG
25 CAPSULE ORAL ONCE
Start: 2023-12-14 | End: 2023-12-14

## 2023-11-16 RX ORDER — ACETAMINOPHEN 500 MG
500 TABLET ORAL ONCE
Start: 2023-12-14 | End: 2023-12-14

## 2023-11-16 RX ADMIN — SODIUM CHLORIDE 250 ML: 9 INJECTION, SOLUTION INTRAVENOUS at 11:50

## 2023-11-16 RX ADMIN — Medication 10 G: at 13:12

## 2023-11-16 RX ADMIN — IMMUNE GLOBULIN INTRAVENOUS (HUMAN) 10 G: KIT at 11:50

## 2023-11-16 NOTE — NURSING NOTE
Pt arrived for IVIG infusion. Pt stated she took tylenol and benadryl prior to arriving. Pt tolerated infusion without complications and was discharged in a stable condition.

## 2023-11-21 ENCOUNTER — LAB (OUTPATIENT)
Dept: OTHER | Facility: HOSPITAL | Age: 78
End: 2023-11-21
Payer: MEDICARE

## 2023-11-21 ENCOUNTER — OFFICE VISIT (OUTPATIENT)
Dept: ONCOLOGY | Facility: CLINIC | Age: 78
End: 2023-11-21
Payer: MEDICARE

## 2023-11-21 VITALS
BODY MASS INDEX: 25.62 KG/M2 | WEIGHT: 144.6 LBS | HEART RATE: 79 BPM | DIASTOLIC BLOOD PRESSURE: 83 MMHG | OXYGEN SATURATION: 98 % | SYSTOLIC BLOOD PRESSURE: 154 MMHG | TEMPERATURE: 98 F | HEIGHT: 63 IN | RESPIRATION RATE: 18 BRPM

## 2023-11-21 DIAGNOSIS — C50.512 MALIGNANT NEOPLASM OF LOWER-OUTER QUADRANT OF LEFT BREAST OF FEMALE, ESTROGEN RECEPTOR POSITIVE: ICD-10-CM

## 2023-11-21 DIAGNOSIS — D83.9 COMMON VARIABLE IMMUNODEFICIENCY: ICD-10-CM

## 2023-11-21 DIAGNOSIS — R92.8 ABNORMAL SCREENING MAMMOGRAM: ICD-10-CM

## 2023-11-21 DIAGNOSIS — C50.512 MALIGNANT NEOPLASM OF LOWER-OUTER QUADRANT OF LEFT BREAST OF FEMALE, ESTROGEN RECEPTOR POSITIVE: Primary | ICD-10-CM

## 2023-11-21 DIAGNOSIS — Z17.0 MALIGNANT NEOPLASM OF LOWER-OUTER QUADRANT OF LEFT BREAST OF FEMALE, ESTROGEN RECEPTOR POSITIVE: Primary | ICD-10-CM

## 2023-11-21 DIAGNOSIS — K58.0 IRRITABLE BOWEL SYNDROME WITH DIARRHEA: ICD-10-CM

## 2023-11-21 DIAGNOSIS — Z17.0 MALIGNANT NEOPLASM OF LOWER-OUTER QUADRANT OF LEFT BREAST OF FEMALE, ESTROGEN RECEPTOR POSITIVE: ICD-10-CM

## 2023-11-21 DIAGNOSIS — M81.0 OSTEOPOROSIS, UNSPECIFIED OSTEOPOROSIS TYPE, UNSPECIFIED PATHOLOGICAL FRACTURE PRESENCE: ICD-10-CM

## 2023-11-21 LAB
ALBUMIN SERPL-MCNC: 4.4 G/DL (ref 3.5–5.2)
ALBUMIN/GLOB SERPL: 1.6 G/DL
ALP SERPL-CCNC: 53 U/L (ref 39–117)
ALT SERPL W P-5'-P-CCNC: 13 U/L (ref 1–33)
ANION GAP SERPL CALCULATED.3IONS-SCNC: 7.6 MMOL/L (ref 5–15)
AST SERPL-CCNC: 27 U/L (ref 1–32)
BASOPHILS # BLD AUTO: 0.05 10*3/MM3 (ref 0–0.2)
BASOPHILS NFR BLD AUTO: 1 % (ref 0–1.5)
BILIRUB SERPL-MCNC: 0.5 MG/DL (ref 0–1.2)
BUN SERPL-MCNC: 14 MG/DL (ref 8–23)
BUN/CREAT SERPL: 18.2 (ref 7–25)
CALCIUM SPEC-SCNC: 9.5 MG/DL (ref 8.6–10.5)
CHLORIDE SERPL-SCNC: 107 MMOL/L (ref 98–107)
CO2 SERPL-SCNC: 28.4 MMOL/L (ref 22–29)
CREAT SERPL-MCNC: 0.77 MG/DL (ref 0.57–1)
DEPRECATED RDW RBC AUTO: 50.6 FL (ref 37–54)
EGFRCR SERPLBLD CKD-EPI 2021: 80.1 ML/MIN/1.73
EOSINOPHIL # BLD AUTO: 0.09 10*3/MM3 (ref 0–0.4)
EOSINOPHIL NFR BLD AUTO: 1.9 % (ref 0.3–6.2)
ERYTHROCYTE [DISTWIDTH] IN BLOOD BY AUTOMATED COUNT: 13.6 % (ref 12.3–15.4)
GLOBULIN UR ELPH-MCNC: 2.8 GM/DL
GLUCOSE SERPL-MCNC: 93 MG/DL (ref 65–99)
HCT VFR BLD AUTO: 37.9 % (ref 34–46.6)
HGB BLD-MCNC: 12.2 G/DL (ref 12–15.9)
IMM GRANULOCYTES # BLD AUTO: 0.01 10*3/MM3 (ref 0–0.05)
IMM GRANULOCYTES NFR BLD AUTO: 0.2 % (ref 0–0.5)
LYMPHOCYTES # BLD AUTO: 1.61 10*3/MM3 (ref 0.7–3.1)
LYMPHOCYTES NFR BLD AUTO: 33.3 % (ref 19.6–45.3)
MCH RBC QN AUTO: 32.5 PG (ref 26.6–33)
MCHC RBC AUTO-ENTMCNC: 32.2 G/DL (ref 31.5–35.7)
MCV RBC AUTO: 101.1 FL (ref 79–97)
MONOCYTES # BLD AUTO: 0.38 10*3/MM3 (ref 0.1–0.9)
MONOCYTES NFR BLD AUTO: 7.9 % (ref 5–12)
NEUTROPHILS NFR BLD AUTO: 2.7 10*3/MM3 (ref 1.7–7)
NEUTROPHILS NFR BLD AUTO: 55.7 % (ref 42.7–76)
NRBC BLD AUTO-RTO: 0 /100 WBC (ref 0–0.2)
PLATELET # BLD AUTO: 245 10*3/MM3 (ref 140–450)
PMV BLD AUTO: 9.4 FL (ref 6–12)
POTASSIUM SERPL-SCNC: 4.2 MMOL/L (ref 3.5–5.2)
PROT SERPL-MCNC: 7.2 G/DL (ref 6–8.5)
RBC # BLD AUTO: 3.75 10*6/MM3 (ref 3.77–5.28)
SODIUM SERPL-SCNC: 143 MMOL/L (ref 136–145)
WBC NRBC COR # BLD AUTO: 4.84 10*3/MM3 (ref 3.4–10.8)

## 2023-11-21 PROCEDURE — 36415 COLL VENOUS BLD VENIPUNCTURE: CPT

## 2023-11-21 PROCEDURE — 85025 COMPLETE CBC W/AUTO DIFF WBC: CPT | Performed by: INTERNAL MEDICINE

## 2023-11-21 PROCEDURE — 80053 COMPREHEN METABOLIC PANEL: CPT | Performed by: INTERNAL MEDICINE

## 2023-11-21 NOTE — PROGRESS NOTES
Subjective     REASON FOR FOLLOW UP:   1. Stage I ( T1c,N0,M0 ) ER+,[place]+, HER 2 neg for a ductal carcinoma of the left breast status post lumpectomy 5/19/2017.  Patient started on adjuvant tamoxifen after the visit of 6/9/2017.  2.  Osteoporosis  3.  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  Development of draining seroma and poor incisional healing in the left breast that required further surgery on 7/3/2017.  5.  Genetic testing negative for any adverse mutations in June 2019    HISTORY OF PRESENT ILLNESS:  The patient is a 78 y.o. year old female who was noted to have a radiographic abnormality in the left breast in March 2017.  She underwent an ultrasound-guided biopsy on 3/28/2017 showing invasive ductal carcinoma which was strongly hormone receptor positive and HER-2/trae negative.  She elected to undergo a lumpectomy procedure with Dr. Leon on 5/19/2017.  The tumor size was 1.2 x 1 x 0.9 cm and 3 sentinel lymph nodes were examined all of which were negative for metastatic cancer.    The patient was seen on 6/9/2017 to discuss postop adjuvant hormonal therapy.  She has a history of osteoporosis.  We discussed initiation of tamoxifen therapy which would have the advantage of helping to maintain her bone density.    She is here today for routine 6 month follow-up. She has been on tamoxifen for 6.5  years now.    She had her annual screening mammogram 7/5/2023 with no suspicious findings.    She looks great in the office today.  Her CBC is unremarkable.    History of Present Illness        ALLERGIES:    Allergies   Allergen Reactions    Other Anaphylaxis     BLOOD PRODUCTS WITH IGA IN THEM    Erythromycin GI Intolerance     VOMITING    Avelox [Moxifloxacin] Rash    Levaquin [Levofloxacin] Rash         Review of Systems   Constitutional:  Negative for activity change, appetite change, fatigue, fever and unexpected weight change.   HENT:  Negative for hearing loss, nosebleeds,  "trouble swallowing and voice change.    Eyes:  Negative for visual disturbance.   Respiratory:  Negative for cough, shortness of breath and wheezing.    Cardiovascular:  Negative for chest pain and palpitations.   Gastrointestinal:  Negative for abdominal pain, diarrhea, nausea and vomiting.   Genitourinary:  Negative for difficulty urinating, frequency, hematuria and urgency.   Musculoskeletal:  Negative for back pain and neck pain.   Neurological:  Negative for dizziness, seizures, syncope and headaches.   Hematological:  Negative for adenopathy. Does not bruise/bleed easily.   Psychiatric/Behavioral:  Negative for behavioral problems. The patient is not nervous/anxious.        Objective     Vitals:    11/21/23 1307   BP: 154/83   Pulse: 79   Resp: 18   Temp: 98 °F (36.7 °C)   TempSrc: Temporal   SpO2: 98%   Weight: 65.6 kg (144 lb 9.6 oz)   Height: 160 cm (62.99\")   PainSc: 0-No pain         11/21/2023     1:07 PM   Current Status   ECOG score 0       Physical Exam   Constitutional: She is oriented to person, place, and time. She appears well-developed. No distress.   HENT:   Head: Normocephalic.   Eyes: Pupils are equal, round, and reactive to light. Conjunctivae are normal. No scleral icterus.   Neck: No JVD present. No thyromegaly present.   Cardiovascular: Normal rate and regular rhythm. Exam reveals no gallop and no friction rub.   No murmur heard.  Pulmonary/Chest: Effort normal and breath sounds normal. She has no wheezes. She has no rales. Right breast exhibits no mass, no nipple discharge and no skin change. Left breast exhibits skin change. Left breast exhibits no mass and no nipple discharge.   Left breast shows healed lumpectomy scar and radiation tattoos.   Abdominal: Soft. Bowel sounds are normal. She exhibits no distension and no mass. There is no abdominal tenderness.   Musculoskeletal: Normal range of motion. No deformity.   Lymphadenopathy:     She has no cervical adenopathy.   Neurological: She " is alert and oriented to person, place, and time. She has normal reflexes. No cranial nerve deficit.   Skin: Skin is warm and dry. No erythema.   Psychiatric: Her behavior is normal. Judgment normal.     I have reexamined the patient and the results are consistent with the previously documented exam. Jj Hickman MD     RECENT LABS:  Hematology WBC   Date Value Ref Range Status   11/21/2023 4.84 3.40 - 10.80 10*3/mm3 Final   04/29/2020 5.1 3.4 - 10.8 x10E3/uL Final     RBC   Date Value Ref Range Status   11/21/2023 3.75 (L) 3.77 - 5.28 10*6/mm3 Final   04/29/2020 3.85 3.77 - 5.28 x10E6/uL Final     Hemoglobin   Date Value Ref Range Status   11/21/2023 12.2 12.0 - 15.9 g/dL Final     Hematocrit   Date Value Ref Range Status   11/21/2023 37.9 34.0 - 46.6 % Final     Platelets   Date Value Ref Range Status   11/21/2023 245 140 - 450 10*3/mm3 Final          Assessment & Plan     1.  Stage I, hormone receptor positive, HER-2/trae negative invasive ductal carcinoma the left breast status post lumpectomy.  She has been on adjuvant hormonal therapy with tamoxifen 20 mg daily since June 2017 and continues to tolerate it well.  She has been on the medication for 6.5 years and we discussed the pros and cons of continuing tamoxifen.  She has elected to remain on tamoxifen for now with plans to continue for up to 10 years.  2.  Osteoporosis on her last DEXA scan.  3.  Common variable immunodeficiency requiring monthly IV immunoglobulin infusions.  4.  Mild chronic anemia. Stable      Plan  1.  Continue tamoxifen 20 mg daily with plans to continue treatment for up to 10 years through June 2027.  2.  Return for follow-up in 6 months.   3.  Her [facility].  She has been vigilant about having [facility]

## 2023-12-07 ENCOUNTER — OFFICE VISIT (OUTPATIENT)
Dept: GASTROENTEROLOGY | Facility: CLINIC | Age: 78
End: 2023-12-07
Payer: MEDICARE

## 2023-12-07 ENCOUNTER — TELEPHONE (OUTPATIENT)
Dept: GASTROENTEROLOGY | Facility: CLINIC | Age: 78
End: 2023-12-07

## 2023-12-07 ENCOUNTER — LAB (OUTPATIENT)
Dept: LAB | Facility: HOSPITAL | Age: 78
End: 2023-12-07
Payer: MEDICARE

## 2023-12-07 ENCOUNTER — TRANSCRIBE ORDERS (OUTPATIENT)
Dept: ADMINISTRATIVE | Facility: HOSPITAL | Age: 78
End: 2023-12-07
Payer: MEDICARE

## 2023-12-07 VITALS
SYSTOLIC BLOOD PRESSURE: 157 MMHG | BODY MASS INDEX: 24.06 KG/M2 | DIASTOLIC BLOOD PRESSURE: 82 MMHG | HEIGHT: 65 IN | WEIGHT: 144.4 LBS | TEMPERATURE: 96.4 F | HEART RATE: 83 BPM

## 2023-12-07 DIAGNOSIS — E55.9 VITAMIN D DEFICIENCY: ICD-10-CM

## 2023-12-07 DIAGNOSIS — Z83.719 FH: COLON POLYPS: Primary | ICD-10-CM

## 2023-12-07 DIAGNOSIS — K58.0 IRRITABLE BOWEL SYNDROME WITH DIARRHEA: ICD-10-CM

## 2023-12-07 DIAGNOSIS — E78.5 HYPERLIPIDEMIA, UNSPECIFIED HYPERLIPIDEMIA TYPE: ICD-10-CM

## 2023-12-07 DIAGNOSIS — E03.9 ACQUIRED HYPOTHYROIDISM: ICD-10-CM

## 2023-12-07 DIAGNOSIS — Z00.00 ROUTINE GENERAL MEDICAL EXAMINATION AT A HEALTH CARE FACILITY: ICD-10-CM

## 2023-12-07 DIAGNOSIS — K21.9 GASTROESOPHAGEAL REFLUX DISEASE, UNSPECIFIED WHETHER ESOPHAGITIS PRESENT: ICD-10-CM

## 2023-12-07 DIAGNOSIS — D51.9 ANEMIA DUE TO VITAMIN B12 DEFICIENCY, UNSPECIFIED B12 DEFICIENCY TYPE: ICD-10-CM

## 2023-12-07 DIAGNOSIS — Z00.00 ROUTINE GENERAL MEDICAL EXAMINATION AT A HEALTH CARE FACILITY: Primary | ICD-10-CM

## 2023-12-07 LAB
25(OH)D3 SERPL-MCNC: 46.5 NG/ML (ref 30–100)
ALBUMIN SERPL-MCNC: 4 G/DL (ref 3.5–5.2)
ALBUMIN/GLOB SERPL: 1.7 G/DL
ALP SERPL-CCNC: 47 U/L (ref 39–117)
ALT SERPL W P-5'-P-CCNC: 20 U/L (ref 1–33)
ANION GAP SERPL CALCULATED.3IONS-SCNC: 9.8 MMOL/L (ref 5–15)
AST SERPL-CCNC: 24 U/L (ref 1–32)
BACTERIA UR QL AUTO: ABNORMAL /HPF
BASOPHILS # BLD AUTO: 0.04 10*3/MM3 (ref 0–0.2)
BASOPHILS NFR BLD AUTO: 1.1 % (ref 0–1.5)
BILIRUB SERPL-MCNC: 0.4 MG/DL (ref 0–1.2)
BILIRUB UR QL STRIP: NEGATIVE
BUN SERPL-MCNC: 13 MG/DL (ref 8–23)
BUN/CREAT SERPL: 16.7 (ref 7–25)
CALCIUM SPEC-SCNC: 9 MG/DL (ref 8.6–10.5)
CHLORIDE SERPL-SCNC: 107 MMOL/L (ref 98–107)
CHOLEST SERPL-MCNC: 163 MG/DL (ref 0–200)
CLARITY UR: CLEAR
CO2 SERPL-SCNC: 25.2 MMOL/L (ref 22–29)
COLOR UR: ABNORMAL
CREAT SERPL-MCNC: 0.78 MG/DL (ref 0.57–1)
DEPRECATED RDW RBC AUTO: 43.8 FL (ref 37–54)
EGFRCR SERPLBLD CKD-EPI 2021: 78.8 ML/MIN/1.73
EOSINOPHIL # BLD AUTO: 0.09 10*3/MM3 (ref 0–0.4)
EOSINOPHIL NFR BLD AUTO: 2.4 % (ref 0.3–6.2)
ERYTHROCYTE [DISTWIDTH] IN BLOOD BY AUTOMATED COUNT: 12.3 % (ref 12.3–15.4)
FOLATE SERPL-MCNC: >20 NG/ML (ref 4.78–24.2)
GLOBULIN UR ELPH-MCNC: 2.3 GM/DL
GLUCOSE SERPL-MCNC: 96 MG/DL (ref 65–99)
GLUCOSE UR STRIP-MCNC: NEGATIVE MG/DL
HCT VFR BLD AUTO: 35.5 % (ref 34–46.6)
HDLC SERPL-MCNC: 53 MG/DL (ref 40–60)
HGB BLD-MCNC: 11.5 G/DL (ref 12–15.9)
HGB UR QL STRIP.AUTO: NEGATIVE
HYALINE CASTS UR QL AUTO: ABNORMAL /LPF
IMM GRANULOCYTES # BLD AUTO: 0.01 10*3/MM3 (ref 0–0.05)
IMM GRANULOCYTES NFR BLD AUTO: 0.3 % (ref 0–0.5)
KETONES UR QL STRIP: NEGATIVE
LDLC SERPL CALC-MCNC: 96 MG/DL (ref 0–100)
LDLC/HDLC SERPL: 1.8 {RATIO}
LEUKOCYTE ESTERASE UR QL STRIP.AUTO: ABNORMAL
LYMPHOCYTES # BLD AUTO: 1.37 10*3/MM3 (ref 0.7–3.1)
LYMPHOCYTES NFR BLD AUTO: 36.2 % (ref 19.6–45.3)
MCH RBC QN AUTO: 32 PG (ref 26.6–33)
MCHC RBC AUTO-ENTMCNC: 32.4 G/DL (ref 31.5–35.7)
MCV RBC AUTO: 98.9 FL (ref 79–97)
MONOCYTES # BLD AUTO: 0.38 10*3/MM3 (ref 0.1–0.9)
MONOCYTES NFR BLD AUTO: 10.1 % (ref 5–12)
NEUTROPHILS NFR BLD AUTO: 1.89 10*3/MM3 (ref 1.7–7)
NEUTROPHILS NFR BLD AUTO: 49.9 % (ref 42.7–76)
NITRITE UR QL STRIP: NEGATIVE
NRBC BLD AUTO-RTO: 0 /100 WBC (ref 0–0.2)
PH UR STRIP.AUTO: 5.5 [PH] (ref 5–8)
PLATELET # BLD AUTO: 226 10*3/MM3 (ref 140–450)
PMV BLD AUTO: 10.6 FL (ref 6–12)
POTASSIUM SERPL-SCNC: 4 MMOL/L (ref 3.5–5.2)
PROT SERPL-MCNC: 6.3 G/DL (ref 6–8.5)
PROT UR QL STRIP: NEGATIVE
RBC # BLD AUTO: 3.59 10*6/MM3 (ref 3.77–5.28)
RBC # UR STRIP: ABNORMAL /HPF
REF LAB TEST METHOD: ABNORMAL
SODIUM SERPL-SCNC: 142 MMOL/L (ref 136–145)
SP GR UR STRIP: 1.02 (ref 1–1.03)
SQUAMOUS #/AREA URNS HPF: ABNORMAL /HPF
TRIGL SERPL-MCNC: 74 MG/DL (ref 0–150)
TSH SERPL DL<=0.05 MIU/L-ACNC: 1.17 UIU/ML (ref 0.27–4.2)
UROBILINOGEN UR QL STRIP: ABNORMAL
VIT B12 BLD-MCNC: 862 PG/ML (ref 211–946)
VLDLC SERPL-MCNC: 14 MG/DL (ref 5–40)
WBC # UR STRIP: ABNORMAL /HPF
WBC NRBC COR # BLD AUTO: 3.78 10*3/MM3 (ref 3.4–10.8)

## 2023-12-07 PROCEDURE — 81001 URINALYSIS AUTO W/SCOPE: CPT

## 2023-12-07 PROCEDURE — 36415 COLL VENOUS BLD VENIPUNCTURE: CPT

## 2023-12-07 PROCEDURE — 82607 VITAMIN B-12: CPT

## 2023-12-07 PROCEDURE — 80061 LIPID PANEL: CPT

## 2023-12-07 PROCEDURE — 84443 ASSAY THYROID STIM HORMONE: CPT

## 2023-12-07 PROCEDURE — 85025 COMPLETE CBC W/AUTO DIFF WBC: CPT

## 2023-12-07 PROCEDURE — 80053 COMPREHEN METABOLIC PANEL: CPT

## 2023-12-07 PROCEDURE — 82306 VITAMIN D 25 HYDROXY: CPT

## 2023-12-07 PROCEDURE — 82746 ASSAY OF FOLIC ACID SERUM: CPT

## 2023-12-07 RX ORDER — ALBUTEROL SULFATE 90 UG/1
AEROSOL, METERED RESPIRATORY (INHALATION)
COMMUNITY
Start: 2023-11-28

## 2023-12-07 NOTE — TELEPHONE ENCOUNTER
COLONOSCOPY & EGD on  4/26/2024 arrive at 10;00  . gave prep instructions to pt in office ....miralax

## 2023-12-07 NOTE — PROGRESS NOTES
"Chief Complaint   Patient presents with    Irritable Bowel Syndrome    Heartburn        Elaina Tom is a  78 y.o. female here for a follow up visit for GERD, IBS-D, family history of polyps    HPI this 78-year-old white female patient of Dr. Christian Chandler returns in follow-up since last seen 6 months ago.  She continues to have intermittent symptoms of bowel pattern changes consistent with her irritable bowel syndrome.  She manages this with fiber and antispasmodics.  She is due for follow-up colonoscopy because of a family history of polyps when last studied in 2019.  She also has persistent reflux issues despite trying to adjust her histamine blocker therapy and I would offer both upper and lower endoscopic examinations at this time.    Past Medical History:   Diagnosis Date    Allergic rhinitis     Anxiety     Arthritis     Asthma     ONLY HAS ISSUES WHEN HAS RESPIRATORY INFECTION    Cancer 2017    BREAST-LEFT    Cataract     CVID (common variable immunodeficiency)     External hemorrhoids     Gastritis     GERD (gastroesophageal reflux disease)     H/O CT scan of abdomen     PELVIS AND CHEST: UNREMARKABLE    Hernia     Hiatal hernia     History of colon polyps     History of giardia infection     History of Helicobacter pylori infection     IBS (irritable bowel syndrome)     IgA deficiency     CANNOT HAVE ANYTHING WITH IGA IN IT WILL CAUSE ANAPHYLAXIS    Immunoglobulin deficiency     Peptic ulcer disease     PONV (postoperative nausea and vomiting)     Torn meniscus     Wound discharge     LT BREAST       Current Outpatient Medications   Medication Sig Dispense Refill    ALBUTEROL IN Inhale 2 puffs As Needed (\"USES ONLY WTH A RESPIRATORY INFECTION\").      albuterol sulfate  (90 Base) MCG/ACT inhaler 2 PUFFS EVERY 4 HOURS AS NEEDED FOR COUGH, WHEEZE OR SHORTNESS OF BREATH. USE WITH VORTEX SPACER.      Ascorbic Acid (VITAMIN C) 500 MG capsule Take 1 tablet by mouth Daily. PT TO HOLD MED PRIOR TO OR      b " complex vitamins tablet Take 1 tablet by mouth Daily. PT TO HOLD MED PRIOR TO OR      Calcium-Magnesium-Vitamin D (CALCIUM MAGNESIUM PO) Take 1 tablet by mouth Daily. PT TO HOLD MED PRIOR TO OR      Cholecalciferol (VITAMIN D-3 PO) Take 1 tablet by mouth Daily. PT TO HOLD MED PRIOR TO OR      Coenzyme Q10 (CO Q 10 PO) Take 1 tablet by mouth Daily. PT TO HOLD MED PRIOR TO OR      CRANBERRY PO Take 2 tablets by mouth Daily.      dicyclomine (BENTYL) 10 MG capsule TAKE 1 CAPSULE BY MOUTH 4 (FOUR) TIMES A DAY BEFORE MEALS & AT BEDTIME. 120 capsule 5    famotidine (PEPCID) 20 MG tablet TAKE 1 TABLET BY MOUTH TWICE A  tablet 3    Flaxseed, Linseed, (FLAX SEEDS PO) Take 1 capsule by mouth Daily. PT TO HOLD MED PRIOR TO OR      fluticasone (FLONASE) 50 MCG/ACT nasal spray 2 sprays into the nostril(s) as directed by provider As Needed.      FOLIC ACID PO Take 1 tablet by mouth Daily. PT TO HOLD MED PRIOR TO OR      Ginkgo 60 MG tablet Take 1 tablet by mouth Daily. PT TO HOLD MED PRIOR TO OR      GuaiFENesin (MUCINEX PO) Take 1 tablet by mouth As Needed.      Immune Globulin, Human, (GAMMAGARD IJ) Inject  as directed Every 28 (Twenty-Eight) Days. INFUSION LAST DOSE 3/17/22      LECITHIN PO Take 1 tablet by mouth Daily. PT TO HOLD MED PRIOR TO OR      Multiple Vitamins-Minerals (MULTIVITAMIN ADULT PO) Take 1 tablet by mouth Daily. PT TO HOLD MED PRIOR TO OR      Omega-3 Fatty Acids (OMEGA 3 PO) Take 1,000 mg by mouth Daily. LAST DOSE 5/12/17      RESVERATROL PO Take 1 tablet by mouth Daily. PT TO HOLD MED PRIOR TO OR      tamoxifen (NOLVADEX) 20 MG chemo tablet Take 1 tablet by mouth Daily. 90 tablet 3    VITAMIN A PO Take 1 tablet by mouth Every Other Day. LAST DOSE 5/12/17      Vitamin Mixture (VITAMIN E COMPLETE) capsule Take 2 capsules by mouth Daily. PT TO HOLD MED PRIOR TO OR      Zinc 15 MG capsule Take 15 mg by mouth 2 (two) times a day.       Current Facility-Administered Medications   Medication Dose Route  Frequency Provider Last Rate Last Admin    acetaminophen (TYLENOL) tablet 500 mg  500 mg Oral Once Jnoh Collier MD           PRN Meds:.    Allergies   Allergen Reactions    Other Anaphylaxis     BLOOD PRODUCTS WITH IGA IN THEM    Erythromycin GI Intolerance     VOMITING    Avelox [Moxifloxacin] Rash    Levaquin [Levofloxacin] Rash       Social History     Socioeconomic History    Marital status:      Spouse name: Ortiz    Number of children: 3    Years of education: College   Tobacco Use    Smoking status: Never    Smokeless tobacco: Never   Substance and Sexual Activity    Alcohol use: Yes     Alcohol/week: 1.0 standard drink of alcohol     Types: 1 Glasses of wine per week     Comment: occasional    Drug use: No    Sexual activity: Not Currently       Family History   Problem Relation Age of Onset    Other Father         POLYP    Colon polyps Father     Prostate cancer Father 68    Pancreatitis Daughter     Malig Hyperthermia Neg Hx        Review of Systems   Constitutional:  Negative for activity change, appetite change, fatigue and unexpected weight change.   HENT:  Negative for congestion, facial swelling, sore throat, trouble swallowing and voice change.    Eyes:  Negative for photophobia and visual disturbance.   Respiratory:  Negative for cough and choking.    Cardiovascular:  Negative for chest pain.   Gastrointestinal:  Positive for abdominal pain and diarrhea. Negative for abdominal distention, anal bleeding, blood in stool, constipation, nausea, rectal pain and vomiting.   Endocrine: Negative for polyphagia.   Musculoskeletal:  Negative for arthralgias, gait problem and joint swelling.   Skin:  Negative for color change, pallor and rash.   Allergic/Immunologic: Negative for food allergies.   Neurological:  Negative for speech difficulty and headaches.   Hematological:  Does not bruise/bleed easily.   Psychiatric/Behavioral:  Negative for agitation, confusion and sleep disturbance.         Vitals:    12/07/23 1344   BP: 157/82   Pulse: 83   Temp: 96.4 °F (35.8 °C)       Physical Exam  Constitutional:       Appearance: She is well-developed.   HENT:      Head: Normocephalic.   Eyes:      Conjunctiva/sclera: Conjunctivae normal.   Cardiovascular:      Rate and Rhythm: Normal rate and regular rhythm.   Pulmonary:      Breath sounds: Normal breath sounds.   Abdominal:      General: Bowel sounds are normal.      Palpations: Abdomen is soft.   Musculoskeletal:         General: Normal range of motion.      Cervical back: Normal range of motion.   Skin:     General: Skin is warm and dry.   Neurological:      Mental Status: She is alert and oriented to person, place, and time.   Psychiatric:         Behavior: Behavior normal.         ASSESSMENT   #1 GERD: Managed with H2 blocker  #2 IBS-D: Managing symptoms with antispasmodic and fiber  #3 family history of colon polyps      PLAN  Schedule EGD and colonoscopy  Continue current medical regimen      ICD-10-CM ICD-9-CM   1. FH: colon polyps  Z83.719 V18.51   2. Irritable bowel syndrome with diarrhea  K58.0 564.1   3. Gastroesophageal reflux disease, unspecified whether esophagitis present  K21.9 530.81

## 2023-12-14 ENCOUNTER — INFUSION (OUTPATIENT)
Dept: ONCOLOGY | Facility: HOSPITAL | Age: 78
End: 2023-12-14
Payer: MEDICARE

## 2023-12-14 VITALS
RESPIRATION RATE: 15 BRPM | TEMPERATURE: 98.6 F | WEIGHT: 144.2 LBS | BODY MASS INDEX: 24.03 KG/M2 | SYSTOLIC BLOOD PRESSURE: 118 MMHG | HEIGHT: 65 IN | DIASTOLIC BLOOD PRESSURE: 71 MMHG | OXYGEN SATURATION: 98 % | HEART RATE: 69 BPM

## 2023-12-14 DIAGNOSIS — D80.2 IGA DEFICIENCY: Primary | ICD-10-CM

## 2023-12-14 DIAGNOSIS — D83.9 COMMON VARIABLE IMMUNODEFICIENCY: ICD-10-CM

## 2023-12-14 LAB — IGG1 SER-MCNC: 1099 MG/DL (ref 700–1600)

## 2023-12-14 PROCEDURE — 25010000002 IMMUNE GLOBULIN (HUMAN) PER 500 MG: Performed by: ALLERGY & IMMUNOLOGY

## 2023-12-14 PROCEDURE — 96365 THER/PROPH/DIAG IV INF INIT: CPT

## 2023-12-14 PROCEDURE — 96366 THER/PROPH/DIAG IV INF ADDON: CPT

## 2023-12-14 PROCEDURE — 25810000003 SODIUM CHLORIDE 0.9 % SOLUTION: Performed by: ALLERGY & IMMUNOLOGY

## 2023-12-14 PROCEDURE — 82784 ASSAY IGA/IGD/IGG/IGM EACH: CPT | Performed by: ALLERGY & IMMUNOLOGY

## 2023-12-14 RX ORDER — DIPHENHYDRAMINE HCL 25 MG
25 CAPSULE ORAL ONCE
Start: 2024-01-11 | End: 2024-01-11

## 2023-12-14 RX ORDER — ACETAMINOPHEN 500 MG
500 TABLET ORAL ONCE
Status: DISCONTINUED | OUTPATIENT
Start: 2023-12-14 | End: 2023-12-14 | Stop reason: HOSPADM

## 2023-12-14 RX ORDER — ACETAMINOPHEN 500 MG
500 TABLET ORAL ONCE
Start: 2024-01-11 | End: 2024-01-11

## 2023-12-14 RX ORDER — DIPHENHYDRAMINE HCL 25 MG
50 CAPSULE ORAL ONCE AS NEEDED
Start: 2024-01-11

## 2023-12-14 RX ORDER — DIPHENHYDRAMINE HCL 25 MG
25 CAPSULE ORAL ONCE
Status: DISCONTINUED | OUTPATIENT
Start: 2023-12-14 | End: 2023-12-14 | Stop reason: HOSPADM

## 2023-12-14 RX ORDER — SODIUM CHLORIDE 9 MG/ML
250 INJECTION, SOLUTION INTRAVENOUS ONCE
Status: COMPLETED | OUTPATIENT
Start: 2023-12-14 | End: 2023-12-14

## 2023-12-14 RX ORDER — EPINEPHRINE 0.3 MG/.3ML
0.3 INJECTION SUBCUTANEOUS ONCE AS NEEDED
Start: 2024-01-11

## 2023-12-14 RX ORDER — SODIUM CHLORIDE 9 MG/ML
250 INJECTION, SOLUTION INTRAVENOUS ONCE
OUTPATIENT
Start: 2024-01-11

## 2023-12-14 RX ADMIN — IMMUNE GLOBULIN INTRAVENOUS (HUMAN) 10 G: KIT at 12:33

## 2023-12-14 RX ADMIN — Medication 10 G: at 13:41

## 2023-12-14 RX ADMIN — SODIUM CHLORIDE 250 ML: 9 INJECTION, SOLUTION INTRAVENOUS at 12:21

## 2024-01-11 ENCOUNTER — INFUSION (OUTPATIENT)
Dept: ONCOLOGY | Facility: HOSPITAL | Age: 79
End: 2024-01-11
Payer: MEDICARE

## 2024-01-11 VITALS
TEMPERATURE: 97.9 F | WEIGHT: 142.6 LBS | DIASTOLIC BLOOD PRESSURE: 79 MMHG | BODY MASS INDEX: 23.76 KG/M2 | SYSTOLIC BLOOD PRESSURE: 117 MMHG | RESPIRATION RATE: 15 BRPM | HEIGHT: 65 IN | OXYGEN SATURATION: 97 % | HEART RATE: 73 BPM

## 2024-01-11 DIAGNOSIS — D80.2 IGA DEFICIENCY: ICD-10-CM

## 2024-01-11 DIAGNOSIS — D83.9 COMMON VARIABLE IMMUNODEFICIENCY: Primary | ICD-10-CM

## 2024-01-11 PROCEDURE — 25010000002 IMMUNE GLOBULIN (HUMAN) PER 500 MG: Performed by: ALLERGY & IMMUNOLOGY

## 2024-01-11 PROCEDURE — 96365 THER/PROPH/DIAG IV INF INIT: CPT

## 2024-01-11 PROCEDURE — 63710000001 ACETAMINOPHEN 500 MG TABLET: Performed by: ALLERGY & IMMUNOLOGY

## 2024-01-11 PROCEDURE — 25810000003 SODIUM CHLORIDE 0.9 % SOLUTION: Performed by: ALLERGY & IMMUNOLOGY

## 2024-01-11 PROCEDURE — A9270 NON-COVERED ITEM OR SERVICE: HCPCS | Performed by: ALLERGY & IMMUNOLOGY

## 2024-01-11 PROCEDURE — 96366 THER/PROPH/DIAG IV INF ADDON: CPT

## 2024-01-11 RX ORDER — ACETAMINOPHEN 500 MG
500 TABLET ORAL ONCE
Start: 2024-02-08 | End: 2024-02-08

## 2024-01-11 RX ORDER — SODIUM CHLORIDE 9 MG/ML
250 INJECTION, SOLUTION INTRAVENOUS ONCE
Status: COMPLETED | OUTPATIENT
Start: 2024-01-11 | End: 2024-01-11

## 2024-01-11 RX ORDER — ACETAMINOPHEN 500 MG
500 TABLET ORAL ONCE
Status: COMPLETED | OUTPATIENT
Start: 2024-01-11 | End: 2024-01-11

## 2024-01-11 RX ORDER — SODIUM CHLORIDE 9 MG/ML
250 INJECTION, SOLUTION INTRAVENOUS ONCE
OUTPATIENT
Start: 2024-02-08

## 2024-01-11 RX ORDER — DIPHENHYDRAMINE HCL 25 MG
25 CAPSULE ORAL ONCE
Start: 2024-02-08 | End: 2024-02-08

## 2024-01-11 RX ORDER — DIPHENHYDRAMINE HCL 25 MG
50 CAPSULE ORAL ONCE AS NEEDED
Start: 2024-02-08

## 2024-01-11 RX ORDER — EPINEPHRINE 0.3 MG/.3ML
0.3 INJECTION SUBCUTANEOUS ONCE AS NEEDED
Start: 2024-02-08

## 2024-01-11 RX ADMIN — ACETAMINOPHEN 500 MG: 500 TABLET, FILM COATED ORAL at 11:40

## 2024-01-11 RX ADMIN — SODIUM CHLORIDE 250 ML: 9 INJECTION, SOLUTION INTRAVENOUS at 11:40

## 2024-01-11 RX ADMIN — IMMUNE GLOBULIN INTRAVENOUS (HUMAN) 10 G: KIT at 12:15

## 2024-01-11 RX ADMIN — IMMUNE GLOBULIN INTRAVENOUS (HUMAN) 10 G: KIT at 14:02

## 2024-01-11 NOTE — NURSING NOTE
Pt declined pre med benadryl prior to IVIG due to unwanted drowsy effect. Tolerated infusion without any complications and discharged in stable condition.

## 2024-02-08 ENCOUNTER — INFUSION (OUTPATIENT)
Dept: ONCOLOGY | Facility: HOSPITAL | Age: 79
End: 2024-02-08
Payer: MEDICARE

## 2024-02-08 VITALS
SYSTOLIC BLOOD PRESSURE: 100 MMHG | BODY MASS INDEX: 23.82 KG/M2 | OXYGEN SATURATION: 97 % | RESPIRATION RATE: 16 BRPM | HEIGHT: 65 IN | TEMPERATURE: 97.9 F | HEART RATE: 83 BPM | WEIGHT: 143 LBS | DIASTOLIC BLOOD PRESSURE: 65 MMHG

## 2024-02-08 DIAGNOSIS — D80.2 IGA DEFICIENCY: ICD-10-CM

## 2024-02-08 DIAGNOSIS — D83.9 COMMON VARIABLE IMMUNODEFICIENCY: Primary | ICD-10-CM

## 2024-02-08 PROCEDURE — 96365 THER/PROPH/DIAG IV INF INIT: CPT

## 2024-02-08 PROCEDURE — 96366 THER/PROPH/DIAG IV INF ADDON: CPT

## 2024-02-08 PROCEDURE — 25810000003 SODIUM CHLORIDE 0.9 % SOLUTION: Performed by: ALLERGY & IMMUNOLOGY

## 2024-02-08 PROCEDURE — 25010000002 IMMUNE GLOBULIN (HUMAN) PER 500 MG: Performed by: ALLERGY & IMMUNOLOGY

## 2024-02-08 RX ORDER — SODIUM CHLORIDE 9 MG/ML
250 INJECTION, SOLUTION INTRAVENOUS ONCE
OUTPATIENT
Start: 2024-03-07

## 2024-02-08 RX ORDER — SODIUM CHLORIDE 9 MG/ML
250 INJECTION, SOLUTION INTRAVENOUS ONCE
Status: COMPLETED | OUTPATIENT
Start: 2024-02-08 | End: 2024-02-08

## 2024-02-08 RX ORDER — EPINEPHRINE 0.3 MG/.3ML
0.3 INJECTION SUBCUTANEOUS ONCE AS NEEDED
Start: 2024-03-07

## 2024-02-08 RX ORDER — DIPHENHYDRAMINE HCL 25 MG
50 CAPSULE ORAL ONCE AS NEEDED
Start: 2024-03-07

## 2024-02-08 RX ORDER — ACETAMINOPHEN 500 MG
500 TABLET ORAL ONCE
Start: 2024-03-07 | End: 2024-03-07

## 2024-02-08 RX ORDER — DIPHENHYDRAMINE HCL 25 MG
25 CAPSULE ORAL ONCE
Start: 2024-03-07 | End: 2024-03-07

## 2024-02-08 RX ADMIN — IMMUNE GLOBULIN INTRAVENOUS (HUMAN) 10 G: KIT at 12:21

## 2024-02-08 RX ADMIN — SODIUM CHLORIDE 250 ML: 9 INJECTION, SOLUTION INTRAVENOUS at 12:21

## 2024-02-08 RX ADMIN — IMMUNE GLOBULIN INTRAVENOUS (HUMAN) 10 G: KIT at 13:44

## 2024-02-08 NOTE — NURSING NOTE
Pt arrived for IVIG and reports taking tylenol at home prior to arrival and declines benadryl stating it makes her too drowsy.

## 2024-03-07 ENCOUNTER — INFUSION (OUTPATIENT)
Dept: ONCOLOGY | Facility: HOSPITAL | Age: 79
End: 2024-03-07
Payer: MEDICARE

## 2024-03-07 VITALS
DIASTOLIC BLOOD PRESSURE: 76 MMHG | BODY MASS INDEX: 23.49 KG/M2 | RESPIRATION RATE: 15 BRPM | SYSTOLIC BLOOD PRESSURE: 119 MMHG | TEMPERATURE: 97.6 F | OXYGEN SATURATION: 96 % | HEIGHT: 65 IN | WEIGHT: 141 LBS | HEART RATE: 65 BPM

## 2024-03-07 DIAGNOSIS — D83.9 COMMON VARIABLE IMMUNODEFICIENCY: Primary | ICD-10-CM

## 2024-03-07 DIAGNOSIS — D80.2 IGA DEFICIENCY: ICD-10-CM

## 2024-03-07 LAB — IGG1 SER-MCNC: 1095 MG/DL (ref 700–1600)

## 2024-03-07 PROCEDURE — 25010000002 IMMUNE GLOBULIN (HUMAN) PER 500 MG: Performed by: ALLERGY & IMMUNOLOGY

## 2024-03-07 PROCEDURE — 96366 THER/PROPH/DIAG IV INF ADDON: CPT

## 2024-03-07 PROCEDURE — 96365 THER/PROPH/DIAG IV INF INIT: CPT

## 2024-03-07 PROCEDURE — 82784 ASSAY IGA/IGD/IGG/IGM EACH: CPT | Performed by: ALLERGY & IMMUNOLOGY

## 2024-03-07 PROCEDURE — 25810000003 SODIUM CHLORIDE 0.9 % SOLUTION: Performed by: ALLERGY & IMMUNOLOGY

## 2024-03-07 RX ORDER — SODIUM CHLORIDE 9 MG/ML
250 INJECTION, SOLUTION INTRAVENOUS ONCE
Status: COMPLETED | OUTPATIENT
Start: 2024-03-07 | End: 2024-03-07

## 2024-03-07 RX ORDER — EPINEPHRINE 0.3 MG/.3ML
0.3 INJECTION SUBCUTANEOUS ONCE AS NEEDED
Start: 2024-04-04

## 2024-03-07 RX ORDER — SODIUM CHLORIDE 9 MG/ML
250 INJECTION, SOLUTION INTRAVENOUS ONCE
OUTPATIENT
Start: 2024-04-04

## 2024-03-07 RX ORDER — DIPHENHYDRAMINE HCL 25 MG
50 CAPSULE ORAL ONCE AS NEEDED
Start: 2024-04-04

## 2024-03-07 RX ORDER — ACETAMINOPHEN 500 MG
500 TABLET ORAL ONCE
Start: 2024-04-04 | End: 2024-04-04

## 2024-03-07 RX ORDER — DIPHENHYDRAMINE HCL 25 MG
25 CAPSULE ORAL ONCE
Start: 2024-04-04 | End: 2024-04-04

## 2024-03-07 RX ADMIN — IMMUNE GLOBULIN INTRAVENOUS (HUMAN) 10 G: KIT at 12:46

## 2024-03-07 RX ADMIN — SODIUM CHLORIDE 250 ML: 9 INJECTION, SOLUTION INTRAVENOUS at 12:35

## 2024-03-07 RX ADMIN — Medication 10 G: at 14:06

## 2024-04-04 ENCOUNTER — INFUSION (OUTPATIENT)
Dept: ONCOLOGY | Facility: HOSPITAL | Age: 79
End: 2024-04-04
Payer: MEDICARE

## 2024-04-04 VITALS
RESPIRATION RATE: 15 BRPM | HEIGHT: 64 IN | WEIGHT: 143 LBS | SYSTOLIC BLOOD PRESSURE: 107 MMHG | HEART RATE: 74 BPM | BODY MASS INDEX: 24.41 KG/M2 | TEMPERATURE: 97.8 F | DIASTOLIC BLOOD PRESSURE: 61 MMHG | OXYGEN SATURATION: 97 %

## 2024-04-04 DIAGNOSIS — D83.9 COMMON VARIABLE IMMUNODEFICIENCY: Primary | ICD-10-CM

## 2024-04-04 DIAGNOSIS — D80.2 IGA DEFICIENCY: ICD-10-CM

## 2024-04-04 LAB
BASOPHILS # BLD AUTO: 0.04 10*3/MM3 (ref 0–0.2)
BASOPHILS NFR BLD AUTO: 0.7 % (ref 0–1.5)
DEPRECATED RDW RBC AUTO: 48.8 FL (ref 37–54)
EOSINOPHIL # BLD AUTO: 0.07 10*3/MM3 (ref 0–0.4)
EOSINOPHIL NFR BLD AUTO: 1.3 % (ref 0.3–6.2)
ERYTHROCYTE [DISTWIDTH] IN BLOOD BY AUTOMATED COUNT: 13.2 % (ref 12.3–15.4)
HCT VFR BLD AUTO: 36.6 % (ref 34–46.6)
HGB BLD-MCNC: 12.1 G/DL (ref 12–15.9)
IMM GRANULOCYTES # BLD AUTO: 0 10*3/MM3 (ref 0–0.05)
IMM GRANULOCYTES NFR BLD AUTO: 0 % (ref 0–0.5)
LYMPHOCYTES # BLD AUTO: 1.92 10*3/MM3 (ref 0.7–3.1)
LYMPHOCYTES NFR BLD AUTO: 34.8 % (ref 19.6–45.3)
MCH RBC QN AUTO: 32.9 PG (ref 26.6–33)
MCHC RBC AUTO-ENTMCNC: 33.1 G/DL (ref 31.5–35.7)
MCV RBC AUTO: 99.5 FL (ref 79–97)
MONOCYTES # BLD AUTO: 0.46 10*3/MM3 (ref 0.1–0.9)
MONOCYTES NFR BLD AUTO: 8.3 % (ref 5–12)
NEUTROPHILS NFR BLD AUTO: 3.03 10*3/MM3 (ref 1.7–7)
NEUTROPHILS NFR BLD AUTO: 54.9 % (ref 42.7–76)
NRBC BLD AUTO-RTO: 0 /100 WBC (ref 0–0.2)
PLATELET # BLD AUTO: 239 10*3/MM3 (ref 140–450)
PMV BLD AUTO: 9.6 FL (ref 6–12)
RBC # BLD AUTO: 3.68 10*6/MM3 (ref 3.77–5.28)
WBC NRBC COR # BLD AUTO: 5.52 10*3/MM3 (ref 3.4–10.8)

## 2024-04-04 PROCEDURE — 25010000002 IMMUNE GLOBULIN (HUMAN) PER 500 MG: Performed by: ALLERGY & IMMUNOLOGY

## 2024-04-04 PROCEDURE — 96365 THER/PROPH/DIAG IV INF INIT: CPT

## 2024-04-04 PROCEDURE — 85025 COMPLETE CBC W/AUTO DIFF WBC: CPT | Performed by: ALLERGY & IMMUNOLOGY

## 2024-04-04 PROCEDURE — 25810000003 SODIUM CHLORIDE 0.9 % SOLUTION: Performed by: ALLERGY & IMMUNOLOGY

## 2024-04-04 PROCEDURE — 96366 THER/PROPH/DIAG IV INF ADDON: CPT

## 2024-04-04 RX ORDER — SODIUM CHLORIDE 9 MG/ML
250 INJECTION, SOLUTION INTRAVENOUS ONCE
Status: COMPLETED | OUTPATIENT
Start: 2024-04-04 | End: 2024-04-04

## 2024-04-04 RX ORDER — ACETAMINOPHEN 500 MG
500 TABLET ORAL ONCE
Status: DISCONTINUED | OUTPATIENT
Start: 2024-04-04 | End: 2024-04-04 | Stop reason: HOSPADM

## 2024-04-04 RX ORDER — DIPHENHYDRAMINE HCL 25 MG
50 CAPSULE ORAL ONCE AS NEEDED
Start: 2024-05-02

## 2024-04-04 RX ORDER — EPINEPHRINE 0.3 MG/.3ML
0.3 INJECTION SUBCUTANEOUS ONCE AS NEEDED
Start: 2024-05-02

## 2024-04-04 RX ORDER — ACETAMINOPHEN 500 MG
500 TABLET ORAL ONCE
Start: 2024-05-02 | End: 2024-05-02

## 2024-04-04 RX ORDER — DIPHENHYDRAMINE HCL 25 MG
25 CAPSULE ORAL ONCE
Start: 2024-05-02 | End: 2024-05-02

## 2024-04-04 RX ORDER — SODIUM CHLORIDE 9 MG/ML
250 INJECTION, SOLUTION INTRAVENOUS ONCE
OUTPATIENT
Start: 2024-05-02

## 2024-04-04 RX ADMIN — SODIUM CHLORIDE 250 ML: 9 INJECTION, SOLUTION INTRAVENOUS at 11:52

## 2024-04-04 RX ADMIN — Medication 10 G: at 11:52

## 2024-04-04 RX ADMIN — IMMUNE GLOBULIN INTRAVENOUS (HUMAN) 10 G: KIT at 13:20

## 2024-04-04 NOTE — NURSING NOTE
Pt arrived for IVIG infusion stating she took premeds at home prior to arriving. CBC collected and results faxed to Dr. Collier.

## 2024-04-22 ENCOUNTER — TELEPHONE (OUTPATIENT)
Dept: GASTROENTEROLOGY | Facility: CLINIC | Age: 79
End: 2024-04-22
Payer: MEDICARE

## 2024-04-22 NOTE — TELEPHONE ENCOUNTER
Caller: Elaina Tom    Relationship: Self    Best call back number: [phone]    What is the best time to reach you: ANYTIME     Who are you requesting to speak with (clinical staff, provider,  specific staff member): CLINICAL STAFF         What was the call regarding: PT IS NEEDING A CALLBACK SHE HAS SOME QUESTIONS ABOUT THE PREP FOR HER COLONOSCOPY 4/26. PLEASE CALL AND ADVISE

## 2024-04-23 NOTE — TELEPHONE ENCOUNTER
I have called pt and answered her questions regarding what kind of Dulcolax pills she needed. So I told her she needed the laxatives not the stool softener.

## 2024-04-24 ENCOUNTER — TELEPHONE (OUTPATIENT)
Dept: GASTROENTEROLOGY | Facility: CLINIC | Age: 79
End: 2024-04-24
Payer: MEDICARE

## 2024-04-24 NOTE — TELEPHONE ENCOUNTER
Hub staff attempted to follow warm transfer process and was unsuccessful     Caller: Elaina Tom A    Relationship to patient: Self    Best call back number: [phone]    Patient is needing: PATIENT CALLED IN, SHE SAID SHE WENT TO 3 DIFFERENT PLACES TO LOOK FOR THE DUCOLAX SHE WAS TOLD TO GET AND THEY DIDN'T HAVE IT. SHE SAID SHE ALSO LOOKED UP ONLINE WHAT SHE WAS TOLD TO GET AND IT DOESN'T SHOW THEY HAVE THAT SHE STATED. SHE ISN'T SURE WHAT TO DO AND WOULD LIKE A CALL BACK AS SOON AS POSSIBLE PLEASE AS HER PROCEDURE IS 4/26/24. YOU CAN LEAVE A VMAIL.

## 2024-04-26 ENCOUNTER — ANESTHESIA (OUTPATIENT)
Dept: GASTROENTEROLOGY | Facility: HOSPITAL | Age: 79
End: 2024-04-26
Payer: MEDICARE

## 2024-04-26 ENCOUNTER — HOSPITAL ENCOUNTER (OUTPATIENT)
Facility: HOSPITAL | Age: 79
Setting detail: HOSPITAL OUTPATIENT SURGERY
Discharge: HOME | End: 2024-04-26
Attending: INTERNAL MEDICINE | Admitting: INTERNAL MEDICINE
Payer: MEDICARE

## 2024-04-26 ENCOUNTER — ANESTHESIA EVENT (OUTPATIENT)
Dept: GASTROENTEROLOGY | Facility: HOSPITAL | Age: 79
End: 2024-04-26
Payer: MEDICARE

## 2024-04-26 VITALS
DIASTOLIC BLOOD PRESSURE: 68 MMHG | HEIGHT: 65 IN | BODY MASS INDEX: 23.39 KG/M2 | SYSTOLIC BLOOD PRESSURE: 126 MMHG | HEART RATE: 74 BPM | OXYGEN SATURATION: 9 % | WEIGHT: 140.4 LBS | RESPIRATION RATE: 16 BRPM

## 2024-04-26 DIAGNOSIS — K21.9 GASTROESOPHAGEAL REFLUX DISEASE, UNSPECIFIED WHETHER ESOPHAGITIS PRESENT: ICD-10-CM

## 2024-04-26 DIAGNOSIS — Z83.719 FH: COLON POLYPS: ICD-10-CM

## 2024-04-26 PROCEDURE — 43239 EGD BIOPSY SINGLE/MULTIPLE: CPT | Performed by: INTERNAL MEDICINE

## 2024-04-26 PROCEDURE — G0105 COLORECTAL SCRN; HI RISK IND: HCPCS | Performed by: INTERNAL MEDICINE

## 2024-04-26 PROCEDURE — 25010000002 PROPOFOL 1000 MG/100ML EMULSION: Performed by: NURSE ANESTHETIST, CERTIFIED REGISTERED

## 2024-04-26 PROCEDURE — 87081 CULTURE SCREEN ONLY: CPT | Performed by: INTERNAL MEDICINE

## 2024-04-26 PROCEDURE — 25810000003 LACTATED RINGERS PER 1000 ML: Performed by: INTERNAL MEDICINE

## 2024-04-26 PROCEDURE — 88342 IMHCHEM/IMCYTCHM 1ST ANTB: CPT | Performed by: INTERNAL MEDICINE

## 2024-04-26 PROCEDURE — 25010000002 PROPOFOL 200 MG/20ML EMULSION: Performed by: NURSE ANESTHETIST, CERTIFIED REGISTERED

## 2024-04-26 PROCEDURE — S0260 H&P FOR SURGERY: HCPCS | Performed by: INTERNAL MEDICINE

## 2024-04-26 PROCEDURE — 88305 TISSUE EXAM BY PATHOLOGIST: CPT | Performed by: INTERNAL MEDICINE

## 2024-04-26 RX ORDER — SODIUM CHLORIDE, SODIUM LACTATE, POTASSIUM CHLORIDE, CALCIUM CHLORIDE 600; 310; 30; 20 MG/100ML; MG/100ML; MG/100ML; MG/100ML
1000 INJECTION, SOLUTION INTRAVENOUS CONTINUOUS
Status: DISCONTINUED | OUTPATIENT
Start: 2024-04-26 | End: 2024-04-26 | Stop reason: HOSPADM

## 2024-04-26 RX ORDER — PROPOFOL 10 MG/ML
INJECTION, EMULSION INTRAVENOUS AS NEEDED
Status: DISCONTINUED | OUTPATIENT
Start: 2024-04-26 | End: 2024-04-26 | Stop reason: SURG

## 2024-04-26 RX ORDER — LIDOCAINE HYDROCHLORIDE 20 MG/ML
INJECTION, SOLUTION INFILTRATION; PERINEURAL AS NEEDED
Status: DISCONTINUED | OUTPATIENT
Start: 2024-04-26 | End: 2024-04-26 | Stop reason: SURG

## 2024-04-26 RX ORDER — SODIUM CHLORIDE 0.9 % (FLUSH) 0.9 %
10 SYRINGE (ML) INJECTION AS NEEDED
Status: DISCONTINUED | OUTPATIENT
Start: 2024-04-26 | End: 2024-04-26 | Stop reason: HOSPADM

## 2024-04-26 RX ORDER — PROPOFOL 10 MG/ML
INJECTION, EMULSION INTRAVENOUS CONTINUOUS PRN
Status: DISCONTINUED | OUTPATIENT
Start: 2024-04-26 | End: 2024-04-26 | Stop reason: SURG

## 2024-04-26 RX ADMIN — LIDOCAINE HYDROCHLORIDE 60 MG: 20 INJECTION, SOLUTION INFILTRATION; PERINEURAL at 11:29

## 2024-04-26 RX ADMIN — PROPOFOL INJECTABLE EMULSION 100 MG: 10 INJECTION, EMULSION INTRAVENOUS at 11:29

## 2024-04-26 RX ADMIN — PROPOFOL 140 MCG/KG/MIN: 10 INJECTION, EMULSION INTRAVENOUS at 11:31

## 2024-04-26 RX ADMIN — SODIUM CHLORIDE, POTASSIUM CHLORIDE, SODIUM LACTATE AND CALCIUM CHLORIDE 1000 ML: 600; 310; 30; 20 INJECTION, SOLUTION INTRAVENOUS at 10:47

## 2024-04-26 RX ADMIN — PROPOFOL INJECTABLE EMULSION 50 MG: 10 INJECTION, EMULSION INTRAVENOUS at 11:34

## 2024-04-26 NOTE — ANESTHESIA PREPROCEDURE EVALUATION
" Anesthesia Evaluation     Patient summary reviewed and Nursing notes reviewed   history of anesthetic complications:  PONV prolonged sedation  NPO Solid Status: > 8 hours  NPO Liquid Status: > 2 hours           Airway   Mallampati: II  TM distance: >3 FB  Neck ROM: full  No difficulty expected  Dental - normal exam     Pulmonary    (+) asthma,  (-) COPD  Cardiovascular   Exercise tolerance: good (4-7 METS)    Rhythm: regular    (-) past MI, angina, cardiac stents      Neuro/Psych  (-) seizures, CVA  GI/Hepatic/Renal/Endo    (+) GERD well controlled, PUD  (-) liver disease, no renal disease    Musculoskeletal     Abdominal    Substance History - negative use     OB/GYN          Other   arthritis,                       Anesthesia Plan    ASA 2     MAC     (I have reviewed the patient's history and chart with the patient, including all pertinent laboratory results and imaging. I have explained the risks of monitored anesthesia care including but not limited to respiratory depression, possible need for airway intervention, or possible intra-op recall. I explained that airway intervention involves risk of possible dental injury.    VITALS:  /79 (BP Location: Right arm, Patient Position: Lying)   Pulse 79   Resp 16   Ht 165.1 cm (65\")   Wt 63.7 kg (140 lb 6.4 oz)   SpO2 96%   BMI 23.36 kg/m² )  intravenous induction     Anesthetic plan, risks, benefits, and alternatives have been provided, discussed and informed consent has been obtained with: patient.  Pre-procedure education provided      CODE STATUS:         "

## 2024-04-26 NOTE — ANESTHESIA POSTPROCEDURE EVALUATION
Patient: Elaina Tom    Procedure Summary       Date: 04/26/24 Room / Location:  [facility]    Anesthesia Start: 1118 Anesthesia Stop: 1203    Procedures:       ESOPHAGOGASTRODUODENOSCOPY with cold biopsies (Esophagus)      COLONOSCOPY to cecum and TI Diagnosis:       Hiatal hernia      Gastritis      Tortuous colon      Hemorrhoids      (FH: colon polyps [Z83.719])      (Gastroesophageal reflux disease, unspecified whether esophagitis present [K21.9])    Surgeons: Rigoberto Rogers MD Provider: Marisol Beatty MD    Anesthesia Type: MAC ASA Status: 2            Anesthesia Type: MAC    Vitals  Vitals Value Taken Time   /68 04/26/24 1223   Temp     Pulse 80 04/26/24 1226   Resp 16 04/26/24 1222   SpO2 96 % 04/26/24 1226   Vitals shown include unfiled device data.        Post Anesthesia Care and Evaluation    Patient location during evaluation: PHASE II  Level of consciousness: awake  Pain management: adequate    Airway patency: patent  Anesthetic complications: No anesthetic complications  PONV Status: none  Cardiovascular status: acceptable  Respiratory status: acceptable  Hydration status: acceptable

## 2024-04-26 NOTE — H&P
[facility], [facility]  Pre Procedure History & Physical    Chief Complaint:   GERD, family history    Subjective     HPI:   This 78-year-old female presents the endoscopy suite for upper and lower endoscopic evaluations.  She has issues with reflux and is due for screening evaluation secondary to a family history of colon cancer.  Last colonoscopy performed in 2019.    Past Medical History:   Past Medical History:   Diagnosis Date    Allergic rhinitis     Anxiety     Arthritis     Asthma     ONLY HAS ISSUES WHEN HAS RESPIRATORY INFECTION    Cancer 2017    BREAST-LEFT    Cataract     CVID (common variable immunodeficiency)     External hemorrhoids     Gastritis     GERD (gastroesophageal reflux disease)     H/O CT scan of abdomen     PELVIS AND CHEST: UNREMARKABLE    Hernia     Hiatal hernia     History of colon polyps     History of giardia infection     History of Helicobacter pylori infection     IBS (irritable bowel syndrome)     IgA deficiency     CANNOT HAVE ANYTHING WITH IGA IN IT WILL CAUSE ANAPHYLAXIS    Immunoglobulin deficiency     Peptic ulcer disease     PONV (postoperative nausea and vomiting)     Torn meniscus     Wound discharge     LT BREAST       Past Surgical History:  Past Surgical History:   Procedure Laterality Date    ABDOMINAL WALL ABSCESS INCISION AND DRAINAGE Left 07/03/2017    Procedure: BREAST ABSCESS INCISION AND DRAINAGE AND WOUND DEBRIDMENT;  Surgeon: Steve Leon MD;  [place] [facility] [place] [facility] [place]:     BREAST BIOPSY Left 02/26/2007    Left breast needle localization biopsy; Dr. Steve Leon    BREAST BIOPSY Left 02/02/2007    Left breast stereotactic vacuum assisted core biopsy with marker placement; Dr. Steve Leon    BREAST BIOPSY Left 02/07/2003    Left breast needle localization biopsy; Dr. Steve Leon    BREAST LUMPECTOMY WITH SENTINEL NODE BIOPSY Left 05/19/2017    Procedure: BREAST LUMPECTOMY WITH SENTINEL NODE BIOPSY & MAMMO NEEDLE LOCALIZATION;   Surgeon: Steve Leon MD;  Location:  [facility];  Service:     COLONOSCOPY  01/10/2014    NTEH, TORTS, STOOL    COLONOSCOPY  08/24/2010    IH, TORT, BX-    COLONOSCOPY N/A 05/23/2019    Procedure: COLONOSCOPY TO CECUM AND TERM. ILEUM WITH BIOPSIES;  Surgeon: Rigoberto Rogers MD;  [facility];  Service: Gastroenterology    ENDOSCOPY  01/10/2014    Z-LINE REGULAR, 35 CM FROM INCISORS, HH, GASTRITIS, NO H-PYLORI    ENDOSCOPY  08/24/2010    GASTRITIS    ENDOSCOPY N/A 10/18/2016    Gastritis, duodenitis, HH     ENDOSCOPY N/A 05/23/2019    Procedure: ESOPHAGOGASTRODUODENOSCOPY WITH BIOPSIES AND RAPID UREASE;  Surgeon: Rigoberto Rogers MD;  [facility];  Service: Gastroenterology    ENDOSCOPY AND COLONOSCOPY N/A 08/23/2007    Z-line irregular, normal esophagus, bilious gastric fluid, gastritis, hiatal hernia, normal duodenal bulb and 2nd part of the duodenum; non-thrombosed external hemorrhoids, torts, one 5 mm polyp in the mid sigmoid colon, stool in the sigmoid colon, descending colon, transverse colon, ascending colon and in the cecum-Dr. Rigoberto Rogers    KNEE SURGERY Left 01/2015    SEPTOPLASTY      TONSILLECTOMY      TUBAL ABDOMINAL LIGATION  10/2/81    UPPER GASTROINTESTINAL ENDOSCOPY  5/23/19       Family History:  Family History   Problem Relation Age of Onset    Other Father         POLYP    Colon polyps Father     Prostate cancer Father 68    Pancreatitis Daughter     Malig Hyperthermia Neg Hx        Social History:   reports that she has never smoked. She has never used smokeless tobacco. She reports current alcohol use of about 1.0 standard drink of alcohol per week. She reports that she does not use drugs.    Medications:   No medications prior to admission.       Allergies:  Other, Erythromycin, Avelox [moxifloxacin], and Levaquin [levofloxacin]    ROS:    Pertinent items are noted in HPI, all other systems reviewed and negative     Objective     There were  no vitals taken for this visit.    Physical Exam   Constitutional: Pt is oriented to person, place, and time and well-developed, well-nourished, and in no distress.   Mouth/Throat: Oropharynx is clear and moist.   Neck: Normal range of motion.   Cardiovascular: Normal rate, regular rhythm and normal heart sounds.    Pulmonary/Chest: Effort normal and breath sounds normal.   Abdominal: Soft. Nontender  Skin: Skin is warm and dry.   Psychiatric: Mood, memory, affect and judgment normal.     Assessment & Plan     Diagnosis:  GERD  Family history    Anticipated Surgical Procedure:  EGD, colonoscopy    The risks, benefits, and alternatives of this procedure have been discussed with the patient or the responsible party- the patient understands and agrees to proceed.

## 2024-04-26 NOTE — DISCHARGE INSTRUCTIONS
For the rest of the day patient needs to be with a responsible adult.    For the rest of the day DO NOT work, drive, operate heavy machinery, drink alcohol, make important decisions or sign legal documents.    Advance to regular diet as tolerated, if your stomach is upset start with a light/bland diet.    Follow recommendations on procedure report if provided by your doctor.    Call Dr MARTINEZ for problems [phone]    If these problems occur come to ER: large amounts of bleeding, trouble breathing, repeated vomiting, severe unrelieved pain, fever or chills.

## 2024-04-27 LAB — UREASE TISS QL: NEGATIVE

## 2024-04-30 LAB
LAB AP CASE REPORT: NORMAL
PATH REPORT.ADDENDUM SPEC: NORMAL
PATH REPORT.FINAL DX SPEC: NORMAL
PATH REPORT.GROSS SPEC: NORMAL

## 2024-05-02 ENCOUNTER — INFUSION (OUTPATIENT)
Dept: ONCOLOGY | Facility: HOSPITAL | Age: 79
End: 2024-05-02
Payer: MEDICARE

## 2024-05-02 VITALS
TEMPERATURE: 96.9 F | OXYGEN SATURATION: 97 % | DIASTOLIC BLOOD PRESSURE: 69 MMHG | WEIGHT: 142 LBS | BODY MASS INDEX: 23.63 KG/M2 | SYSTOLIC BLOOD PRESSURE: 121 MMHG | HEART RATE: 84 BPM

## 2024-05-02 DIAGNOSIS — D80.2 IGA DEFICIENCY: ICD-10-CM

## 2024-05-02 DIAGNOSIS — D83.9 COMMON VARIABLE IMMUNODEFICIENCY: Primary | ICD-10-CM

## 2024-05-02 PROCEDURE — 25010000002 IMMUNE GLOBULIN (HUMAN) PER 500 MG: Performed by: ALLERGY & IMMUNOLOGY

## 2024-05-02 PROCEDURE — 25810000003 SODIUM CHLORIDE 0.9 % SOLUTION: Performed by: ALLERGY & IMMUNOLOGY

## 2024-05-02 PROCEDURE — 96365 THER/PROPH/DIAG IV INF INIT: CPT

## 2024-05-02 PROCEDURE — 96366 THER/PROPH/DIAG IV INF ADDON: CPT

## 2024-05-02 RX ORDER — SODIUM CHLORIDE 9 MG/ML
250 INJECTION, SOLUTION INTRAVENOUS ONCE
OUTPATIENT
Start: 2024-05-30

## 2024-05-02 RX ORDER — EPINEPHRINE 0.3 MG/.3ML
0.3 INJECTION SUBCUTANEOUS ONCE AS NEEDED
Start: 2024-05-30

## 2024-05-02 RX ORDER — ACETAMINOPHEN 500 MG
500 TABLET ORAL ONCE
Start: 2024-05-30 | End: 2024-05-30

## 2024-05-02 RX ORDER — DIPHENHYDRAMINE HCL 25 MG
50 CAPSULE ORAL ONCE AS NEEDED
Start: 2024-05-30

## 2024-05-02 RX ORDER — SODIUM CHLORIDE 9 MG/ML
250 INJECTION, SOLUTION INTRAVENOUS ONCE
Status: COMPLETED | OUTPATIENT
Start: 2024-05-02 | End: 2024-05-02

## 2024-05-02 RX ORDER — DIPHENHYDRAMINE HCL 25 MG
25 CAPSULE ORAL ONCE
Start: 2024-05-30 | End: 2024-05-30

## 2024-05-02 RX ADMIN — IMMUNE GLOBULIN INTRAVENOUS (HUMAN) 10 G: KIT at 12:59

## 2024-05-02 RX ADMIN — IMMUNE GLOBULIN INTRAVENOUS (HUMAN) 10 G: KIT at 11:35

## 2024-05-02 RX ADMIN — SODIUM CHLORIDE 250 ML: 9 INJECTION, SOLUTION INTRAVENOUS at 11:31

## 2024-05-07 ENCOUNTER — TELEPHONE (OUTPATIENT)
Dept: GASTROENTEROLOGY | Facility: CLINIC | Age: 79
End: 2024-05-07
Payer: MEDICARE

## 2024-05-08 RX ORDER — OMEPRAZOLE 40 MG/1
40 CAPSULE, DELAYED RELEASE ORAL DAILY
Qty: 90 CAPSULE | Refills: 0 | Status: SHIPPED | OUTPATIENT
Start: 2024-05-08

## 2024-05-23 ENCOUNTER — LAB (OUTPATIENT)
Dept: OTHER | Facility: HOSPITAL | Age: 79
End: 2024-05-23
Payer: MEDICARE

## 2024-05-23 ENCOUNTER — OFFICE VISIT (OUTPATIENT)
Dept: ONCOLOGY | Facility: CLINIC | Age: 79
End: 2024-05-23
Payer: MEDICARE

## 2024-05-23 VITALS
BODY MASS INDEX: 23.49 KG/M2 | WEIGHT: 141 LBS | OXYGEN SATURATION: 97 % | SYSTOLIC BLOOD PRESSURE: 137 MMHG | HEIGHT: 65 IN | DIASTOLIC BLOOD PRESSURE: 77 MMHG | TEMPERATURE: 98 F | RESPIRATION RATE: 16 BRPM | HEART RATE: 81 BPM

## 2024-05-23 DIAGNOSIS — K58.0 IRRITABLE BOWEL SYNDROME WITH DIARRHEA: ICD-10-CM

## 2024-05-23 DIAGNOSIS — C50.512 MALIGNANT NEOPLASM OF LOWER-OUTER QUADRANT OF LEFT BREAST OF FEMALE, ESTROGEN RECEPTOR POSITIVE: ICD-10-CM

## 2024-05-23 DIAGNOSIS — D83.9 COMMON VARIABLE IMMUNODEFICIENCY: ICD-10-CM

## 2024-05-23 DIAGNOSIS — Z78.0 POST-MENOPAUSAL: ICD-10-CM

## 2024-05-23 DIAGNOSIS — Z12.31 SCREENING MAMMOGRAM, ENCOUNTER FOR: ICD-10-CM

## 2024-05-23 DIAGNOSIS — D83.9 COMMON VARIABLE IMMUNODEFICIENCY: Primary | ICD-10-CM

## 2024-05-23 DIAGNOSIS — M81.0 OSTEOPOROSIS, UNSPECIFIED OSTEOPOROSIS TYPE, UNSPECIFIED PATHOLOGICAL FRACTURE PRESENCE: ICD-10-CM

## 2024-05-23 DIAGNOSIS — Z79.811 LONG TERM (CURRENT) USE OF AROMATASE INHIBITORS: ICD-10-CM

## 2024-05-23 DIAGNOSIS — Z17.0 MALIGNANT NEOPLASM OF LOWER-OUTER QUADRANT OF LEFT BREAST OF FEMALE, ESTROGEN RECEPTOR POSITIVE: ICD-10-CM

## 2024-05-23 DIAGNOSIS — D80.2 IGA DEFICIENCY: ICD-10-CM

## 2024-05-23 LAB
ALBUMIN SERPL-MCNC: 4.2 G/DL (ref 3.5–5.2)
ALBUMIN/GLOB SERPL: 1.6 G/DL
ALP SERPL-CCNC: 59 U/L (ref 39–117)
ALT SERPL W P-5'-P-CCNC: 17 U/L (ref 1–33)
ANION GAP SERPL CALCULATED.3IONS-SCNC: 9.6 MMOL/L (ref 5–15)
AST SERPL-CCNC: 31 U/L (ref 1–32)
BASOPHILS # BLD AUTO: 0.04 10*3/MM3 (ref 0–0.2)
BASOPHILS NFR BLD AUTO: 0.8 % (ref 0–1.5)
BILIRUB SERPL-MCNC: 0.5 MG/DL (ref 0–1.2)
BUN SERPL-MCNC: 14 MG/DL (ref 8–23)
BUN/CREAT SERPL: 20.3 (ref 7–25)
CALCIUM SPEC-SCNC: 9.6 MG/DL (ref 8.6–10.5)
CHLORIDE SERPL-SCNC: 104 MMOL/L (ref 98–107)
CO2 SERPL-SCNC: 26.4 MMOL/L (ref 22–29)
CREAT SERPL-MCNC: 0.69 MG/DL (ref 0.57–1)
DEPRECATED RDW RBC AUTO: 49.2 FL (ref 37–54)
EGFRCR SERPLBLD CKD-EPI 2021: 90.1 ML/MIN/1.73
EOSINOPHIL # BLD AUTO: 0.04 10*3/MM3 (ref 0–0.4)
EOSINOPHIL NFR BLD AUTO: 0.8 % (ref 0.3–6.2)
ERYTHROCYTE [DISTWIDTH] IN BLOOD BY AUTOMATED COUNT: 13.4 % (ref 12.3–15.4)
GLOBULIN UR ELPH-MCNC: 2.7 GM/DL
GLUCOSE SERPL-MCNC: 95 MG/DL (ref 65–99)
HCT VFR BLD AUTO: 36.7 % (ref 34–46.6)
HGB BLD-MCNC: 12.1 G/DL (ref 12–15.9)
IMM GRANULOCYTES # BLD AUTO: 0.01 10*3/MM3 (ref 0–0.05)
IMM GRANULOCYTES NFR BLD AUTO: 0.2 % (ref 0–0.5)
LYMPHOCYTES # BLD AUTO: 1.41 10*3/MM3 (ref 0.7–3.1)
LYMPHOCYTES NFR BLD AUTO: 27.3 % (ref 19.6–45.3)
MCH RBC QN AUTO: 33 PG (ref 26.6–33)
MCHC RBC AUTO-ENTMCNC: 33 G/DL (ref 31.5–35.7)
MCV RBC AUTO: 100 FL (ref 79–97)
MONOCYTES # BLD AUTO: 0.44 10*3/MM3 (ref 0.1–0.9)
MONOCYTES NFR BLD AUTO: 8.5 % (ref 5–12)
NEUTROPHILS NFR BLD AUTO: 3.22 10*3/MM3 (ref 1.7–7)
NEUTROPHILS NFR BLD AUTO: 62.4 % (ref 42.7–76)
NRBC BLD AUTO-RTO: 0 /100 WBC (ref 0–0.2)
PLATELET # BLD AUTO: 238 10*3/MM3 (ref 140–450)
PMV BLD AUTO: 9.5 FL (ref 6–12)
POTASSIUM SERPL-SCNC: 3.9 MMOL/L (ref 3.5–5.2)
PROT SERPL-MCNC: 6.9 G/DL (ref 6–8.5)
RBC # BLD AUTO: 3.67 10*6/MM3 (ref 3.77–5.28)
SODIUM SERPL-SCNC: 140 MMOL/L (ref 136–145)
WBC NRBC COR # BLD AUTO: 5.16 10*3/MM3 (ref 3.4–10.8)

## 2024-05-23 PROCEDURE — 85025 COMPLETE CBC W/AUTO DIFF WBC: CPT | Performed by: INTERNAL MEDICINE

## 2024-05-23 PROCEDURE — 80053 COMPREHEN METABOLIC PANEL: CPT | Performed by: INTERNAL MEDICINE

## 2024-05-23 PROCEDURE — 36415 COLL VENOUS BLD VENIPUNCTURE: CPT

## 2024-05-23 RX ORDER — TAMOXIFEN CITRATE 20 MG/1
20 TABLET ORAL DAILY
Qty: 90 TABLET | Refills: 3 | Status: SHIPPED | OUTPATIENT
Start: 2024-05-23

## 2024-05-23 NOTE — PROGRESS NOTES
Subjective     REASON FOR FOLLOW UP:   1. Stage I ( T1c,N0,M0 ) ER+,NJ+, HER 2 neg for a ductal carcinoma of the left breast status post lumpectomy 5/19/2017.  Patient started on adjuvant tamoxifen after the visit of 6/9/2017.  2.  Osteoporosis  3.  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  Development of draining seroma and poor incisional healing in the left breast that required further surgery on 7/3/2017.  5.  Genetic testing negative for any adverse mutations in June 2019    HISTORY OF PRESENT ILLNESS:  The patient is a 78 y.o. year old female who was noted to have a radiographic abnormality in the left breast in March 2017.  She underwent an ultrasound-guided biopsy on 3/28/2017 showing invasive ductal carcinoma which was strongly hormone receptor positive and HER-2/trae negative.  She elected to undergo a lumpectomy procedure with Dr. Leon on 5/19/2017.  The tumor size was 1.2 x 1 x 0.9 cm and 3 sentinel lymph nodes were examined all of which were negative for metastatic cancer.    The patient was seen on 6/9/2017 to discuss postop adjuvant hormonal therapy.  She has a history of osteoporosis.  We discussed initiation of tamoxifen therapy which would have the advantage of helping to maintain her bone density.    Ms. Tom is seen today by me for the first time.  She was previously seen by Dr. Hickman and has been on tamoxifen since 2017.  She has been tolerating tamoxifen well.    The plan is to continue tamoxifen for total of 10 years.    She also has common variable immunodeficiency and has been on IVIG infusions monthly.  She sees an immunologist regularly.    Reports that she is up-to-date on DEXA with the last one being performed last year by her gynecologist.  She is currently not on any bisphosphonates for osteoporosis.    She has twisted her right knee and having some swelling and discomfort at that site.  Otherwise no new bone pains, cough, abdominal pain nausea  "vomiting.            ALLERGIES:    Allergies   Allergen Reactions    Other Anaphylaxis     BLOOD PRODUCTS WITH IGA IN THEM    Erythromycin GI Intolerance     VOMITING    Avelox [Moxifloxacin] Rash    Levaquin [Levofloxacin] Rash         Review of Systems   Constitutional:  Negative for activity change, appetite change, fatigue, fever and unexpected weight change.   HENT:  Negative for hearing loss, nosebleeds, trouble swallowing and voice change.    Eyes:  Negative for visual disturbance.   Respiratory:  Negative for cough, shortness of breath and wheezing.    Cardiovascular:  Negative for chest pain and palpitations.   Gastrointestinal:  Negative for abdominal pain, diarrhea, nausea and vomiting.   Genitourinary:  Negative for difficulty urinating, frequency, hematuria and urgency.   Musculoskeletal:  Negative for back pain and neck pain.   Neurological:  Negative for dizziness, seizures, syncope and headaches.   Hematological:  Negative for adenopathy. Does not bruise/bleed easily.   Psychiatric/Behavioral:  Negative for behavioral problems. The patient is not nervous/anxious.        Objective     Vitals:    05/23/24 1430   BP: 137/77   Pulse: 81   Resp: 16   Temp: 98 °F (36.7 °C)   TempSrc: Oral   SpO2: 97%   Weight: 64 kg (141 lb)   Height: 165 cm (64.96\")   PainSc:   2   PainLoc: Knee         5/23/2024     2:33 PM   Current Status   ECOG score 0       Physical Exam   Constitutional: She is oriented to person, place, and time. She appears well-developed. No distress.   HENT:   Head: Normocephalic.   Eyes: Pupils are equal, round, and reactive to light. Conjunctivae are normal. No scleral icterus.   Neck: No JVD present. No thyromegaly present.   Cardiovascular: Normal rate and regular rhythm. Exam reveals no gallop and no friction rub.   No murmur heard.  Pulmonary/Chest: Effort normal and breath sounds normal. She has no wheezes. She has no rales. Right breast exhibits no mass, no nipple discharge and no skin " change. Left breast exhibits skin change. Left breast exhibits no mass and no nipple discharge.   Left breast shows healed lumpectomy scar and radiation tattoos.   Abdominal: Soft. Bowel sounds are normal. She exhibits no distension and no mass. There is no abdominal tenderness.   Musculoskeletal: Normal range of motion. No deformity.   Lymphadenopathy:     She has no cervical adenopathy.   Neurological: She is alert and oriented to person, place, and time. She has normal reflexes. No cranial nerve deficit.   Skin: Skin is warm and dry. No erythema.   Psychiatric: Her behavior is normal. Judgment normal.     I have reexamined the patient and the results are consistent with the previously documented exam. Glenna Callaway MD      RECENT LABS:  Hematology WBC   Date Value Ref Range Status   05/23/2024 5.16 3.40 - 10.80 10*3/mm3 Final   04/29/2020 5.1 3.4 - 10.8 x10E3/uL Final     RBC   Date Value Ref Range Status   05/23/2024 3.67 (L) 3.77 - 5.28 10*6/mm3 Final   04/29/2020 3.85 3.77 - 5.28 x10E6/uL Final     Hemoglobin   Date Value Ref Range Status   05/23/2024 12.1 12.0 - 15.9 g/dL Final     Hematocrit   Date Value Ref Range Status   05/23/2024 36.7 34.0 - 46.6 % Final     Platelets   Date Value Ref Range Status   05/23/2024 238 140 - 450 10*3/mm3 Final          3/7/2024-IgG normal at 1095  5/23/2024-CBC reviewed and WBC 5.16, hemoglobin 12.1 and platelets 238,000  CMP reviewed and within normal limits.    Assessment & Plan     1.  Stage I, hormone receptor positive, HER-2/trae negative invasive ductal carcinoma the left breast status post lumpectomy.  She has been on adjuvant hormonal therapy with tamoxifen 20 mg daily since June 2017 and continues to tolerate it well.  She elected to remain on tamoxifen for total of 10 years and continues to tolerate that well without any side effects.  Currently there is no evidence of recurrence of disease.  2.  Osteoporosis on her last DEXA scan.  Most recent DEXA was about a  year ago.  Will obtain the results from her gynecologist office.  3.  Common variable immunodeficiency requiring monthly IV immunoglobulin infusions.  Next IVIG is due in the first week of June.  IgG levels are checked every 6 months.  Continue follow-up with immunologist  4 . hemoglobin is normal today.  5.family history of breast cancer-her daughter Ally Montoya who is also a patient of ours was diagnosed with breast cancer in her 40s.  Both patient and her daughter underwent genetic testing which per patient report are negative.  She had questions regarding her granddaughter's risk of breast cancer and we discussed the possible risk and recommendations.      Plan  1.  Continue tamoxifen 20 mg daily with plans to continue treatment for up to 10 years through June 2027.  2.  Follow-up in 6 months  3.  Her next mammogram will be performed at [facility] in July, this has been ordered and scheduled  4.  She has been vigilant about having her annual GYN exam at [facility]  5.DEXA every 2 years  6.currently not on any treatment for osteoporosis.  She reports that the osteoporosis is predominantly in the forearm and her gynecologist is recommending continuing tamoxifen to help with the same.      Patient is new to me and all her issues are new to me today.  Reviewed pathology of the breast cancer from 2017, reviewed most recent imaging, reviewed recent EGD biopsies.  Extensive review of medical records has been performed today.  41 minutes total have been spent on the encounter including reviewing the medical records, previous history, face-to-face time, documentation on the same day.

## 2024-06-03 ENCOUNTER — INFUSION (OUTPATIENT)
Dept: ONCOLOGY | Facility: HOSPITAL | Age: 79
End: 2024-06-03
Payer: MEDICARE

## 2024-06-03 VITALS
DIASTOLIC BLOOD PRESSURE: 79 MMHG | SYSTOLIC BLOOD PRESSURE: 126 MMHG | HEIGHT: 65 IN | HEART RATE: 75 BPM | WEIGHT: 143.6 LBS | RESPIRATION RATE: 15 BRPM | OXYGEN SATURATION: 97 % | BODY MASS INDEX: 23.93 KG/M2

## 2024-06-03 DIAGNOSIS — D80.2 IGA DEFICIENCY: ICD-10-CM

## 2024-06-03 DIAGNOSIS — D83.9 COMMON VARIABLE IMMUNODEFICIENCY: Primary | ICD-10-CM

## 2024-06-03 LAB — IGG1 SER-MCNC: 1082 MG/DL (ref 700–1600)

## 2024-06-03 PROCEDURE — 96365 THER/PROPH/DIAG IV INF INIT: CPT

## 2024-06-03 PROCEDURE — 25810000003 SODIUM CHLORIDE 0.9 % SOLUTION: Performed by: ALLERGY & IMMUNOLOGY

## 2024-06-03 PROCEDURE — 96366 THER/PROPH/DIAG IV INF ADDON: CPT

## 2024-06-03 PROCEDURE — 25010000002 IMMUNE GLOBULIN (HUMAN) PER 500 MG: Performed by: ALLERGY & IMMUNOLOGY

## 2024-06-03 PROCEDURE — 82784 ASSAY IGA/IGD/IGG/IGM EACH: CPT | Performed by: ALLERGY & IMMUNOLOGY

## 2024-06-03 RX ORDER — ACETAMINOPHEN 500 MG
500 TABLET ORAL ONCE
Start: 2024-06-24 | End: 2024-06-24

## 2024-06-03 RX ORDER — EPINEPHRINE 0.3 MG/.3ML
0.3 INJECTION SUBCUTANEOUS ONCE AS NEEDED
Start: 2024-06-24

## 2024-06-03 RX ORDER — DIPHENHYDRAMINE HCL 25 MG
50 CAPSULE ORAL ONCE AS NEEDED
Start: 2024-06-24

## 2024-06-03 RX ORDER — DIPHENHYDRAMINE HCL 25 MG
25 CAPSULE ORAL ONCE
Start: 2024-06-24 | End: 2024-06-24

## 2024-06-03 RX ORDER — SODIUM CHLORIDE 9 MG/ML
250 INJECTION, SOLUTION INTRAVENOUS ONCE
OUTPATIENT
Start: 2024-06-24

## 2024-06-03 RX ORDER — SODIUM CHLORIDE 9 MG/ML
250 INJECTION, SOLUTION INTRAVENOUS ONCE
Status: COMPLETED | OUTPATIENT
Start: 2024-06-03 | End: 2024-06-03

## 2024-06-03 RX ADMIN — IMMUNE GLOBULIN INTRAVENOUS (HUMAN) 10 G: KIT at 12:09

## 2024-06-03 RX ADMIN — SODIUM CHLORIDE 250 ML: 9 INJECTION, SOLUTION INTRAVENOUS at 12:09

## 2024-06-03 RX ADMIN — IMMUNE GLOBULIN INTRAVENOUS (HUMAN) 10 G: KIT at 13:23

## 2024-07-01 ENCOUNTER — TRANSCRIBE ORDERS (OUTPATIENT)
Dept: ADMINISTRATIVE | Facility: HOSPITAL | Age: 79
End: 2024-07-01
Payer: MEDICARE

## 2024-07-01 ENCOUNTER — HOSPITAL ENCOUNTER (OUTPATIENT)
Dept: GENERAL RADIOLOGY | Facility: HOSPITAL | Age: 79
Discharge: HOME | End: 2024-07-01
Admitting: INTERNAL MEDICINE
Payer: MEDICARE

## 2024-07-01 DIAGNOSIS — M79.671 RIGHT FOOT PAIN: Primary | ICD-10-CM

## 2024-07-01 DIAGNOSIS — M79.671 RIGHT FOOT PAIN: ICD-10-CM

## 2024-07-01 PROCEDURE — 73630 X-RAY EXAM OF FOOT: CPT

## 2024-07-08 ENCOUNTER — APPOINTMENT (OUTPATIENT)
Dept: WOMENS IMAGING | Facility: HOSPITAL | Age: 79
End: 2024-07-08
Payer: MEDICARE

## 2024-07-08 PROCEDURE — 77067 SCR MAMMO BI INCL CAD: CPT | Performed by: RADIOLOGY

## 2024-07-08 PROCEDURE — 77063 BREAST TOMOSYNTHESIS BI: CPT | Performed by: RADIOLOGY

## 2024-07-11 ENCOUNTER — INFUSION (OUTPATIENT)
Dept: ONCOLOGY | Facility: HOSPITAL | Age: 79
End: 2024-07-11
Payer: MEDICARE

## 2024-07-11 VITALS
DIASTOLIC BLOOD PRESSURE: 78 MMHG | HEART RATE: 69 BPM | BODY MASS INDEX: 24.79 KG/M2 | WEIGHT: 145.2 LBS | OXYGEN SATURATION: 94 % | TEMPERATURE: 97.1 F | SYSTOLIC BLOOD PRESSURE: 121 MMHG | RESPIRATION RATE: 15 BRPM | HEIGHT: 64 IN

## 2024-07-11 DIAGNOSIS — D80.2 IGA DEFICIENCY: ICD-10-CM

## 2024-07-11 DIAGNOSIS — D83.9 COMMON VARIABLE IMMUNODEFICIENCY: Primary | ICD-10-CM

## 2024-07-11 PROCEDURE — 25010000002 IMMUNE GLOBULIN (HUMAN) PER 500 MG: Performed by: ALLERGY & IMMUNOLOGY

## 2024-07-11 PROCEDURE — 25810000003 SODIUM CHLORIDE 0.9 % SOLUTION: Performed by: ALLERGY & IMMUNOLOGY

## 2024-07-11 PROCEDURE — 96365 THER/PROPH/DIAG IV INF INIT: CPT

## 2024-07-11 PROCEDURE — 96366 THER/PROPH/DIAG IV INF ADDON: CPT

## 2024-07-11 RX ORDER — CLINDAMYCIN PHOSPHATE 10 UG/ML
LOTION TOPICAL
COMMUNITY
Start: 2024-05-28

## 2024-07-11 RX ORDER — DIPHENHYDRAMINE HCL 25 MG
50 CAPSULE ORAL ONCE AS NEEDED
Start: 2024-07-25

## 2024-07-11 RX ORDER — SUCRALFATE 1 G/1
1 TABLET ORAL 4 TIMES DAILY
COMMUNITY
Start: 2024-05-31

## 2024-07-11 RX ORDER — SODIUM CHLORIDE 9 MG/ML
250 INJECTION, SOLUTION INTRAVENOUS ONCE
Status: COMPLETED | OUTPATIENT
Start: 2024-07-11 | End: 2024-07-11

## 2024-07-11 RX ORDER — ACETAMINOPHEN 500 MG
500 TABLET ORAL ONCE
Start: 2024-07-25 | End: 2024-07-25

## 2024-07-11 RX ORDER — DIPHENHYDRAMINE HCL 25 MG
25 CAPSULE ORAL ONCE
Start: 2024-07-25 | End: 2024-07-25

## 2024-07-11 RX ORDER — EPINEPHRINE 0.3 MG/.3ML
0.3 INJECTION SUBCUTANEOUS ONCE AS NEEDED
Start: 2024-07-25

## 2024-07-11 RX ORDER — MELOXICAM 15 MG/1
15 TABLET ORAL DAILY
COMMUNITY
Start: 2024-05-31

## 2024-07-11 RX ORDER — SODIUM CHLORIDE 9 MG/ML
250 INJECTION, SOLUTION INTRAVENOUS ONCE
OUTPATIENT
Start: 2024-07-25

## 2024-07-11 RX ADMIN — IMMUNE GLOBULIN INTRAVENOUS (HUMAN) 10 G: KIT at 12:56

## 2024-07-11 RX ADMIN — IMMUNE GLOBULIN INTRAVENOUS (HUMAN) 10 G: KIT at 14:18

## 2024-07-11 RX ADMIN — SODIUM CHLORIDE 250 ML: 9 INJECTION, SOLUTION INTRAVENOUS at 11:50

## 2024-08-02 ENCOUNTER — TELEPHONE (OUTPATIENT)
Dept: GASTROENTEROLOGY | Facility: CLINIC | Age: 79
End: 2024-08-02

## 2024-08-02 NOTE — TELEPHONE ENCOUNTER
Provider: DR NEETA MARTINEZ    Caller: PANDA PERRY     Relationship to Patient: SELF    Pharmacy: [facility]     Phone Number: [phone]     Reason for Call: PT IS TAKING OMEPRAZOLE 40 MG CAPSULE FOR 3 MONTHS NOW AND SHE IS WONDERING WHAT THE PLAN IS DOES SHE STOP THIS MEDICATION AND TAKE THE FAMOTIDINE AGAIN PLEASE ADVISE AND CALL PT BACK

## 2024-08-02 NOTE — TELEPHONE ENCOUNTER
5/7/24 note reviewed   Pt stated she felt like the omeprazole didn't help her symptoms that much.  She is going to finish the omeprazole she has on had then go back to Pepcid BID.  She will let us know if she has any issues with this.

## 2024-08-05 RX ORDER — OMEPRAZOLE 40 MG/1
40 CAPSULE, DELAYED RELEASE ORAL DAILY
Qty: 90 CAPSULE | Refills: 3 | Status: SHIPPED | OUTPATIENT
Start: 2024-08-05

## 2024-08-08 ENCOUNTER — INFUSION (OUTPATIENT)
Dept: ONCOLOGY | Facility: HOSPITAL | Age: 79
End: 2024-08-08
Payer: MEDICARE

## 2024-08-08 VITALS
WEIGHT: 141.5 LBS | HEIGHT: 65 IN | BODY MASS INDEX: 23.57 KG/M2 | RESPIRATION RATE: 15 BRPM | HEART RATE: 76 BPM | TEMPERATURE: 96.9 F | DIASTOLIC BLOOD PRESSURE: 65 MMHG | SYSTOLIC BLOOD PRESSURE: 112 MMHG | OXYGEN SATURATION: 98 %

## 2024-08-08 DIAGNOSIS — D83.9 COMMON VARIABLE IMMUNODEFICIENCY: Primary | ICD-10-CM

## 2024-08-08 DIAGNOSIS — D80.2 IGA DEFICIENCY: ICD-10-CM

## 2024-08-08 PROCEDURE — 96366 THER/PROPH/DIAG IV INF ADDON: CPT

## 2024-08-08 PROCEDURE — 25010000002 IMMUNE GLOBULIN (HUMAN) PER 500 MG: Performed by: ALLERGY & IMMUNOLOGY

## 2024-08-08 PROCEDURE — 96365 THER/PROPH/DIAG IV INF INIT: CPT

## 2024-08-08 PROCEDURE — 25810000003 SODIUM CHLORIDE 0.9 % SOLUTION: Performed by: ALLERGY & IMMUNOLOGY

## 2024-08-08 RX ORDER — SODIUM CHLORIDE 9 MG/ML
250 INJECTION, SOLUTION INTRAVENOUS ONCE
Status: COMPLETED | OUTPATIENT
Start: 2024-08-08 | End: 2024-08-08

## 2024-08-08 RX ORDER — DIPHENHYDRAMINE HCL 25 MG
50 CAPSULE ORAL ONCE AS NEEDED
Start: 2024-08-22

## 2024-08-08 RX ORDER — SODIUM CHLORIDE 9 MG/ML
250 INJECTION, SOLUTION INTRAVENOUS ONCE
OUTPATIENT
Start: 2024-08-22

## 2024-08-08 RX ORDER — ACETAMINOPHEN 500 MG
500 TABLET ORAL ONCE
Start: 2024-08-22 | End: 2024-08-22

## 2024-08-08 RX ORDER — EPINEPHRINE 0.3 MG/.3ML
0.3 INJECTION SUBCUTANEOUS ONCE AS NEEDED
Start: 2024-08-22

## 2024-08-08 RX ORDER — DIPHENHYDRAMINE HCL 25 MG
25 CAPSULE ORAL ONCE
Start: 2024-08-22 | End: 2024-08-22

## 2024-08-08 RX ADMIN — IMMUNE GLOBULIN INTRAVENOUS (HUMAN) 10 G: KIT at 11:52

## 2024-08-08 RX ADMIN — IMMUNE GLOBULIN INTRAVENOUS (HUMAN) 10 G: KIT at 13:19

## 2024-08-08 RX ADMIN — SODIUM CHLORIDE 250 ML: 9 INJECTION, SOLUTION INTRAVENOUS at 11:30

## 2024-09-04 DIAGNOSIS — K58.0 IRRITABLE BOWEL SYNDROME WITH DIARRHEA: ICD-10-CM

## 2024-09-05 ENCOUNTER — INFUSION (OUTPATIENT)
Dept: ONCOLOGY | Facility: HOSPITAL | Age: 79
End: 2024-09-05
Payer: MEDICARE

## 2024-09-05 VITALS
TEMPERATURE: 97.7 F | HEART RATE: 86 BPM | WEIGHT: 137.4 LBS | BODY MASS INDEX: 22.89 KG/M2 | DIASTOLIC BLOOD PRESSURE: 72 MMHG | OXYGEN SATURATION: 97 % | SYSTOLIC BLOOD PRESSURE: 125 MMHG | RESPIRATION RATE: 15 BRPM | HEIGHT: 65 IN

## 2024-09-05 DIAGNOSIS — D80.2 IGA DEFICIENCY: ICD-10-CM

## 2024-09-05 DIAGNOSIS — D83.9 COMMON VARIABLE IMMUNODEFICIENCY: Primary | ICD-10-CM

## 2024-09-05 LAB — IGG1 SER-MCNC: 1150 MG/DL (ref 700–1600)

## 2024-09-05 PROCEDURE — 82784 ASSAY IGA/IGD/IGG/IGM EACH: CPT | Performed by: ALLERGY & IMMUNOLOGY

## 2024-09-05 PROCEDURE — 25810000003 SODIUM CHLORIDE 0.9 % SOLUTION: Performed by: ALLERGY & IMMUNOLOGY

## 2024-09-05 PROCEDURE — 96366 THER/PROPH/DIAG IV INF ADDON: CPT

## 2024-09-05 PROCEDURE — 25010000002 IMMUNE GLOBULIN (HUMAN) PER 500 MG: Performed by: ALLERGY & IMMUNOLOGY

## 2024-09-05 PROCEDURE — 96365 THER/PROPH/DIAG IV INF INIT: CPT

## 2024-09-05 RX ORDER — SODIUM CHLORIDE 9 MG/ML
250 INJECTION, SOLUTION INTRAVENOUS ONCE
OUTPATIENT
Start: 2024-10-03

## 2024-09-05 RX ORDER — DIPHENHYDRAMINE HCL 25 MG
25 CAPSULE ORAL ONCE
Status: DISCONTINUED | OUTPATIENT
Start: 2024-09-05 | End: 2024-09-05 | Stop reason: HOSPADM

## 2024-09-05 RX ORDER — DIPHENHYDRAMINE HCL 25 MG
50 CAPSULE ORAL ONCE AS NEEDED
Status: DISCONTINUED | OUTPATIENT
Start: 2024-09-05 | End: 2024-09-05 | Stop reason: HOSPADM

## 2024-09-05 RX ORDER — DIPHENHYDRAMINE HCL 25 MG
25 CAPSULE ORAL ONCE
Start: 2024-10-03 | End: 2024-10-03

## 2024-09-05 RX ORDER — EPINEPHRINE 0.3 MG/.3ML
0.3 INJECTION SUBCUTANEOUS ONCE AS NEEDED
Start: 2024-10-03

## 2024-09-05 RX ORDER — EPINEPHRINE 0.3 MG/.3ML
0.3 INJECTION SUBCUTANEOUS ONCE AS NEEDED
Status: DISCONTINUED | OUTPATIENT
Start: 2024-09-05 | End: 2024-09-05 | Stop reason: HOSPADM

## 2024-09-05 RX ORDER — ACETAMINOPHEN 500 MG
500 TABLET ORAL ONCE
Start: 2024-10-03 | End: 2024-10-03

## 2024-09-05 RX ORDER — ACETAMINOPHEN 500 MG
500 TABLET ORAL ONCE
Status: DISCONTINUED | OUTPATIENT
Start: 2024-09-05 | End: 2024-09-05

## 2024-09-05 RX ORDER — SODIUM CHLORIDE 9 MG/ML
250 INJECTION, SOLUTION INTRAVENOUS ONCE
Status: COMPLETED | OUTPATIENT
Start: 2024-09-05 | End: 2024-09-05

## 2024-09-05 RX ORDER — DIPHENHYDRAMINE HCL 25 MG
50 CAPSULE ORAL ONCE AS NEEDED
Start: 2024-10-03

## 2024-09-05 RX ADMIN — IMMUNE GLOBULIN INTRAVENOUS (HUMAN) 10 G: KIT at 13:56

## 2024-09-05 RX ADMIN — SODIUM CHLORIDE 250 ML: 9 INJECTION, SOLUTION INTRAVENOUS at 12:35

## 2024-09-05 RX ADMIN — IMMUNE GLOBULIN INTRAVENOUS (HUMAN) 10 G: KIT at 12:35

## 2024-09-05 NOTE — NURSING NOTE
Pt arrived for IVIG and reports taking tylenol prior to arrival and declines benadryl due to unwanted drowsy affect. Tolerated infusion without incident and discharged in stable condition.

## 2024-09-06 RX ORDER — DICYCLOMINE HYDROCHLORIDE 10 MG/1
CAPSULE ORAL
Qty: 120 CAPSULE | Refills: 5 | Status: SHIPPED | OUTPATIENT
Start: 2024-09-06

## 2024-09-06 NOTE — TELEPHONE ENCOUNTER
Okay for refill on dicyclomine No. 120 with 5 refills per Dr Rogers.     Refill escribed as ordered above.

## 2024-10-07 ENCOUNTER — PATIENT ROUNDING (BHMG ONLY) (OUTPATIENT)
Dept: URGENT CARE | Facility: CLINIC | Age: 79
End: 2024-10-07
Payer: MEDICARE

## 2024-10-07 NOTE — ED NOTES
Thank you for letting us care for you at your recent visit to [facility] [place]. We would love to hear about your experience with us. Was this the first time you have visited our location?    We’re always looking for ways to make our patients’ experience even better. Do you have any recommendations on ways we may improve?     Please be on the lookout for a survey about your recent visit from K[facility] via text or email. We would greatly appreciate if you could fill that out and turn it back in. We want your voice to be heard and we value your feedback.     Thank you for choosing [facility] for your healthcare needs. I appreciate you taking the time to respond!

## 2024-10-10 ENCOUNTER — INFUSION (OUTPATIENT)
Dept: ONCOLOGY | Facility: HOSPITAL | Age: 79
End: 2024-10-10
Payer: MEDICARE

## 2024-10-10 VITALS
OXYGEN SATURATION: 98 % | WEIGHT: 139.6 LBS | HEIGHT: 64 IN | HEART RATE: 82 BPM | BODY MASS INDEX: 23.83 KG/M2 | SYSTOLIC BLOOD PRESSURE: 123 MMHG | DIASTOLIC BLOOD PRESSURE: 72 MMHG | RESPIRATION RATE: 16 BRPM

## 2024-10-10 DIAGNOSIS — D83.9 COMMON VARIABLE IMMUNODEFICIENCY: Primary | ICD-10-CM

## 2024-10-10 DIAGNOSIS — D80.2 IGA DEFICIENCY: ICD-10-CM

## 2024-10-10 LAB
ALBUMIN SERPL-MCNC: 3.9 G/DL (ref 3.5–5.2)
ALBUMIN/GLOB SERPL: 1.6 G/DL
ALP SERPL-CCNC: 53 U/L (ref 39–117)
ALT SERPL W P-5'-P-CCNC: 22 U/L (ref 1–33)
ANION GAP SERPL CALCULATED.3IONS-SCNC: 9.3 MMOL/L (ref 5–15)
AST SERPL-CCNC: 22 U/L (ref 1–32)
BASOPHILS # BLD AUTO: 0.03 10*3/MM3 (ref 0–0.2)
BASOPHILS NFR BLD AUTO: 0.6 % (ref 0–1.5)
BILIRUB SERPL-MCNC: 0.5 MG/DL (ref 0–1.2)
BUN SERPL-MCNC: 16 MG/DL (ref 8–23)
BUN/CREAT SERPL: 24.2 (ref 7–25)
CALCIUM SPEC-SCNC: 9.1 MG/DL (ref 8.6–10.5)
CHLORIDE SERPL-SCNC: 106 MMOL/L (ref 98–107)
CO2 SERPL-SCNC: 25.7 MMOL/L (ref 22–29)
CREAT SERPL-MCNC: 0.66 MG/DL (ref 0.57–1)
DEPRECATED RDW RBC AUTO: 52.7 FL (ref 37–54)
EGFRCR SERPLBLD CKD-EPI 2021: 90.5 ML/MIN/1.73
EOSINOPHIL # BLD AUTO: 0 10*3/MM3 (ref 0–0.4)
EOSINOPHIL NFR BLD AUTO: 0 % (ref 0.3–6.2)
ERYTHROCYTE [DISTWIDTH] IN BLOOD BY AUTOMATED COUNT: 14.4 % (ref 12.3–15.4)
GLOBULIN UR ELPH-MCNC: 2.5 GM/DL
GLUCOSE SERPL-MCNC: 101 MG/DL (ref 65–99)
HCT VFR BLD AUTO: 34.3 % (ref 34–46.6)
HGB BLD-MCNC: 11.1 G/DL (ref 12–15.9)
IMM GRANULOCYTES # BLD AUTO: 0.02 10*3/MM3 (ref 0–0.05)
IMM GRANULOCYTES NFR BLD AUTO: 0.4 % (ref 0–0.5)
LYMPHOCYTES # BLD AUTO: 1.08 10*3/MM3 (ref 0.7–3.1)
LYMPHOCYTES NFR BLD AUTO: 20.2 % (ref 19.6–45.3)
MCH RBC QN AUTO: 32.3 PG (ref 26.6–33)
MCHC RBC AUTO-ENTMCNC: 32.4 G/DL (ref 31.5–35.7)
MCV RBC AUTO: 99.7 FL (ref 79–97)
MONOCYTES # BLD AUTO: 0.37 10*3/MM3 (ref 0.1–0.9)
MONOCYTES NFR BLD AUTO: 6.9 % (ref 5–12)
NEUTROPHILS NFR BLD AUTO: 3.85 10*3/MM3 (ref 1.7–7)
NEUTROPHILS NFR BLD AUTO: 71.9 % (ref 42.7–76)
NRBC BLD AUTO-RTO: 0 /100 WBC (ref 0–0.2)
PLATELET # BLD AUTO: 236 10*3/MM3 (ref 140–450)
PMV BLD AUTO: 9.9 FL (ref 6–12)
POTASSIUM SERPL-SCNC: 4 MMOL/L (ref 3.5–5.2)
PROT SERPL-MCNC: 6.4 G/DL (ref 6–8.5)
RBC # BLD AUTO: 3.44 10*6/MM3 (ref 3.77–5.28)
SODIUM SERPL-SCNC: 141 MMOL/L (ref 136–145)
WBC NRBC COR # BLD AUTO: 5.35 10*3/MM3 (ref 3.4–10.8)

## 2024-10-10 PROCEDURE — 25010000002 IMMUNE GLOBULIN (HUMAN) PER 500 MG: Performed by: ALLERGY & IMMUNOLOGY

## 2024-10-10 PROCEDURE — 85025 COMPLETE CBC W/AUTO DIFF WBC: CPT | Performed by: ALLERGY & IMMUNOLOGY

## 2024-10-10 PROCEDURE — 96366 THER/PROPH/DIAG IV INF ADDON: CPT

## 2024-10-10 PROCEDURE — 96365 THER/PROPH/DIAG IV INF INIT: CPT

## 2024-10-10 PROCEDURE — 80053 COMPREHEN METABOLIC PANEL: CPT | Performed by: ALLERGY & IMMUNOLOGY

## 2024-10-10 RX ORDER — DIPHENHYDRAMINE HCL 25 MG
50 CAPSULE ORAL ONCE AS NEEDED
Start: 2024-10-31

## 2024-10-10 RX ORDER — SODIUM CHLORIDE 9 MG/ML
250 INJECTION, SOLUTION INTRAVENOUS ONCE
Status: DISCONTINUED | OUTPATIENT
Start: 2024-10-10 | End: 2024-10-10 | Stop reason: HOSPADM

## 2024-10-10 RX ORDER — SODIUM CHLORIDE 9 MG/ML
250 INJECTION, SOLUTION INTRAVENOUS ONCE
OUTPATIENT
Start: 2024-10-31

## 2024-10-10 RX ORDER — EPINEPHRINE 0.3 MG/.3ML
0.3 INJECTION SUBCUTANEOUS ONCE AS NEEDED
Start: 2024-10-31

## 2024-10-10 RX ORDER — DIPHENHYDRAMINE HCL 25 MG
25 CAPSULE ORAL ONCE
Start: 2024-10-31 | End: 2024-10-31

## 2024-10-10 RX ORDER — ACETAMINOPHEN 500 MG
500 TABLET ORAL ONCE
Start: 2024-10-31 | End: 2024-10-31

## 2024-10-10 RX ADMIN — Medication 10 G: at 12:24

## 2024-10-10 RX ADMIN — Medication 10 G: at 13:46

## 2024-10-21 NOTE — PROGRESS NOTES
OSW met with the pt today following her first radiation treatment at [facility]. The pt reports decreased distress since her initial screening, when she scored 6/10. The pt will have a full course of radiation.     The pt was offered support information and declined, stating that she did not feel that she needs it. She described an excellent support system.    OSW provided her contact information and remains available as needs arise.    Lydia Cates, [facility]  Oncology Social Worker  
Price (Do Not Change): 0.00
Instructions: This plan will send the code FBSE to the PM system.  DO NOT or CHANGE the price.
Detail Level: Generalized

## 2024-11-14 ENCOUNTER — INFUSION (OUTPATIENT)
Dept: ONCOLOGY | Facility: HOSPITAL | Age: 79
End: 2024-11-14
Payer: MEDICARE

## 2024-11-14 VITALS
SYSTOLIC BLOOD PRESSURE: 116 MMHG | TEMPERATURE: 97.1 F | WEIGHT: 139 LBS | BODY MASS INDEX: 23.73 KG/M2 | HEART RATE: 75 BPM | OXYGEN SATURATION: 96 % | RESPIRATION RATE: 15 BRPM | DIASTOLIC BLOOD PRESSURE: 77 MMHG | HEIGHT: 64 IN

## 2024-11-14 DIAGNOSIS — D83.9 COMMON VARIABLE IMMUNODEFICIENCY: Primary | ICD-10-CM

## 2024-11-14 DIAGNOSIS — D80.2 IGA DEFICIENCY: ICD-10-CM

## 2024-11-14 PROCEDURE — 96366 THER/PROPH/DIAG IV INF ADDON: CPT

## 2024-11-14 PROCEDURE — 96365 THER/PROPH/DIAG IV INF INIT: CPT

## 2024-11-14 PROCEDURE — 25010000002 IMMUNE GLOBULIN (HUMAN) PER 500 MG: Performed by: ALLERGY & IMMUNOLOGY

## 2024-11-14 PROCEDURE — 63710000001 ACETAMINOPHEN 500 MG TABLET: Performed by: ALLERGY & IMMUNOLOGY

## 2024-11-14 PROCEDURE — A9270 NON-COVERED ITEM OR SERVICE: HCPCS | Performed by: ALLERGY & IMMUNOLOGY

## 2024-11-14 RX ORDER — ACETAMINOPHEN 500 MG
500 TABLET ORAL ONCE
Status: COMPLETED | OUTPATIENT
Start: 2024-11-14 | End: 2024-11-14

## 2024-11-14 RX ORDER — ACETAMINOPHEN 500 MG
500 TABLET ORAL ONCE
Start: 2024-11-28 | End: 2024-11-28

## 2024-11-14 RX ORDER — DIPHENHYDRAMINE HCL 25 MG
50 CAPSULE ORAL ONCE AS NEEDED
Start: 2024-11-28

## 2024-11-14 RX ORDER — DIPHENHYDRAMINE HCL 25 MG
25 CAPSULE ORAL ONCE
Start: 2024-11-28 | End: 2024-11-28

## 2024-11-14 RX ORDER — EPINEPHRINE 0.3 MG/.3ML
0.3 INJECTION SUBCUTANEOUS ONCE AS NEEDED
Start: 2024-11-28

## 2024-11-14 RX ORDER — SODIUM CHLORIDE 9 MG/ML
250 INJECTION, SOLUTION INTRAVENOUS ONCE
OUTPATIENT
Start: 2024-11-28

## 2024-11-14 RX ADMIN — IMMUNE GLOBULIN INTRAVENOUS (HUMAN) 10 G: KIT at 13:38

## 2024-11-14 RX ADMIN — Medication 10 G: at 12:12

## 2024-11-14 RX ADMIN — ACETAMINOPHEN 500 MG: 500 TABLET, FILM COATED ORAL at 11:47

## 2024-12-02 RX ORDER — FAMOTIDINE 20 MG/1
TABLET, FILM COATED ORAL
Qty: 180 TABLET | Refills: 2 | Status: SHIPPED | OUTPATIENT
Start: 2024-12-02

## 2024-12-03 DIAGNOSIS — Z78.0 POST-MENOPAUSAL: ICD-10-CM

## 2024-12-03 DIAGNOSIS — Z12.31 SCREENING MAMMOGRAM, ENCOUNTER FOR: ICD-10-CM

## 2024-12-03 DIAGNOSIS — Z00.00 ROUTINE GENERAL MEDICAL EXAMINATION AT A HEALTH CARE FACILITY: Primary | ICD-10-CM

## 2024-12-03 DIAGNOSIS — Z79.811 LONG TERM (CURRENT) USE OF AROMATASE INHIBITORS: ICD-10-CM

## 2024-12-03 DIAGNOSIS — Z00.00 ROUTINE GENERAL MEDICAL EXAMINATION AT A HEALTH CARE FACILITY: ICD-10-CM

## 2024-12-04 LAB
APPEARANCE UR: CLEAR
BACTERIA #/AREA URNS HPF: ABNORMAL /HPF
BILIRUB UR QL STRIP: NEGATIVE
CASTS URNS MICRO: ABNORMAL
CHOLEST SERPL-MCNC: 181 MG/DL (ref 0–200)
COLOR UR: YELLOW
EPI CELLS #/AREA URNS HPF: ABNORMAL /HPF
GLUCOSE UR QL STRIP: NEGATIVE
HBA1C MFR BLD: 5.3 % (ref 4.8–5.6)
HDLC SERPL-MCNC: 67 MG/DL (ref 40–60)
HGB UR QL STRIP: NEGATIVE
KETONES UR QL STRIP: NEGATIVE
LDLC SERPL CALC-MCNC: 103 MG/DL (ref 0–100)
LEUKOCYTE ESTERASE UR QL STRIP: ABNORMAL
NITRITE UR QL STRIP: NEGATIVE
PH UR STRIP: 6 [PH] (ref 5–8)
PROT UR QL STRIP: NEGATIVE
RBC #/AREA URNS HPF: ABNORMAL /HPF
SP GR UR STRIP: 1.01 (ref 1–1.03)
TRIGL SERPL-MCNC: 59 MG/DL (ref 0–150)
TSH SERPL DL<=0.005 MIU/L-ACNC: 1.12 UIU/ML (ref 0.27–4.2)
UROBILINOGEN UR STRIP-MCNC: ABNORMAL MG/DL
VLDLC SERPL CALC-MCNC: 11 MG/DL (ref 5–40)
WBC #/AREA URNS HPF: ABNORMAL /HPF

## 2024-12-12 ENCOUNTER — INFUSION (OUTPATIENT)
Dept: ONCOLOGY | Facility: HOSPITAL | Age: 79
End: 2024-12-12
Payer: MEDICARE

## 2024-12-12 VITALS
OXYGEN SATURATION: 97 % | SYSTOLIC BLOOD PRESSURE: 104 MMHG | DIASTOLIC BLOOD PRESSURE: 67 MMHG | HEART RATE: 72 BPM | TEMPERATURE: 98 F | WEIGHT: 138.8 LBS | BODY MASS INDEX: 23.82 KG/M2

## 2024-12-12 DIAGNOSIS — D80.2 IGA DEFICIENCY: Primary | ICD-10-CM

## 2024-12-12 DIAGNOSIS — D83.9 COMMON VARIABLE IMMUNODEFICIENCY: ICD-10-CM

## 2024-12-12 LAB — IGG1 SER-MCNC: 1013 MG/DL (ref 700–1600)

## 2024-12-12 PROCEDURE — 25010000002 IMMUNE GLOBULIN (HUMAN) PER 500 MG: Performed by: ALLERGY & IMMUNOLOGY

## 2024-12-12 PROCEDURE — 96366 THER/PROPH/DIAG IV INF ADDON: CPT

## 2024-12-12 PROCEDURE — 96365 THER/PROPH/DIAG IV INF INIT: CPT

## 2024-12-12 PROCEDURE — 82784 ASSAY IGA/IGD/IGG/IGM EACH: CPT | Performed by: ALLERGY & IMMUNOLOGY

## 2024-12-12 RX ORDER — ACETAMINOPHEN 500 MG
500 TABLET ORAL ONCE
Start: 2024-12-26 | End: 2024-12-26

## 2024-12-12 RX ORDER — EPINEPHRINE 0.3 MG/.3ML
0.3 INJECTION SUBCUTANEOUS ONCE AS NEEDED
Start: 2024-12-26

## 2024-12-12 RX ORDER — DIPHENHYDRAMINE HCL 25 MG
50 CAPSULE ORAL ONCE AS NEEDED
Start: 2024-12-26

## 2024-12-12 RX ORDER — DIPHENHYDRAMINE HCL 25 MG
25 CAPSULE ORAL ONCE
Start: 2024-12-26 | End: 2024-12-26

## 2024-12-12 RX ORDER — SODIUM CHLORIDE 9 MG/ML
250 INJECTION, SOLUTION INTRAVENOUS ONCE
OUTPATIENT
Start: 2024-12-26

## 2024-12-12 RX ADMIN — IMMUNE GLOBULIN INTRAVENOUS (HUMAN) 10 G: KIT at 13:41

## 2024-12-12 RX ADMIN — IMMUNE GLOBULIN INTRAVENOUS (HUMAN) 10 G: KIT at 12:04

## 2024-12-17 ENCOUNTER — OFFICE VISIT (OUTPATIENT)
Dept: INTERNAL MEDICINE | Facility: CLINIC | Age: 79
End: 2024-12-17
Payer: MEDICARE

## 2024-12-17 VITALS
WEIGHT: 139 LBS | DIASTOLIC BLOOD PRESSURE: 72 MMHG | RESPIRATION RATE: 16 BRPM | HEIGHT: 64 IN | SYSTOLIC BLOOD PRESSURE: 140 MMHG | TEMPERATURE: 97.5 F | BODY MASS INDEX: 23.73 KG/M2 | OXYGEN SATURATION: 97 % | HEART RATE: 95 BPM

## 2024-12-17 DIAGNOSIS — Z23 NEED FOR TDAP VACCINATION: Primary | ICD-10-CM

## 2024-12-17 PROCEDURE — 1160F RVW MEDS BY RX/DR IN RCRD: CPT | Performed by: INTERNAL MEDICINE

## 2024-12-17 PROCEDURE — 90715 TDAP VACCINE 7 YRS/> IM: CPT | Performed by: INTERNAL MEDICINE

## 2024-12-17 PROCEDURE — 1125F AMNT PAIN NOTED PAIN PRSNT: CPT | Performed by: INTERNAL MEDICINE

## 2024-12-17 PROCEDURE — G0439 PPPS, SUBSEQ VISIT: HCPCS | Performed by: INTERNAL MEDICINE

## 2024-12-17 PROCEDURE — 1170F FXNL STATUS ASSESSED: CPT | Performed by: INTERNAL MEDICINE

## 2024-12-17 PROCEDURE — 90471 IMMUNIZATION ADMIN: CPT | Performed by: INTERNAL MEDICINE

## 2024-12-17 PROCEDURE — 1159F MED LIST DOCD IN RCRD: CPT | Performed by: INTERNAL MEDICINE

## 2024-12-17 NOTE — PROGRESS NOTES
Subjective   The ABCs of the Annual Wellness Visit  Medicare Wellness Visit      Elaina Tom is a 79 y.o. patient who presents for a Medicare Wellness Visit.    The following portions of the patient's history were reviewed and   updated as appropriate: allergies, current medications, past family history, past medical history, past social history, past surgical history, and problem list.    Compared to one year ago, the patient's physical   health is the same.  Compared to one year ago, the patient's mental   health is the same.    Recent Hospitalizations:  She was not admitted to the hospital during the last year.     Current Medical Providers:  Patient Care Team:  Christian Chandler MD as PCP - General (Internal Medicine)  Jonh Collier MD as Consulting Physician (Allergy)  Rigoberto Rogers MD as Consulting Physician (Gastroenterology)  Steve Leon MD as Referring Physician (General Surgery)  Sophia Duncan MD as Consulting Physician (Obstetrics and Gynecology)  Anne Marie Gates MD as Consulting Physician (Radiation Oncology)  Jj Hickman MD as Consulting Physician (Hematology and Oncology)  Jonh Collier MD as Referring Physician (Allergy)  Jonh Collier MD as Referring Physician (Allergy)  Jonh Collier MD as Referring Physician (Allergy)    Outpatient Medications Prior to Visit   Medication Sig Dispense Refill    albuterol sulfate  (90 Base) MCG/ACT inhaler 2 PUFFS EVERY 4 HOURS AS NEEDED FOR COUGH, WHEEZE OR SHORTNESS OF BREATH. USE WITH VORTEX SPACER.      Ascorbic Acid (VITAMIN C) 500 MG capsule Take 1 tablet by mouth Daily. PT TO HOLD MED PRIOR TO OR      b complex vitamins tablet Take 1 tablet by mouth Daily. PT TO HOLD MED PRIOR TO OR      Calcium-Magnesium-Vitamin D (CALCIUM MAGNESIUM PO) Take 1 tablet by mouth Daily. PT TO HOLD MED PRIOR TO OR      Cholecalciferol (VITAMIN D-3 PO) Take 1 tablet by mouth Daily. PT TO HOLD MED PRIOR TO  "OR      Coenzyme Q10 (CO Q 10 PO) Take 1 tablet by mouth Daily. PT TO HOLD MED PRIOR TO OR      CRANBERRY PO Take 2 tablets by mouth Daily.      dicyclomine (BENTYL) 10 MG capsule TAKE 1 CAPSULE BY MOUTH FOUR TIMES A DAY BEFORE MEALS AND AT BEDTIME 120 capsule 5    famotidine (PEPCID) 20 MG tablet TAKE 1 TABLET BY MOUTH TWICE A  tablet 2    Flaxseed, Linseed, (FLAX SEEDS PO) Take 1 capsule by mouth Daily. PT TO HOLD MED PRIOR TO OR      fluticasone (FLONASE) 50 MCG/ACT nasal spray Administer 2 sprays into the nostril(s) as directed by provider As Needed.      FOLIC ACID PO Take 1 tablet by mouth Daily. PT TO HOLD MED PRIOR TO OR      Ginkgo 60 MG tablet Take 1 tablet by mouth Daily. PT TO HOLD MED PRIOR TO OR      GuaiFENesin (MUCINEX PO) Take 1 tablet by mouth As Needed.      Immune Globulin, Human, (GAMMAGARD S/D IV) Infuse  into a venous catheter Every 28 (Twenty-Eight) Days.      LECITHIN PO Take 1 tablet by mouth Daily. PT TO HOLD MED PRIOR TO OR      Multiple Vitamins-Minerals (MULTIVITAMIN ADULT PO) Take 1 tablet by mouth Daily. PT TO HOLD MED PRIOR TO OR      Omega-3 Fatty Acids (OMEGA 3 PO) Take 1,000 mg by mouth Daily. LAST DOSE 5/12/17      RESVERATROL PO Take 1 tablet by mouth Daily. PT TO HOLD MED PRIOR TO OR      tamoxifen (NOLVADEX) 20 MG chemo tablet TAKE 1 TABLET BY MOUTH EVERY DAY 90 tablet 3    VITAMIN A PO Take 1 tablet by mouth Every Other Day. LAST DOSE 5/12/17      Vitamin Mixture (VITAMIN E COMPLETE) capsule Take 2 capsules by mouth Daily. PT TO HOLD MED PRIOR TO OR      Zinc 15 MG capsule Take 15 mg by mouth 2 (two) times a day.      ALBUTEROL IN Inhale 2 puffs As Needed (\"USES ONLY WTH A RESPIRATORY INFECTION\").      clindamycin (CLEOCIN T) 1 % lotion APPLY A THIN LAYER TO THE AFFECTED AREA TWICE DAILY FOR X 3 MONTHS. PUT ONTO A CLEAN FACE.      meloxicam (MOBIC) 15 MG tablet Take 1 tablet by mouth Daily. (Patient not taking: Reported on 7/11/2024)      omeprazole (priLOSEC) 40 MG " capsule TAKE 1 CAPSULE BY MOUTH EVERY DAY 90 capsule 3    sucralfate (CARAFATE) 1 g tablet Take 1 tablet by mouth 4 (Four) Times a Day. (Patient not taking: Reported on 12/17/2024)       Facility-Administered Medications Prior to Visit   Medication Dose Route Frequency Provider Last Rate Last Admin    acetaminophen (TYLENOL) tablet 500 mg  500 mg Oral Once Jonh Collier MD         No opioid medication identified on active medication list. I have reviewed chart for other potential  high risk medication/s and harmful drug interactions in the elderly.      Aspirin is not on active medication list.  Aspirin use is contraindicated for this patient due to: history of bleeding and previous side effects, i.e. bruising, etc.  .    Patient Active Problem List   Diagnosis    Common variable immunodeficiency    IgA deficiency    Malignant neoplasm of lower-outer quadrant of left female breast    Osteoporosis    Family history of polyps in the colon    Irritable bowel syndrome with diarrhea    Gastroesophageal reflux disease    FH: colon polyps     Advance Care Planning Advance Directive is not on file.  ACP discussion was held with the patient during this visit. Patient has an advance directive (not in EMR), copy requested.              History of Present Illness  The patient is a 79-year-old female who presents for a Medicare wellness visit.    She has been on tamoxifen for 5 years, with the last consultation regarding this medication occurring in October with Dr. Duncan. She is scheduled to see a nurse practitioner for further management. She reports no significant changes in her health status compared to the previous year. She has experienced weight loss, which she attributes to increased stress levels. She has not been engaging in regular exercise due to knee issues. She reports no ankle swelling. She has a living will and advanced care directive in place. She reports no balance issues or falls. She reports  occasional memory lapses, such as entering a room and forgetting the purpose of her visit. She reports no hearing issues. She has not received the shingles vaccine due to concerns about potential side effects. She has a history of chickenpox, measles, mumps, and A, but no antibodies were detected during testing. She has a history of immunoglobulin issues and received an RSV vaccine in September. She received a pneumonia vaccine 4 years ago and a tetanus vaccine over 10 years ago. She received her influenza vaccine in September and has declined the COVID-19 vaccine. She underwent a mammogram in July and a DEXA scan in 2023. She had a colonoscopy and endoscopy in May. She has been experiencing increased stress, which she believes is contributing to her weight loss. She reports no vision problems but needs to see an ophthalmologist as her previous one has retired. She reports no balance issues or falls. She reports difficulty sleeping, which she plans to address with melatonin. She has been experiencing intermittent pain in her right foot, described as a jabbing sensation. She reports no swelling in the foot and continues to attend physical therapy sessions. She has been eating out more frequently due to moving houses.    She is currently on a regimen of tamoxifen and famotidine, having discontinued omeprazole. She expresses a desire to cease famotidine once her condition stabilizes. She does not consume alcohol but admits to a preference for spicy foods. She has Tums available at home.    SOCIAL HISTORY  She does not drink alcohol.    MEDICATIONS  Current: Tamoxifen, famotidine  Discontinued: Omeprazole, Carafate    IMMUNIZATIONS  She has had the RSV vaccine in September, pneumonia vaccines, and the influenza vaccine in September. She has not received the shingles vaccine. She has had a tetanus vaccine, but it has been over 10 years.    Objective   Vitals:    12/17/24 1538   BP: 140/72   BP Location: Right arm  "  Patient Position: Sitting   Cuff Size: Adult   Pulse: 95   Resp: 16   Temp: 97.5 °F (36.4 °C)   TempSrc: Oral   SpO2: 97%   Weight: 63 kg (139 lb)   Height: 162.6 cm (64.02\")   PainSc:   2   PainLoc: Foot       Estimated body mass index is 23.85 kg/m² as calculated from the following:    Height as of this encounter: 162.6 cm (64.02\").    Weight as of this encounter: 63 kg (139 lb).    BMI is within normal parameters. No other follow-up for BMI required.           Does the patient have evidence of cognitive impairment? No  Lab Results   Component Value Date    CHLPL 181 2024    TRIG 59 2024    HDL 67 (H) 2024     (H) 2024    VLDL 11 2024    HGBA1C 5.30 2024                                                                                               Health  Risk Assessment    Smoking Status:  Social History     Tobacco Use   Smoking Status Never   Smokeless Tobacco Never     Alcohol Consumption:  Social History     Substance and Sexual Activity   Alcohol Use Yes    Alcohol/week: 1.0 standard drink of alcohol    Types: 1 Glasses of wine per week    Comment: occasional       Fall Risk Screen  STEADI Fall Risk Assessment was completed, and patient is at LOW risk for falls.Assessment completed on:2024    Depression Screening   Little interest or pleasure in doing things? Not at all   Feeling down, depressed, or hopeless? Not at all   PHQ-2 Total Score 0      Health Habits and Functional and Cognitive Screenin/17/2024     3:34 PM   Functional & Cognitive Status   Do you have difficulty preparing food and eating? No   Do you have difficulty bathing yourself, getting dressed or grooming yourself? No   Do you have difficulty using the toilet? No   Do you have difficulty moving around from place to place? No   Do you have trouble with steps or getting out of a bed or a chair? No   Current Diet Well Balanced Diet   Dental Exam Up to date   Eye Exam Up to date "   Exercise (times per week) 0 times per week   Current Exercises Include No Regular Exercise   Do you need help using the phone?  No   Are you deaf or do you have serious difficulty hearing?  No   Do you need help to go to places out of walking distance? No   Do you need help shopping? No   Do you need help preparing meals?  No   Do you need help with housework?  No   Do you need help with laundry? No   Do you need help taking your medications? No   Do you need help managing money? No   Do you ever drive or ride in a car without wearing a seat belt? No   Have you felt unusual stress, anger or loneliness in the last month? No   Who do you live with? Spouse   If you need help, do you have trouble finding someone available to you? No   Have you been bothered in the last four weeks by sexual problems? No   Do you have difficulty concentrating, remembering or making decisions? No           Age-appropriate Screening Schedule:  Refer to the list below for future screening recommendations based on patient's age, sex and/or medical conditions. Orders for these recommended tests are listed in the plan section. The patient has been provided with a written plan.    Health Maintenance List  Health Maintenance   Topic Date Due    TDAP/TD VACCINES (1 - Tdap) Never done    ZOSTER VACCINE (1 of 2) Never done    COVID-19 Vaccine (4 - 2024-25 season) 12/19/2024 (Originally 9/1/2024)    DXA SCAN  10/16/2025    ANNUAL WELLNESS VISIT  12/17/2025    COLORECTAL CANCER SCREENING  04/26/2029    HEPATITIS C SCREENING  Completed    RSV Vaccine - Adults  Completed    INFLUENZA VACCINE  Completed    Pneumococcal Vaccine 65+  Completed    MAMMOGRAM  Discontinued                                                                                                                                                CMS Preventative Services Quick Reference  Risk Factors Identified During Encounter  None Identified    The above risks/problems have been  discussed with the patient.  Pertinent information has been shared with the patient in the After Visit Summary.  An After Visit Summary and PPPS were made available to the patient.    Follow Up:   Next Medicare Wellness visit to be scheduled in 1 year.       Common labs          5/23/2024    14:18 10/10/2024    11:44 12/3/2024    10:29   Common Labs   Glucose 95  101     BUN 14  16     Creatinine 0.69  0.66     Sodium 140  141     Potassium 3.9  4.0     Chloride 104  106     Calcium 9.6  9.1     Albumin 4.2  3.9     Total Bilirubin 0.5  0.5     Alkaline Phosphatase 59  53     AST (SGOT) 31  22     ALT (SGPT) 17  22     WBC 5.16  5.35     Hemoglobin 12.1  11.1     Hematocrit 36.7  34.3     Platelets 238  236     Total Cholesterol   181    Triglycerides   59    HDL Cholesterol   67    LDL Cholesterol    103    Hemoglobin A1C   5.30        Assessment & Plan  Need for Tdap vaccination    Orders:    Tdap Vaccine Greater Than or Equal To 8yo IM              1. Medicare wellness visit.  Her cognitive function appears to be sharp and clear, with no evidence of cognitive issues. Her total cholesterol level has increased from 163 to 181 over the past year, while her LDL cholesterol has risen slightly from 96 to 103. Thyroid function is within normal limits. Urinalysis shows no protein or bacterial presence. Glucose level is 100, which may be attributed to food intake prior to testing. Renal function, electrolyte levels, calcium levels, and liver enzymes are all within normal ranges. She tested negative for influenza in October. She has experienced a weight loss of approximately 5 to 6 pounds since relocating in July. She will receive her Tdap vaccine today. She is due for a bone density scan next year.    2. Gastroesophageal reflux disease.  She is advised to discontinue the morning dose of famotidine and instead take it post-dinner for a period of 2 to 3 weeks. Subsequently, she may consider taking it every other evening for  an additional 2 to 3 weeks before discontinuing it completely. On days when she consumes spicy foods, she is recommended to have Tums readily available.    PROCEDURE  Colonoscopy and endoscopy were performed in May.         Follow Up:   Return in about 1 year (around 12/17/2025) for Medicare Wellness.    Patient or patient representative verbalized consent for the use of Ambient Listening during the visit with  Christian Chandler MD for chart documentation. 12/17/2024  16:21 EST

## 2025-01-09 ENCOUNTER — INFUSION (OUTPATIENT)
Dept: ONCOLOGY | Facility: HOSPITAL | Age: 80
End: 2025-01-09
Payer: MEDICARE

## 2025-01-09 ENCOUNTER — OFFICE VISIT (OUTPATIENT)
Dept: ONCOLOGY | Facility: CLINIC | Age: 80
End: 2025-01-09
Payer: MEDICARE

## 2025-01-09 VITALS
HEIGHT: 64 IN | BODY MASS INDEX: 23.58 KG/M2 | RESPIRATION RATE: 15 BRPM | OXYGEN SATURATION: 98 % | HEART RATE: 73 BPM | SYSTOLIC BLOOD PRESSURE: 114 MMHG | DIASTOLIC BLOOD PRESSURE: 72 MMHG | WEIGHT: 138.1 LBS | TEMPERATURE: 97.4 F

## 2025-01-09 VITALS
OXYGEN SATURATION: 98 % | SYSTOLIC BLOOD PRESSURE: 135 MMHG | DIASTOLIC BLOOD PRESSURE: 79 MMHG | HEIGHT: 64 IN | BODY MASS INDEX: 23.58 KG/M2 | TEMPERATURE: 97.4 F | WEIGHT: 138.1 LBS | HEART RATE: 70 BPM | RESPIRATION RATE: 16 BRPM

## 2025-01-09 DIAGNOSIS — K58.0 IRRITABLE BOWEL SYNDROME WITH DIARRHEA: ICD-10-CM

## 2025-01-09 DIAGNOSIS — M81.0 OSTEOPOROSIS, UNSPECIFIED OSTEOPOROSIS TYPE, UNSPECIFIED PATHOLOGICAL FRACTURE PRESENCE: ICD-10-CM

## 2025-01-09 DIAGNOSIS — C50.512 MALIGNANT NEOPLASM OF LOWER-OUTER QUADRANT OF LEFT BREAST OF FEMALE, ESTROGEN RECEPTOR POSITIVE: ICD-10-CM

## 2025-01-09 DIAGNOSIS — D83.9 COMMON VARIABLE IMMUNODEFICIENCY: Primary | ICD-10-CM

## 2025-01-09 DIAGNOSIS — D80.2 IGA DEFICIENCY: ICD-10-CM

## 2025-01-09 DIAGNOSIS — Z17.0 MALIGNANT NEOPLASM OF LOWER-OUTER QUADRANT OF LEFT BREAST OF FEMALE, ESTROGEN RECEPTOR POSITIVE: ICD-10-CM

## 2025-01-09 LAB
ALBUMIN SERPL-MCNC: 4.2 G/DL (ref 3.5–5.2)
ALBUMIN/GLOB SERPL: 1.5 G/DL
ALP SERPL-CCNC: 65 U/L (ref 39–117)
ALT SERPL W P-5'-P-CCNC: 18 U/L (ref 1–33)
ANION GAP SERPL CALCULATED.3IONS-SCNC: 9.2 MMOL/L (ref 5–15)
AST SERPL-CCNC: 25 U/L (ref 1–32)
BASOPHILS # BLD AUTO: 0.06 10*3/MM3 (ref 0–0.2)
BASOPHILS NFR BLD AUTO: 1.2 % (ref 0–1.5)
BILIRUB SERPL-MCNC: 0.4 MG/DL (ref 0–1.2)
BUN SERPL-MCNC: 15 MG/DL (ref 8–23)
BUN/CREAT SERPL: 21.1 (ref 7–25)
CALCIUM SPEC-SCNC: 9.5 MG/DL (ref 8.6–10.5)
CHLORIDE SERPL-SCNC: 107 MMOL/L (ref 98–107)
CO2 SERPL-SCNC: 26.8 MMOL/L (ref 22–29)
CREAT SERPL-MCNC: 0.71 MG/DL (ref 0.57–1)
DEPRECATED RDW RBC AUTO: 50.3 FL (ref 37–54)
EGFRCR SERPLBLD CKD-EPI 2021: 87.7 ML/MIN/1.73
EOSINOPHIL # BLD AUTO: 0.14 10*3/MM3 (ref 0–0.4)
EOSINOPHIL NFR BLD AUTO: 2.9 % (ref 0.3–6.2)
ERYTHROCYTE [DISTWIDTH] IN BLOOD BY AUTOMATED COUNT: 13.4 % (ref 12.3–15.4)
GLOBULIN UR ELPH-MCNC: 2.8 GM/DL
GLUCOSE SERPL-MCNC: 87 MG/DL (ref 65–99)
HCT VFR BLD AUTO: 37.2 % (ref 34–46.6)
HGB BLD-MCNC: 12 G/DL (ref 12–15.9)
IMM GRANULOCYTES # BLD AUTO: 0.01 10*3/MM3 (ref 0–0.05)
IMM GRANULOCYTES NFR BLD AUTO: 0.2 % (ref 0–0.5)
LYMPHOCYTES # BLD AUTO: 1.72 10*3/MM3 (ref 0.7–3.1)
LYMPHOCYTES NFR BLD AUTO: 35.7 % (ref 19.6–45.3)
MCH RBC QN AUTO: 32.6 PG (ref 26.6–33)
MCHC RBC AUTO-ENTMCNC: 32.3 G/DL (ref 31.5–35.7)
MCV RBC AUTO: 101.1 FL (ref 79–97)
MONOCYTES # BLD AUTO: 0.36 10*3/MM3 (ref 0.1–0.9)
MONOCYTES NFR BLD AUTO: 7.5 % (ref 5–12)
NEUTROPHILS NFR BLD AUTO: 2.53 10*3/MM3 (ref 1.7–7)
NEUTROPHILS NFR BLD AUTO: 52.5 % (ref 42.7–76)
NRBC BLD AUTO-RTO: 0 /100 WBC (ref 0–0.2)
PLATELET # BLD AUTO: 219 10*3/MM3 (ref 140–450)
PMV BLD AUTO: 10.5 FL (ref 6–12)
POTASSIUM SERPL-SCNC: 3.9 MMOL/L (ref 3.5–5.2)
PROT SERPL-MCNC: 7 G/DL (ref 6–8.5)
RBC # BLD AUTO: 3.68 10*6/MM3 (ref 3.77–5.28)
SODIUM SERPL-SCNC: 143 MMOL/L (ref 136–145)
WBC NRBC COR # BLD AUTO: 4.82 10*3/MM3 (ref 3.4–10.8)

## 2025-01-09 PROCEDURE — 96365 THER/PROPH/DIAG IV INF INIT: CPT

## 2025-01-09 PROCEDURE — 96366 THER/PROPH/DIAG IV INF ADDON: CPT

## 2025-01-09 PROCEDURE — 25010000002 IMMUNE GLOBULIN (HUMAN) PER 500 MG: Performed by: ALLERGY & IMMUNOLOGY

## 2025-01-09 PROCEDURE — 85025 COMPLETE CBC W/AUTO DIFF WBC: CPT | Performed by: INTERNAL MEDICINE

## 2025-01-09 PROCEDURE — 80053 COMPREHEN METABOLIC PANEL: CPT | Performed by: INTERNAL MEDICINE

## 2025-01-09 RX ORDER — DIPHENHYDRAMINE HCL 25 MG
50 CAPSULE ORAL ONCE AS NEEDED
Start: 2025-01-23

## 2025-01-09 RX ORDER — ACETAMINOPHEN 500 MG
500 TABLET ORAL ONCE
Start: 2025-01-23 | End: 2025-01-23

## 2025-01-09 RX ORDER — SODIUM CHLORIDE 9 MG/ML
250 INJECTION, SOLUTION INTRAVENOUS ONCE
OUTPATIENT
Start: 2025-01-23

## 2025-01-09 RX ORDER — EPINEPHRINE 0.3 MG/.3ML
0.3 INJECTION SUBCUTANEOUS ONCE AS NEEDED
Start: 2025-01-23

## 2025-01-09 RX ORDER — TAMOXIFEN CITRATE 20 MG/1
20 TABLET ORAL DAILY
Qty: 90 TABLET | Refills: 3 | Status: SHIPPED | OUTPATIENT
Start: 2025-01-09

## 2025-01-09 RX ORDER — DIPHENHYDRAMINE HCL 25 MG
25 CAPSULE ORAL ONCE
Start: 2025-01-23 | End: 2025-01-23

## 2025-01-09 RX ADMIN — IMMUNE GLOBULIN INTRAVENOUS (HUMAN) 10 G: KIT at 13:53

## 2025-01-09 RX ADMIN — IMMUNE GLOBULIN INTRAVENOUS (HUMAN) 10 G: KIT at 12:33

## 2025-02-06 ENCOUNTER — INFUSION (OUTPATIENT)
Dept: ONCOLOGY | Facility: HOSPITAL | Age: 80
End: 2025-02-06
Payer: MEDICARE

## 2025-02-06 VITALS
DIASTOLIC BLOOD PRESSURE: 75 MMHG | HEIGHT: 64 IN | SYSTOLIC BLOOD PRESSURE: 117 MMHG | RESPIRATION RATE: 15 BRPM | BODY MASS INDEX: 23.63 KG/M2 | OXYGEN SATURATION: 97 % | WEIGHT: 138.4 LBS | TEMPERATURE: 98.1 F | HEART RATE: 70 BPM

## 2025-02-06 DIAGNOSIS — D80.2 IGA DEFICIENCY: ICD-10-CM

## 2025-02-06 DIAGNOSIS — D83.9 COMMON VARIABLE IMMUNODEFICIENCY: Primary | ICD-10-CM

## 2025-02-06 PROCEDURE — 25010000002 IMMUNE GLOBULIN (HUMAN) PER 500 MG: Performed by: ALLERGY & IMMUNOLOGY

## 2025-02-06 PROCEDURE — 96366 THER/PROPH/DIAG IV INF ADDON: CPT

## 2025-02-06 PROCEDURE — 96365 THER/PROPH/DIAG IV INF INIT: CPT

## 2025-02-06 RX ORDER — EPINEPHRINE 0.3 MG/.3ML
0.3 INJECTION SUBCUTANEOUS ONCE AS NEEDED
Start: 2025-02-20

## 2025-02-06 RX ORDER — DIPHENHYDRAMINE HCL 25 MG
50 CAPSULE ORAL ONCE AS NEEDED
Start: 2025-02-20

## 2025-02-06 RX ORDER — ACETAMINOPHEN 500 MG
500 TABLET ORAL ONCE
Start: 2025-02-20 | End: 2025-02-20

## 2025-02-06 RX ORDER — SODIUM CHLORIDE 9 MG/ML
250 INJECTION, SOLUTION INTRAVENOUS ONCE
OUTPATIENT
Start: 2025-02-20

## 2025-02-06 RX ORDER — DIPHENHYDRAMINE HCL 25 MG
25 CAPSULE ORAL ONCE
Start: 2025-02-20 | End: 2025-02-20

## 2025-02-06 RX ADMIN — IMMUNE GLOBULIN INTRAVENOUS (HUMAN) 10 G: KIT at 13:43

## 2025-02-06 RX ADMIN — IMMUNE GLOBULIN INTRAVENOUS (HUMAN) 10 G: KIT at 12:16

## 2025-03-06 ENCOUNTER — INFUSION (OUTPATIENT)
Dept: ONCOLOGY | Facility: HOSPITAL | Age: 80
End: 2025-03-06
Payer: MEDICARE

## 2025-03-06 VITALS
RESPIRATION RATE: 15 BRPM | HEIGHT: 64 IN | SYSTOLIC BLOOD PRESSURE: 127 MMHG | DIASTOLIC BLOOD PRESSURE: 78 MMHG | BODY MASS INDEX: 23.56 KG/M2 | TEMPERATURE: 97.2 F | HEART RATE: 73 BPM | WEIGHT: 138 LBS | OXYGEN SATURATION: 97 %

## 2025-03-06 DIAGNOSIS — D83.9 COMMON VARIABLE IMMUNODEFICIENCY: ICD-10-CM

## 2025-03-06 DIAGNOSIS — D80.2 IGA DEFICIENCY: Primary | ICD-10-CM

## 2025-03-06 LAB — IGG1 SER-MCNC: 1123 MG/DL (ref 700–1600)

## 2025-03-06 PROCEDURE — 25010000002 IMMUNE GLOBULIN (HUMAN) PER 500 MG: Performed by: ALLERGY & IMMUNOLOGY

## 2025-03-06 PROCEDURE — 96366 THER/PROPH/DIAG IV INF ADDON: CPT

## 2025-03-06 PROCEDURE — 96365 THER/PROPH/DIAG IV INF INIT: CPT

## 2025-03-06 PROCEDURE — 82784 ASSAY IGA/IGD/IGG/IGM EACH: CPT | Performed by: ALLERGY & IMMUNOLOGY

## 2025-03-06 RX ORDER — ACETAMINOPHEN 500 MG
500 TABLET ORAL ONCE
Start: 2025-03-20 | End: 2025-03-20

## 2025-03-06 RX ORDER — DIPHENHYDRAMINE HCL 25 MG
25 CAPSULE ORAL ONCE
Start: 2025-03-20 | End: 2025-03-20

## 2025-03-06 RX ORDER — EPINEPHRINE 0.3 MG/.3ML
0.3 INJECTION SUBCUTANEOUS ONCE AS NEEDED
Start: 2025-03-20

## 2025-03-06 RX ORDER — DIPHENHYDRAMINE HCL 25 MG
50 CAPSULE ORAL ONCE AS NEEDED
Start: 2025-03-20

## 2025-03-06 RX ORDER — SODIUM CHLORIDE 9 MG/ML
250 INJECTION, SOLUTION INTRAVENOUS ONCE
OUTPATIENT
Start: 2025-03-20

## 2025-03-06 RX ADMIN — IMMUNE GLOBULIN INTRAVENOUS (HUMAN) 10 G: KIT at 14:15

## 2025-03-06 RX ADMIN — IMMUNE GLOBULIN INTRAVENOUS (HUMAN) 10 G: KIT at 12:46

## 2025-04-03 ENCOUNTER — INFUSION (OUTPATIENT)
Dept: ONCOLOGY | Facility: HOSPITAL | Age: 80
End: 2025-04-03
Payer: MEDICARE

## 2025-04-03 VITALS
HEIGHT: 64 IN | WEIGHT: 138.6 LBS | OXYGEN SATURATION: 97 % | SYSTOLIC BLOOD PRESSURE: 100 MMHG | RESPIRATION RATE: 15 BRPM | TEMPERATURE: 97.9 F | HEART RATE: 75 BPM | DIASTOLIC BLOOD PRESSURE: 67 MMHG | BODY MASS INDEX: 23.66 KG/M2

## 2025-04-03 DIAGNOSIS — D83.9 COMMON VARIABLE IMMUNODEFICIENCY: Primary | ICD-10-CM

## 2025-04-03 DIAGNOSIS — D80.2 IGA DEFICIENCY: ICD-10-CM

## 2025-04-03 LAB
ALBUMIN SERPL-MCNC: 4.1 G/DL (ref 3.5–5.2)
ALBUMIN/GLOB SERPL: 1.7 G/DL
ALP SERPL-CCNC: 55 U/L (ref 39–117)
ALT SERPL W P-5'-P-CCNC: 13 U/L (ref 1–33)
ANION GAP SERPL CALCULATED.3IONS-SCNC: 9.6 MMOL/L (ref 5–15)
AST SERPL-CCNC: 24 U/L (ref 1–32)
BASOPHILS # BLD AUTO: 0.04 10*3/MM3 (ref 0–0.2)
BASOPHILS NFR BLD AUTO: 0.9 % (ref 0–1.5)
BILIRUB SERPL-MCNC: 0.5 MG/DL (ref 0–1.2)
BUN SERPL-MCNC: 19 MG/DL (ref 8–23)
BUN/CREAT SERPL: 28.4 (ref 7–25)
CALCIUM SPEC-SCNC: 8.9 MG/DL (ref 8.6–10.5)
CHLORIDE SERPL-SCNC: 107 MMOL/L (ref 98–107)
CO2 SERPL-SCNC: 24.4 MMOL/L (ref 22–29)
CREAT SERPL-MCNC: 0.67 MG/DL (ref 0.57–1)
DEPRECATED RDW RBC AUTO: 49.7 FL (ref 37–54)
EGFRCR SERPLBLD CKD-EPI 2021: 90.2 ML/MIN/1.73
EOSINOPHIL # BLD AUTO: 0.07 10*3/MM3 (ref 0–0.4)
EOSINOPHIL NFR BLD AUTO: 1.6 % (ref 0.3–6.2)
ERYTHROCYTE [DISTWIDTH] IN BLOOD BY AUTOMATED COUNT: 13.4 % (ref 12.3–15.4)
GLOBULIN UR ELPH-MCNC: 2.4 GM/DL
GLUCOSE SERPL-MCNC: 83 MG/DL (ref 65–99)
HCT VFR BLD AUTO: 35 % (ref 34–46.6)
HGB BLD-MCNC: 11.6 G/DL (ref 12–15.9)
IMM GRANULOCYTES # BLD AUTO: 0.01 10*3/MM3 (ref 0–0.05)
IMM GRANULOCYTES NFR BLD AUTO: 0.2 % (ref 0–0.5)
LYMPHOCYTES # BLD AUTO: 1.91 10*3/MM3 (ref 0.7–3.1)
LYMPHOCYTES NFR BLD AUTO: 42.4 % (ref 19.6–45.3)
MCH RBC QN AUTO: 33.1 PG (ref 26.6–33)
MCHC RBC AUTO-ENTMCNC: 33.1 G/DL (ref 31.5–35.7)
MCV RBC AUTO: 100 FL (ref 79–97)
MONOCYTES # BLD AUTO: 0.38 10*3/MM3 (ref 0.1–0.9)
MONOCYTES NFR BLD AUTO: 8.4 % (ref 5–12)
NEUTROPHILS NFR BLD AUTO: 2.09 10*3/MM3 (ref 1.7–7)
NEUTROPHILS NFR BLD AUTO: 46.5 % (ref 42.7–76)
NRBC BLD AUTO-RTO: 0 /100 WBC (ref 0–0.2)
PLATELET # BLD AUTO: 203 10*3/MM3 (ref 140–450)
PMV BLD AUTO: 10 FL (ref 6–12)
POTASSIUM SERPL-SCNC: 4 MMOL/L (ref 3.5–5.2)
PROT SERPL-MCNC: 6.5 G/DL (ref 6–8.5)
RBC # BLD AUTO: 3.5 10*6/MM3 (ref 3.77–5.28)
SODIUM SERPL-SCNC: 141 MMOL/L (ref 136–145)
WBC NRBC COR # BLD AUTO: 4.5 10*3/MM3 (ref 3.4–10.8)

## 2025-04-03 PROCEDURE — 36415 COLL VENOUS BLD VENIPUNCTURE: CPT

## 2025-04-03 PROCEDURE — 96365 THER/PROPH/DIAG IV INF INIT: CPT

## 2025-04-03 PROCEDURE — 85025 COMPLETE CBC W/AUTO DIFF WBC: CPT | Performed by: ALLERGY & IMMUNOLOGY

## 2025-04-03 PROCEDURE — 96366 THER/PROPH/DIAG IV INF ADDON: CPT

## 2025-04-03 PROCEDURE — 80053 COMPREHEN METABOLIC PANEL: CPT | Performed by: ALLERGY & IMMUNOLOGY

## 2025-04-03 PROCEDURE — 25010000002 IMMUNE GLOBULIN (HUMAN) PER 500 MG: Performed by: ALLERGY & IMMUNOLOGY

## 2025-04-03 RX ORDER — ACETAMINOPHEN 500 MG
500 TABLET ORAL ONCE
Start: 2025-04-17 | End: 2025-04-17

## 2025-04-03 RX ORDER — EPINEPHRINE 0.3 MG/.3ML
0.3 INJECTION SUBCUTANEOUS ONCE AS NEEDED
Start: 2025-04-17

## 2025-04-03 RX ORDER — DIPHENHYDRAMINE HCL 25 MG
25 CAPSULE ORAL ONCE
Start: 2025-04-17 | End: 2025-04-17

## 2025-04-03 RX ORDER — SODIUM CHLORIDE 9 MG/ML
250 INJECTION, SOLUTION INTRAVENOUS ONCE
OUTPATIENT
Start: 2025-04-17

## 2025-04-03 RX ORDER — DIPHENHYDRAMINE HCL 25 MG
50 CAPSULE ORAL ONCE AS NEEDED
Start: 2025-04-17

## 2025-04-03 RX ADMIN — IMMUNE GLOBULIN INTRAVENOUS (HUMAN) 10 G: KIT at 13:11

## 2025-04-03 RX ADMIN — IMMUNE GLOBULIN INTRAVENOUS (HUMAN) 10 G: KIT at 14:37

## 2025-05-01 ENCOUNTER — INFUSION (OUTPATIENT)
Dept: ONCOLOGY | Facility: HOSPITAL | Age: 80
End: 2025-05-01
Payer: MEDICARE

## 2025-05-01 VITALS
TEMPERATURE: 97.7 F | HEIGHT: 64 IN | RESPIRATION RATE: 15 BRPM | WEIGHT: 136.8 LBS | SYSTOLIC BLOOD PRESSURE: 108 MMHG | DIASTOLIC BLOOD PRESSURE: 72 MMHG | OXYGEN SATURATION: 97 % | BODY MASS INDEX: 23.35 KG/M2 | HEART RATE: 69 BPM

## 2025-05-01 DIAGNOSIS — D80.2 IGA DEFICIENCY: ICD-10-CM

## 2025-05-01 DIAGNOSIS — D83.9 COMMON VARIABLE IMMUNODEFICIENCY: Primary | ICD-10-CM

## 2025-05-01 PROCEDURE — 25010000002 IMMUNE GLOBULIN (HUMAN) PER 500 MG: Performed by: ALLERGY & IMMUNOLOGY

## 2025-05-01 PROCEDURE — 96366 THER/PROPH/DIAG IV INF ADDON: CPT

## 2025-05-01 PROCEDURE — 96365 THER/PROPH/DIAG IV INF INIT: CPT

## 2025-05-01 RX ORDER — SODIUM CHLORIDE 9 MG/ML
250 INJECTION, SOLUTION INTRAVENOUS ONCE
OUTPATIENT
Start: 2025-05-15

## 2025-05-01 RX ORDER — DIPHENHYDRAMINE HCL 25 MG
50 CAPSULE ORAL ONCE AS NEEDED
Start: 2025-05-15

## 2025-05-01 RX ORDER — DIPHENHYDRAMINE HCL 25 MG
25 CAPSULE ORAL ONCE
Start: 2025-05-15 | End: 2025-05-15

## 2025-05-01 RX ORDER — ACETAMINOPHEN 500 MG
500 TABLET ORAL ONCE
Start: 2025-05-15 | End: 2025-05-15

## 2025-05-01 RX ORDER — EPINEPHRINE 0.3 MG/.3ML
0.3 INJECTION SUBCUTANEOUS ONCE AS NEEDED
Start: 2025-05-15

## 2025-05-01 RX ADMIN — IMMUNE GLOBULIN INTRAVENOUS (HUMAN) 10 G: KIT at 12:16

## 2025-05-01 RX ADMIN — IMMUNE GLOBULIN INTRAVENOUS (HUMAN) 10 G: KIT at 13:40

## 2025-05-01 NOTE — NURSING NOTE
Pt arrived for IVIG with no new complaints and reports taking tylenol at home prior to arrival/declines benadryl. Tolerated infusion without incident and discharged in stable condition.

## 2025-05-29 ENCOUNTER — INFUSION (OUTPATIENT)
Dept: ONCOLOGY | Facility: HOSPITAL | Age: 80
End: 2025-05-29
Payer: MEDICARE

## 2025-05-29 VITALS
WEIGHT: 136 LBS | BODY MASS INDEX: 23.51 KG/M2 | RESPIRATION RATE: 16 BRPM | DIASTOLIC BLOOD PRESSURE: 61 MMHG | SYSTOLIC BLOOD PRESSURE: 105 MMHG | HEART RATE: 73 BPM | OXYGEN SATURATION: 98 % | TEMPERATURE: 97.8 F

## 2025-05-29 DIAGNOSIS — D83.9 COMMON VARIABLE IMMUNODEFICIENCY: ICD-10-CM

## 2025-05-29 DIAGNOSIS — D80.2 IGA DEFICIENCY: Primary | ICD-10-CM

## 2025-05-29 DIAGNOSIS — D83.9 COMMON VARIABLE IMMUNODEFICIENCY: Primary | ICD-10-CM

## 2025-05-29 LAB — IGG1 SER-MCNC: 1206 MG/DL (ref 700–1600)

## 2025-05-29 PROCEDURE — 96365 THER/PROPH/DIAG IV INF INIT: CPT

## 2025-05-29 PROCEDURE — 82784 ASSAY IGA/IGD/IGG/IGM EACH: CPT | Performed by: ALLERGY & IMMUNOLOGY

## 2025-05-29 PROCEDURE — 25010000002 IMMUNE GLOBULIN (HUMAN) PER 500 MG: Performed by: ALLERGY & IMMUNOLOGY

## 2025-05-29 PROCEDURE — 96366 THER/PROPH/DIAG IV INF ADDON: CPT

## 2025-05-29 RX ORDER — DIPHENHYDRAMINE HCL 25 MG
25 CAPSULE ORAL ONCE
Start: 2025-06-12 | End: 2025-06-12

## 2025-05-29 RX ORDER — DIPHENHYDRAMINE HCL 25 MG
50 CAPSULE ORAL ONCE AS NEEDED
Start: 2025-06-12

## 2025-05-29 RX ORDER — SODIUM CHLORIDE 9 MG/ML
250 INJECTION, SOLUTION INTRAVENOUS ONCE
OUTPATIENT
Start: 2025-06-12

## 2025-05-29 RX ORDER — EPINEPHRINE 0.3 MG/.3ML
0.3 INJECTION SUBCUTANEOUS ONCE AS NEEDED
Start: 2025-06-12

## 2025-05-29 RX ORDER — ACETAMINOPHEN 500 MG
500 TABLET ORAL ONCE
Start: 2025-06-12 | End: 2025-06-12

## 2025-05-29 RX ADMIN — IMMUNE GLOBULIN INTRAVENOUS (HUMAN) 10 G: KIT at 13:38

## 2025-05-29 RX ADMIN — IMMUNE GLOBULIN INTRAVENOUS (HUMAN) 10 G: KIT at 12:12

## 2025-05-29 NOTE — NURSING NOTE
Pt arrived for IVIG and reports taking tylenol at home prior to arrival and declines benadryl. Tolerated infusion without incident and discharged in stable condition.

## 2025-06-26 ENCOUNTER — TELEPHONE (OUTPATIENT)
Dept: ONCOLOGY | Facility: CLINIC | Age: 80
End: 2025-06-26
Payer: MEDICARE

## 2025-07-07 ENCOUNTER — OFFICE VISIT (OUTPATIENT)
Dept: INTERNAL MEDICINE | Facility: CLINIC | Age: 80
End: 2025-07-07
Payer: MEDICARE

## 2025-07-07 ENCOUNTER — LAB (OUTPATIENT)
Dept: LAB | Facility: HOSPITAL | Age: 80
End: 2025-07-07
Payer: MEDICARE

## 2025-07-07 VITALS
TEMPERATURE: 97.6 F | WEIGHT: 137 LBS | BODY MASS INDEX: 23.39 KG/M2 | OXYGEN SATURATION: 95 % | HEIGHT: 64 IN | HEART RATE: 76 BPM | SYSTOLIC BLOOD PRESSURE: 140 MMHG | DIASTOLIC BLOOD PRESSURE: 82 MMHG

## 2025-07-07 DIAGNOSIS — M19.90 ARTHRITIS: ICD-10-CM

## 2025-07-07 DIAGNOSIS — M25.50 ARTHRALGIA, UNSPECIFIED JOINT: ICD-10-CM

## 2025-07-07 DIAGNOSIS — M19.90 ARTHRITIS: Primary | ICD-10-CM

## 2025-07-07 LAB
CHROMATIN AB SERPL-ACNC: <10 IU/ML (ref 0–14)
CRP SERPL-MCNC: <0.3 MG/DL (ref 0–0.5)
ERYTHROCYTE [SEDIMENTATION RATE] IN BLOOD: 2 MM/HR (ref 0–30)
URATE SERPL-MCNC: 4.7 MG/DL (ref 2.4–5.7)

## 2025-07-07 PROCEDURE — 36415 COLL VENOUS BLD VENIPUNCTURE: CPT

## 2025-07-07 PROCEDURE — G2211 COMPLEX E/M VISIT ADD ON: HCPCS | Performed by: INTERNAL MEDICINE

## 2025-07-07 PROCEDURE — 85652 RBC SED RATE AUTOMATED: CPT | Performed by: INTERNAL MEDICINE

## 2025-07-07 PROCEDURE — 86140 C-REACTIVE PROTEIN: CPT

## 2025-07-07 PROCEDURE — 86038 ANTINUCLEAR ANTIBODIES: CPT

## 2025-07-07 PROCEDURE — 84550 ASSAY OF BLOOD/URIC ACID: CPT

## 2025-07-07 PROCEDURE — 99214 OFFICE O/P EST MOD 30 MIN: CPT | Performed by: INTERNAL MEDICINE

## 2025-07-07 PROCEDURE — 86431 RHEUMATOID FACTOR QUANT: CPT

## 2025-07-07 PROCEDURE — 1126F AMNT PAIN NOTED NONE PRSNT: CPT | Performed by: INTERNAL MEDICINE

## 2025-07-07 RX ORDER — MELOXICAM 15 MG/1
15 TABLET ORAL DAILY
Qty: 90 TABLET | Refills: 3 | Status: SHIPPED | OUTPATIENT
Start: 2025-07-07 | End: 2025-07-07

## 2025-07-07 RX ORDER — MELOXICAM 15 MG/1
15 TABLET ORAL DAILY
Qty: 90 TABLET | Refills: 3 | Status: SHIPPED | OUTPATIENT
Start: 2025-07-07

## 2025-07-07 NOTE — PROGRESS NOTES
Chief Complaint  Arthritis and foot pain (Right foot pain)    Subjective        Elaina Tom presents to [facility]  Arthritis         History of Present Illness  The patient is a 80-year-old female who presents with joint discomfort and complaints of arthritis worsening.    She reports experiencing pain in every joint and muscle, which she attributes to her CVID. She has not started any new medications or received any recent vaccines. Her treatment regimen remains unchanged. She has a high tolerance for pain and does not take any medication for her joint pain. She experiences severe stiffness throughout her body. She is open to trying medication to alleviate her stiffness. She occasionally takes Tylenol but has never taken meloxicam. She is concerned about potential neuropathy due to her long-term use of Gammagard. She is also taking collagen supplements. She is currently taking tamoxifen and famotidine daily. She was previously prescribed vitamin E for fibrocystic disease and D3 for osteoporosis in her left arm.    She recalls mentioning foot pain during her last visit in 12/2024, which has since evolved into intermittent stabbing pain. She describes the sensation as if her toes are swollen underneath, with the stiffness extending upwards. She is planning a trip to [place] at the end of 08/2025 and is concerned about her ability to walk.    She has a family history of rheumatoid arthritis, with her grandfather being affected. She consulted with Dr. Li, her immunologist, who referred her to this clinic.    She has arthritis in both knees and saw Dr. Nicole in 05/2024 for an issue with her right knee, which was diagnosed as mild arthritis. She underwent physical therapy from 07/2024 to 01/2025, which provided some relief. She had a torn meniscus in her right knee, which was repaired.    PAST SURGICAL HISTORY:  Torn meniscus repair in the right knee.    SOCIAL HISTORY  She drinks a  "6-ounce glass of wine when she goes out to dinner with friends.    FAMILY HISTORY  Her grandfather had rheumatoid arthritis.      Objective   Vital Signs:  /82 (BP Location: Right arm)   Pulse 76   Temp 97.6 °F (36.4 °C)   Ht 162 cm (63.78\")   Wt 62.1 kg (137 lb)   SpO2 95%   BMI 23.68 kg/m²   Estimated body mass index is 23.68 kg/m² as calculated from the following:    Height as of this encounter: 162 cm (63.78\").    Weight as of this encounter: 62.1 kg (137 lb).    BMI is within normal parameters. No other follow-up for BMI required.      Past Medical History:   Diagnosis Date    Allergic rhinitis     Anxiety     Arthritis     Asthma     ONLY HAS ISSUES WHEN HAS RESPIRATORY INFECTION    Cancer 2017    BREAST-LEFT    Cataract     CVID (common variable immunodeficiency)     External hemorrhoids     Gastritis     GERD (gastroesophageal reflux disease)     H/O CT scan of abdomen     PELVIS AND CHEST: UNREMARKABLE    Hernia     Hiatal hernia     History of colon polyps     History of giardia infection     History of Helicobacter pylori infection     IBS (irritable bowel syndrome)     IgA deficiency     CANNOT HAVE ANYTHING WITH IGA IN IT WILL CAUSE ANAPHYLAXIS    Immunoglobulin deficiency     Peptic ulcer disease     PONV (postoperative nausea and vomiting)     Torn meniscus     Wound discharge     LT BREAST        Family History   Problem Relation Age of Onset    Other Father         POLYP    Colon polyps Father     Prostate cancer Father 68    Pancreatitis Daughter     Malig Hyperthermia Neg Hx         Social History     Socioeconomic History    Marital status:      Spouse name: Ortiz    Number of children: 3    Years of education: College   Tobacco Use    Smoking status: Never    Smokeless tobacco: Never   Vaping Use    Vaping status: Never Used   Substance and Sexual Activity    Alcohol use: Yes     Alcohol/week: 1.0 standard drink of alcohol     Types: 1 Glasses of wine per week     Comment: " occasional    Drug use: No    Sexual activity: Not Currently        Review of Systems   Musculoskeletal:  Positive for arthritis.        Physical Exam  Constitutional:       Appearance: Normal appearance.   Cardiovascular:      Rate and Rhythm: Normal rate and regular rhythm.   Musculoskeletal:         General: Tenderness present. No swelling or deformity. Normal range of motion.      Right lower leg: No edema.      Left lower leg: No edema.      Comments: Complains of tenderness to her left knee.  And her right forefoot occasionally.  No joint instability range of motion's appear normal.   Neurological:      Mental Status: She is alert.          Result Review :                Assessment and Plan   Diagnoses and all orders for this visit:    1. Arthritis (Primary)  -     Sedimentation Rate  -     Rheumatoid Factor; Future  -     C-reactive protein; Future  -     GUTIERREZ; Future  -     Uric acid; Future  -     Discontinue: meloxicam (MOBIC) 15 MG tablet; Take 1 tablet by mouth Daily.  Dispense: 90 tablet; Refill: 3  -     meloxicam (MOBIC) 15 MG tablet; Take 1 tablet by mouth Daily.  Dispense: 90 tablet; Refill: 3    2. Arthralgia, unspecified joint  -     Sedimentation Rate  -     Rheumatoid Factor; Future  -     C-reactive protein; Future  -     GUTIERREZ; Future  -     Uric acid; Future  -     Discontinue: meloxicam (MOBIC) 15 MG tablet; Take 1 tablet by mouth Daily.  Dispense: 90 tablet; Refill: 3  -     meloxicam (MOBIC) 15 MG tablet; Take 1 tablet by mouth Daily.  Dispense: 90 tablet; Refill: 3        Assessment & Plan  1. Joint discomfort.  - The joint discomfort could be attributed to muscle or ligament strain.  - Blood work will be conducted today to rule out any other potential causes.  - If the blood work results indicate rheumatoid arthritis or any other autoimmune diseases, a referral to a rheumatologist will be made.  - Meloxicam 15 mg once daily was prescribed, with instructions to take it with food and a full  glass of water.    2. Arthritis.  - The patient reports worsening arthritis symptoms, including stiffness and pain in multiple joints.  - She has a history of arthritis in both knees, with the right knee previously treated for a torn meniscus.  - Meloxicam 15 mg once daily was prescribed to help reduce inflammation and stiffness.  - If symptoms persist or worsen, further evaluation and potential referral to a rheumatologist will be considered.    3. Foot pain.  - The patient experiences intermittent stabbing pain in her foot, which may be related to muscle or ligament strain.  - An x-ray of the foot showed no erosions or arthritis.  - Meloxicam 15 mg once daily was prescribed to help manage the pain.  - If the pain continues, further diagnostic tests may be necessary.    4. Suspected rheumatoid arthritis.  - Given the family history of rheumatoid arthritis and the patient's symptoms, there is a suspicion of rheumatoid arthritis.  - Blood work will be conducted today to check for rheumatoid arthritis markers.  - If the results are positive, a referral to a rheumatologist will be made.  - Meloxicam 15 mg once daily was prescribed to help decrease joint stiffness and inflammation.            Follow Up   No follow-ups on file.  Patient was given instructions and counseling regarding her condition or for health maintenance advice. Please see specific information pulled into the AVS if appropriate.           Patient or patient representative verbalized consent for the use of Ambient Listening during the visit with  Christian Chandler MD for chart documentation. 7/7/2025  14:16 EDT

## 2025-07-08 ENCOUNTER — RESULTS FOLLOW-UP (OUTPATIENT)
Dept: INTERNAL MEDICINE | Facility: CLINIC | Age: 80
End: 2025-07-08
Payer: MEDICARE

## 2025-07-08 LAB — ANA SER QL: NEGATIVE

## 2025-07-08 NOTE — TELEPHONE ENCOUNTER
"Relay   Lmtcb x2  \"GUTIERREZ is negative. No evidence of any type of autoimmune disorder. \"        "

## 2025-07-08 NOTE — TELEPHONE ENCOUNTER
Name: Elaina Tom    Relationship: Self    Best Callback Number:      HUB PROVIDED THE RELAY MESSAGE FROM THE OFFICE   PATIENT HAS FURTHER QUESTIONS AND WOULD LIKE A CALL BACK.  PATIENT WANTS TO KNOW WHAT STEPS DOES SHE TAKE NOW, AND WHAT IS GOING ON WITH HER.   SHE WANTS TO KNOW IF IT IS JUST THE OSTEOARTHRITIS, WHAT DOES SHE DO.       ADDITIONAL INFORMATION:  PATIENT WANTS TO KNOW HOW THE MELOXICAM WILL INTERACT WITH HER OTHER MEDICATIONS, TAMOXIFEN AND FAMOTIDINE.   PLEASE ADVISE.

## 2025-07-09 ENCOUNTER — APPOINTMENT (OUTPATIENT)
Dept: WOMENS IMAGING | Facility: HOSPITAL | Age: 80
End: 2025-07-09
Payer: MEDICARE

## 2025-07-09 PROCEDURE — 77067 SCR MAMMO BI INCL CAD: CPT | Performed by: RADIOLOGY

## 2025-07-09 PROCEDURE — 77063 BREAST TOMOSYNTHESIS BI: CPT | Performed by: RADIOLOGY

## 2025-07-10 ENCOUNTER — INFUSION (OUTPATIENT)
Dept: ONCOLOGY | Facility: HOSPITAL | Age: 80
End: 2025-07-10
Payer: MEDICARE

## 2025-07-10 VITALS
RESPIRATION RATE: 16 BRPM | WEIGHT: 136.4 LBS | SYSTOLIC BLOOD PRESSURE: 110 MMHG | BODY MASS INDEX: 23.57 KG/M2 | HEART RATE: 74 BPM | DIASTOLIC BLOOD PRESSURE: 71 MMHG | TEMPERATURE: 97.4 F | OXYGEN SATURATION: 95 %

## 2025-07-10 DIAGNOSIS — D80.2 IGA DEFICIENCY: ICD-10-CM

## 2025-07-10 DIAGNOSIS — D83.9 COMMON VARIABLE IMMUNODEFICIENCY: Primary | ICD-10-CM

## 2025-07-10 PROCEDURE — 25010000002 IMMUNE GLOBULIN (HUMAN) PER 500 MG: Performed by: ALLERGY & IMMUNOLOGY

## 2025-07-10 PROCEDURE — 96365 THER/PROPH/DIAG IV INF INIT: CPT

## 2025-07-10 PROCEDURE — 96366 THER/PROPH/DIAG IV INF ADDON: CPT

## 2025-07-10 RX ORDER — ACETAMINOPHEN 500 MG
500 TABLET ORAL ONCE
Start: 2025-07-17 | End: 2025-07-17

## 2025-07-10 RX ORDER — DIPHENHYDRAMINE HCL 25 MG
25 CAPSULE ORAL ONCE
Start: 2025-07-17 | End: 2025-07-17

## 2025-07-10 RX ORDER — EPINEPHRINE 0.3 MG/.3ML
0.3 INJECTION SUBCUTANEOUS ONCE AS NEEDED
Start: 2025-07-17

## 2025-07-10 RX ORDER — DIPHENHYDRAMINE HCL 25 MG
50 CAPSULE ORAL ONCE AS NEEDED
Start: 2025-07-17

## 2025-07-10 RX ORDER — SODIUM CHLORIDE 9 MG/ML
250 INJECTION, SOLUTION INTRAVENOUS ONCE
OUTPATIENT
Start: 2025-07-17

## 2025-07-10 RX ADMIN — IMMUNE GLOBULIN INTRAVENOUS (HUMAN) 10 G: KIT at 11:21

## 2025-07-10 RX ADMIN — IMMUNE GLOBULIN INTRAVENOUS (HUMAN) 10 G: KIT at 12:40

## 2025-07-13 NOTE — PROGRESS NOTES
Subjective     REASON FOR FOLLOW UP:   1. Stage I ( T1c,N0,M0 ) ER+,HI+, HER 2 neg for a ductal carcinoma of the left breast status post lumpectomy 5/19/2017.  Patient started on adjuvant tamoxifen after the visit of 6/9/2017.  2.  Osteoporosis  3.  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  Development of draining seroma and poor incisional healing in the left breast that required further surgery on 7/3/2017.  5.  Genetic testing negative for any adverse mutations in June 2019    HISTORY OF PRESENT ILLNESS:  The patient is a 80 y.o. year old female who was noted to have a radiographic abnormality in the left breast in March 2017.  She underwent an ultrasound-guided biopsy on 3/28/2017 showing invasive ductal carcinoma which was strongly hormone receptor positive and HER-2/trae negative.  She elected to undergo a lumpectomy procedure with Dr. Leon on 5/19/2017.  The tumor size was 1.2 x 1 x 0.9 cm and 3 sentinel lymph nodes were examined all of which were negative for metastatic cancer.    The patient was seen on 6/9/2017 to discuss postop adjuvant hormonal therapy.  She has a history of osteoporosis.  We discussed initiation of tamoxifen therapy which would have the advantage of helping to maintain her bone density.    Ms. Tom is seen today by me for the first time.  She was previously seen by Dr. Hickman and has been on tamoxifen since 2017.  She has been tolerating tamoxifen well.    The plan is to continue tamoxifen for total of 10 years.    She also has common variable immunodeficiency and has been on IVIG infusions monthly.  She sees an immunologist regularly.    Reports that she is up-to-date on DEXA with the last one being performed last year by her gynecologist.  She is currently not on any bisphosphonates for osteoporosis.    Interval History:  The patient returns today for 6 month follow up.  She continues on Tamoxifen 20 mg daily.  Continues monthly IVIG infusions through  "her immunologist.  Continues to tolerate tamoxifen well.  Denies any hot flashes.  Does have chronic issues with IBS, recently had a flare and is following with GI for this.  They are planning stool studies and follow-up with her on 8/11/2025.  She otherwise denies nausea, vomiting, abdominal pain, bone pain, cough or shortness of breath.  Denies any new breast mass.    Screening mammogram 7/9/2025, benign.      ALLERGIES:    Allergies   Allergen Reactions    Other Anaphylaxis     BLOOD PRODUCTS WITH IGA IN THEM    Erythromycin GI Intolerance     VOMITING    Avelox [Moxifloxacin] Rash    Levaquin [Levofloxacin] Rash       ROS per HPI    Objective     Vitals:    07/17/25 1539   BP: 128/78   Pulse: 86   Resp: 16   Temp: 97.7 °F (36.5 °C)   TempSrc: Oral   SpO2: 95%   Weight: 61.8 kg (136 lb 3.2 oz)   Height: 162 cm (63.78\")   PainSc: 0-No pain           7/17/2025     3:33 PM   Current Status   ECOG score 0     Physical Exam   Constitutional: She is oriented to person, place, and time. She appears well-developed. No distress.   HENT:   Head: Normocephalic.   Eyes: Pupils are equal, round, and reactive to light. Conjunctivae are normal. No scleral icterus.   Neck: No JVD present. No thyromegaly present.   Cardiovascular: Normal rate and regular rhythm. Exam reveals no gallop and no friction rub.   No murmur heard.  Pulmonary/Chest: Effort normal and breath sounds normal. She has no wheezes. She has no rales. Right breast exhibits no mass, no nipple discharge and no skin change. Left breast exhibits skin change. Left breast exhibits no mass and no nipple discharge.   Left breast shows healed lumpectomy scar and radiation tattoos.   Abdominal: Soft. Bowel sounds are normal. She exhibits no distension and no mass. There is no abdominal tenderness.   Musculoskeletal: Normal range of motion. No deformity.   Lymphadenopathy:     She has no cervical adenopathy.   Neurological: She is alert and oriented to person, place, and " time. She has normal reflexes. No cranial nerve deficit.   Skin: Skin is warm and dry. No erythema.   Psychiatric: Her behavior is normal. Judgment normal.     I have reexamined the patient and the results are consistent with the previously documented exam. BEATRICE Payne      RECENT LABS:  Hematology WBC   Date Value Ref Range Status   07/17/2025 5.12 3.40 - 10.80 10*3/mm3 Final   04/29/2020 5.1 3.4 - 10.8 x10E3/uL Final     RBC   Date Value Ref Range Status   07/17/2025 3.65 (L) 3.77 - 5.28 10*6/mm3 Final   04/29/2020 3.85 3.77 - 5.28 x10E6/uL Final     Hemoglobin   Date Value Ref Range Status   07/17/2025 12.1 12.0 - 15.9 g/dL Final     Hematocrit   Date Value Ref Range Status   07/17/2025 36.9 34.0 - 46.6 % Final     Platelets   Date Value Ref Range Status   07/17/2025 224 140 - 450 10*3/mm3 Final              Assessment & Plan     *Stage I, hormone receptor positive, HER-2/trae negative invasive ductal carcinoma the left breast status post lumpectomy.    She has been on adjuvant hormonal therapy with tamoxifen 20 mg daily since June 2017.   She elected to remain on tamoxifen for total of 10 years.  Mammogram 7/9/2025 benign.  7/17/2025: Continues Tamoxifen 20 mg daily, tolerating well.  Currently there is no evidence of recurrence of disease.      *Osteoporosis on her last DEXA scan.    Continues DEXA scan through gynecology.    *Common variable immunodeficiency   Requiring monthly IV immunoglobulin infusions.    IgG levels are checked every 6 months.  Continue follow-up with immunologist    *family history of breast cancer  her daughter Ally Montoya who is also a patient of ours was diagnosed with breast cancer in her 40s.    Both patient and her daughter underwent genetic testing which per patient report are negative.    She had questions regarding her granddaughter's risk of breast cancer and we previously discussed the possible risk and recommendations.    Plan:   Continue tamoxifen 20 mg daily  with plans to continue treatment for up to 10 years through June 2027.  Screening mammogram next due July 2026, ordered today.   She has been vigilant about having [facility]  DEXA scan through gynecology.  currently not on any treatment for osteoporosis.  She reports that the osteoporosis is predominantly in the forearm and her gynecologist is recommending continuing tamoxifen to help with the same.  monthly IVIG through immunologist  6 months MD.    Patient is on medications requiring close monitoring for toxicities

## 2025-07-16 ENCOUNTER — TELEPHONE (OUTPATIENT)
Dept: GASTROENTEROLOGY | Facility: CLINIC | Age: 80
End: 2025-07-16
Payer: MEDICARE

## 2025-07-16 DIAGNOSIS — Z17.0 MALIGNANT NEOPLASM OF LOWER-OUTER QUADRANT OF LEFT BREAST OF FEMALE, ESTROGEN RECEPTOR POSITIVE: Primary | ICD-10-CM

## 2025-07-16 DIAGNOSIS — D80.2 IGA DEFICIENCY: ICD-10-CM

## 2025-07-16 DIAGNOSIS — R19.7 DIARRHEA, UNSPECIFIED TYPE: Primary | ICD-10-CM

## 2025-07-16 DIAGNOSIS — C50.512 MALIGNANT NEOPLASM OF LOWER-OUTER QUADRANT OF LEFT BREAST OF FEMALE, ESTROGEN RECEPTOR POSITIVE: Primary | ICD-10-CM

## 2025-07-16 NOTE — TELEPHONE ENCOUNTER
Pt stated for the last week, after returning from [place] she has been experiencing diarrhea.  She reports she is going up to 4 times a day.  Denies blood, denies cramping, denies urgency.      Pt reporting a hx of IBS, Hpylori and a parasite infection.  She also is reporting a compromised immune system.

## 2025-07-16 NOTE — TELEPHONE ENCOUNTER
Caller: Elaina Tom    Relationship: Self    Best call back number: [phone]         What was the call regarding: PATIENT HAS IBS AND HAS HAD DIARRHEA FOR OVER A WEEK NOW AND HAS TAKEN THE dicyclomine (BENTYL) 10 MG capsule PRESCRIBED. PATIENT IS CONCERNED ABOUT A POSSIBLE PARASITE. PATIENT HAS TAKEN FIBER. PATIENT HAS MULTIPLE BOUTS DURING THE DAY. PATIENT HAS NOT TAKEN ANY ANTI DIARRHEA MEDICATION.

## 2025-07-17 ENCOUNTER — LAB (OUTPATIENT)
Dept: OTHER | Facility: HOSPITAL | Age: 80
End: 2025-07-17
Payer: MEDICARE

## 2025-07-17 ENCOUNTER — OFFICE VISIT (OUTPATIENT)
Dept: ONCOLOGY | Facility: CLINIC | Age: 80
End: 2025-07-17
Payer: MEDICARE

## 2025-07-17 VITALS
WEIGHT: 136.2 LBS | TEMPERATURE: 97.7 F | BODY MASS INDEX: 23.25 KG/M2 | RESPIRATION RATE: 16 BRPM | HEIGHT: 64 IN | SYSTOLIC BLOOD PRESSURE: 128 MMHG | HEART RATE: 86 BPM | OXYGEN SATURATION: 95 % | DIASTOLIC BLOOD PRESSURE: 78 MMHG

## 2025-07-17 DIAGNOSIS — C50.512 MALIGNANT NEOPLASM OF LOWER-OUTER QUADRANT OF LEFT BREAST OF FEMALE, ESTROGEN RECEPTOR POSITIVE: Primary | ICD-10-CM

## 2025-07-17 DIAGNOSIS — C50.512 MALIGNANT NEOPLASM OF LOWER-OUTER QUADRANT OF LEFT BREAST OF FEMALE, ESTROGEN RECEPTOR POSITIVE: ICD-10-CM

## 2025-07-17 DIAGNOSIS — Z17.0 MALIGNANT NEOPLASM OF LOWER-OUTER QUADRANT OF LEFT BREAST OF FEMALE, ESTROGEN RECEPTOR POSITIVE: ICD-10-CM

## 2025-07-17 DIAGNOSIS — Z79.810 LONG-TERM CURRENT USE OF TAMOXIFEN: ICD-10-CM

## 2025-07-17 DIAGNOSIS — Z12.31 SCREENING MAMMOGRAM, ENCOUNTER FOR: ICD-10-CM

## 2025-07-17 DIAGNOSIS — Z17.0 MALIGNANT NEOPLASM OF LOWER-OUTER QUADRANT OF LEFT BREAST OF FEMALE, ESTROGEN RECEPTOR POSITIVE: Primary | ICD-10-CM

## 2025-07-17 LAB
ALBUMIN SERPL-MCNC: 4.3 G/DL (ref 3.5–5.2)
ALBUMIN/GLOB SERPL: 1.5 G/DL
ALP SERPL-CCNC: 61 U/L (ref 39–117)
ALT SERPL W P-5'-P-CCNC: 19 U/L (ref 1–33)
ANION GAP SERPL CALCULATED.3IONS-SCNC: 8.1 MMOL/L (ref 5–15)
AST SERPL-CCNC: 25 U/L (ref 1–32)
BASOPHILS # BLD AUTO: 0.03 10*3/MM3 (ref 0–0.2)
BASOPHILS NFR BLD AUTO: 0.6 % (ref 0–1.5)
BILIRUB SERPL-MCNC: 0.5 MG/DL (ref 0–1.2)
BUN SERPL-MCNC: 13.3 MG/DL (ref 8–23)
BUN/CREAT SERPL: 18.5 (ref 7–25)
CALCIUM SPEC-SCNC: 9.3 MG/DL (ref 8.6–10.5)
CHLORIDE SERPL-SCNC: 107 MMOL/L (ref 98–107)
CO2 SERPL-SCNC: 27.9 MMOL/L (ref 22–29)
CREAT SERPL-MCNC: 0.72 MG/DL (ref 0.57–1)
DEPRECATED RDW RBC AUTO: 50.6 FL (ref 37–54)
EGFRCR SERPLBLD CKD-EPI 2021: 85.7 ML/MIN/1.73
EOSINOPHIL # BLD AUTO: 0.04 10*3/MM3 (ref 0–0.4)
EOSINOPHIL NFR BLD AUTO: 0.8 % (ref 0.3–6.2)
ERYTHROCYTE [DISTWIDTH] IN BLOOD BY AUTOMATED COUNT: 13.6 % (ref 12.3–15.4)
GLOBULIN UR ELPH-MCNC: 2.8 GM/DL
GLUCOSE SERPL-MCNC: 108 MG/DL (ref 65–99)
HCT VFR BLD AUTO: 36.9 % (ref 34–46.6)
HGB BLD-MCNC: 12.1 G/DL (ref 12–15.9)
IMM GRANULOCYTES # BLD AUTO: 0.01 10*3/MM3 (ref 0–0.05)
IMM GRANULOCYTES NFR BLD AUTO: 0.2 % (ref 0–0.5)
LYMPHOCYTES # BLD AUTO: 1.25 10*3/MM3 (ref 0.7–3.1)
LYMPHOCYTES NFR BLD AUTO: 24.4 % (ref 19.6–45.3)
MCH RBC QN AUTO: 33.2 PG (ref 26.6–33)
MCHC RBC AUTO-ENTMCNC: 32.8 G/DL (ref 31.5–35.7)
MCV RBC AUTO: 101.1 FL (ref 79–97)
MONOCYTES # BLD AUTO: 0.36 10*3/MM3 (ref 0.1–0.9)
MONOCYTES NFR BLD AUTO: 7 % (ref 5–12)
NEUTROPHILS NFR BLD AUTO: 3.43 10*3/MM3 (ref 1.7–7)
NEUTROPHILS NFR BLD AUTO: 67 % (ref 42.7–76)
NRBC BLD AUTO-RTO: 0 /100 WBC (ref 0–0.2)
PLATELET # BLD AUTO: 224 10*3/MM3 (ref 140–450)
PMV BLD AUTO: 9.7 FL (ref 6–12)
POTASSIUM SERPL-SCNC: 4 MMOL/L (ref 3.5–5.2)
PROT SERPL-MCNC: 7.1 G/DL (ref 6–8.5)
RBC # BLD AUTO: 3.65 10*6/MM3 (ref 3.77–5.28)
SODIUM SERPL-SCNC: 143 MMOL/L (ref 136–145)
WBC NRBC COR # BLD AUTO: 5.12 10*3/MM3 (ref 3.4–10.8)

## 2025-07-17 PROCEDURE — 80053 COMPREHEN METABOLIC PANEL: CPT | Performed by: NURSE PRACTITIONER

## 2025-07-17 PROCEDURE — 85025 COMPLETE CBC W/AUTO DIFF WBC: CPT | Performed by: NURSE PRACTITIONER

## 2025-07-17 PROCEDURE — 36415 COLL VENOUS BLD VENIPUNCTURE: CPT

## 2025-07-17 NOTE — TELEPHONE ENCOUNTER
Patient called. Advised as per Radha's note. She verb understanding. Stool kit placed at the  for .

## 2025-07-28 LAB
O+P SPEC MICRO: NORMAL
O+P STL CONC: NORMAL

## 2025-08-11 ENCOUNTER — OFFICE VISIT (OUTPATIENT)
Dept: GASTROENTEROLOGY | Facility: CLINIC | Age: 80
End: 2025-08-11
Payer: MEDICARE

## 2025-08-11 VITALS
HEART RATE: 94 BPM | TEMPERATURE: 97.1 F | WEIGHT: 136.9 LBS | SYSTOLIC BLOOD PRESSURE: 142 MMHG | BODY MASS INDEX: 23.37 KG/M2 | HEIGHT: 64 IN | DIASTOLIC BLOOD PRESSURE: 85 MMHG

## 2025-08-11 DIAGNOSIS — K58.0 IRRITABLE BOWEL SYNDROME WITH DIARRHEA: Primary | ICD-10-CM

## 2025-08-11 PROCEDURE — 99213 OFFICE O/P EST LOW 20 MIN: CPT | Performed by: PHYSICIAN ASSISTANT

## 2025-08-11 PROCEDURE — 1159F MED LIST DOCD IN RCRD: CPT | Performed by: PHYSICIAN ASSISTANT

## 2025-08-11 PROCEDURE — 1160F RVW MEDS BY RX/DR IN RCRD: CPT | Performed by: PHYSICIAN ASSISTANT

## 2025-08-11 RX ORDER — ALBUTEROL SULFATE 90 UG/1
2 INHALANT RESPIRATORY (INHALATION) EVERY 4 HOURS
COMMUNITY
Start: 2025-05-13

## 2025-08-20 RX ORDER — SODIUM CHLORIDE 9 MG/ML
250 INJECTION, SOLUTION INTRAVENOUS ONCE
Status: CANCELLED | OUTPATIENT
Start: 2025-08-20

## 2025-08-20 RX ORDER — EPINEPHRINE 0.3 MG/.3ML
0.3 INJECTION SUBCUTANEOUS ONCE AS NEEDED
Status: CANCELLED
Start: 2025-08-20

## 2025-08-20 RX ORDER — DIPHENHYDRAMINE HCL 25 MG
50 CAPSULE ORAL ONCE AS NEEDED
Status: CANCELLED
Start: 2025-08-20

## 2025-08-20 RX ORDER — ACETAMINOPHEN 500 MG
500 TABLET ORAL ONCE
Status: CANCELLED
Start: 2025-08-20 | End: 2025-08-20

## 2025-08-20 RX ORDER — DIPHENHYDRAMINE HCL 25 MG
25 CAPSULE ORAL ONCE
Status: CANCELLED
Start: 2025-08-20 | End: 2025-08-20

## 2025-08-21 ENCOUNTER — INFUSION (OUTPATIENT)
Dept: ONCOLOGY | Facility: HOSPITAL | Age: 80
End: 2025-08-21
Payer: MEDICARE

## 2025-08-21 VITALS
DIASTOLIC BLOOD PRESSURE: 71 MMHG | OXYGEN SATURATION: 97 % | WEIGHT: 136.2 LBS | TEMPERATURE: 97.6 F | HEIGHT: 64 IN | SYSTOLIC BLOOD PRESSURE: 118 MMHG | HEART RATE: 68 BPM | RESPIRATION RATE: 15 BRPM | BODY MASS INDEX: 23.25 KG/M2

## 2025-08-21 DIAGNOSIS — D83.9 COMMON VARIABLE IMMUNODEFICIENCY: Primary | ICD-10-CM

## 2025-08-21 DIAGNOSIS — D80.2 IGA DEFICIENCY: ICD-10-CM

## 2025-08-21 LAB
ALBUMIN SERPL-MCNC: 3.8 G/DL (ref 3.5–5.2)
ALBUMIN/GLOB SERPL: 1.7 G/DL
ALP SERPL-CCNC: 52 U/L (ref 39–117)
ALT SERPL W P-5'-P-CCNC: 18 U/L (ref 1–33)
ANION GAP SERPL CALCULATED.3IONS-SCNC: 9.7 MMOL/L (ref 5–15)
AST SERPL-CCNC: 23 U/L (ref 1–32)
BASOPHILS # BLD AUTO: 0.04 10*3/MM3 (ref 0–0.2)
BASOPHILS NFR BLD AUTO: 1 % (ref 0–1.5)
BILIRUB SERPL-MCNC: 0.5 MG/DL (ref 0–1.2)
BUN SERPL-MCNC: 14.6 MG/DL (ref 8–23)
BUN/CREAT SERPL: 25.6 (ref 7–25)
CALCIUM SPEC-SCNC: 8.8 MG/DL (ref 8.6–10.5)
CHLORIDE SERPL-SCNC: 108 MMOL/L (ref 98–107)
CO2 SERPL-SCNC: 24.3 MMOL/L (ref 22–29)
CREAT SERPL-MCNC: 0.57 MG/DL (ref 0.57–1)
DEPRECATED RDW RBC AUTO: 49.1 FL (ref 37–54)
EGFRCR SERPLBLD CKD-EPI 2021: 93.2 ML/MIN/1.73
EOSINOPHIL # BLD AUTO: 0.05 10*3/MM3 (ref 0–0.4)
EOSINOPHIL NFR BLD AUTO: 1.2 % (ref 0.3–6.2)
ERYTHROCYTE [DISTWIDTH] IN BLOOD BY AUTOMATED COUNT: 13.3 % (ref 12.3–15.4)
GLOBULIN UR ELPH-MCNC: 2.3 GM/DL
GLUCOSE SERPL-MCNC: 90 MG/DL (ref 65–99)
HCT VFR BLD AUTO: 37.2 % (ref 34–46.6)
HGB BLD-MCNC: 12.2 G/DL (ref 12–15.9)
IGG1 SER-MCNC: 969 MG/DL (ref 700–1600)
IMM GRANULOCYTES # BLD AUTO: 0.01 10*3/MM3 (ref 0–0.05)
IMM GRANULOCYTES NFR BLD AUTO: 0.2 % (ref 0–0.5)
LYMPHOCYTES # BLD AUTO: 0.98 10*3/MM3 (ref 0.7–3.1)
LYMPHOCYTES NFR BLD AUTO: 23.6 % (ref 19.6–45.3)
MCH RBC QN AUTO: 32.9 PG (ref 26.6–33)
MCHC RBC AUTO-ENTMCNC: 32.8 G/DL (ref 31.5–35.7)
MCV RBC AUTO: 100.3 FL (ref 79–97)
MONOCYTES # BLD AUTO: 0.41 10*3/MM3 (ref 0.1–0.9)
MONOCYTES NFR BLD AUTO: 9.9 % (ref 5–12)
NEUTROPHILS NFR BLD AUTO: 2.67 10*3/MM3 (ref 1.7–7)
NEUTROPHILS NFR BLD AUTO: 64.1 % (ref 42.7–76)
NRBC BLD AUTO-RTO: 0 /100 WBC (ref 0–0.2)
PLATELET # BLD AUTO: 195 10*3/MM3 (ref 140–450)
PMV BLD AUTO: 9.6 FL (ref 6–12)
POTASSIUM SERPL-SCNC: 3.8 MMOL/L (ref 3.5–5.2)
PROT SERPL-MCNC: 6.1 G/DL (ref 6–8.5)
RBC # BLD AUTO: 3.71 10*6/MM3 (ref 3.77–5.28)
SODIUM SERPL-SCNC: 142 MMOL/L (ref 136–145)
WBC NRBC COR # BLD AUTO: 4.16 10*3/MM3 (ref 3.4–10.8)

## 2025-08-21 PROCEDURE — 96366 THER/PROPH/DIAG IV INF ADDON: CPT

## 2025-08-21 PROCEDURE — 25010000002 IMMUNE GLOBULIN (HUMAN) PER 500 MG: Performed by: ALLERGY & IMMUNOLOGY

## 2025-08-21 PROCEDURE — 96365 THER/PROPH/DIAG IV INF INIT: CPT

## 2025-08-21 RX ORDER — DIPHENHYDRAMINE HCL 25 MG
50 CAPSULE ORAL ONCE AS NEEDED
Start: 2025-09-18

## 2025-08-21 RX ORDER — DIPHENHYDRAMINE HCL 25 MG
25 CAPSULE ORAL ONCE
Start: 2025-09-18 | End: 2025-09-18

## 2025-08-21 RX ORDER — ACETAMINOPHEN 500 MG
500 TABLET ORAL ONCE
Start: 2025-09-18 | End: 2025-09-18

## 2025-08-21 RX ORDER — EPINEPHRINE 0.3 MG/.3ML
0.3 INJECTION SUBCUTANEOUS ONCE AS NEEDED
Start: 2025-09-18

## 2025-08-21 RX ORDER — SODIUM CHLORIDE 9 MG/ML
250 INJECTION, SOLUTION INTRAVENOUS ONCE
OUTPATIENT
Start: 2025-09-18

## 2025-08-21 RX ADMIN — IMMUNE GLOBULIN INTRAVENOUS (HUMAN) 10 G: KIT at 12:19

## 2025-08-21 RX ADMIN — IMMUNE GLOBULIN INTRAVENOUS (HUMAN) 10 G: KIT at 13:42

## (undated) DEVICE — SPNG GZ WOVN 4X4IN 12PLY 10/BX STRL

## (undated) DEVICE — ENCORE® LATEX ORTHO SIZE 7.5, STERILE LATEX POWDER-FREE SURGICAL GLOVE: Brand: ENCORE

## (undated) DEVICE — TUBING, SUCTION, 1/4" X 10', STRAIGHT: Brand: MEDLINE

## (undated) DEVICE — PAD,ABDOMINAL,8"X10",ST,LF: Brand: MEDLINE

## (undated) DEVICE — Device: Brand: DEFENDO AIR/WATER/SUCTION AND BIOPSY VALVE

## (undated) DEVICE — SENSR O2 OXIMAX FNGR A/ 18IN NONSTR

## (undated) DEVICE — CANN NASL CO2 TRULINK W/O2 A/

## (undated) DEVICE — SUT VIC 3/0 SH 27IN J416H

## (undated) DEVICE — GAUZE,SPONGE,4"X4",16PLY,XRAY,STRL,LF: Brand: MEDLINE

## (undated) DEVICE — LN SMPL CO2 SHTRM SD STREAM W/M LUER

## (undated) DEVICE — KT ORCA ORCAPOD DISP STRL

## (undated) DEVICE — SUT ETHLN 3/0 PS1 18IN 1663H

## (undated) DEVICE — NDL HYPO PRECISIONGLIDE REG 25G 1 1/2

## (undated) DEVICE — SKIN PREP TRAY W/CHG: Brand: MEDLINE INDUSTRIES, INC.

## (undated) DEVICE — SUT VIC 5/0 PS2 18IN J495H

## (undated) DEVICE — CVR TRANSD CIV FLX TPR 11.9 TO 3.8X61CM

## (undated) DEVICE — ADHS SKIN DERMABOND TOP ADVANCED

## (undated) DEVICE — CONTAINER,SPECIMEN,OR STERILE,4OZ: Brand: MEDLINE

## (undated) DEVICE — THE TORRENT IRRIGATION SCOPE CONNECTOR IS USED WITH THE TORRENT IRRIGATION TUBING TO PROVIDE IRRIGATION FLUIDS SUCH AS STERILE WATER DURING GASTROINTESTINAL ENDOSCOPIC PROCEDURES WHEN USED IN CONJUNCTION WITH AN IRRIGATION PUMP (OR ELECTROSURGICAL UNIT).: Brand: TORRENT

## (undated) DEVICE — FRCP BX RADJAW4 NDL 2.8 240CM LG OG BX40

## (undated) DEVICE — SUT ETHLN 4/0 PS2 PLSTC 1667G

## (undated) DEVICE — ADAPT CLN BIOGUARD AIR/H2O DISP

## (undated) DEVICE — SUT SILK 2/0 FS BLK 18IN 685G

## (undated) DEVICE — PK PROC MINOR TOWER 40

## (undated) DEVICE — CULT AER/ANAEROB FASTIDIOUS BACT

## (undated) DEVICE — MSK ENDO PORT O2 POM ELITE CURAPLEX A/

## (undated) DEVICE — SUT VIC 3/0 TIES 18IN J110T

## (undated) DEVICE — DRSNG SURESITE WNDW 4X4.5

## (undated) DEVICE — BLCK/BITE BLOX W/DENTL/RIM W/STRAP 54F

## (undated) DEVICE — JACKSON-PRATT 100CC BULB RESERVOIR: Brand: CARDINAL HEALTH

## (undated) DEVICE — HANDPIECE SET WITH COAXIAL HIGH FLOW TIP AND SUCTION TUBE: Brand: INTERPULSE

## (undated) DEVICE — ELECTRD BLD EDGE/INSUL1P 2.4X5.1MM STRL

## (undated) DEVICE — SINGLE-USE BIOPSY FORCEPS: Brand: RADIAL JAW 4

## (undated) DEVICE — Device

## (undated) DEVICE — BITEBLOCK OMNI BLOC